# Patient Record
Sex: MALE | Race: WHITE | NOT HISPANIC OR LATINO | URBAN - METROPOLITAN AREA
[De-identification: names, ages, dates, MRNs, and addresses within clinical notes are randomized per-mention and may not be internally consistent; named-entity substitution may affect disease eponyms.]

---

## 2019-01-21 ENCOUNTER — OFFICE (OUTPATIENT)
Dept: URBAN - METROPOLITAN AREA CLINIC 75 | Facility: CLINIC | Age: 69
End: 2019-01-21

## 2019-01-21 VITALS
DIASTOLIC BLOOD PRESSURE: 76 MMHG | WEIGHT: 209 LBS | HEART RATE: 69 BPM | HEIGHT: 66 IN | SYSTOLIC BLOOD PRESSURE: 164 MMHG

## 2019-01-21 DIAGNOSIS — K75.81 NONALCOHOLIC STEATOHEPATITIS (NASH): ICD-10-CM

## 2019-01-21 DIAGNOSIS — Z86.010 PERSONAL HISTORY OF COLONIC POLYPS: ICD-10-CM

## 2019-01-21 PROCEDURE — 99214 OFFICE O/P EST MOD 30 MIN: CPT | Performed by: INTERNAL MEDICINE

## 2020-01-06 ENCOUNTER — OFFICE (OUTPATIENT)
Dept: URBAN - METROPOLITAN AREA CLINIC 75 | Facility: CLINIC | Age: 70
End: 2020-01-06

## 2020-01-06 VITALS
HEIGHT: 66 IN | SYSTOLIC BLOOD PRESSURE: 136 MMHG | WEIGHT: 197 LBS | HEART RATE: 99 BPM | DIASTOLIC BLOOD PRESSURE: 74 MMHG

## 2020-01-06 DIAGNOSIS — K75.81 NONALCOHOLIC STEATOHEPATITIS (NASH): ICD-10-CM

## 2020-01-06 DIAGNOSIS — E11.9 TYPE 2 DIABETES MELLITUS WITHOUT COMPLICATIONS: ICD-10-CM

## 2020-01-06 DIAGNOSIS — Z86.010 PERSONAL HISTORY OF COLONIC POLYPS: ICD-10-CM

## 2020-01-06 DIAGNOSIS — I10 ESSENTIAL (PRIMARY) HYPERTENSION: ICD-10-CM

## 2020-01-06 PROCEDURE — 99214 OFFICE O/P EST MOD 30 MIN: CPT | Performed by: INTERNAL MEDICINE

## 2021-01-13 ENCOUNTER — CONSULT (OUTPATIENT)
Dept: ONCOLOGY | Facility: CLINIC | Age: 71
End: 2021-01-13

## 2021-01-13 ENCOUNTER — LAB (OUTPATIENT)
Dept: LAB | Facility: HOSPITAL | Age: 71
End: 2021-01-13

## 2021-01-13 VITALS
BODY MASS INDEX: 31.67 KG/M2 | HEART RATE: 88 BPM | WEIGHT: 190.1 LBS | RESPIRATION RATE: 16 BRPM | TEMPERATURE: 98.9 F | HEIGHT: 65 IN | OXYGEN SATURATION: 97 %

## 2021-01-13 DIAGNOSIS — D64.9 ANEMIA, UNSPECIFIED TYPE: ICD-10-CM

## 2021-01-13 DIAGNOSIS — D69.6 THROMBOCYTOPENIA (HCC): Primary | ICD-10-CM

## 2021-01-13 DIAGNOSIS — D61.818 PANCYTOPENIA (HCC): ICD-10-CM

## 2021-01-13 DIAGNOSIS — K76.0 FATTY LIVER: ICD-10-CM

## 2021-01-13 LAB
ALBUMIN SERPL-MCNC: 3.7 G/DL (ref 3.5–5.2)
ALBUMIN/GLOB SERPL: 1.1 G/DL (ref 1.1–2.4)
ALP SERPL-CCNC: 132 U/L (ref 38–116)
ALT SERPL W P-5'-P-CCNC: 84 U/L (ref 0–41)
ANION GAP SERPL CALCULATED.3IONS-SCNC: 12.1 MMOL/L (ref 5–15)
AST SERPL-CCNC: 94 U/L (ref 0–40)
BASOPHILS # BLD AUTO: 0.02 10*3/MM3 (ref 0–0.2)
BASOPHILS NFR BLD AUTO: 0.6 % (ref 0–1.5)
BILIRUB SERPL-MCNC: 0.8 MG/DL (ref 0.2–1.2)
BUN SERPL-MCNC: 18 MG/DL (ref 6–20)
BUN/CREAT SERPL: 17 (ref 7.3–30)
CALCIUM SPEC-SCNC: 9.7 MG/DL (ref 8.5–10.2)
CHLORIDE SERPL-SCNC: 100 MMOL/L (ref 98–107)
CO2 SERPL-SCNC: 22.9 MMOL/L (ref 22–29)
CREAT SERPL-MCNC: 1.06 MG/DL (ref 0.7–1.3)
DEPRECATED RDW RBC AUTO: 45 FL (ref 37–54)
EOSINOPHIL # BLD AUTO: 0.22 10*3/MM3 (ref 0–0.4)
EOSINOPHIL NFR BLD AUTO: 6.8 % (ref 0.3–6.2)
ERYTHROCYTE [DISTWIDTH] IN BLOOD BY AUTOMATED COUNT: 13.8 % (ref 12.3–15.4)
FERRITIN SERPL-MCNC: 41.5 NG/ML (ref 30–400)
FOLATE SERPL-MCNC: >20 NG/ML (ref 4.78–24.2)
GFR SERPL CREATININE-BSD FRML MDRD: 69 ML/MIN/1.73
GLOBULIN UR ELPH-MCNC: 3.3 GM/DL (ref 1.8–3.5)
GLUCOSE SERPL-MCNC: 259 MG/DL (ref 74–124)
HAPTOGLOB SERPL-MCNC: 55 MG/DL (ref 30–200)
HCT VFR BLD AUTO: 31.1 % (ref 37.5–51)
HGB BLD-MCNC: 10.9 G/DL (ref 13–17.7)
IMM GRANULOCYTES # BLD AUTO: 0.01 10*3/MM3 (ref 0–0.05)
IMM GRANULOCYTES NFR BLD AUTO: 0.3 % (ref 0–0.5)
IRON 24H UR-MRATE: 48 MCG/DL (ref 59–158)
IRON SATN MFR SERPL: 10 % (ref 14–48)
LDH SERPL-CCNC: 197 U/L (ref 99–259)
LYMPHOCYTES # BLD AUTO: 0.84 10*3/MM3 (ref 0.7–3.1)
LYMPHOCYTES NFR BLD AUTO: 25.8 % (ref 19.6–45.3)
MCH RBC QN AUTO: 31.1 PG (ref 26.6–33)
MCHC RBC AUTO-ENTMCNC: 35 G/DL (ref 31.5–35.7)
MCV RBC AUTO: 88.9 FL (ref 79–97)
MONOCYTES # BLD AUTO: 0.33 10*3/MM3 (ref 0.1–0.9)
MONOCYTES NFR BLD AUTO: 10.2 % (ref 5–12)
NEUTROPHILS NFR BLD AUTO: 1.83 10*3/MM3 (ref 1.7–7)
NEUTROPHILS NFR BLD AUTO: 56.3 % (ref 42.7–76)
NRBC BLD AUTO-RTO: 0 /100 WBC (ref 0–0.2)
PLATELET # BLD AUTO: 48 10*3/MM3 (ref 140–450)
PLATELETS.RETICULATED NFR BLD AUTO: 7 % (ref 0.9–6.5)
PMV BLD AUTO: 12.6 FL (ref 6–12)
POTASSIUM SERPL-SCNC: 3.9 MMOL/L (ref 3.5–4.7)
PROT SERPL-MCNC: 7 G/DL (ref 6.3–8)
RBC # BLD AUTO: 3.5 10*6/MM3 (ref 4.14–5.8)
SODIUM SERPL-SCNC: 135 MMOL/L (ref 134–145)
TIBC SERPL-MCNC: 470 MCG/DL (ref 249–505)
TRANSFERRIN SERPL-MCNC: 336 MG/DL (ref 200–360)
VIT B12 BLD-MCNC: 1413 PG/ML (ref 211–946)
WBC # BLD AUTO: 3.25 10*3/MM3 (ref 3.4–10.8)

## 2021-01-13 PROCEDURE — 84466 ASSAY OF TRANSFERRIN: CPT | Performed by: INTERNAL MEDICINE

## 2021-01-13 PROCEDURE — 82746 ASSAY OF FOLIC ACID SERUM: CPT | Performed by: INTERNAL MEDICINE

## 2021-01-13 PROCEDURE — 85025 COMPLETE CBC W/AUTO DIFF WBC: CPT

## 2021-01-13 PROCEDURE — 83010 ASSAY OF HAPTOGLOBIN QUANT: CPT | Performed by: INTERNAL MEDICINE

## 2021-01-13 PROCEDURE — 82728 ASSAY OF FERRITIN: CPT | Performed by: INTERNAL MEDICINE

## 2021-01-13 PROCEDURE — 80053 COMPREHEN METABOLIC PANEL: CPT | Performed by: INTERNAL MEDICINE

## 2021-01-13 PROCEDURE — 99204 OFFICE O/P NEW MOD 45 MIN: CPT | Performed by: INTERNAL MEDICINE

## 2021-01-13 PROCEDURE — 36415 COLL VENOUS BLD VENIPUNCTURE: CPT

## 2021-01-13 PROCEDURE — 82607 VITAMIN B-12: CPT | Performed by: INTERNAL MEDICINE

## 2021-01-13 PROCEDURE — 83540 ASSAY OF IRON: CPT | Performed by: INTERNAL MEDICINE

## 2021-01-13 PROCEDURE — 83615 LACTATE (LD) (LDH) ENZYME: CPT | Performed by: INTERNAL MEDICINE

## 2021-01-13 PROCEDURE — 85055 RETICULATED PLATELET ASSAY: CPT | Performed by: INTERNAL MEDICINE

## 2021-01-13 RX ORDER — LISINOPRIL AND HYDROCHLOROTHIAZIDE 20; 12.5 MG/1; MG/1
1 TABLET ORAL 2 TIMES DAILY
COMMUNITY
Start: 2020-12-01 | End: 2022-10-01 | Stop reason: HOSPADM

## 2021-01-13 RX ORDER — ALLOPURINOL 100 MG/1
100 TABLET ORAL DAILY
COMMUNITY
Start: 2020-12-21

## 2021-01-13 RX ORDER — TESTOSTERONE 10 MG/.5G
GEL, METERED TOPICAL
COMMUNITY
Start: 2020-12-24 | End: 2022-06-10 | Stop reason: SDDI

## 2021-01-13 RX ORDER — SILDENAFIL 100 MG/1
TABLET, FILM COATED ORAL
COMMUNITY
Start: 2020-11-10 | End: 2022-06-10 | Stop reason: SDDI

## 2021-01-13 NOTE — PROGRESS NOTES
.     REASON FOR CONSULTATION:     Provide an opinion on any further workup or treatment of thrombocytopenia.                                REQUESTING PHYSICIAN: Александр Joseph MD      RECORDS OBTAINED:  Records of the patient's history including those obtained from the referring provider were reviewed and summarized in detail.    HISTORY OF PRESENT ILLNESS:  The patient is a 70 y.o. year old male with history of hypertension diabetes, RODRIGUEZ, and hypogonadism who presented today on 1/13/2021 for initial evaluation because of thrombocytopenia, referred by his primary care physician Dr. Joseph.      Patient reports he has no limitation of activity.  He does have chronic joint pains, but of note joint swelling.  His skin pruritus.  No skin rashes.  He does have easy bruising, however denies evidence of bleeding such as epistaxis, gum bleeding, rectal bleeding or hematuria.  Denies weight loss.  No low fever.  Appetite is reasonable.    I reviewed his outside laboratory studies.  Most recent also lab on 10/22/2020 reported hemoglobin 10.5, MCV 89.6, WBC 3200, including neutrophils 1900, lymphocytes 800 monocytes 300, and platelets 62,000.    Liver study on 9/2/2020 reported WBC 3200, including ANC 1900 lymphocytes 1000 monocytes 300, hemoglobin 10.5, MCV 80.0, and platelets 65,000.    His earliest CBC results available for review was from 5/8/2019 which reported WBC 4200, including ANC 2600, lymphocytes 1200 and monocytes 400, hemoglobin 13.4 and platelets 95,000.  At that time chemistry lab reported elevated liver panel including  , alk phosphatase 87 and a total bilirubin 1.1.  Total serum protein 7.0 albumin 4.1 and the globulin 2.9.  His creatinine was 1.31, with glucose of 219 been in normal electrolytes.  Hemoglobin A1c was 8.3%.      Past Medical History:   Diagnosis Date   • H/O Dental disorder    • History of thrombocytopenia    • Hypertension    • RODRIGUEZ (nonalcoholic steatohepatitis)     • Psoriasis    • Type 2 diabetes mellitus (CMS/HCC)      No past surgical history on file.    MEDICATIONS    Current Outpatient Medications:   •  allopurinol (ZYLOPRIM) 100 MG tablet, , Disp: , Rfl:   •  lisinopril-hydrochlorothiazide (PRINZIDE,ZESTORETIC) 20-12.5 MG per tablet, Take 1 tablet by mouth 2 (Two) Times a Day., Disp: , Rfl:   •  metFORMIN (GLUCOPHAGE) 500 MG tablet, , Disp: , Rfl:   •  sildenafil (VIAGRA) 100 MG tablet, , Disp: , Rfl:   •  Testosterone 10 MG/ACT (2%) gel, APPLY 2 APPLICATORFUL TOPICALLY ONCE DAILY, Disp: , Rfl:     ALLERGIES:   No Known Allergies    SOCIAL HISTORY:         Social History     Socioeconomic History   • Marital status:      Spouse name: Lisa   • Number of children: Not on file   • Years of education: Not on file   • Highest education level: Not on file   Occupational History     Employer: RETIRED   Tobacco Use   • Smoking status: Former Smoker   Substance and Sexual Activity   • Alcohol use: Never     Frequency: Never   As of 1/13/2021, patient reports he does drink alcoholic beverage 10 drinks per week.  Denies illegal drug use.      FAMILY HISTORY:  No cancer in family, mother HTN,  Family History   Problem Relation Age of Onset   • Cerebral aneurysm Other    • Cancer Other        REVIEW OF SYSTEMS:  Review of Systems   Constitutional: Negative for activity change, appetite change, diaphoresis, fatigue and unexpected weight change.   HENT: Negative for congestion, nosebleeds and sore throat.    Eyes: Negative for photophobia and visual disturbance.   Respiratory: Negative for cough and shortness of breath.    Cardiovascular: Negative for chest pain and leg swelling.   Gastrointestinal: Negative for abdominal distention, abdominal pain, blood in stool and nausea.   Endocrine: Negative for cold intolerance.   Genitourinary: Negative for enuresis and hematuria.   Musculoskeletal: Positive for arthralgias.        Leg cramping   Skin: Negative for color change and  "rash.        Pruritus   Allergic/Immunologic: Negative for immunocompromised state.   Neurological: Negative for dizziness, weakness and headaches.   Hematological: Negative for adenopathy. Bruises/bleeds easily.   Psychiatric/Behavioral: Negative for agitation and sleep disturbance.              Vitals:    21 1508   Pulse: 88   Resp: 16   Temp: 98.9 °F (37.2 °C)   SpO2: 97%   Weight: 86.2 kg (190 lb 1.6 oz)   Height: 164 cm (64.57\")   PainSc: 0-No pain     Current Status 2021   ECOG score 0      PHYSICAL EXAM:      CONSTITUTIONAL:  Vital signs reviewed.  Well-developed well-nourished male.  No distress, looks comfortable.   EYES:  Conjunctiva and lids unremarkable.  PERRLA  EARS,NOSE,MOUTH,THROAT: Patient wears mask due to the pandemic coronavirus infection.   RESPIRATORY:  Normal respiratory effort.  Lungs clear to auscultation bilaterally.  CARDIOVASCULAR: Regular rhythm and rate.  Normal S1, S2.  No murmurs.  No significant lower extremity edema.  GASTROINTESTINAL: Abdomen appears unremarkable.  Nontender.  No hepatomegaly.  No splenomegaly.  Bowel sounds normal.  LYMPHATIC:  No cervical, supraclavicular, axillary lymphadenopathy.  MUSCULOSKELETAL:  Unremarkable gait and station.  Unremarkable digits/nails.  No cyanosis or clubbing.  SKIN:  Warm.  No rashes.  PSYCHIATRIC:  Normal judgment and insight.  Normal mood and affect.      RECENT LABS:        WBC   Date Value Ref Range Status   2021 3.25 (L) 3.40 - 10.80 10*3/mm3 Final     Hemoglobin   Date Value Ref Range Status   2021 10.9 (L) 13.0 - 17.7 g/dL Final     Platelets   Date Value Ref Range Status   2021 48 (L) 140 - 450 10*3/mm3 Final     Lab Results   Component Value Date    NEUTROABS 1.83 2021           RADIOLOGY REPORT      FACILITY:  [facility]    UNIT/AGE/GENDER:   OP      AGE:61 Y          SEX:M    PATIENT NAME/:  WINSOME MOSES C    1950    UNIT NUMBER:  [id number]    ACCOUNT NUMBER: "  [id number]    ACCESSION NUMBER:  [id number]      EXAM: ULTRASOUND ABDOMEN, LIMITED EXAM .      DATE: July 20, 2012.      HISTORY: Nonalcoholic steatohepatitis.      FINDINGS: The liver is diffusely echogenic consistent with hepatic steatosis. There are no gallstones seen. There is no evidence for gallbladder wall thickening. The common bile duct measures 4   mm, which is within normal limits. The pancreas is suboptimally visualized. The right kidney is unremarkable.      IMPRESSION: Hepatic steatosis.  No acute abnormalities.      Dictated by: Carl Turcios M.D.      Assessment/Plan   *  Thrombocytopenia   Etiology of the thrombocytopenia is not clear.  However I do think it somehow related to his fatty liver which was documented from abdomen ultrasound on 7/20/2012.  Nevertheless we will obtain laboratory studies with IPF and Vitamin B12 for reevaluation.   (Addendum IPF 7.0%, no apparent compensation for platelets production).     *  Anemia.   Patient has mild anemia.  Discussed with him, will obtain laboratory studies for evaluation to see whether he has hemolysis, or underproduction of erythropoietin, or evidence for deficiency with folate B12 or iron.    * Pancytopenia with mild leukocytopenia.  Patient reports no recurrent infection.  His neutrophils marginally normal at 1830 out of WBC 3250.  Will obtain laboratory studies for vitamin B12, CMP.       PLAN:  1. Laboratory studies today:   - Immature Platelet Fraction    - Folate  - Vitamin B12  - Comprehensive Metabolic Panel  - Lactate Dehydrogenase  - Haptoglobin  - Erythropoietin  - Ferritin  - Iron Profile  2. We will bring patient back for reevaluation in 2 months, will repeat a CBC and IPF.       TERE ANDREWS M.D., Ph.D.        Addendum:  Lab Results   Component Value Date    IRON 48 (L) 01/13/2021    TIBC 470 01/13/2021    FERRITIN 41.50 01/13/2021     Lab Results   Component Value Date    [id number] 1,413 (H) 01/13/2021     Lab Results    Component Value Date    FOLATE >20.00 01/13/2021     Glucose   Date Value Ref Range Status   01/13/2021 259 (H) 74 - 124 mg/dL Final     BUN   Date Value Ref Range Status   01/13/2021 18 6 - 20 mg/dL Final     Creatinine   Date Value Ref Range Status   01/13/2021 1.06 0.70 - 1.30 mg/dL Final     Sodium   Date Value Ref Range Status   01/13/2021 135 134 - 145 mmol/L Final     Potassium   Date Value Ref Range Status   01/13/2021 3.9 3.5 - 4.7 mmol/L Final     Chloride   Date Value Ref Range Status   01/13/2021 100 98 - 107 mmol/L Final     CO2   Date Value Ref Range Status   01/13/2021 22.9 22.0 - 29.0 mmol/L Final     Calcium   Date Value Ref Range Status   01/13/2021 9.7 8.5 - 10.2 mg/dL Final     Total Protein   Date Value Ref Range Status   01/13/2021 7.0 6.3 - 8.0 g/dL Final     Albumin   Date Value Ref Range Status   01/13/2021 3.70 3.50 - 5.20 g/dL Final     ALT (SGPT)   Date Value Ref Range Status   01/13/2021 84 (H) 0 - 41 U/L Final     AST (SGOT)   Date Value Ref Range Status   01/13/2021 94 (C) 0 - 40 U/L Final     Alkaline Phosphatase   Date Value Ref Range Status   01/13/2021 132 (H) 38 - 116 U/L Final     Total Bilirubin   Date Value Ref Range Status   01/13/2021 0.8 0.2 - 1.2 mg/dL Final     eGFR Non  Amer   Date Value Ref Range Status   01/13/2021 69 >60 mL/min/1.73 Final     BUN/Creatinine Ratio   Date Value Ref Range Status   01/13/2021 17.0 7.3 - 30.0 Final     Anion Gap   Date Value Ref Range Status   01/13/2021 12.1 5.0 - 15.0 mmol/L Final     Component      Latest Ref Rng & Units 1/13/2021   IPF      0.90 - 6.50 % 7.00 (H)   LDH      99 - 259 U/L 197   Haptoglobin      30 - 200 mg/dL 55   Erythropoietin      2.6 - 18.5 mIU/mL 16.1       Laboratory study reported no evidence of hemolysis, had a supratherapeutic B12 level and folate.  He does have evidence of iron deficiency with low iron sats 10% and the ferritin only 41.5 ng/mL in the setting of anemia with Hb 10.9.  His erythropoietin  is not elevated accordingly, which indicates lack of response from his kidney to produce erythropoietin.     Currently is not a candidate for CONRAD/Procrit treatment.  However he is adamant he is a candidate for oral iron treatment.    We will start him on ferrous sulfate 325 mg 3 times a day.  Will send prescription to his pharmacy.       TERE ANDREWS M.D., Ph.D.

## 2021-01-14 LAB — EPO SERPL-ACNC: 16.1 MIU/ML (ref 2.6–18.5)

## 2021-01-25 PROBLEM — K76.0 FATTY LIVER: Status: ACTIVE | Noted: 2021-01-25

## 2021-01-25 RX ORDER — FERROUS SULFATE 325(65) MG
325 TABLET ORAL
Qty: 90 TABLET | Refills: 2 | Status: SHIPPED | OUTPATIENT
Start: 2021-01-25

## 2021-01-26 ENCOUNTER — TELEPHONE (OUTPATIENT)
Dept: ONCOLOGY | Facility: CLINIC | Age: 71
End: 2021-01-26

## 2021-01-26 NOTE — TELEPHONE ENCOUNTER
Attempted to contact patient to let him know about his prescription of ferrous sulfate x3 daily sent to his [facility] pharmacy per dr hebert. Patients phone went to  with no option to leave one at this time. Will attempt for second time later today.

## 2021-01-26 NOTE — TELEPHONE ENCOUNTER
Got farrukh of patients wife, she stated that they knew about prescription sent in. I verified frequency with her and told her to have him eat with it because it can cause gi upset and to let us know if they need anything. No further questions.

## 2021-02-18 ENCOUNTER — IMMUNIZATION (OUTPATIENT)
Dept: VACCINE CLINIC | Facility: HOSPITAL | Age: 71
End: 2021-02-18

## 2021-02-18 PROCEDURE — 91300 HC SARSCOV02 VAC 30MCG/0.3ML IM: CPT | Performed by: INTERNAL MEDICINE

## 2021-02-18 PROCEDURE — 0001A: CPT | Performed by: INTERNAL MEDICINE

## 2021-03-11 ENCOUNTER — IMMUNIZATION (OUTPATIENT)
Dept: VACCINE CLINIC | Facility: HOSPITAL | Age: 71
End: 2021-03-11

## 2021-03-11 PROCEDURE — 0002A: CPT | Performed by: INTERNAL MEDICINE

## 2021-03-11 PROCEDURE — 91300 HC SARSCOV02 VAC 30MCG/0.3ML IM: CPT | Performed by: INTERNAL MEDICINE

## 2021-03-19 ENCOUNTER — LAB (OUTPATIENT)
Dept: LAB | Facility: HOSPITAL | Age: 71
End: 2021-03-19

## 2021-03-19 ENCOUNTER — OFFICE VISIT (OUTPATIENT)
Dept: ONCOLOGY | Facility: CLINIC | Age: 71
End: 2021-03-19

## 2021-03-19 VITALS
HEIGHT: 65 IN | TEMPERATURE: 98.2 F | BODY MASS INDEX: 31.31 KG/M2 | OXYGEN SATURATION: 95 % | DIASTOLIC BLOOD PRESSURE: 80 MMHG | HEART RATE: 81 BPM | SYSTOLIC BLOOD PRESSURE: 122 MMHG | WEIGHT: 187.9 LBS | RESPIRATION RATE: 16 BRPM

## 2021-03-19 DIAGNOSIS — D50.9 IRON DEFICIENCY ANEMIA, UNSPECIFIED IRON DEFICIENCY ANEMIA TYPE: ICD-10-CM

## 2021-03-19 DIAGNOSIS — D72.819 LEUKOPENIA, UNSPECIFIED TYPE: ICD-10-CM

## 2021-03-19 DIAGNOSIS — D61.818 PANCYTOPENIA (HCC): ICD-10-CM

## 2021-03-19 DIAGNOSIS — D69.6 THROMBOCYTOPENIA (HCC): ICD-10-CM

## 2021-03-19 DIAGNOSIS — K76.0 FATTY LIVER: ICD-10-CM

## 2021-03-19 DIAGNOSIS — D69.6 THROMBOCYTOPENIA (HCC): Primary | ICD-10-CM

## 2021-03-19 DIAGNOSIS — D64.9 ANEMIA, UNSPECIFIED TYPE: ICD-10-CM

## 2021-03-19 LAB
BASOPHILS # BLD AUTO: 0.03 10*3/MM3 (ref 0–0.2)
BASOPHILS NFR BLD AUTO: 0.8 % (ref 0–1.5)
DEPRECATED RDW RBC AUTO: 43.7 FL (ref 37–54)
EOSINOPHIL # BLD AUTO: 0.21 10*3/MM3 (ref 0–0.4)
EOSINOPHIL NFR BLD AUTO: 5.6 % (ref 0.3–6.2)
ERYTHROCYTE [DISTWIDTH] IN BLOOD BY AUTOMATED COUNT: 13.1 % (ref 12.3–15.4)
HCT VFR BLD AUTO: 32.7 % (ref 37.5–51)
HGB BLD-MCNC: 11.9 G/DL (ref 13–17.7)
IMM GRANULOCYTES # BLD AUTO: 0.03 10*3/MM3 (ref 0–0.05)
IMM GRANULOCYTES NFR BLD AUTO: 0.8 % (ref 0–0.5)
LYMPHOCYTES # BLD AUTO: 0.99 10*3/MM3 (ref 0.7–3.1)
LYMPHOCYTES NFR BLD AUTO: 26.6 % (ref 19.6–45.3)
MCH RBC QN AUTO: 33.4 PG (ref 26.6–33)
MCHC RBC AUTO-ENTMCNC: 36.4 G/DL (ref 31.5–35.7)
MCV RBC AUTO: 91.9 FL (ref 79–97)
MONOCYTES # BLD AUTO: 0.36 10*3/MM3 (ref 0.1–0.9)
MONOCYTES NFR BLD AUTO: 9.7 % (ref 5–12)
NEUTROPHILS NFR BLD AUTO: 2.1 10*3/MM3 (ref 1.7–7)
NEUTROPHILS NFR BLD AUTO: 56.5 % (ref 42.7–76)
NRBC BLD AUTO-RTO: 0 /100 WBC (ref 0–0.2)
PLATELET # BLD AUTO: 50 10*3/MM3 (ref 140–450)
PLATELETS.RETICULATED NFR BLD AUTO: 8.3 % (ref 0.9–6.5)
PMV BLD AUTO: 12.5 FL (ref 6–12)
RBC # BLD AUTO: 3.56 10*6/MM3 (ref 4.14–5.8)
WBC # BLD AUTO: 3.72 10*3/MM3 (ref 3.4–10.8)

## 2021-03-19 PROCEDURE — 99213 OFFICE O/P EST LOW 20 MIN: CPT | Performed by: INTERNAL MEDICINE

## 2021-03-19 PROCEDURE — 36415 COLL VENOUS BLD VENIPUNCTURE: CPT

## 2021-03-19 PROCEDURE — 85025 COMPLETE CBC W/AUTO DIFF WBC: CPT

## 2021-03-19 PROCEDURE — 85055 RETICULATED PLATELET ASSAY: CPT

## 2021-03-19 NOTE — PROGRESS NOTES
.     REASON FOR FOLLOWUP:     * Thrombocytopenia.   *  Iron deficiency anemia discovered on 1/13/2021.  Patient was started on oral iron 3 times a day.  *  Mild leukocytopenia and low normal neutrophils      HISTORY OF PRESENT ILLNESS:  The patient is a 70 y.o. year old male with history of hypertension diabetes, RODRIGUEZ, and hypogonadism who presented today for 2 months follow-up evaluation to assess response to oral iron treatment.    Patient reports that he had 2 doses of covid 19 vaccine, has very limited side effects with some soreness at the site of injection.    Patient otherwise is at baseline condition.  Patient reports he has no limitation of activity.  He does have chronic joint pains, but no joint swelling.  Has skin pruritus.  No skin rashes.  He does have easy bruising, however denies evidence of bleeding such as epistaxis, gum bleeding, rectal bleeding or hematuria.  Denies weight loss.  No low fever.  Appetite is reasonable.    Patient has been tolerating oral iron 3 times a day with no nausea vomiting no significant constipation.    Laboratory study today showed a slightly improved hemoglobin 11.9, with stable thrombocytopenia platelets 50,000 IPF 8.3%, and WBC 3720 including ANC 2100.      Past Medical History:   Diagnosis Date   • H/O Dental disorder    • History of thrombocytopenia    • Hypertension    • RODRIGUEZ (nonalcoholic steatohepatitis)    • Psoriasis    • Type 2 diabetes mellitus (CMS/HCC)      Past Surgical History:   Procedure Laterality Date   • COLONOSCOPY  11/2015   • ROTATOR CUFF REPAIR Left 2005     HEMATOLOGY HISTORY:  The patient is a 70 y.o. year old male with history of hypertension diabetes, RODRIGUEZ, and hypogonadism who presented today on 1/13/2021 for initial evaluation because of thrombocytopenia, referred by his primary care physician Dr. Joseph.      Patient reports he has no limitation of activity.  He does have chronic joint pains, but of note joint swelling.  His skin  pruritus.  No skin rashes.  He does have easy bruising, however denies evidence of bleeding such as epistaxis, gum bleeding, rectal bleeding or hematuria.  Denies weight loss.  No low fever.  Appetite is reasonable.    I reviewed his outside laboratory studies.  Most recent also lab on 10/22/2020 reported hemoglobin 10.5, MCV 89.6, WBC 3200, including neutrophils 1900, lymphocytes 800 monocytes 300, and platelets 62,000.    Liver study on 9/2/2020 reported WBC 3200, including ANC 1900 lymphocytes 1000 monocytes 300, hemoglobin 10.5, MCV 80.0, and platelets 65,000.    His earliest CBC results available for review was from 5/8/2019 which reported WBC 4200, including ANC 2600, lymphocytes 1200 and monocytes 400, hemoglobin 13.4 and platelets 95,000.  At that time chemistry lab reported elevated liver panel including  , alk phosphatase 87 and a total bilirubin 1.1.  Total serum protein 7.0 albumin 4.1 and the globulin 2.9.  His creatinine was 1.31, with glucose of 219 been in normal electrolytes.  Hemoglobin A1c was 8.3%.      Laboratory studies on 1/13/2021 reported mild anemia with Hb 10.9, thrombocytopenia platelets 48,000, and IPF 7.0%, leukocytopenia WBC 3250 including ANC 1830.  Other labs reported no evidence of hemolysis, had supratherapeutic B12 level 1413 pg/mL and folate > 20 ng/mL.  He does have evidence of iron deficiency with low iron sats 10% and ferritin only 41.5 ng/mL in the setting of anemia with Hb 10.9.  His erythropoietin is not elevated accordingly, which indicates lack of response from his kidney to produce erythropoietin.     Currently is not a candidate for CONRAD/Procrit treatment.  However he is adamant he is a candidate for oral iron treatment.    We will start him on ferrous sulfate 325 mg 3 times a day.  Will send prescription to his pharmacy.     MEDICATIONS    Current Outpatient Medications:   •  allopurinol (ZYLOPRIM) 100 MG tablet, , Disp: , Rfl:   •  ferrous sulfate 325 (65  FE) MG tablet, Take 1 tablet by mouth 3 (Three) Times a Day With Meals., Disp: 90 tablet, Rfl: 2  •  lisinopril-hydrochlorothiazide (PRINZIDE,ZESTORETIC) 20-12.5 MG per tablet, Take 1 tablet by mouth 2 (Two) Times a Day., Disp: , Rfl:   •  metFORMIN (GLUCOPHAGE) 500 MG tablet, , Disp: , Rfl:   •  sildenafil (VIAGRA) 100 MG tablet, , Disp: , Rfl:   •  Testosterone 10 MG/ACT (2%) gel, APPLY 2 APPLICATORFUL TOPICALLY ONCE DAILY, Disp: , Rfl:     ALLERGIES:   No Known Allergies    SOCIAL HISTORY:         Social History     Socioeconomic History   • Marital status:      Spouse name: Lisa   • Number of children: Not on file   • Years of education: Not on file   • Highest education level: Not on file   Tobacco Use   • Smoking status: Former Smoker     Quit date:      Years since quittin.2   Substance and Sexual Activity   • Alcohol use: Never   As of 2021, patient reports he does drink alcoholic beverage 10 drinks per week.  Denies illegal drug use.      FAMILY HISTORY:  No cancer in family, mother HTN,  Family History   Problem Relation Age of Onset   • Cerebral aneurysm Other    • Cancer Other        REVIEW OF SYSTEMS:  Review of Systems   Constitutional: Negative for activity change, appetite change, diaphoresis, fatigue, fever and unexpected weight change.   HENT: Negative for nosebleeds and sore throat.    Eyes: Negative for photophobia and visual disturbance.   Respiratory: Negative for cough and shortness of breath.    Cardiovascular: Negative for chest pain and leg swelling.   Gastrointestinal: Negative for abdominal pain, blood in stool and nausea.   Endocrine: Negative for cold intolerance and heat intolerance.   Genitourinary: Negative for dysuria and hematuria.   Musculoskeletal: Positive for arthralgias. Negative for joint swelling.        Leg cramping   Skin: Negative for color change, pallor and rash.        Pruritus   Allergic/Immunologic: Negative for immunocompromised state.  "  Neurological: Negative for dizziness and headaches.   Hematological: Negative for adenopathy. Bruises/bleeds easily.   Psychiatric/Behavioral: Negative for agitation and sleep disturbance.          Vitals:    03/19/21 0953   BP: 122/80   Pulse: 81   Resp: 16   Temp: 98.2 °F (36.8 °C)   SpO2: 95%   Weight: 85.2 kg (187 lb 14.4 oz)   Height: 164 cm (64.57\")   PainSc: 0-No pain     Current Status 3/19/2021   ECOG score 0      PHYSICAL EXAM:    GENERAL:  Well-developed, well-nourished in no acute distress.   SKIN:  Warm, dry without rashes, purpura or petechiae.  HEAD:  Normocephalic.  EYES:  Pupils equal, round.  Conjunctivae normal.  NOSE:  Patient wears mask due to the pandemic coronavirus infection.   NECK:  Supple; no thyromegaly or masses.  LYMPHATICS:  No cervical, supraclavicular adenopathy.  CHEST: Normal respiratory effort.  Lungs clear to auscultation. Good airflow.  CARDIAC:  Regular rate and rhythm.  Patient has systolic murmur grade 3/6.  Normal S1,S2.  ABDOMEN:  Soft, nontender with no organomegaly or masses.  Bowel sounds normal.  EXTREMITIES:  No clubbing, cyanosis or edema.  NEUROLOGICAL:  Cranial Nerves II-XII grossly intact.    PSYCHIATRIC:  Normal affect and mood.       RECENT LABS:        WBC   Date Value Ref Range Status   03/19/2021 3.72 3.40 - 10.80 10*3/mm3 Final   01/13/2021 3.25 (L) 3.40 - 10.80 10*3/mm3 Final     Hemoglobin   Date Value Ref Range Status   03/19/2021 11.9 (L) 13.0 - 17.7 g/dL Final   01/13/2021 10.9 (L) 13.0 - 17.7 g/dL Final     Platelets   Date Value Ref Range Status   03/19/2021 50 (L) 140 - 450 10*3/mm3 Final   01/13/2021 48 (L) 140 - 450 10*3/mm3 Final     Lab Results   Component Value Date    NEUTROABS 2.10 03/19/2021     Lab Results   Component Value Date    IRON 48 (L) 01/13/2021    TIBC 470 01/13/2021    FERRITIN 41.50 01/13/2021   Iron saturation 10% on 1/13/2021.    Lab Results   Component Value Date    FOLATE >20.00 01/13/2021     Lab Results   Component Value " Date    [id number] 1,413 (H) 01/13/2021         Assessment/Plan   *  Thrombocytopenia   · Etiology of the thrombocytopenia is not clear.  However I do think it is likely related to his fatty liver which was documented from abdomen ultrasound on 7/20/2012.    · Laboratory studies on 1/13/2021 reported excellent vitamin B12 level.  He had marginally elevated IPF 7.0%.  no apparent compensation for platelets production.     *  Anemia.   · Laboratory study on 1/13/2021 reported no evidence of hemolysis, had supratherapeutic B12 level and folate.    · He does have evidence of iron deficiency with low iron sats 10% and ferritin only 41.5 ng/mL in the setting of anemia with Hb 10.9.    · His erythropoietin is not elevated accordingly, which indicates lack of response from his kidney to produce erythropoietin. Currently is not a candidate for CONRAD/Procrit treatment.    · We started patient on oral ferrous sulfate 325 mg 3 times a day in January 2021.   · Today 3/19/2021, patient has improved hemoglobin 11.9.  Continue to monitor.    * Pancytopenia with mild leukocytopenia.  This is also likely related to his RODRIGUEZ.  · Patient reports no recurrent infection.  His neutrophils marginally normal at 1830 out of WBC 3250.  · Patient had supratherapeutic B12 level 1/13/2021.  · Marginally better WBC 3720 including ANC 2100 3/19/2021.  Continue to monitor.        PLAN:  1. Continue oral iron 3 times a day.    2. We will bring patient back for reevaluation in 3 months, will repeat CBC, IPF and ferritin with iron profile.    TERE ANDREWS M.D., Ph.D.    3/19/2021    CC:  Александр Joseph MD

## 2021-03-21 PROBLEM — D72.819 LEUKOCYTOPENIA: Status: ACTIVE | Noted: 2021-03-21

## 2021-06-11 ENCOUNTER — OFFICE VISIT (OUTPATIENT)
Dept: ONCOLOGY | Facility: CLINIC | Age: 71
End: 2021-06-11

## 2021-06-11 ENCOUNTER — LAB (OUTPATIENT)
Dept: LAB | Facility: HOSPITAL | Age: 71
End: 2021-06-11

## 2021-06-11 VITALS
BODY MASS INDEX: 30.82 KG/M2 | SYSTOLIC BLOOD PRESSURE: 130 MMHG | RESPIRATION RATE: 14 BRPM | WEIGHT: 185 LBS | HEART RATE: 93 BPM | TEMPERATURE: 98.4 F | HEIGHT: 65 IN | DIASTOLIC BLOOD PRESSURE: 74 MMHG | OXYGEN SATURATION: 97 %

## 2021-06-11 DIAGNOSIS — D72.819 LEUKOPENIA, UNSPECIFIED TYPE: ICD-10-CM

## 2021-06-11 DIAGNOSIS — D50.9 IRON DEFICIENCY ANEMIA, UNSPECIFIED IRON DEFICIENCY ANEMIA TYPE: Primary | ICD-10-CM

## 2021-06-11 DIAGNOSIS — D61.818 PANCYTOPENIA (HCC): ICD-10-CM

## 2021-06-11 DIAGNOSIS — D69.6 THROMBOCYTOPENIA (HCC): ICD-10-CM

## 2021-06-11 DIAGNOSIS — D50.9 IRON DEFICIENCY ANEMIA, UNSPECIFIED IRON DEFICIENCY ANEMIA TYPE: ICD-10-CM

## 2021-06-11 LAB
ALBUMIN SERPL-MCNC: 3.7 G/DL (ref 3.5–5.2)
ALBUMIN/GLOB SERPL: 1.2 G/DL (ref 1.1–2.4)
ALP SERPL-CCNC: 141 U/L (ref 38–116)
ALT SERPL W P-5'-P-CCNC: 104 U/L (ref 0–41)
ANION GAP SERPL CALCULATED.3IONS-SCNC: 11.5 MMOL/L (ref 5–15)
AST SERPL-CCNC: 126 U/L (ref 0–40)
BASOPHILS # BLD AUTO: 0.03 10*3/MM3 (ref 0–0.2)
BASOPHILS NFR BLD AUTO: 0.8 % (ref 0–1.5)
BILIRUB SERPL-MCNC: 0.8 MG/DL (ref 0.2–1.2)
BUN SERPL-MCNC: 18 MG/DL (ref 6–20)
BUN/CREAT SERPL: 17.5 (ref 7.3–30)
CALCIUM SPEC-SCNC: 9.3 MG/DL (ref 8.5–10.2)
CHLORIDE SERPL-SCNC: 102 MMOL/L (ref 98–107)
CO2 SERPL-SCNC: 21.5 MMOL/L (ref 22–29)
CREAT SERPL-MCNC: 1.03 MG/DL (ref 0.7–1.3)
DEPRECATED RDW RBC AUTO: 46.4 FL (ref 37–54)
EOSINOPHIL # BLD AUTO: 0.23 10*3/MM3 (ref 0–0.4)
EOSINOPHIL NFR BLD AUTO: 6.5 % (ref 0.3–6.2)
ERYTHROCYTE [DISTWIDTH] IN BLOOD BY AUTOMATED COUNT: 13.2 % (ref 12.3–15.4)
FERRITIN SERPL-MCNC: 115.3 NG/ML (ref 30–400)
GFR SERPL CREATININE-BSD FRML MDRD: 71 ML/MIN/1.73
GLOBULIN UR ELPH-MCNC: 3.2 GM/DL (ref 1.8–3.5)
GLUCOSE SERPL-MCNC: 156 MG/DL (ref 74–124)
HCT VFR BLD AUTO: 32.3 % (ref 37.5–51)
HGB BLD-MCNC: 11 G/DL (ref 13–17.7)
IMM GRANULOCYTES # BLD AUTO: 0.01 10*3/MM3 (ref 0–0.05)
IMM GRANULOCYTES NFR BLD AUTO: 0.3 % (ref 0–0.5)
IRON 24H UR-MRATE: 55 MCG/DL (ref 59–158)
IRON SATN MFR SERPL: 14 % (ref 14–48)
LYMPHOCYTES # BLD AUTO: 0.68 10*3/MM3 (ref 0.7–3.1)
LYMPHOCYTES NFR BLD AUTO: 19.3 % (ref 19.6–45.3)
MCH RBC QN AUTO: 32.5 PG (ref 26.6–33)
MCHC RBC AUTO-ENTMCNC: 34.1 G/DL (ref 31.5–35.7)
MCV RBC AUTO: 95.6 FL (ref 79–97)
MONOCYTES # BLD AUTO: 0.4 10*3/MM3 (ref 0.1–0.9)
MONOCYTES NFR BLD AUTO: 11.3 % (ref 5–12)
NEUTROPHILS NFR BLD AUTO: 2.18 10*3/MM3 (ref 1.7–7)
NEUTROPHILS NFR BLD AUTO: 61.8 % (ref 42.7–76)
NRBC BLD AUTO-RTO: 0 /100 WBC (ref 0–0.2)
PLATELET # BLD AUTO: 63 10*3/MM3 (ref 140–450)
PLATELETS.RETICULATED NFR BLD AUTO: 5.3 % (ref 0.9–6.5)
PMV BLD AUTO: 10.7 FL (ref 6–12)
POTASSIUM SERPL-SCNC: 4.3 MMOL/L (ref 3.5–4.7)
PROT SERPL-MCNC: 6.9 G/DL (ref 6.3–8)
RBC # BLD AUTO: 3.38 10*6/MM3 (ref 4.14–5.8)
SODIUM SERPL-SCNC: 135 MMOL/L (ref 134–145)
TIBC SERPL-MCNC: 384 MCG/DL (ref 249–505)
TRANSFERRIN SERPL-MCNC: 274 MG/DL (ref 200–360)
WBC # BLD AUTO: 3.53 10*3/MM3 (ref 3.4–10.8)

## 2021-06-11 PROCEDURE — 80053 COMPREHEN METABOLIC PANEL: CPT

## 2021-06-11 PROCEDURE — 84466 ASSAY OF TRANSFERRIN: CPT

## 2021-06-11 PROCEDURE — 99213 OFFICE O/P EST LOW 20 MIN: CPT | Performed by: INTERNAL MEDICINE

## 2021-06-11 PROCEDURE — 85025 COMPLETE CBC W/AUTO DIFF WBC: CPT

## 2021-06-11 PROCEDURE — 85055 RETICULATED PLATELET ASSAY: CPT

## 2021-06-11 PROCEDURE — 36415 COLL VENOUS BLD VENIPUNCTURE: CPT

## 2021-06-11 PROCEDURE — 82728 ASSAY OF FERRITIN: CPT

## 2021-06-11 PROCEDURE — 83540 ASSAY OF IRON: CPT

## 2021-06-11 NOTE — PROGRESS NOTES
.     REASON FOR FOLLOWUP:     * Thrombocytopenia.   *  Iron deficiency anemia discovered on 1/13/2021.  Patient was started on oral iron 3 times a day.  *  Mild leukocytopenia and low normal neutrophils      HISTORY OF PRESENT ILLNESS:  The patient is a 70 y.o. year old male with history of hypertension diabetes, RODRIGUEZ, and hypogonadism who presented today for 2 months follow-up evaluation to assess response to oral iron treatment.    Patient reports that he had 2 doses of covid 19 vaccine, has very limited side effects with some soreness at the site of injection.    Patient otherwise is at baseline condition.  Patient reports he has no limitation of activity.  He does have chronic joint pains, but no joint swelling.  Has skin pruritus.  No skin rashes.  He does have easy bruising, however denies evidence of bleeding such as epistaxis, gum bleeding, rectal bleeding or hematuria.  Denies weight loss.  No low fever.  Appetite is reasonable.    Patient has been tolerating oral iron 3 times a day with no nausea vomiting no significant constipation.    Laboratory study today showed a slightly improved hemoglobin 11.9, with stable thrombocytopenia platelets 50,000 IPF 8.3%, and WBC 3720 including ANC 2100.      Past Medical History:   Diagnosis Date   • H/O Dental disorder    • History of thrombocytopenia    • Hypertension    • RODRIGUEZ (nonalcoholic steatohepatitis)    • Psoriasis    • Type 2 diabetes mellitus (CMS/HCC)      Past Surgical History:   Procedure Laterality Date   • COLONOSCOPY  11/2015   • ROTATOR CUFF REPAIR Left 2005     HEMATOLOGY HISTORY:  The patient is a 70 y.o. year old male with history of hypertension diabetes, RODRIGUEZ, and hypogonadism who presented today on 1/13/2021 for initial evaluation because of thrombocytopenia, referred by his primary care physician Dr. Joseph.      Patient reports he has no limitation of activity.  He does have chronic joint pains, but of note joint swelling.  His skin  pruritus.  No skin rashes.  He does have easy bruising, however denies evidence of bleeding such as epistaxis, gum bleeding, rectal bleeding or hematuria.  Denies weight loss.  No low fever.  Appetite is reasonable.    I reviewed his outside laboratory studies.  Most recent also lab on 10/22/2020 reported hemoglobin 10.5, MCV 89.6, WBC 3200, including neutrophils 1900, lymphocytes 800 monocytes 300, and platelets 62,000.    Liver study on 9/2/2020 reported WBC 3200, including ANC 1900 lymphocytes 1000 monocytes 300, hemoglobin 10.5, MCV 80.0, and platelets 65,000.    His earliest CBC results available for review was from 5/8/2019 which reported WBC 4200, including ANC 2600, lymphocytes 1200 and monocytes 400, hemoglobin 13.4 and platelets 95,000.  At that time chemistry lab reported elevated liver panel including  , alk phosphatase 87 and a total bilirubin 1.1.  Total serum protein 7.0 albumin 4.1 and the globulin 2.9.  His creatinine was 1.31, with glucose of 219 been in normal electrolytes.  Hemoglobin A1c was 8.3%.      Laboratory studies on 1/13/2021 reported mild anemia with Hb 10.9, thrombocytopenia platelets 48,000, and IPF 7.0%, leukocytopenia WBC 3250 including ANC 1830.  Other labs reported no evidence of hemolysis, had supratherapeutic B12 level 1413 pg/mL and folate > 20 ng/mL.  He does have evidence of iron deficiency with low iron sats 10% and ferritin only 41.5 ng/mL in the setting of anemia with Hb 10.9.  His erythropoietin is not elevated accordingly, which indicates lack of response from his kidney to produce erythropoietin.     Currently is not a candidate for CONRAD/Procrit treatment.  However he is adamant he is a candidate for oral iron treatment.    We will start him on ferrous sulfate 325 mg 3 times a day.  Will send prescription to his pharmacy.     MEDICATIONS    Current Outpatient Medications:   •  allopurinol (ZYLOPRIM) 100 MG tablet, , Disp: , Rfl:   •  ferrous sulfate 325 (65  FE) MG tablet, Take 1 tablet by mouth 3 (Three) Times a Day With Meals., Disp: 90 tablet, Rfl: 2  •  lisinopril-hydrochlorothiazide (PRINZIDE,ZESTORETIC) 20-12.5 MG per tablet, Take 1 tablet by mouth 2 (Two) Times a Day., Disp: , Rfl:   •  metFORMIN (GLUCOPHAGE) 500 MG tablet, , Disp: , Rfl:   •  sildenafil (VIAGRA) 100 MG tablet, , Disp: , Rfl:   •  Testosterone 10 MG/ACT (2%) gel, APPLY 2 APPLICATORFUL TOPICALLY ONCE DAILY, Disp: , Rfl:     ALLERGIES:     Allergies   Allergen Reactions   • Doxycycline Itching       SOCIAL HISTORY:         Social History     Socioeconomic History   • Marital status:      Spouse name: Lisa   • Number of children: Not on file   • Years of education: Not on file   • Highest education level: Not on file   Tobacco Use   • Smoking status: Former Smoker     Quit date:      Years since quittin.4   Substance and Sexual Activity   • Alcohol use: Never   As of 2021, patient reports he does drink alcoholic beverage 10 drinks per week.  Denies illegal drug use.      FAMILY HISTORY:  No cancer in family, mother HTN,  Family History   Problem Relation Age of Onset   • Cerebral aneurysm Other    • Cancer Other        REVIEW OF SYSTEMS:  Review of Systems   Constitutional: Negative for activity change, appetite change, diaphoresis, fatigue, fever and unexpected weight change.   HENT: Negative for nosebleeds and sore throat.    Eyes: Negative for photophobia and visual disturbance.   Respiratory: Negative for cough and shortness of breath.    Cardiovascular: Negative for chest pain and leg swelling.   Gastrointestinal: Negative for abdominal pain, blood in stool and nausea.   Endocrine: Negative for cold intolerance and heat intolerance.   Genitourinary: Negative for dysuria and hematuria.   Musculoskeletal: Positive for arthralgias. Negative for joint swelling.        Leg cramping   Skin: Negative for color change, pallor and rash.        Pruritus   Allergic/Immunologic:  "Negative for immunocompromised state.   Neurological: Negative for dizziness and headaches.   Hematological: Negative for adenopathy. Bruises/bleeds easily.   Psychiatric/Behavioral: Negative for agitation and sleep disturbance.          Vitals:    06/11/21 0937   BP: 130/74   Pulse: 93   Resp: 14   Temp: 98.4 °F (36.9 °C)   TempSrc: Infrared   SpO2: 97%   Weight: 83.9 kg (185 lb)   Height: 164 cm (64.57\")   PainSc: 0-No pain     Current Status 6/11/2021   ECOG score 0      PHYSICAL EXAM:    GENERAL:  Well-developed, well-nourished male in no acute distress.  Orientated to time place and the people.  SKIN:  Warm, dry without rashes, purpura or petechiae.  HEENT:  Normocephalic.  Wearing mask.   LYMPHATICS:  No cervical, supraclavicular adenopathy.  CHEST: Normal respiratory effort.  Lungs clear to auscultation. Good airflow.  CARDIAC:  Regular rate and rhythm.  Patient has systolic murmur 3/6 grade.  Normal S1,S2.  ABDOMEN:  Soft, nontender with no organomegaly or masses.  Bowel sounds normal.  EXTREMITIES:  No clubbing, cyanosis or edema.  NEUROLOGICAL:  Grossly intact.    PSYCHIATRIC:  Normal affect and mood.      RECENT LABS:  Lab Results   Component Value Date    WBC 3.53 06/11/2021    HGB 11.0 (L) 06/11/2021    HCT 32.3 (L) 06/11/2021    MCV 95.6 06/11/2021    PLT 63 (L) 06/11/2021     Lab Results   Component Value Date    NEUTROABS 2.18 06/11/2021     Lab Results   Component Value Date    IRON 55 (L) 06/11/2021    TIBC 384 06/11/2021    FERRITIN 115.30 06/11/2021   Iron saturation 14% on 6/11/2021.    Lab Results   Component Value Date    FOLATE >20.00 01/13/2021     Lab Results   Component Value Date    PHPQDVSI29 1,413 (H) 01/13/2021         Assessment/Plan   *  Thrombocytopenia   · Etiology of the thrombocytopenia is not clear.  However I do think it is likely related to his fatty liver which was documented from abdomen ultrasound on 7/20/2012.    · Laboratory studies on 1/13/2021 reported excellent vitamin " B12 level.  He had marginally elevated IPF 7.0%.  no apparent compensation for platelets production.   · On 6/11/2021, platelets improved at 63,000 with normal IPF 5.3%.  Patient is asymptomatic.  Continue to monitor.    *Iron deficiency anemia.   · Laboratory study on 1/13/2021 reported no evidence of hemolysis, had supratherapeutic B12 level and folate.    · He does have evidence of iron deficiency with low iron sats 10% and ferritin only 41.5 ng/mL with Hb 10.9.    · His erythropoietin 16.1, is not elevated accordingly, which indicates lack of response from his kidney to produce erythropoietin. Currently is not a candidate for CONRAD/Procrit treatment.    · We started patient on oral ferrous sulfate 325 mg 3 times a day in January 2021.   · Today 3/19/2021, patient has improved hemoglobin 11.9.  Continue to monitor.  · 6/11/2021, stable anemia Hb 11.0, however much improved with ferritin 115.3 ng/mL, and iron saturation 14%.  Will advised to continue oral iron treatment.    * Pancytopenia with mild leukocytopenia.  This is also likely related to his RODRIGUEZ.  · Patient reports no recurrent infection.  His neutrophils marginally normal at 1830 out of WBC 3250.  · Patient had supratherapeutic B12 level 1/13/2021.  · Marginally better WBC 3720 including ANC 2100 3/19/2021.    · 6/11/2021 WBC 3530, with ANC 2180.  No recurrent infection.  Continue to monitor.        PLAN:  1. Continue oral iron 3 times a day.    2. We will bring patient back for reevaluation in 6 months, will repeat CBC, IPF, CMP and ferritin plus iron profile.      TERE ANDREWS M.D., Ph.D.    6/11/2021    CC:  MD Everardo Cuello M.D.

## 2021-10-08 ENCOUNTER — OFFICE (OUTPATIENT)
Dept: URBAN - METROPOLITAN AREA CLINIC 75 | Facility: CLINIC | Age: 71
End: 2021-10-08

## 2021-10-08 VITALS
HEART RATE: 90 BPM | DIASTOLIC BLOOD PRESSURE: 80 MMHG | OXYGEN SATURATION: 100 % | WEIGHT: 197 LBS | HEIGHT: 66 IN | SYSTOLIC BLOOD PRESSURE: 182 MMHG

## 2021-10-08 DIAGNOSIS — K75.81 NONALCOHOLIC STEATOHEPATITIS (NASH): ICD-10-CM

## 2021-10-08 DIAGNOSIS — D50.9 IRON DEFICIENCY ANEMIA, UNSPECIFIED: ICD-10-CM

## 2021-10-08 DIAGNOSIS — R19.7 DIARRHEA, UNSPECIFIED: ICD-10-CM

## 2021-10-08 PROCEDURE — 99214 OFFICE O/P EST MOD 30 MIN: CPT | Performed by: INTERNAL MEDICINE

## 2021-12-17 ENCOUNTER — OFFICE VISIT (OUTPATIENT)
Dept: ONCOLOGY | Facility: CLINIC | Age: 71
End: 2021-12-17

## 2021-12-17 ENCOUNTER — LAB (OUTPATIENT)
Dept: LAB | Facility: HOSPITAL | Age: 71
End: 2021-12-17

## 2021-12-17 VITALS
OXYGEN SATURATION: 98 % | BODY MASS INDEX: 32.47 KG/M2 | DIASTOLIC BLOOD PRESSURE: 74 MMHG | HEIGHT: 65 IN | TEMPERATURE: 99.5 F | HEART RATE: 91 BPM | SYSTOLIC BLOOD PRESSURE: 168 MMHG | RESPIRATION RATE: 18 BRPM | WEIGHT: 194.9 LBS

## 2021-12-17 DIAGNOSIS — D69.6 THROMBOCYTOPENIA (HCC): ICD-10-CM

## 2021-12-17 DIAGNOSIS — D50.9 IRON DEFICIENCY ANEMIA, UNSPECIFIED IRON DEFICIENCY ANEMIA TYPE: ICD-10-CM

## 2021-12-17 DIAGNOSIS — D61.818 PANCYTOPENIA (HCC): ICD-10-CM

## 2021-12-17 DIAGNOSIS — D50.9 IRON DEFICIENCY ANEMIA, UNSPECIFIED IRON DEFICIENCY ANEMIA TYPE: Primary | ICD-10-CM

## 2021-12-17 DIAGNOSIS — D64.9 ANEMIA, UNSPECIFIED TYPE: ICD-10-CM

## 2021-12-17 LAB
ALBUMIN SERPL-MCNC: 3.6 G/DL (ref 3.5–5.2)
ALBUMIN/GLOB SERPL: 1.2 G/DL (ref 1.1–2.4)
ALP SERPL-CCNC: 144 U/L (ref 38–116)
ALT SERPL W P-5'-P-CCNC: 54 U/L (ref 0–41)
ANION GAP SERPL CALCULATED.3IONS-SCNC: 10.1 MMOL/L (ref 5–15)
AST SERPL-CCNC: 66 U/L (ref 0–40)
BASOPHILS # BLD AUTO: 0.02 10*3/MM3 (ref 0–0.2)
BASOPHILS NFR BLD AUTO: 0.5 % (ref 0–1.5)
BILIRUB SERPL-MCNC: 1.2 MG/DL (ref 0.2–1.2)
BUN SERPL-MCNC: 15 MG/DL (ref 6–20)
BUN/CREAT SERPL: 14.7 (ref 7.3–30)
CALCIUM SPEC-SCNC: 9.7 MG/DL (ref 8.5–10.2)
CHLORIDE SERPL-SCNC: 105 MMOL/L (ref 98–107)
CO2 SERPL-SCNC: 23.9 MMOL/L (ref 22–29)
CREAT SERPL-MCNC: 1.02 MG/DL (ref 0.7–1.3)
DEPRECATED RDW RBC AUTO: 44 FL (ref 37–54)
EOSINOPHIL # BLD AUTO: 0.26 10*3/MM3 (ref 0–0.4)
EOSINOPHIL NFR BLD AUTO: 6.9 % (ref 0.3–6.2)
ERYTHROCYTE [DISTWIDTH] IN BLOOD BY AUTOMATED COUNT: 12.8 % (ref 12.3–15.4)
FERRITIN SERPL-MCNC: 86.1 NG/ML (ref 30–400)
GFR SERPL CREATININE-BSD FRML MDRD: 72 ML/MIN/1.73
GLOBULIN UR ELPH-MCNC: 3 GM/DL (ref 1.8–3.5)
GLUCOSE SERPL-MCNC: 147 MG/DL (ref 74–124)
HCT VFR BLD AUTO: 31.8 % (ref 37.5–51)
HGB BLD-MCNC: 10.8 G/DL (ref 13–17.7)
IMM GRANULOCYTES # BLD AUTO: 0.01 10*3/MM3 (ref 0–0.05)
IMM GRANULOCYTES NFR BLD AUTO: 0.3 % (ref 0–0.5)
IRON 24H UR-MRATE: 126 MCG/DL (ref 59–158)
IRON SATN MFR SERPL: 34 % (ref 14–48)
LYMPHOCYTES # BLD AUTO: 1.04 10*3/MM3 (ref 0.7–3.1)
LYMPHOCYTES NFR BLD AUTO: 27.5 % (ref 19.6–45.3)
MCH RBC QN AUTO: 32 PG (ref 26.6–33)
MCHC RBC AUTO-ENTMCNC: 34 G/DL (ref 31.5–35.7)
MCV RBC AUTO: 94.1 FL (ref 79–97)
MONOCYTES # BLD AUTO: 0.39 10*3/MM3 (ref 0.1–0.9)
MONOCYTES NFR BLD AUTO: 10.3 % (ref 5–12)
NEUTROPHILS NFR BLD AUTO: 2.06 10*3/MM3 (ref 1.7–7)
NEUTROPHILS NFR BLD AUTO: 54.5 % (ref 42.7–76)
NRBC BLD AUTO-RTO: 0 /100 WBC (ref 0–0.2)
PLATELET # BLD AUTO: 60 10*3/MM3 (ref 140–450)
PLATELETS.RETICULATED NFR BLD AUTO: 4.1 % (ref 0.9–6.5)
PMV BLD AUTO: 9.8 FL (ref 6–12)
POTASSIUM SERPL-SCNC: 4.3 MMOL/L (ref 3.5–4.7)
PROT SERPL-MCNC: 6.6 G/DL (ref 6.3–8)
RBC # BLD AUTO: 3.38 10*6/MM3 (ref 4.14–5.8)
SODIUM SERPL-SCNC: 139 MMOL/L (ref 134–145)
TIBC SERPL-MCNC: 368 MCG/DL (ref 249–505)
TRANSFERRIN SERPL-MCNC: 263 MG/DL (ref 200–360)
WBC NRBC COR # BLD: 3.78 10*3/MM3 (ref 3.4–10.8)

## 2021-12-17 PROCEDURE — 99214 OFFICE O/P EST MOD 30 MIN: CPT | Performed by: INTERNAL MEDICINE

## 2021-12-17 PROCEDURE — 85055 RETICULATED PLATELET ASSAY: CPT

## 2021-12-17 PROCEDURE — 82728 ASSAY OF FERRITIN: CPT

## 2021-12-17 PROCEDURE — 83540 ASSAY OF IRON: CPT

## 2021-12-17 PROCEDURE — 85025 COMPLETE CBC W/AUTO DIFF WBC: CPT

## 2021-12-17 PROCEDURE — 80053 COMPREHEN METABOLIC PANEL: CPT

## 2021-12-17 PROCEDURE — 36415 COLL VENOUS BLD VENIPUNCTURE: CPT

## 2021-12-17 PROCEDURE — 84466 ASSAY OF TRANSFERRIN: CPT

## 2021-12-17 NOTE — PROGRESS NOTES
.     REASON FOR FOLLOWUP:     * Thrombocytopenia.   *  Iron deficiency anemia discovered on 1/13/2021.  Patient was started on oral iron 3 times a day.  *  Mild leukocytopenia and low normal neutrophils      HISTORY OF PRESENT ILLNESS:  The patient is a 71 y.o. year old male with history of hypertension diabetes, RODRIGUEZ, and hypogonadism who presented today for 6-month follow-up evaluation to assess response to oral iron treatment.    Patient reports he received a booster dose of COVID-19 vaccine.  He also already received a flu vaccine this season.    Patient complains of easy bruising on his arms from scratching, he has skin pruritus, however no skin rashes.  He denies spontaneous bleeding such as epistaxis, gum bleeding, or hematochezia.    Patient reports he been taking oral iron 3 times a day with good tolerance.    Laboratory studies today on 12/17/2021 reported stable pancytopenia with anemia Hb 10.8, with platelets 60,000 and IPF 4.1%, and WBC 3780 including neutrophils 2060 lymphocytes 1040.  Iron study reported slightly trending down ferritin 86.1 ng/mL in the much improved iron saturation 34% with free iron 126 and TIBC 368.      Past Medical History:   Diagnosis Date   • H/O Dental disorder    • History of thrombocytopenia    • Hypertension    • RODRIGUEZ (nonalcoholic steatohepatitis)    • Psoriasis    • Type 2 diabetes mellitus (HCC)      Past Surgical History:   Procedure Laterality Date   • COLONOSCOPY  11/2015   • ROTATOR CUFF REPAIR Left 2005     HEMATOLOGY HISTORY:  The patient is a 70 y.o. year old male with history of hypertension diabetes, RODRIGUEZ, and hypogonadism who presented today on 1/13/2021 for initial evaluation because of thrombocytopenia, referred by his primary care physician Dr. Joseph.      Patient reports he has no limitation of activity.  He does have chronic joint pains, but of note joint swelling.  His skin pruritus.  No skin rashes.  He does have easy bruising, however denies  evidence of bleeding such as epistaxis, gum bleeding, rectal bleeding or hematuria.  Denies weight loss.  No low fever.  Appetite is reasonable.    I reviewed his outside laboratory studies.  Most recent also lab on 10/22/2020 reported hemoglobin 10.5, MCV 89.6, WBC 3200, including neutrophils 1900, lymphocytes 800 monocytes 300, and platelets 62,000.    Liver study on 9/2/2020 reported WBC 3200, including ANC 1900 lymphocytes 1000 monocytes 300, hemoglobin 10.5, MCV 80.0, and platelets 65,000.    His earliest CBC results available for review was from 5/8/2019 which reported WBC 4200, including ANC 2600, lymphocytes 1200 and monocytes 400, hemoglobin 13.4 and platelets 95,000.  At that time chemistry lab reported elevated liver panel including  , alk phosphatase 87 and a total bilirubin 1.1.  Total serum protein 7.0 albumin 4.1 and the globulin 2.9.  His creatinine was 1.31, with glucose of 219 been in normal electrolytes.  Hemoglobin A1c was 8.3%.      Laboratory studies on 1/13/2021 reported mild anemia with Hb 10.9, thrombocytopenia platelets 48,000, and IPF 7.0%, leukocytopenia WBC 3250 including ANC 1830.  Other labs reported no evidence of hemolysis, had supratherapeutic B12 level 1413 pg/mL and folate > 20 ng/mL.  He does have evidence of iron deficiency with low iron sats 10% and ferritin only 41.5 ng/mL in the setting of anemia with Hb 10.9.  His erythropoietin is not elevated accordingly, which indicates lack of response from his kidney to produce erythropoietin.     Currently is not a candidate for CONRAD/Procrit treatment.  However he is adamant he is a candidate for oral iron treatment.    We will start him on ferrous sulfate 325 mg 3 times a day.  Will send prescription to his pharmacy.     Laboratory study 6/11/2021 showed a slightly improved hemoglobin 11.9, with stable thrombocytopenia platelets 50,000 IPF 8.3%, and WBC 3720 including ANC 2100.  Iron study reported ferritin 115.3 ng/mL  "and iron saturation 14% with free iron 55 TIBC 384.    MEDICATIONS    Current Outpatient Medications:   •  allopurinol (ZYLOPRIM) 100 MG tablet, , Disp: , Rfl:   •  ferrous sulfate 325 (65 FE) MG tablet, Take 1 tablet by mouth 3 (Three) Times a Day With Meals., Disp: 90 tablet, Rfl: 2  •  lisinopril-hydrochlorothiazide (PRINZIDE,ZESTORETIC) 20-12.5 MG per tablet, Take 1 tablet by mouth 2 (Two) Times a Day., Disp: , Rfl:   •  metFORMIN (GLUCOPHAGE) 500 MG tablet, 3 (Three) Times a Day., Disp: , Rfl:   •  sildenafil (VIAGRA) 100 MG tablet, , Disp: , Rfl:   •  Testosterone 10 MG/ACT (2%) gel, APPLY 2 APPLICATORFUL TOPICALLY ONCE DAILY, Disp: , Rfl:     ALLERGIES:     Allergies   Allergen Reactions   • Doxycycline Itching       SOCIAL HISTORY:         Social History     Socioeconomic History   • Marital status:      Spouse name: Lisa   Tobacco Use   • Smoking status: Former Smoker     Quit date:      Years since quittin.9   Substance and Sexual Activity   • Alcohol use: Never   · As of 2021, patient reports he does drink alcoholic beverage 10 drinks per week.  Denies illegal drug use.      FAMILY HISTORY:  No cancer in family, mother HTN,         Vitals:    21 0941   BP: 168/74   Pulse: 91   Resp: 18   Temp: 99.5 °F (37.5 °C)   TempSrc: Temporal   SpO2: 98%   Weight: 88.4 kg (194 lb 14.4 oz)   Height: 164 cm (64.57\")   PainSc: 0-No pain     Current Status 2021   ECOG score 0      PHYSICAL EXAM:    GENERAL:  Well-developed, well-nourished in no acute distress.    SKIN:  Warm, dry without rashes, purpura or petechiae.  HEENT:  Normocephalic.  Wearing mask.   LYMPHATICS:  No cervical, supraclavicular adenopathy.  CHEST: Normal respiratory effort.  Lungs clear to auscultation. Good airflow.  CARDIAC:  Regular rate and rhythm. Positive for systolic murmur. Normal S1,S2.  ABDOMEN:  Soft, nontender with no organomegaly or masses.  Bowel sounds normal.  EXTREMITIES:  No clubbing, cyanosis or " edema.  NEUROLOGICAL:  Grossly intact.    PSYCHIATRIC:  Normal affect and mood.        RECENT LABS:  Lab Results   Component Value Date    WBC 3.78 12/17/2021    HGB 10.8 (L) 12/17/2021    HCT 31.8 (L) 12/17/2021    MCV 94.1 12/17/2021    PLT 60 (L) 12/17/2021     Lab Results   Component Value Date    NEUTROABS 2.06 12/17/2021     Lab Results   Component Value Date    IRON 126 12/17/2021    TIBC 368 12/17/2021    FERRITIN 86.10 12/17/2021   Iron saturation 34% on 12/17/2020     Lab Results   Component Value Date    FOLATE >20.00 01/13/2021     Lab Results   Component Value Date    [id number] 1,413 (H) 01/13/2021         Assessment/Plan   *  Thrombocytopenia   · Etiology of the thrombocytopenia is not clear.  However I do think it is likely related to his fatty liver which was documented from abdomen ultrasound on 7/20/2012.    · Laboratory studies on 1/13/2021 reported excellent vitamin B12 level.  He had marginally elevated IPF 7.0%.  no apparent compensation for platelets production.   · On 6/11/2021, platelets improved at 63,000 with normal IPF 5.3%.  Patient is asymptomatic.    · On 12/17/2021, stable platelets 60,000 and normal IPF 4.1%. Patient is asymptomatic. Continue to monitor.    *Iron deficiency anemia.   · Laboratory study on 1/13/2021 reported no evidence of hemolysis, had supratherapeutic B12 level and folate.    · He does have evidence of iron deficiency with low iron sats 10% and ferritin only 41.5 ng/mL with Hb 10.9.    · His erythropoietin 16.1, is not elevated accordingly, which indicates lack of response from his kidney to produce erythropoietin. Currently is not a candidate for CONRAD/Procrit treatment.    · We started patient on oral ferrous sulfate 325 mg 3 times a day in January 2021.   · On 3/19/2021, patient has improved hemoglobin 11.9.  Continue to monitor.  · On 6/11/2021, stable anemia Hb 11.0, however much improved with ferritin 115.3 ng/mL, and iron saturation 14%.  Will advised to  continue oral iron treatment.  · On 12/17/2021 stable Hb 10.8, much improved iron saturation 34% and free iron 126, however with trending down ferritin level 86.1 ng/mL. Continue oral iron treatment.    * Pancytopenia with mild leukocytopenia.  This is also likely related to his fatty liver. RODRIGUEZ? Patient drinks alcohol beverage is reported in January 2021.  · Patient reports no recurrent infection.  His neutrophils marginally normal at 1830 out of WBC 3250.  · Patient had supratherapeutic B12 level 1/13/2021.  · Marginally better WBC 3720 including ANC 2100 3/19/2021.    · 6/11/2021 WBC 3530, with ANC 2180.  No recurrent infection.    · On 12/17/2021 stable leukocytopenia WBC 3780 with low normal neutrophils 2016.  Patient is asymptomatic.  Continue to monitor.    *COVID-19 vaccination.    · 6/11/2021, patient reports that he had 2 doses of COVID-19 vaccine, had very limited side effects with some soreness at the site of injection.   · On 12/17/2021, patient reports he received a booster dose of COVID-19 vaccine.    PLAN:  1. Continue oral iron 3 times a day.    2. Patient returns for follow-up with me in 6 months, will repeat CBC, CMP, ferritin plus iron profile.      TERE ANDREWS M.D., Ph.D.    12/17/2021      CC:  MD Everardo Cuello M.D.

## 2022-06-10 ENCOUNTER — OFFICE VISIT (OUTPATIENT)
Dept: ONCOLOGY | Facility: CLINIC | Age: 72
End: 2022-06-10

## 2022-06-10 ENCOUNTER — LAB (OUTPATIENT)
Dept: LAB | Facility: HOSPITAL | Age: 72
End: 2022-06-10

## 2022-06-10 VITALS
SYSTOLIC BLOOD PRESSURE: 142 MMHG | TEMPERATURE: 98.3 F | RESPIRATION RATE: 16 BRPM | BODY MASS INDEX: 36.57 KG/M2 | HEART RATE: 97 BPM | WEIGHT: 219.5 LBS | HEIGHT: 65 IN | DIASTOLIC BLOOD PRESSURE: 70 MMHG | OXYGEN SATURATION: 95 %

## 2022-06-10 DIAGNOSIS — D61.818 PANCYTOPENIA: ICD-10-CM

## 2022-06-10 DIAGNOSIS — D64.9 ANEMIA, UNSPECIFIED TYPE: ICD-10-CM

## 2022-06-10 DIAGNOSIS — D69.6 THROMBOCYTOPENIA: ICD-10-CM

## 2022-06-10 DIAGNOSIS — D50.9 IRON DEFICIENCY ANEMIA, UNSPECIFIED IRON DEFICIENCY ANEMIA TYPE: ICD-10-CM

## 2022-06-10 DIAGNOSIS — R18.8 CIRRHOSIS OF LIVER WITH ASCITES, UNSPECIFIED HEPATIC CIRRHOSIS TYPE: Primary | ICD-10-CM

## 2022-06-10 DIAGNOSIS — R18.8 OTHER ASCITES: ICD-10-CM

## 2022-06-10 DIAGNOSIS — K74.60 CIRRHOSIS OF LIVER WITH ASCITES, UNSPECIFIED HEPATIC CIRRHOSIS TYPE: Primary | ICD-10-CM

## 2022-06-10 PROBLEM — T45.4X5A ADVERSE EFFECT OF IRON AND ITS COMPOUNDS, INITIAL ENCOUNTER: Status: ACTIVE | Noted: 2022-06-10

## 2022-06-10 PROBLEM — K90.89 POOR IRON ABSORPTION: Status: ACTIVE | Noted: 2022-06-10

## 2022-06-10 LAB
BASOPHILS # BLD AUTO: 0.02 10*3/MM3 (ref 0–0.2)
BASOPHILS NFR BLD AUTO: 0.5 % (ref 0–1.5)
DEPRECATED RDW RBC AUTO: 50 FL (ref 37–54)
EOSINOPHIL # BLD AUTO: 0.17 10*3/MM3 (ref 0–0.4)
EOSINOPHIL NFR BLD AUTO: 4 % (ref 0.3–6.2)
ERYTHROCYTE [DISTWIDTH] IN BLOOD BY AUTOMATED COUNT: 14.1 % (ref 12.3–15.4)
FERRITIN SERPL-MCNC: 86.8 NG/ML (ref 30–400)
HCT VFR BLD AUTO: 29.5 % (ref 37.5–51)
HGB BLD-MCNC: 9.6 G/DL (ref 13–17.7)
IMM GRANULOCYTES # BLD AUTO: 0.01 10*3/MM3 (ref 0–0.05)
IMM GRANULOCYTES NFR BLD AUTO: 0.2 % (ref 0–0.5)
IRON 24H UR-MRATE: 61 MCG/DL (ref 59–158)
IRON SATN MFR SERPL: 17 % (ref 14–48)
LYMPHOCYTES # BLD AUTO: 0.74 10*3/MM3 (ref 0.7–3.1)
LYMPHOCYTES NFR BLD AUTO: 17.2 % (ref 19.6–45.3)
MCH RBC QN AUTO: 31.4 PG (ref 26.6–33)
MCHC RBC AUTO-ENTMCNC: 32.5 G/DL (ref 31.5–35.7)
MCV RBC AUTO: 96.4 FL (ref 79–97)
MONOCYTES # BLD AUTO: 0.37 10*3/MM3 (ref 0.1–0.9)
MONOCYTES NFR BLD AUTO: 8.6 % (ref 5–12)
NEUTROPHILS NFR BLD AUTO: 2.98 10*3/MM3 (ref 1.7–7)
NEUTROPHILS NFR BLD AUTO: 69.5 % (ref 42.7–76)
NRBC BLD AUTO-RTO: 0 /100 WBC (ref 0–0.2)
PLATELET # BLD AUTO: 69 10*3/MM3 (ref 140–450)
PMV BLD AUTO: 10 FL (ref 6–12)
RBC # BLD AUTO: 3.06 10*6/MM3 (ref 4.14–5.8)
TIBC SERPL-MCNC: 357 MCG/DL (ref 249–505)
TRANSFERRIN SERPL-MCNC: 255 MG/DL (ref 200–360)
WBC NRBC COR # BLD: 4.29 10*3/MM3 (ref 3.4–10.8)

## 2022-06-10 PROCEDURE — 84466 ASSAY OF TRANSFERRIN: CPT

## 2022-06-10 PROCEDURE — 83540 ASSAY OF IRON: CPT

## 2022-06-10 PROCEDURE — 82728 ASSAY OF FERRITIN: CPT

## 2022-06-10 PROCEDURE — 99215 OFFICE O/P EST HI 40 MIN: CPT | Performed by: INTERNAL MEDICINE

## 2022-06-10 PROCEDURE — 36415 COLL VENOUS BLD VENIPUNCTURE: CPT

## 2022-06-10 PROCEDURE — 85025 COMPLETE CBC W/AUTO DIFF WBC: CPT

## 2022-06-10 RX ORDER — DIPHENOXYLATE HYDROCHLORIDE AND ATROPINE SULFATE 2.5; .025 MG/1; MG/1
TABLET ORAL DAILY
COMMUNITY

## 2022-06-10 NOTE — H&P (VIEW-ONLY)
.     REASON FOR FOLLOWUP:     * Thrombocytopenia.   *  Iron deficiency anemia discovered on 1/13/2021.  Patient was started on oral iron 3 times a day.  *  Mild leukocytopenia and low normal neutrophils      HISTORY OF PRESENT ILLNESS:  The patient is a 71 y.o. year old male with history of hypertension diabetes, RODRIGUEZ, and hypogonadism who presented today for 6-month follow-up evaluation.  Patient is accompanied by his wife who helped with history.    Raul reports patient has abdomen distention for about 3 months.  He also developed bilateral leg edema in December 2021.  Patient also reports worsening dyspnea and weight gain for the past several months..  Per our records, he gained 25 pounds since December 2021.    Patient reports having taken oral iron 3 times a day.  He has mild nausea but not significant and no vomiting.  He has brown-colored stool.  He also reports having loose stool some of days.  No diarrhea.    Patient received a boost dose of COVID-19 vaccine on 11/6/2021.    abd distention about 3 months, leg edema 6 months, after the boost dose of COVID (December) boost on 11/6/21    Patient complains of easy bruising on his arms from scratching, he has skin pruritus, however no skin rashes.  He denies spontaneous bleeding such as epistaxis, gum bleeding, or hematochezia.    Lab study today 6/10/2022 reported deteriorating anemia with Hb 9.6, persistent thrombocytopenia with stable platelets 69,000, and low normal WBC 4290 including neutrophils 2980.  Iron studies showed a deteriorating saturation 17%, free iron 61 TIBC 357 with a ferritin 86.8%.          Past Medical History:   Diagnosis Date   • H/O Dental disorder    • History of thrombocytopenia    • Hypertension    • RODRIGUEZ (nonalcoholic steatohepatitis)    • Psoriasis    • Type 2 diabetes mellitus (HCC)      Past Surgical History:   Procedure Laterality Date   • COLONOSCOPY  11/2015   • ROTATOR CUFF REPAIR Left 2005     HEMATOLOGY HISTORY:  The  patient is a 70 y.o. year old male with history of hypertension diabetes, RODRIGUEZ, and hypogonadism who presented today on 1/13/2021 for initial evaluation because of thrombocytopenia, referred by his primary care physician Dr. Joseph.      Patient reports he has no limitation of activity.  He does have chronic joint pains, but of note joint swelling.  His skin pruritus.  No skin rashes.  He does have easy bruising, however denies evidence of bleeding such as epistaxis, gum bleeding, rectal bleeding or hematuria.  Denies weight loss.  No low fever.  Appetite is reasonable.    I reviewed his outside laboratory studies.  Most recent also lab on 10/22/2020 reported hemoglobin 10.5, MCV 89.6, WBC 3200, including neutrophils 1900, lymphocytes 800 monocytes 300, and platelets 62,000.    Liver study on 9/2/2020 reported WBC 3200, including ANC 1900 lymphocytes 1000 monocytes 300, hemoglobin 10.5, MCV 80.0, and platelets 65,000.    His earliest CBC results available for review was from 5/8/2019 which reported WBC 4200, including ANC 2600, lymphocytes 1200 and monocytes 400, hemoglobin 13.4 and platelets 95,000.  At that time chemistry lab reported elevated liver panel including  , alk phosphatase 87 and a total bilirubin 1.1.  Total serum protein 7.0 albumin 4.1 and the globulin 2.9.  His creatinine was 1.31, with glucose of 219 been in normal electrolytes.  Hemoglobin A1c was 8.3%.      Laboratory studies on 1/13/2021 reported mild anemia with Hb 10.9, thrombocytopenia platelets 48,000, and IPF 7.0%, leukocytopenia WBC 3250 including ANC 1830.  Other labs reported no evidence of hemolysis, had supratherapeutic B12 level 1413 pg/mL and folate > 20 ng/mL.  He does have evidence of iron deficiency with low iron sats 10% and ferritin only 41.5 ng/mL in the setting of anemia with Hb 10.9.  His erythropoietin is not elevated accordingly, which indicates lack of response from his kidney to produce erythropoietin.      Currently is not a candidate for CONRAD/Procrit treatment.  However he is adamant he is a candidate for oral iron treatment.    We will start him on ferrous sulfate 325 mg 3 times a day.  Will send prescription to his pharmacy.     Laboratory study 2021 showed a slightly improved hemoglobin 11.9, with stable thrombocytopenia platelets 50,000 IPF 8.3%, and WBC 3720 including ANC 2100.  Iron study reported ferritin 115.3 ng/mL and iron saturation 14% with free iron 55 TIBC 384.    Laboratory studies on 2021 reported stable pancytopenia with anemia Hb 10.8, with platelets 60,000 and IPF 4.1%, and WBC 3780 including neutrophils 2060 lymphocytes 1040.  Iron study reported slightly trending down ferritin 86.1 ng/mL in the much improved iron saturation 34% with free iron 126 and TIBC 368.        MEDICATIONS    Current Outpatient Medications:   •  allopurinol (ZYLOPRIM) 100 MG tablet, , Disp: , Rfl:   •  ferrous sulfate 325 (65 FE) MG tablet, Take 1 tablet by mouth 3 (Three) Times a Day With Meals., Disp: 90 tablet, Rfl: 2  •  FIBER PO, Take  by mouth., Disp: , Rfl:   •  lisinopril-hydrochlorothiazide (PRINZIDE,ZESTORETIC) 20-12.5 MG per tablet, Take 1 tablet by mouth 2 (Two) Times a Day., Disp: , Rfl:   •  metFORMIN (GLUCOPHAGE) 500 MG tablet, 3 (Three) Times a Day., Disp: , Rfl:   •  multivitamin (MULTIVITAMIN PO), Take  by mouth Daily., Disp: , Rfl:     ALLERGIES:     Allergies   Allergen Reactions   • Doxycycline Itching       SOCIAL HISTORY:         Social History     Socioeconomic History   • Marital status:      Spouse name: Lisa   Tobacco Use   • Smoking status: Former Smoker     Quit date:      Years since quittin.4   Substance and Sexual Activity   • Alcohol use: Never   · As of 2021, patient reports he does drink alcoholic beverage 10 drinks per week.  Denies illegal drug use.      FAMILY HISTORY:  No cancer in family, mother HTN,         Vitals:    06/10/22 0934   BP: 142/70  "  Pulse: 97   Resp: 16   Temp: 98.3 °F (36.8 °C)   TempSrc: Temporal   SpO2: 95%   Weight: 99.6 kg (219 lb 8 oz)   Height: 164 cm (64.57\")   PainSc: 0-No pain     Current Status 6/10/2022   ECOG score 1      PHYSICAL EXAM:    GENERAL:  Well-developed, male sitting in wheelchair in no acute distress.  Orientated to time place and the people.  SKIN:  Warm, dry without rashes, purpura or petechiae.  HEENT:  Normocephalic.  Wearing mask.   LYMPHATICS:  No cervical, supraclavicular adenopathy.  CHEST: Normal respiratory effort.  Lungs clear to auscultation. Good airflow.  CARDIAC:  Regular rate and rhythm. Normal S1,S2.  ABDOMEN: Distended abdomen.  Bowel sounds normal.  EXTREMITIES: bilateral leg 2+ edema.  NEUROLOGICAL:  Grossly intact.   PSYCHIATRIC:  Normal affect and mood.        RECENT LABS:  Lab Results   Component Value Date    WBC 4.29 06/10/2022    HGB 9.6 (L) 06/10/2022    HCT 29.5 (L) 06/10/2022    MCV 96.4 06/10/2022    PLT 69 (L) 06/10/2022     Lab Results   Component Value Date    NEUTROABS 2.98 06/10/2022     Lab Results   Component Value Date    IRON 61 06/10/2022    TIBC 357 06/10/2022    FERRITIN 86.80 06/10/2022   Iron saturation 17% on 6/10/2022. was 34% on 2021     Lab Results   Component Value Date    FOLATE >20.00 2021     Lab Results   Component Value Date    KXULJCBL16 1,413 (H) 2021       IMAGING:   ABD CT on 10/15/2021   Name: WINSOME MOSES   MRN: [id number]   FIN: [id number]   Accession No: [id number]   Sex: Male   : 1950     Age: 71 years   Study Date/Time: 10/15/2021 9:00 AM   Study Description: CT Abdomen Pelvis W   Attending Physician: ALLAN WHITESIDE (REF) STEPHEN   Ordering Physician: ALLAN WHITESIDE (REF) STEPHEN   Referring Physician: ALLAN WHITESIDE (REF) STEPHEN   Primary Care Physician: ALLAN WHITESIDE (REFKiara MITCHELL         INDICATION:   Nonalcoholic steatohepatitis..     TECHNIQUE:   CT of the abdomen and pelvis with contrast. Coronal and sagittal reconstructions were "   obtained.  Radiation dose reduction techniques included automated exposure control or   exposure modulation based on body size.     COMPARISON:   CT abdomen 6/15/2015. MR abdomen 3/23/2016.     FINDINGS:   ABDOMEN:   The liver is cirrhotic. Diffusely diminished attenuation throughout the hepatic   parenchyma is indicative of hepatic steatosis. There is a benign cyst in superior   right hepatic lobe. No intrahepatic or extrahepatic biliary dilatation. The spleen is   enlarged measuring up to 17 cm. There is benign cyst in the right kidney. The adrenal   glands and the pancreas are within normal limits.. The gallbladder is not distended.   The bowel is not dilated.  The appendix is normal.   No enlarged retroperitoneal or   mesenteric lymph nodes. The abdominal aorta is normal in caliber. There are slightly   increased number of mesenteric and retroperitoneal lymph nodes, however they're   subcentimeter in size. Small volume of ascites. There is recanalization of the   umbilical vein and small gastroesophageal varices.     PELVIS:   No pelvic mass or free pelvic fluid.  No enlarged pelvic or inguinal lymph nodes.   There is a small umbilical hernia.     No acute osseous abnormalities.     IMPRESSION:     1. Cirrhotic morphology of the liver.   2. Splenomegaly and small volume of ascites..          Assessment & Plan   *  Thrombocytopenia   · Etiology of the thrombocytopenia is not clear.  However I do think it is likely related to his fatty liver which was documented from abdomen ultrasound on 7/20/2012.    · Laboratory studies on 1/13/2021 reported excellent vitamin B12 level.  He had marginally elevated IPF 7.0%.  no apparent compensation for platelets production.   · On 6/11/2021, platelets improved at 63,000 with normal IPF 5.3%.  Patient is asymptomatic.    · On 12/17/2021, stable platelets 60,000 and normal IPF 4.1%. Patient is asymptomatic.   · On 6/10/2022 platelets 69,000.  Patient reports no spontaneous  bleeding.  Continue to monitor.    *Iron deficiency anemia.   · Laboratory study on 1/13/2021 reported no evidence of hemolysis, had supratherapeutic B12 level and folate.    · He does have evidence of iron deficiency with low iron sats 10% and ferritin only 41.5 ng/mL with Hb 10.9.    · His erythropoietin 16.1, is not elevated accordingly, which indicates lack of response from his kidney to produce erythropoietin. Currently is not a candidate for CONRAD/Procrit treatment.    · We started patient on oral ferrous sulfate 325 mg 3 times a day in January 2021.   · On 3/19/2021, patient has improved hemoglobin 11.9.  Continue to monitor.  · On 6/11/2021, stable anemia Hb 11.0, however much improved with ferritin 115.3 ng/mL, and iron saturation 14%.  Will advised to continue oral iron treatment.  · On 12/17/2021 stable Hb 10.8, much improved iron saturation 34% and free iron 126, however with trending down ferritin level 86.1 ng/mL. Continue oral iron treatment.  · 6/10/2022, patient reports taking oral iron 3 times a day.  No apparent side effects except mild nausea.  Laboratory studies showed a deteriorating hemoglobin 9.6 and also worsening iron saturation 17% with free iron 61 and the ferritin 86.8 ng/mL.  Discussed with the patient, recommended intravenous iron therapy, this patient has poor iron absorption.      * Pancytopenia with mild leukocytopenia.  This is also likely related to his fatty liver and liver cirrhosis.  Patient drinks alcohol beverage is reported in January 2021.  · Patient reports no recurrent infection.  His neutrophils marginally normal at 1830 out of WBC 3250.  · Patient had supratherapeutic B12 level 1/13/2021.  · Marginally better WBC 3720 including ANC 2100 3/19/2021.    · 6/11/2021 WBC 3530, with ANC 2180.  No recurrent infection.    · On 12/17/2021 stable leukocytopenia WBC 3780 with low normal neutrophils 2016.  Patient is asymptomatic.  Continue to monitor.  · On 6/10/2022 patient has  slightly better WBC 4290 and neutrophils 2980.    *COVID-19 vaccination.    · 6/11/2021, patient reports that he had 2 doses of COVID-19 vaccine, had very limited side effects with some soreness at the site of injection.   · On 12/17/2021, patient reports he received a booster dose of COVID-19 vaccine on 11/6/2021.      *Ascites secondary to liver cirrhosis.  · CT scan examination on 10/15/2021 confirmed liver cirrhosis.  · Today on 6/10/2022 patient reports abdomen distention for the past 3 months, weight gains, 25 pounds per our records, and also has worsening dyspnea.  Physical examination shows very distended abdomen.  I think this patient has large ascites secondary to his liver cirrhosis.  I recommended to have ultrasound-guided therapeutic paracentesis with samples for routine chemistry labs cell counts and also for cytology study.  · Patient also has bilateral leg edema which is also secondary to liver cirrhosis.  Patient was seen her primary care physician next week.  I think most likely he will need to be started on diuretics.  He also needs to follow-up with his GI specialist Dr. Everardo Foote to discuss possibility of  shunt        PLAN:  1. Arrange ultrasound-guided therapeutic paracentesis, also send sample for chemistry study, cytology study and the cell counts.    2. Arrange patient to have intravenous iron therapy with Venofer 300 mg weekly for 5 doses versus Injectafer weekly for 2 doses.  Pending insurance approval.    3. Continue oral iron 3 times a day.    4. Will arrange patient come back for follow-up with me in 3 months.  Repeat laboratory studies for CBC CMP ferritin iron profile, will also add a B12 and folate level.        Discussed with the patient and his wife about laboratory results, physical examination, and management plan.  Ascites is a new problem today.    More than 45 min were used for patient care, over half of that time were for counseling and coordinating his  care.      TERE ANDREWS M.D., Ph.D.    6/10/2022.      CC:  MD Everardo Cuello M.D.

## 2022-06-10 NOTE — PROGRESS NOTES
.     REASON FOR FOLLOWUP:     * Thrombocytopenia.   *  Iron deficiency anemia discovered on 1/13/2021.  Patient was started on oral iron 3 times a day.  *  Mild leukocytopenia and low normal neutrophils      HISTORY OF PRESENT ILLNESS:  The patient is a 71 y.o. year old male with history of hypertension diabetes, RODRIGUEZ, and hypogonadism who presented today for 6-month follow-up evaluation.  Patient is accompanied by his wife who helped with history.    Raul reports patient has abdomen distention for about 3 months.  He also developed bilateral leg edema in December 2021.  Patient also reports worsening dyspnea and weight gain for the past several months..  Per our records, he gained 25 pounds since December 2021.    Patient reports having taken oral iron 3 times a day.  He has mild nausea but not significant and no vomiting.  He has brown-colored stool.  He also reports having loose stool some of days.  No diarrhea.    Patient received a boost dose of COVID-19 vaccine on 11/6/2021.    abd distention about 3 months, leg edema 6 months, after the boost dose of COVID (December) boost on 11/6/21    Patient complains of easy bruising on his arms from scratching, he has skin pruritus, however no skin rashes.  He denies spontaneous bleeding such as epistaxis, gum bleeding, or hematochezia.    Lab study today 6/10/2022 reported deteriorating anemia with Hb 9.6, persistent thrombocytopenia with stable platelets 69,000, and low normal WBC 4290 including neutrophils 2980.  Iron studies showed a deteriorating saturation 17%, free iron 61 TIBC 357 with a ferritin 86.8%.          Past Medical History:   Diagnosis Date   • H/O Dental disorder    • History of thrombocytopenia    • Hypertension    • RODRIGUEZ (nonalcoholic steatohepatitis)    • Psoriasis    • Type 2 diabetes mellitus (HCC)      Past Surgical History:   Procedure Laterality Date   • COLONOSCOPY  11/2015   • ROTATOR CUFF REPAIR Left 2005     HEMATOLOGY HISTORY:  The  patient is a 70 y.o. year old male with history of hypertension diabetes, RODRIGUEZ, and hypogonadism who presented today on 1/13/2021 for initial evaluation because of thrombocytopenia, referred by his primary care physician Dr. Joseph.      Patient reports he has no limitation of activity.  He does have chronic joint pains, but of note joint swelling.  His skin pruritus.  No skin rashes.  He does have easy bruising, however denies evidence of bleeding such as epistaxis, gum bleeding, rectal bleeding or hematuria.  Denies weight loss.  No low fever.  Appetite is reasonable.    I reviewed his outside laboratory studies.  Most recent also lab on 10/22/2020 reported hemoglobin 10.5, MCV 89.6, WBC 3200, including neutrophils 1900, lymphocytes 800 monocytes 300, and platelets 62,000.    Liver study on 9/2/2020 reported WBC 3200, including ANC 1900 lymphocytes 1000 monocytes 300, hemoglobin 10.5, MCV 80.0, and platelets 65,000.    His earliest CBC results available for review was from 5/8/2019 which reported WBC 4200, including ANC 2600, lymphocytes 1200 and monocytes 400, hemoglobin 13.4 and platelets 95,000.  At that time chemistry lab reported elevated liver panel including  , alk phosphatase 87 and a total bilirubin 1.1.  Total serum protein 7.0 albumin 4.1 and the globulin 2.9.  His creatinine was 1.31, with glucose of 219 been in normal electrolytes.  Hemoglobin A1c was 8.3%.      Laboratory studies on 1/13/2021 reported mild anemia with Hb 10.9, thrombocytopenia platelets 48,000, and IPF 7.0%, leukocytopenia WBC 3250 including ANC 1830.  Other labs reported no evidence of hemolysis, had supratherapeutic B12 level 1413 pg/mL and folate > 20 ng/mL.  He does have evidence of iron deficiency with low iron sats 10% and ferritin only 41.5 ng/mL in the setting of anemia with Hb 10.9.  His erythropoietin is not elevated accordingly, which indicates lack of response from his kidney to produce erythropoietin.      Currently is not a candidate for CONRAD/Procrit treatment.  However he is adamant he is a candidate for oral iron treatment.    We will start him on ferrous sulfate 325 mg 3 times a day.  Will send prescription to his pharmacy.     Laboratory study 2021 showed a slightly improved hemoglobin 11.9, with stable thrombocytopenia platelets 50,000 IPF 8.3%, and WBC 3720 including ANC 2100.  Iron study reported ferritin 115.3 ng/mL and iron saturation 14% with free iron 55 TIBC 384.    Laboratory studies on 2021 reported stable pancytopenia with anemia Hb 10.8, with platelets 60,000 and IPF 4.1%, and WBC 3780 including neutrophils 2060 lymphocytes 1040.  Iron study reported slightly trending down ferritin 86.1 ng/mL in the much improved iron saturation 34% with free iron 126 and TIBC 368.        MEDICATIONS    Current Outpatient Medications:   •  allopurinol (ZYLOPRIM) 100 MG tablet, , Disp: , Rfl:   •  ferrous sulfate 325 (65 FE) MG tablet, Take 1 tablet by mouth 3 (Three) Times a Day With Meals., Disp: 90 tablet, Rfl: 2  •  FIBER PO, Take  by mouth., Disp: , Rfl:   •  lisinopril-hydrochlorothiazide (PRINZIDE,ZESTORETIC) 20-12.5 MG per tablet, Take 1 tablet by mouth 2 (Two) Times a Day., Disp: , Rfl:   •  metFORMIN (GLUCOPHAGE) 500 MG tablet, 3 (Three) Times a Day., Disp: , Rfl:   •  multivitamin (MULTIVITAMIN PO), Take  by mouth Daily., Disp: , Rfl:     ALLERGIES:     Allergies   Allergen Reactions   • Doxycycline Itching       SOCIAL HISTORY:         Social History     Socioeconomic History   • Marital status:      Spouse name: Lisa   Tobacco Use   • Smoking status: Former Smoker     Quit date:      Years since quittin.4   Substance and Sexual Activity   • Alcohol use: Never   · As of 2021, patient reports he does drink alcoholic beverage 10 drinks per week.  Denies illegal drug use.      FAMILY HISTORY:  No cancer in family, mother HTN,         Vitals:    06/10/22 0934   BP: 142/70  "  Pulse: 97   Resp: 16   Temp: 98.3 °F (36.8 °C)   TempSrc: Temporal   SpO2: 95%   Weight: 99.6 kg (219 lb 8 oz)   Height: 164 cm (64.57\")   PainSc: 0-No pain     Current Status 6/10/2022   ECOG score 1      PHYSICAL EXAM:    GENERAL:  Well-developed, male sitting in wheelchair in no acute distress.  Orientated to time place and the people.  SKIN:  Warm, dry without rashes, purpura or petechiae.  HEENT:  Normocephalic.  Wearing mask.   LYMPHATICS:  No cervical, supraclavicular adenopathy.  CHEST: Normal respiratory effort.  Lungs clear to auscultation. Good airflow.  CARDIAC:  Regular rate and rhythm. Normal S1,S2.  ABDOMEN: Distended abdomen.  Bowel sounds normal.  EXTREMITIES: bilateral leg 2+ edema.  NEUROLOGICAL:  Grossly intact.   PSYCHIATRIC:  Normal affect and mood.        RECENT LABS:  Lab Results   Component Value Date    WBC 4.29 06/10/2022    HGB 9.6 (L) 06/10/2022    HCT 29.5 (L) 06/10/2022    MCV 96.4 06/10/2022    PLT 69 (L) 06/10/2022     Lab Results   Component Value Date    NEUTROABS 2.98 06/10/2022     Lab Results   Component Value Date    IRON 61 06/10/2022    TIBC 357 06/10/2022    FERRITIN 86.80 06/10/2022   Iron saturation 17% on 6/10/2022. was 34% on 2021     Lab Results   Component Value Date    FOLATE >20.00 2021     Lab Results   Component Value Date    JHRLIYDU28 1,413 (H) 2021       IMAGING:   ABD CT on 10/15/2021   Name: WINSOME MOSES   MRN: [id number]   FIN: [id number]   Accession No: [id number]   Sex: Male   : 1950     Age: 71 years   Study Date/Time: 10/15/2021 9:00 AM   Study Description: CT Abdomen Pelvis W   Attending Physician: ALLAN WHITESIDE (REF) STEPHEN   Ordering Physician: ALLAN WHITESIDE (REF) STEPHEN   Referring Physician: ALLAN WHITESIDE (REF) STEPHEN   Primary Care Physician: ALLAN WHITESIDE (REFKiara MITCHELL         INDICATION:   Nonalcoholic steatohepatitis..     TECHNIQUE:   CT of the abdomen and pelvis with contrast. Coronal and sagittal reconstructions were "   obtained.  Radiation dose reduction techniques included automated exposure control or   exposure modulation based on body size.     COMPARISON:   CT abdomen 6/15/2015. MR abdomen 3/23/2016.     FINDINGS:   ABDOMEN:   The liver is cirrhotic. Diffusely diminished attenuation throughout the hepatic   parenchyma is indicative of hepatic steatosis. There is a benign cyst in superior   right hepatic lobe. No intrahepatic or extrahepatic biliary dilatation. The spleen is   enlarged measuring up to 17 cm. There is benign cyst in the right kidney. The adrenal   glands and the pancreas are within normal limits.. The gallbladder is not distended.   The bowel is not dilated.  The appendix is normal.   No enlarged retroperitoneal or   mesenteric lymph nodes. The abdominal aorta is normal in caliber. There are slightly   increased number of mesenteric and retroperitoneal lymph nodes, however they're   subcentimeter in size. Small volume of ascites. There is recanalization of the   umbilical vein and small gastroesophageal varices.     PELVIS:   No pelvic mass or free pelvic fluid.  No enlarged pelvic or inguinal lymph nodes.   There is a small umbilical hernia.     No acute osseous abnormalities.     IMPRESSION:     1. Cirrhotic morphology of the liver.   2. Splenomegaly and small volume of ascites..          Assessment & Plan   *  Thrombocytopenia   · Etiology of the thrombocytopenia is not clear.  However I do think it is likely related to his fatty liver which was documented from abdomen ultrasound on 7/20/2012.    · Laboratory studies on 1/13/2021 reported excellent vitamin B12 level.  He had marginally elevated IPF 7.0%.  no apparent compensation for platelets production.   · On 6/11/2021, platelets improved at 63,000 with normal IPF 5.3%.  Patient is asymptomatic.    · On 12/17/2021, stable platelets 60,000 and normal IPF 4.1%. Patient is asymptomatic.   · On 6/10/2022 platelets 69,000.  Patient reports no spontaneous  bleeding.  Continue to monitor.    *Iron deficiency anemia.   · Laboratory study on 1/13/2021 reported no evidence of hemolysis, had supratherapeutic B12 level and folate.    · He does have evidence of iron deficiency with low iron sats 10% and ferritin only 41.5 ng/mL with Hb 10.9.    · His erythropoietin 16.1, is not elevated accordingly, which indicates lack of response from his kidney to produce erythropoietin. Currently is not a candidate for CONRAD/Procrit treatment.    · We started patient on oral ferrous sulfate 325 mg 3 times a day in January 2021.   · On 3/19/2021, patient has improved hemoglobin 11.9.  Continue to monitor.  · On 6/11/2021, stable anemia Hb 11.0, however much improved with ferritin 115.3 ng/mL, and iron saturation 14%.  Will advised to continue oral iron treatment.  · On 12/17/2021 stable Hb 10.8, much improved iron saturation 34% and free iron 126, however with trending down ferritin level 86.1 ng/mL. Continue oral iron treatment.  · 6/10/2022, patient reports taking oral iron 3 times a day.  No apparent side effects except mild nausea.  Laboratory studies showed a deteriorating hemoglobin 9.6 and also worsening iron saturation 17% with free iron 61 and the ferritin 86.8 ng/mL.  Discussed with the patient, recommended intravenous iron therapy, this patient has poor iron absorption.      * Pancytopenia with mild leukocytopenia.  This is also likely related to his fatty liver and liver cirrhosis.  Patient drinks alcohol beverage is reported in January 2021.  · Patient reports no recurrent infection.  His neutrophils marginally normal at 1830 out of WBC 3250.  · Patient had supratherapeutic B12 level 1/13/2021.  · Marginally better WBC 3720 including ANC 2100 3/19/2021.    · 6/11/2021 WBC 3530, with ANC 2180.  No recurrent infection.    · On 12/17/2021 stable leukocytopenia WBC 3780 with low normal neutrophils 2016.  Patient is asymptomatic.  Continue to monitor.  · On 6/10/2022 patient has  slightly better WBC 4290 and neutrophils 2980.    *COVID-19 vaccination.    · 6/11/2021, patient reports that he had 2 doses of COVID-19 vaccine, had very limited side effects with some soreness at the site of injection.   · On 12/17/2021, patient reports he received a booster dose of COVID-19 vaccine on 11/6/2021.      *Ascites secondary to liver cirrhosis.  · CT scan examination on 10/15/2021 confirmed liver cirrhosis.  · Today on 6/10/2022 patient reports abdomen distention for the past 3 months, weight gains, 25 pounds per our records, and also has worsening dyspnea.  Physical examination shows very distended abdomen.  I think this patient has large ascites secondary to his liver cirrhosis.  I recommended to have ultrasound-guided therapeutic paracentesis with samples for routine chemistry labs cell counts and also for cytology study.  · Patient also has bilateral leg edema which is also secondary to liver cirrhosis.  Patient was seen her primary care physician next week.  I think most likely he will need to be started on diuretics.  He also needs to follow-up with his GI specialist Dr. Everardo Foote to discuss possibility of  shunt        PLAN:  1. Arrange ultrasound-guided therapeutic paracentesis, also send sample for chemistry study, cytology study and the cell counts.    2. Arrange patient to have intravenous iron therapy with Venofer 300 mg weekly for 5 doses versus Injectafer weekly for 2 doses.  Pending insurance approval.    3. Continue oral iron 3 times a day.    4. Will arrange patient come back for follow-up with me in 3 months.  Repeat laboratory studies for CBC CMP ferritin iron profile, will also add a B12 and folate level.        Discussed with the patient and his wife about laboratory results, physical examination, and management plan.  Ascites is a new problem today.    More than 45 min were used for patient care, over half of that time were for counseling and coordinating his  care.      TERE ANDREWS M.D., Ph.D.    6/10/2022.      CC:  MD Everardo Cuello M.D.

## 2022-06-16 ENCOUNTER — INFUSION (OUTPATIENT)
Dept: ONCOLOGY | Facility: HOSPITAL | Age: 72
End: 2022-06-16

## 2022-06-16 VITALS
TEMPERATURE: 98.4 F | DIASTOLIC BLOOD PRESSURE: 69 MMHG | WEIGHT: 221.4 LBS | RESPIRATION RATE: 18 BRPM | OXYGEN SATURATION: 97 % | BODY MASS INDEX: 37.34 KG/M2 | HEART RATE: 85 BPM | SYSTOLIC BLOOD PRESSURE: 121 MMHG

## 2022-06-16 DIAGNOSIS — T45.4X5A ADVERSE EFFECT OF IRON AND ITS COMPOUNDS, INITIAL ENCOUNTER: ICD-10-CM

## 2022-06-16 DIAGNOSIS — D50.9 IRON DEFICIENCY ANEMIA, UNSPECIFIED IRON DEFICIENCY ANEMIA TYPE: Primary | ICD-10-CM

## 2022-06-16 PROCEDURE — 63710000001 PROCHLORPERAZINE MALEATE PER 10 MG: Performed by: INTERNAL MEDICINE

## 2022-06-16 PROCEDURE — 25010000002 FERRIC CARBOXYMALTOSE 750 MG/15ML SOLUTION 15 ML VIAL: Performed by: INTERNAL MEDICINE

## 2022-06-16 PROCEDURE — 96365 THER/PROPH/DIAG IV INF INIT: CPT

## 2022-06-16 RX ORDER — PROCHLORPERAZINE MALEATE 10 MG
10 TABLET ORAL ONCE
Status: COMPLETED | OUTPATIENT
Start: 2022-06-16 | End: 2022-06-16

## 2022-06-16 RX ORDER — SODIUM CHLORIDE 9 MG/ML
250 INJECTION, SOLUTION INTRAVENOUS ONCE
Status: COMPLETED | OUTPATIENT
Start: 2022-06-16 | End: 2022-06-16

## 2022-06-16 RX ADMIN — FERRIC CARBOXYMALTOSE INJECTION 750 MG: 50 INJECTION, SOLUTION INTRAVENOUS at 11:55

## 2022-06-16 RX ADMIN — PROCHLORPERAZINE MALEATE 10 MG: 10 TABLET ORAL at 11:45

## 2022-06-16 RX ADMIN — SODIUM CHLORIDE 250 ML: 9 INJECTION, SOLUTION INTRAVENOUS at 11:54

## 2022-06-17 ENCOUNTER — HOSPITAL ENCOUNTER (OUTPATIENT)
Dept: ULTRASOUND IMAGING | Facility: HOSPITAL | Age: 72
Discharge: HOME | End: 2022-06-17
Admitting: INTERNAL MEDICINE

## 2022-06-17 VITALS
SYSTOLIC BLOOD PRESSURE: 150 MMHG | HEIGHT: 66 IN | DIASTOLIC BLOOD PRESSURE: 67 MMHG | OXYGEN SATURATION: 98 % | RESPIRATION RATE: 16 BRPM | HEART RATE: 81 BPM | WEIGHT: 221 LBS | BODY MASS INDEX: 35.52 KG/M2 | TEMPERATURE: 98.2 F

## 2022-06-17 DIAGNOSIS — R18.8 OTHER ASCITES: ICD-10-CM

## 2022-06-17 DIAGNOSIS — K74.60 CIRRHOSIS OF LIVER WITH ASCITES, UNSPECIFIED HEPATIC CIRRHOSIS TYPE: ICD-10-CM

## 2022-06-17 DIAGNOSIS — R18.8 CIRRHOSIS OF LIVER WITH ASCITES, UNSPECIFIED HEPATIC CIRRHOSIS TYPE: ICD-10-CM

## 2022-06-17 LAB
APPEARANCE FLD: ABNORMAL
COLOR FLD: YELLOW
INR PPP: 1.1 (ref 0.8–1.2)
LYMPHOCYTES NFR FLD MANUAL: 44 %
METHOD: ABNORMAL
MONOCYTES NFR FLD: 15 %
MONOS+MACROS NFR FLD: 39 %
NEUTROPHILS NFR FLD MANUAL: 2 %
NUC CELL # FLD: 105 /MM3
PROTHROMBIN TIME: 13.7 SECONDS (ref 12.8–15.2)
RBC # FLD AUTO: 103 /MM3

## 2022-06-17 PROCEDURE — 85610 PROTHROMBIN TIME: CPT

## 2022-06-17 PROCEDURE — 89051 BODY FLUID CELL COUNT: CPT | Performed by: INTERNAL MEDICINE

## 2022-06-17 PROCEDURE — 88112 CYTOPATH CELL ENHANCE TECH: CPT | Performed by: INTERNAL MEDICINE

## 2022-06-17 PROCEDURE — 88305 TISSUE EXAM BY PATHOLOGIST: CPT | Performed by: INTERNAL MEDICINE

## 2022-06-17 PROCEDURE — 76942 ECHO GUIDE FOR BIOPSY: CPT

## 2022-06-17 PROCEDURE — 0 LIDOCAINE 1 % SOLUTION: Performed by: RADIOLOGY

## 2022-06-17 RX ORDER — SODIUM CHLORIDE 0.9 % (FLUSH) 0.9 %
10 SYRINGE (ML) INJECTION AS NEEDED
Status: DISCONTINUED | OUTPATIENT
Start: 2022-06-17 | End: 2022-06-18 | Stop reason: HOSPADM

## 2022-06-17 RX ORDER — SODIUM CHLORIDE 0.9 % (FLUSH) 0.9 %
3 SYRINGE (ML) INJECTION EVERY 12 HOURS SCHEDULED
Status: DISCONTINUED | OUTPATIENT
Start: 2022-06-17 | End: 2022-06-18 | Stop reason: HOSPADM

## 2022-06-17 RX ORDER — LIDOCAINE HYDROCHLORIDE 10 MG/ML
10 INJECTION, SOLUTION INFILTRATION; PERINEURAL ONCE
Status: COMPLETED | OUTPATIENT
Start: 2022-06-17 | End: 2022-06-17

## 2022-06-17 RX ADMIN — LIDOCAINE HYDROCHLORIDE 6 ML: 10 INJECTION, SOLUTION INFILTRATION; PERINEURAL at 07:44

## 2022-06-17 NOTE — NURSING NOTE
Patient arrived to radiology triage for paracentesis.    Protective goggles and mask in place with all patient interactions today

## 2022-06-17 NOTE — DISCHARGE INSTRUCTIONS
EDUCATION /DISCHARGE INSTRUCTIONS  Paracentesis:  A needle is inserted into the space between your abdominal organs and the membrane that surrounds them (peritoneal space).  It is done for the diagnosis and treatment of fluid that is resistant to other therapies.  It helps determine the cause of the fluid and at the relieves pressure created by the fluid.  A sample is obtained and sent to the laboratory for study.  During the procedure:  You will lie on a bed on your back with your legs drawn up.  Your abdomen will be exposed from the chest to the pelvis.  You will otherwise be covered to maintain comfort.  A physician will clean your abdomen with antiseptic soap, place a sterile towel around the site and administer a local anesthetic to numb the area.  The physician will insert a needle into your abdominal wall.  There may be a popping sound which signifies the needle has pierced the abdominal wall. Next, the physician will attach tubing to transfer a sample into a collection bottle.  After the fluid is obtained the needle will be removed.  A pressure dressing is applied to the site.  Risks of the procedure include but are not limited to:   *  Bleeding    *  Wound infection   *  Low blood pressure   *  Decreased urination   *  Low sodium if a large amount of fluid is removed   *  Puncture of abdominal organs by the needle    Benefits of the procedure:  Benefits include the removal of fluid from the abdomen, relief of abdominal pressure and facilitation of a diagnosis.  Alternatives to the procedure:  Possible alternatives are diuretic drug therapy or surgery to place a shunt to drain fluid.  Risks of diuretic drug therapy include possible dehydration and renal failure.  The benefit of drug therapy is that it can be done at home under physician supervision.  Risks of shunt placement include exposure to anesthesia, infection, excessive bleeding and injury to abdominal organs.  The benefit of a shunt is that it can be  used to drain fluid over a longer period of time.  THIS EDUCATION INFORMATION WAS REVIEWED PRIOR TO THE PROCEDURE AND CONSENT. Patient initials__________________Time_________________    Post procedure: (Follow all the instructions below carefully)   *  Weigh yourself daily.   *  Follow your doctors dietary instructions.   *  Rest today (no pushing pulling, straining or heavy lifting).   *  Slowly increase activity tomorow.   *  If you received sedation do not drive for 24 hours.              * Skin affix  applied to puncture site. Do not try to remove, scratch or apply lotion   * Skin affix will fall off on it's own   *  You may shower tomorrow  Call your doctor if experiencing:   *  Signs of infection such as redness, swelling, excessive pain and / or foul       smelling drainage from the puncture site.   *  Chills or fever over 101 degrees (by mouth).   *  Fainting or any noted Rapid weight gain/loss   *  Unrelieved pain or any new or severe symptoms  Following the procedure:      Follow-up with the ordering physician as directed.   Continue to take other medications as directed by your physician unless    otherwise instructed.   If applicable, resume taking your blood thinners or Aspirin in 24 hours.              If you have any concerns please call the Radiology Nurses Desk at [phone]

## 2022-06-17 NOTE — NURSING NOTE
Pt discharge instructions reviewed. Pt verbalized understanding. NAD noted. Pt taken to main entrance in W/C by CNA.

## 2022-06-20 LAB
CYTO UR: NORMAL
LAB AP CASE REPORT: NORMAL
PATH REPORT.FINAL DX SPEC: NORMAL
PATH REPORT.GROSS SPEC: NORMAL

## 2022-06-23 ENCOUNTER — APPOINTMENT (OUTPATIENT)
Dept: ONCOLOGY | Facility: HOSPITAL | Age: 72
End: 2022-06-23

## 2022-06-24 ENCOUNTER — INFUSION (OUTPATIENT)
Dept: ONCOLOGY | Facility: HOSPITAL | Age: 72
End: 2022-06-24

## 2022-06-24 VITALS
RESPIRATION RATE: 18 BRPM | WEIGHT: 207.8 LBS | TEMPERATURE: 98.2 F | SYSTOLIC BLOOD PRESSURE: 138 MMHG | HEART RATE: 80 BPM | BODY MASS INDEX: 33.54 KG/M2 | OXYGEN SATURATION: 95 % | DIASTOLIC BLOOD PRESSURE: 71 MMHG

## 2022-06-24 DIAGNOSIS — T45.4X5A ADVERSE EFFECT OF IRON AND ITS COMPOUNDS, INITIAL ENCOUNTER: Primary | ICD-10-CM

## 2022-06-24 DIAGNOSIS — D50.9 IRON DEFICIENCY ANEMIA, UNSPECIFIED IRON DEFICIENCY ANEMIA TYPE: ICD-10-CM

## 2022-06-24 PROCEDURE — 63710000001 PROCHLORPERAZINE MALEATE PER 10 MG: Performed by: INTERNAL MEDICINE

## 2022-06-24 PROCEDURE — 25010000002 FERRIC CARBOXYMALTOSE 750 MG/15ML SOLUTION 15 ML VIAL: Performed by: INTERNAL MEDICINE

## 2022-06-24 PROCEDURE — 96374 THER/PROPH/DIAG INJ IV PUSH: CPT

## 2022-06-24 RX ORDER — PROCHLORPERAZINE MALEATE 10 MG
10 TABLET ORAL ONCE
Status: COMPLETED | OUTPATIENT
Start: 2022-06-24 | End: 2022-06-24

## 2022-06-24 RX ORDER — SODIUM CHLORIDE 9 MG/ML
250 INJECTION, SOLUTION INTRAVENOUS ONCE
Status: COMPLETED | OUTPATIENT
Start: 2022-06-24 | End: 2022-06-24

## 2022-06-24 RX ADMIN — SODIUM CHLORIDE 250 ML: 9 INJECTION, SOLUTION INTRAVENOUS at 11:50

## 2022-06-24 RX ADMIN — PROCHLORPERAZINE MALEATE 10 MG: 10 TABLET ORAL at 11:50

## 2022-06-24 RX ADMIN — FERRIC CARBOXYMALTOSE INJECTION 750 MG: 50 INJECTION, SOLUTION INTRAVENOUS at 11:56

## 2022-07-05 ENCOUNTER — TELEPHONE (OUTPATIENT)
Dept: ONCOLOGY | Facility: CLINIC | Age: 72
End: 2022-07-05

## 2022-07-05 DIAGNOSIS — R18.8 CIRRHOSIS OF LIVER WITH ASCITES, UNSPECIFIED HEPATIC CIRRHOSIS TYPE: ICD-10-CM

## 2022-07-05 DIAGNOSIS — D69.6 THROMBOCYTOPENIA: ICD-10-CM

## 2022-07-05 DIAGNOSIS — K74.60 CIRRHOSIS OF LIVER WITH ASCITES, UNSPECIFIED HEPATIC CIRRHOSIS TYPE: ICD-10-CM

## 2022-07-05 DIAGNOSIS — D61.818 PANCYTOPENIA: Primary | ICD-10-CM

## 2022-07-05 NOTE — TELEPHONE ENCOUNTER
Caller: Winsome Lieberman    Relationship: Self        What was the call regarding:     HAD A PARACENTESIS 06/17, AND FLUID HAS BUILT BACK UP ,    NOTICED WITHIN THE LAST WEEK CANNOT LAY DOWN AT ALL , IS NOT ABLE TO BREATHE AT ALL WHEN LAYING DOWN, FEELS LIKE AIR IS PUSHED COMPLETELY OUT OF LUNGS    WANTED TO GET IN THIS WEEK OR NEXT WEEK TO SEE DR ANDREWS HAS F/U SCHEDULED 09/09      I CALLED CLINICAL LINE REGARDING WINSOME'S SYMPTOMS      WARM TRANSFERRED TO MARK ON THE CLINICAL LINE TO FURTHER ASSIST.

## 2022-07-06 ENCOUNTER — OFFICE VISIT (OUTPATIENT)
Dept: ONCOLOGY | Facility: CLINIC | Age: 72
End: 2022-07-06

## 2022-07-06 ENCOUNTER — LAB (OUTPATIENT)
Dept: LAB | Facility: HOSPITAL | Age: 72
End: 2022-07-06

## 2022-07-06 VITALS
SYSTOLIC BLOOD PRESSURE: 145 MMHG | WEIGHT: 206.1 LBS | RESPIRATION RATE: 18 BRPM | TEMPERATURE: 97.3 F | OXYGEN SATURATION: 96 % | HEART RATE: 90 BPM | BODY MASS INDEX: 33.12 KG/M2 | DIASTOLIC BLOOD PRESSURE: 74 MMHG | HEIGHT: 66 IN

## 2022-07-06 DIAGNOSIS — D61.818 PANCYTOPENIA: ICD-10-CM

## 2022-07-06 DIAGNOSIS — R18.8 CIRRHOSIS OF LIVER WITH ASCITES, UNSPECIFIED HEPATIC CIRRHOSIS TYPE: ICD-10-CM

## 2022-07-06 DIAGNOSIS — D69.6 THROMBOCYTOPENIA: ICD-10-CM

## 2022-07-06 DIAGNOSIS — R18.8 CIRRHOSIS OF LIVER WITH ASCITES, UNSPECIFIED HEPATIC CIRRHOSIS TYPE: Primary | ICD-10-CM

## 2022-07-06 DIAGNOSIS — K74.60 CIRRHOSIS OF LIVER WITH ASCITES, UNSPECIFIED HEPATIC CIRRHOSIS TYPE: ICD-10-CM

## 2022-07-06 DIAGNOSIS — D50.9 IRON DEFICIENCY ANEMIA, UNSPECIFIED IRON DEFICIENCY ANEMIA TYPE: ICD-10-CM

## 2022-07-06 DIAGNOSIS — N28.9 RENAL INSUFFICIENCY: ICD-10-CM

## 2022-07-06 DIAGNOSIS — J90 PLEURAL EFFUSION, RIGHT: ICD-10-CM

## 2022-07-06 DIAGNOSIS — K74.60 CIRRHOSIS OF LIVER WITH ASCITES, UNSPECIFIED HEPATIC CIRRHOSIS TYPE: Primary | ICD-10-CM

## 2022-07-06 DIAGNOSIS — R18.8 OTHER ASCITES: ICD-10-CM

## 2022-07-06 DIAGNOSIS — R06.02 SHORTNESS OF BREATH: ICD-10-CM

## 2022-07-06 LAB
ALBUMIN SERPL-MCNC: 3 G/DL (ref 3.5–5.2)
ALBUMIN/GLOB SERPL: 1 G/DL (ref 1.1–2.4)
ALP SERPL-CCNC: 158 U/L (ref 38–116)
ALT SERPL W P-5'-P-CCNC: 45 U/L (ref 0–41)
ANION GAP SERPL CALCULATED.3IONS-SCNC: 11.8 MMOL/L (ref 5–15)
AST SERPL-CCNC: 56 U/L (ref 0–40)
BASOPHILS # BLD AUTO: 0.03 10*3/MM3 (ref 0–0.2)
BASOPHILS NFR BLD AUTO: 0.7 % (ref 0–1.5)
BILIRUB SERPL-MCNC: 0.5 MG/DL (ref 0.2–1.2)
BUN SERPL-MCNC: 27 MG/DL (ref 6–20)
BUN/CREAT SERPL: 19.9 (ref 7.3–30)
CALCIUM SPEC-SCNC: 8.5 MG/DL (ref 8.5–10.2)
CHLORIDE SERPL-SCNC: 109 MMOL/L (ref 98–107)
CO2 SERPL-SCNC: 21.2 MMOL/L (ref 22–29)
CREAT SERPL-MCNC: 1.36 MG/DL (ref 0.7–1.3)
DEPRECATED RDW RBC AUTO: 51.6 FL (ref 37–54)
EGFRCR SERPLBLD CKD-EPI 2021: 55.6 ML/MIN/1.73
EOSINOPHIL # BLD AUTO: 0.29 10*3/MM3 (ref 0–0.4)
EOSINOPHIL NFR BLD AUTO: 6.8 % (ref 0.3–6.2)
ERYTHROCYTE [DISTWIDTH] IN BLOOD BY AUTOMATED COUNT: 14.5 % (ref 12.3–15.4)
FERRITIN SERPL-MCNC: 644.2 NG/ML (ref 30–400)
GLOBULIN UR ELPH-MCNC: 3.1 GM/DL (ref 1.8–3.5)
GLUCOSE SERPL-MCNC: 170 MG/DL (ref 74–124)
HCT VFR BLD AUTO: 30.2 % (ref 37.5–51)
HGB BLD-MCNC: 9.7 G/DL (ref 13–17.7)
IMM GRANULOCYTES # BLD AUTO: 0.01 10*3/MM3 (ref 0–0.05)
IMM GRANULOCYTES NFR BLD AUTO: 0.2 % (ref 0–0.5)
IRON 24H UR-MRATE: 48 MCG/DL (ref 59–158)
IRON SATN MFR SERPL: 21 % (ref 14–48)
LYMPHOCYTES # BLD AUTO: 0.84 10*3/MM3 (ref 0.7–3.1)
LYMPHOCYTES NFR BLD AUTO: 19.7 % (ref 19.6–45.3)
MCH RBC QN AUTO: 31.4 PG (ref 26.6–33)
MCHC RBC AUTO-ENTMCNC: 32.1 G/DL (ref 31.5–35.7)
MCV RBC AUTO: 97.7 FL (ref 79–97)
MONOCYTES # BLD AUTO: 0.47 10*3/MM3 (ref 0.1–0.9)
MONOCYTES NFR BLD AUTO: 11 % (ref 5–12)
NEUTROPHILS NFR BLD AUTO: 2.62 10*3/MM3 (ref 1.7–7)
NEUTROPHILS NFR BLD AUTO: 61.6 % (ref 42.7–76)
NRBC BLD AUTO-RTO: 0 /100 WBC (ref 0–0.2)
PLATELET # BLD AUTO: 65 10*3/MM3 (ref 140–450)
PMV BLD AUTO: 10.9 FL (ref 6–12)
POTASSIUM SERPL-SCNC: 4.3 MMOL/L (ref 3.5–4.7)
PROT SERPL-MCNC: 6.1 G/DL (ref 6.3–8)
RBC # BLD AUTO: 3.09 10*6/MM3 (ref 4.14–5.8)
SODIUM SERPL-SCNC: 142 MMOL/L (ref 134–145)
TIBC SERPL-MCNC: 227 MCG/DL (ref 249–505)
TRANSFERRIN SERPL-MCNC: 162 MG/DL (ref 200–360)
WBC NRBC COR # BLD: 4.26 10*3/MM3 (ref 3.4–10.8)

## 2022-07-06 PROCEDURE — 36415 COLL VENOUS BLD VENIPUNCTURE: CPT

## 2022-07-06 PROCEDURE — 84466 ASSAY OF TRANSFERRIN: CPT | Performed by: INTERNAL MEDICINE

## 2022-07-06 PROCEDURE — 99215 OFFICE O/P EST HI 40 MIN: CPT | Performed by: INTERNAL MEDICINE

## 2022-07-06 PROCEDURE — 80053 COMPREHEN METABOLIC PANEL: CPT

## 2022-07-06 PROCEDURE — 83540 ASSAY OF IRON: CPT | Performed by: INTERNAL MEDICINE

## 2022-07-06 PROCEDURE — 85025 COMPLETE CBC W/AUTO DIFF WBC: CPT

## 2022-07-06 PROCEDURE — 82728 ASSAY OF FERRITIN: CPT | Performed by: INTERNAL MEDICINE

## 2022-07-06 RX ORDER — POTASSIUM CHLORIDE 1500 MG/1
20 TABLET, FILM COATED, EXTENDED RELEASE ORAL DAILY
Qty: 30 TABLET | Refills: 1 | Status: ON HOLD | OUTPATIENT
Start: 2022-07-06 | End: 2022-10-01 | Stop reason: SDUPTHER

## 2022-07-06 RX ORDER — FUROSEMIDE 20 MG/1
20 TABLET ORAL 2 TIMES DAILY
Qty: 30 TABLET | Refills: 1 | Status: SHIPPED | OUTPATIENT
Start: 2022-07-06 | End: 2022-10-01 | Stop reason: HOSPADM

## 2022-07-06 NOTE — PROGRESS NOTES
.     REASON FOR FOLLOWUP:     * Thrombocytopenia.   *  Iron deficiency anemia discovered on 1/13/2021.  Patient was started on oral iron 3 times a day.  *  Mild leukocytopenia and low normal neutrophils      HISTORY OF PRESENT ILLNESS:  The patient is a 71 y.o. year old male with hypertension diabetes, liver cirrhosis secondary to RODRIGUEZ, and hypogonadism who presented today for reevaluation because he is reporting worsening abdomen distention and dyspnea.  I saw him recently on 6/10/2022 and is scheduled patient for paracentesis and arranging for 3-month follow-up.   However he called in reporting not feeling well so we brought him in for evaluation.  Patient is accompanied by his wife.    Patient indeed had paracentesis on 6/17/2022 with 11.2 L ascites fluid removed.  Cytology study was negative for malignancy.    Patient reports today that he has worsening abdominal distention, also has dyspnea however no cough hemoptysis.  No chest pain.  He denies fever sweating chills.  Patient also reports worsening bilateral leg edema.    He also recently finished her 2 dose of Injectafer in late June 2022 because of iron deficiency anemia, not responding to oral iron treatment 3 times a day.    Patient continues to have easy bruising however no spontaneous bleeding such as epistaxis or gum bleeding.    Laboratory study today on 7/6/2022 reported stable anemia Hb 9.7 and stable thrombocytopenia platelets 65,000, low normal WBC 4260.  Patient has worsening renal function with creatinine 1.36, and supratherapeutic ferritin 644 ng/mL.        Past Medical History:   Diagnosis Date   • H/O Dental disorder    • History of thrombocytopenia    • Hypertension    • RODRIGUEZ (nonalcoholic steatohepatitis)    • Psoriasis    • Type 2 diabetes mellitus (HCC)      Past Surgical History:   Procedure Laterality Date   • CATARACT EXTRACTION, BILATERAL     • COLONOSCOPY  11/2015   • ROTATOR CUFF REPAIR Left 2005     HEMATOLOGY HISTORY:  The  patient is a 70 y.o. year old male with history of hypertension diabetes, RODRIGUEZ, and hypogonadism who presented today on 1/13/2021 for initial evaluation because of thrombocytopenia, referred by his primary care physician Dr. Joseph.      Patient reports he has no limitation of activity.  He does have chronic joint pains, but of note joint swelling.  His skin pruritus.  No skin rashes.  He does have easy bruising, however denies evidence of bleeding such as epistaxis, gum bleeding, rectal bleeding or hematuria.  Denies weight loss.  No low fever.  Appetite is reasonable.    I reviewed his outside laboratory studies.  Most recent also lab on 10/22/2020 reported hemoglobin 10.5, MCV 89.6, WBC 3200, including neutrophils 1900, lymphocytes 800 monocytes 300, and platelets 62,000.    Liver study on 9/2/2020 reported WBC 3200, including ANC 1900 lymphocytes 1000 monocytes 300, hemoglobin 10.5, MCV 80.0, and platelets 65,000.    His earliest CBC results available for review was from 5/8/2019 which reported WBC 4200, including ANC 2600, lymphocytes 1200 and monocytes 400, hemoglobin 13.4 and platelets 95,000.  At that time chemistry lab reported elevated liver panel including  , alk phosphatase 87 and a total bilirubin 1.1.  Total serum protein 7.0 albumin 4.1 and the globulin 2.9.  His creatinine was 1.31, with glucose of 219 been in normal electrolytes.  Hemoglobin A1c was 8.3%.      Laboratory studies on 1/13/2021 reported mild anemia with Hb 10.9, thrombocytopenia platelets 48,000, and IPF 7.0%, leukocytopenia WBC 3250 including ANC 1830.  Other labs reported no evidence of hemolysis, had supratherapeutic B12 level 1413 pg/mL and folate > 20 ng/mL.  He does have evidence of iron deficiency with low iron sats 10% and ferritin only 41.5 ng/mL in the setting of anemia with Hb 10.9.  His erythropoietin is not elevated accordingly, which indicates lack of response from his kidney to produce erythropoietin.      Currently is not a candidate for CONRAD/Procrit treatment.  However he is adamant he is a candidate for oral iron treatment.    We will start him on ferrous sulfate 325 mg 3 times a day.  Will send prescription to his pharmacy.     Laboratory study 6/11/2021 showed a slightly improved hemoglobin 11.9, with stable thrombocytopenia platelets 50,000 IPF 8.3%, and WBC 3720 including ANC 2100.  Iron study reported ferritin 115.3 ng/mL and iron saturation 14% with free iron 55 TIBC 384.    Laboratory studies on 12/17/2021 reported stable pancytopenia with anemia Hb 10.8, with platelets 60,000 and IPF 4.1%, and WBC 3780 including neutrophils 2060 lymphocytes 1040.  Iron study reported slightly trending down ferritin 86.1 ng/mL in the much improved iron saturation 34% with free iron 126 and TIBC 368.      On 6/10/2022 reports patient has abdomen distention for about 3 months.  He also developed bilateral leg edema in December 2021.  Patient also reports worsening dyspnea and weight gain for the past several months..  Per our records, he gained 25 pounds since December 2021.    Patient reports having taken oral iron 3 times a day.  He has mild nausea but not significant and no vomiting.  He has brown-colored stool.  He also reports having loose stool some of days.  No diarrhea.    Patient received a boost dose of COVID-19 vaccine on 11/6/2021.  Patient reports abdomen distention about 3 months, leg edema 6 months, after the boost dose of COVID (December) boost on 11/6/21    Patient complains of easy bruising on his arms from scratching, he has skin pruritus, however no skin rashes.  He denies spontaneous bleeding such as epistaxis, gum bleeding, or hematochezia.    Lab study on 6/10/2022 reported deteriorating anemia with Hb 9.6, persistent thrombocytopenia with stable platelets 69,000, and low normal WBC 4290 including neutrophils 2980.  Iron studies showed a deteriorating saturation 17%, free iron 61 TIBC 357 with a  "ferritin 86.8%.      MEDICATIONS    Current Outpatient Medications:   •  allopurinol (ZYLOPRIM) 100 MG tablet, , Disp: , Rfl:   •  ferrous sulfate 325 (65 FE) MG tablet, Take 1 tablet by mouth 3 (Three) Times a Day With Meals., Disp: 90 tablet, Rfl: 2  •  FIBER PO, Take  by mouth., Disp: , Rfl:   •  lisinopril-hydrochlorothiazide (PRINZIDE,ZESTORETIC) 20-12.5 MG per tablet, Take 1 tablet by mouth 2 (Two) Times a Day., Disp: , Rfl:   •  metFORMIN (GLUCOPHAGE) 500 MG tablet, 3 (Three) Times a Day., Disp: , Rfl:   •  multivitamin (THERAGRAN) tablet tablet, Take  by mouth Daily., Disp: , Rfl:     ALLERGIES:     Allergies   Allergen Reactions   • Doxycycline Itching       SOCIAL HISTORY:         Social History     Socioeconomic History   • Marital status:      Spouse name: Lisa   Tobacco Use   • Smoking status: Former Smoker     Quit date:      Years since quittin.5   Substance and Sexual Activity   • Alcohol use: Never   · As of 2021, patient reports he does drink alcoholic beverage 10 drinks per week.  Denies illegal drug use.      FAMILY HISTORY:  No cancer in family, mother HTN,           Vitals:    22 1539   BP: 145/74   Pulse: 90   Resp: 18   Temp: 97.3 °F (36.3 °C)   TempSrc: Temporal   SpO2: 96%   Weight: 93.5 kg (206 lb 1.6 oz)   Height: 167.6 cm (66\")  Comment: pt stated HT   PainSc: 0-No pain  Comment: pt stated just short of breath       Current Status 6/10/2022   ECOG score 1      PHYSICAL EXAM:    GENERAL:  Well-developed, well-nourished male in no acute distress.   SKIN:  Warm, dry without rashes, purpura or petechiae.  HEENT:  Normocephalic.  Wearing mask.   LYMPHATICS:  No cervical, supraclavicular adenopathy.  CHEST: Decreased breathing sounds on the right side, no wheezing or rails.  Left breathing sounds normal.    CARDIAC:  Regular rate and rhythm. Normal S1,S2.  ABDOMEN: Abdomen distended.  Nontender.  Bowel sounds normal.  EXTREMITIES: 2+ edema on both lower " extremities.  NEUROLOGICAL:  Grossly intact.  No focal neurological deficits.  PSYCHIATRIC:  Normal affect and mood.          RECENT LABS:  Lab Results   Component Value Date    WBC 4.26 07/06/2022    HGB 9.7 (L) 07/06/2022    HCT 30.2 (L) 07/06/2022    MCV 97.7 (H) 07/06/2022    PLT 65 (L) 07/06/2022     Lab Results   Component Value Date    NEUTROABS 2.62 07/06/2022     Lab Results   Component Value Date    IRON 61 06/10/2022    TIBC 357 06/10/2022    FERRITIN 86.80 06/10/2022   Iron saturation 17% on 6/10/2022. was 34% on 12/17/2021     Lab Results   Component Value Date    IRON 48 (L) 07/06/2022    TIBC 227 (L) 07/06/2022    FERRITIN 644.20 (H) 07/06/2022   Iron saturation 21% on 7/6/2022.      Lab Results   Component Value Date    FOLATE >20.00 01/13/2021     Lab Results   Component Value Date    RXQKVZYI01 1,413 (H) 01/13/2021       IMAGING:   US Paracentesis (06/17/2022 08:25)      Assessment & Plan   *  Thrombocytopenia   · Etiology of the thrombocytopenia is not clear.  However I do think it is likely related to his fatty liver which was documented from abdomen ultrasound on 7/20/2012.    · Laboratory studies on 1/13/2021 reported excellent vitamin B12 level.  He had marginally elevated IPF 7.0%.  no apparent compensation for platelets production.   · On 6/11/2021, platelets improved at 63,000 with normal IPF 5.3%.  Patient is asymptomatic.    · On 12/17/2021, stable platelets 60,000 and normal IPF 4.1%. Patient is asymptomatic.   · On 6/10/2022 platelets 69,000.  Patient reports no spontaneous bleeding.    · Today 7/6/2022 platelets 65,000.  No spontaneous bleeding.  Continue to monitor.    *Iron deficiency anemia.   · Laboratory study on 1/13/2021 reported no evidence of hemolysis, had supratherapeutic B12 level and folate.    · He does have evidence of iron deficiency with low iron sats 10% and ferritin only 41.5 ng/mL with Hb 10.9.    · His erythropoietin 16.1, is not elevated accordingly, which  indicates lack of response from his kidney to produce erythropoietin. Currently is not a candidate for CONRAD/Procrit treatment.    · We started patient on oral ferrous sulfate 325 mg 3 times a day in January 2021.   · On 3/19/2021, patient has improved hemoglobin 11.9.  Continue to monitor.  · On 6/11/2021, stable anemia Hb 11.0, however much improved with ferritin 115.3 ng/mL, and iron saturation 14%.  Will advised to continue oral iron treatment.  · On 12/17/2021 stable Hb 10.8, much improved iron saturation 34% and free iron 126, however with trending down ferritin level 86.1 ng/mL. Continue oral iron treatment.  · 6/10/2022, patient reports taking oral iron 3 times a day.  No apparent side effects except mild nausea.  Laboratory studies showed deteriorating hemoglobin 9.6 and also worsening iron saturation 17% with free iron 61 and the ferritin 86.8 ng/mL.  Discussed with the patient, recommended intravenous iron therapy, this patient has poor iron absorption.    · Patient received 2 dose of Injectafer 750 mg x 2 in June 2022.  Lab study today on 7/6/2022 reported improved normalized iron saturation 21% with a supratherapeutic ferritin 644 ng/mL.    * Pancytopenia with mild leukocytopenia.  This is also likely related to his fatty liver and liver cirrhosis.  Patient drinks alcohol beverage is reported in January 2021.  · Patient reports no recurrent infection.  His neutrophils marginally normal at 1830 out of WBC 3250.  · Patient had supratherapeutic B12 level 1/13/2021.  · Marginally better WBC 3720 including ANC 2100 3/19/2021.    · 6/11/2021 WBC 3530, with ANC 2180.  No recurrent infection.    · On 12/17/2021 stable leukocytopenia WBC 3780 with low normal neutrophils 2016.  Patient is asymptomatic.  Continue to monitor.  · On 6/10/2022 patient has slightly better WBC 4290 and neutrophils 2980.  · On 7/6/2022 patient had WBC 4260 ANC 2620, lymphocytes 840 and monocytes 470.    *COVID-19 vaccination.     · 6/11/2021, patient reports that he had 2 doses of COVID-19 vaccine, had very limited side effects with some soreness at the site of injection.   · On 12/17/2021, patient reports he received a booster dose of COVID-19 vaccine on 11/6/2021.      *Ascites secondary to liver cirrhosis.  · CT scan examination on 10/15/2021 confirmed liver cirrhosis.  · On 6/10/2022 patient reports abdomen distention for the past 3 months, weight gains, 25 pounds per our records, and also has worsening dyspnea.  Physical examination shows very distended abdomen.  I think this patient has large ascites secondary to his liver cirrhosis.  I recommended to have ultrasound-guided therapeutic paracentesis with samples for routine chemistry labs cell counts and also for cytology study.  · Patient also has bilateral leg edema which is also secondary to liver cirrhosis.  Patient was seen her primary care physician next week.  I think most likely he will need to be started on diuretics.  He also needs to follow-up with his GI specialist Dr. Everardo Foote to discuss possibility of  shunt.   · On 6/17/2022 patient had ultrasound-guided paracentesis, with 11.2 L clear yellow ascites removed.  Cytology study reported negative for malignant cells.  There was a reactive mesothelial cells histiocytes and mixed inflammatory cells.  · Today on 7/6/2022 follow-up, patient reports worsening abdomen distention again.  I will arrange patient to have therapeutic paracentesis again as soon as possible, and also will arrange another paracentesis in 3 weeks.  · At the meantime I also recommended to start the patient on low-dose Lasix 20 mg daily on 7/6/2022.   · Patient also complains of dyspnea, I will obtain chest x-ray examination PA lateral as soon as possible..    *Worsening renal function.  · Laboratory study today on 7/6/2022 reported creatinine 1.37.  He had creatinine 1.02 on 12/17/2021.  Discussed with patient, I recommended referring patient to  nephrology service for evaluation of possible hepatorenal syndrome due to liver cirrhosis.  Patient is agreeable.    PLAN:  1. Arrange ultrasound-guided therapeutic paracentesis, as soon as possible, and also arrange another paracentesis in 3 weeks.   2. Start oral Lasix 20 mg daily.  I E scribed to his pharmacy.  3. Obtain chest x-ray PA lateral as soon as possible for evaluation of dyspnea.  4. Refer to nephrology Dr. Calvo for evaluation worsening renal function.  Suspect patient having hepatorenal syndrome due to his liver cirrhosis.  5. I encourage patient to talk to his GI physician Dr. Everardo Foote for evaluation and possible TIPS.   6. Continue oral iron 3 times a day.    7. Will arrange patient come back for follow-up with me in 2 months.  Repeat laboratory studies for CBC CMP ferritin iron profile.        Discussed with the patient and his wife about the results and the paracentesis, and referral to nephrology service as well as starting diuretics.  Patient and his wife voiced understanding and agreement.    More than 40 min were used for patient care, over half of that time were for counseling and coordinating his care.      TERE ANDREWS M.D., Ph.D.    7/6/2022.     Addendum:  XR Chest PA & Lateral  Narrative: XR CHEST PA AND LATERAL-     HISTORY: Male who is 71 years-old,  short of breath     TECHNIQUE: Frontal and lateral views of the chest     COMPARISON: None available     FINDINGS:     The heart size is difficult to characterize owing to obscuration of the  right cardiac margin. Aorta is calcified. Pulmonary vasculature is  unremarkable.     Opacification of the right mid to lower hemithorax suggests moderate to  large pleural effusion and likely atelectasis, possibility of underlying  pneumonia or neoplasm is not excluded, follow-up recommended, CT can be  considered for further evaluation as indicated. No pneumothorax. No  acute osseous process.     Impression: As described.     This report was  finalized on 7/14/2022 1:20 PM by Dr. Lizandro Horowitz M.D.     US Paracentesis  Narrative: ULTRASOUND-GUIDED PARACENTESIS     HISTORY: Ascites     After signed informed consent was obtained, the abdomen was prepped and  draped in the usual sterile fashion with 2% chlorhexidine. Lidocaine was  used for local anesthesia.     A 8 Romansh catheter was inserted into the right lower quad using  ultrasound guidance. 6.4 L of straw-colored ascites was removed.     Confirmatory images were obtained.     Patient tolerated the procedure well with no complications.     Impression: Ultrasound-guided paracentesis as described.     This report was finalized on 7/14/2022 12:42 PM by Dr. Jonathan Ram M.D.         Chest x-ray examination on 7/14/2022 reported a right-sided moderate to large pleural effusion.  We will arrange patient to have ultrasound-guided right thoracentesis.  Suspect the patient has pleural effusion due to his liver cirrhosis.  He will have x-ray examination after the thoracentesis per protocol.  We will reassess whether he needs a CT scan for the chest.    This patient also had paracentesis again on 7/14/2022 with 6.2 L ascites fluid removed. (He had an 11.2 L ascites removed on 6/17/2022).     TERE ANDREWS M.D., Ph.D.     6/17/2022.        CC:  MD Everardo Cuello M.D.     Adolfo Calvo MD

## 2022-07-14 ENCOUNTER — HOSPITAL ENCOUNTER (OUTPATIENT)
Dept: GENERAL RADIOLOGY | Facility: HOSPITAL | Age: 72
Discharge: HOME | End: 2022-07-14

## 2022-07-14 ENCOUNTER — HOSPITAL ENCOUNTER (OUTPATIENT)
Dept: ULTRASOUND IMAGING | Facility: HOSPITAL | Age: 72
Discharge: HOME | End: 2022-07-14

## 2022-07-14 VITALS
HEART RATE: 95 BPM | DIASTOLIC BLOOD PRESSURE: 69 MMHG | RESPIRATION RATE: 22 BRPM | TEMPERATURE: 97.3 F | OXYGEN SATURATION: 98 % | SYSTOLIC BLOOD PRESSURE: 141 MMHG

## 2022-07-14 DIAGNOSIS — K74.60 CIRRHOSIS OF LIVER WITH ASCITES, UNSPECIFIED HEPATIC CIRRHOSIS TYPE: ICD-10-CM

## 2022-07-14 DIAGNOSIS — N28.9 RENAL INSUFFICIENCY: ICD-10-CM

## 2022-07-14 DIAGNOSIS — R06.02 SHORTNESS OF BREATH: ICD-10-CM

## 2022-07-14 DIAGNOSIS — R18.8 CIRRHOSIS OF LIVER WITH ASCITES, UNSPECIFIED HEPATIC CIRRHOSIS TYPE: ICD-10-CM

## 2022-07-14 DIAGNOSIS — R18.8 OTHER ASCITES: ICD-10-CM

## 2022-07-14 LAB
INR PPP: 1.1 (ref 0.8–1.2)
PROTHROMBIN TIME: 13.4 SECONDS (ref 12.8–15.2)

## 2022-07-14 PROCEDURE — 0 LIDOCAINE 1 % SOLUTION: Performed by: RADIOLOGY

## 2022-07-14 PROCEDURE — 76942 ECHO GUIDE FOR BIOPSY: CPT

## 2022-07-14 PROCEDURE — 71046 X-RAY EXAM CHEST 2 VIEWS: CPT

## 2022-07-14 PROCEDURE — 85610 PROTHROMBIN TIME: CPT

## 2022-07-14 RX ORDER — SODIUM CHLORIDE 0.9 % (FLUSH) 0.9 %
3 SYRINGE (ML) INJECTION EVERY 12 HOURS SCHEDULED
Status: CANCELLED | OUTPATIENT
Start: 2022-07-26

## 2022-07-14 RX ORDER — LIDOCAINE HYDROCHLORIDE 10 MG/ML
10 INJECTION, SOLUTION INFILTRATION; PERINEURAL ONCE
Status: COMPLETED | OUTPATIENT
Start: 2022-07-14 | End: 2022-07-14

## 2022-07-14 RX ORDER — SODIUM CHLORIDE 0.9 % (FLUSH) 0.9 %
10 SYRINGE (ML) INJECTION AS NEEDED
Status: CANCELLED | OUTPATIENT
Start: 2022-07-26

## 2022-07-14 RX ADMIN — LIDOCAINE HYDROCHLORIDE 3 ML: 10 INJECTION, SOLUTION INFILTRATION; PERINEURAL at 11:42

## 2022-07-14 NOTE — DISCHARGE INSTRUCTIONS
..EDUCATION /DISCHARGE INSTRUCTIONS  Paracentesis:  A needle is inserted into the space between your abdominal organs and the membrane that surrounds them (peritoneal space).  It is done for the diagnosis and treatment of fluid that is resistant to other therapies.  It helps determine the cause of the fluid and at the relieves pressure created by the fluid.  A sample is obtained and sent to the laboratory for study.  During the procedure:  You will lie on a bed on your back with your legs drawn up.  Your abdomen will be exposed from the chest to the pelvis.  You will otherwise be covered to maintain comfort.  A physician will clean your abdomen with antiseptic soap, place a sterile towel around the site and administer a local anesthetic to numb the area.  The physician will insert a needle into your abdominal wall.  There may be a popping sound which signifies the needle has pierced the abdominal wall. Next, the physician will attach tubing to transfer a sample into a collection bottle.  After the fluid is obtained the needle will be removed.  A pressure dressing is applied to the site.  Risks of the procedure include but are not limited to:   *  Bleeding    *  Wound infection   *  Low blood pressure   *  Decreased urination   *  Low sodium if a large amount of fluid is removed   *  Puncture of abdominal organs by the needle    Benefits of the procedure:  Benefits include the removal of fluid from the abdomen, relief of abdominal pressure and facilitation of a diagnosis.  Alternatives to the procedure:  Possible alternatives are diuretic drug therapy or surgery to place a shunt to drain fluid.  Risks of diuretic drug therapy include possible dehydration and renal failure.  The benefit of drug therapy is that it can be done at home under physician supervision.  Risks of shunt placement include exposure to anesthesia, infection, excessive bleeding and injury to abdominal organs.  The benefit of a shunt is that it can  be used to drain fluid over a longer period of time.  THIS EDUCATION INFORMATION WAS REVIEWED PRIOR TO THE PROCEDURE AND CONSENT. Patient initials__________________Time_________________    Post procedure: (Follow all the instructions below carefully)   *  Weigh yourself daily.   *  Follow your doctors dietary instructions.   *  Rest today (no pushing pulling, straining or heavy lifting).   *  Slowly increase activity tomorow.   *  If you received sedation do not drive for 24 hours.              * Skin affix  applied to puncture site. Do not try to remove, scratch or apply lotion   * Skin affix will fall off on it's own   *  You may shower tomorrow  Call your doctor if experiencing:   *  Signs of infection such as redness, swelling, excessive pain and / or foul       smelling drainage from the puncture site.   *  Chills or fever over 101 degrees (by mouth).   *  Fainting or any noted Rapid weight gain/loss   *  Unrelieved pain or any new or severe symptoms  Following the procedure:      Follow-up with the ordering physician as directed.   Continue to take other medications as directed by your physician unless    otherwise instructed.   If applicable, resume taking your blood thinners or Aspirin in 24 hours.              If you have any concerns please call the Radiology Nurses Desk at [phone]

## 2022-07-14 NOTE — H&P
Name: Dennys Lieberman ADMIT: 2022   : 1950  PCP: Александр Joseph MD    MRN: [id number] LOS: 0 days   AGE/SEX: 71 y.o. male  ROOM: Room/bed info not found       Chief complaint   Patient is a 71 y.o. male presents with ascites.     Past Surgical History:  Past Surgical History:   Procedure Laterality Date   • CATARACT EXTRACTION, BILATERAL     • COLONOSCOPY  2015   • ROTATOR CUFF REPAIR Left        Past Medical History:  Past Medical History:   Diagnosis Date   • H/O Dental disorder    • History of thrombocytopenia    • Hypertension    • RODRIGUEZ (nonalcoholic steatohepatitis)    • Psoriasis    • Type 2 diabetes mellitus (HCC)        Home Medications:  (Not in a hospital admission)      Allergies:  Doxycycline    Family History:  Family History   Problem Relation Age of Onset   • Cerebral aneurysm Other    • Cancer Other        Social History:  Social History     Tobacco Use   • Smoking status: Former Smoker     Quit date:      Years since quittin.5   Substance Use Topics   • Alcohol use: Never        Objective     Physical Exam:   distended    Vital Signs  Temp:  [97.3 °F (36.3 °C)] 97.3 °F (36.3 °C)  Heart Rate:  [95] 95  Resp:  [22] 22  BP: (158)/(75) 158/75    Anticipated Surgical Procedure:  R/r/r, ctab    The risks, benefits and alternatives of this procedure have been discussed with the patient or responsible party: Yes        Jonathan Ram MD  22  10:56 EDT

## 2022-07-14 NOTE — H&P (VIEW-ONLY)
Name: Dennys Lieberman ADMIT: 2022   : 1950  PCP: Александр Joseph MD    MRN: [id number] LOS: 0 days   AGE/SEX: 71 y.o. male  ROOM: Room/bed info not found       Chief complaint   Patient is a 71 y.o. male presents with ascites.     Past Surgical History:  Past Surgical History:   Procedure Laterality Date   • CATARACT EXTRACTION, BILATERAL     • COLONOSCOPY  2015   • ROTATOR CUFF REPAIR Left        Past Medical History:  Past Medical History:   Diagnosis Date   • H/O Dental disorder    • History of thrombocytopenia    • Hypertension    • RODRIGUEZ (nonalcoholic steatohepatitis)    • Psoriasis    • Type 2 diabetes mellitus (HCC)        Home Medications:  (Not in a hospital admission)      Allergies:  Doxycycline    Family History:  Family History   Problem Relation Age of Onset   • Cerebral aneurysm Other    • Cancer Other        Social History:  Social History     Tobacco Use   • Smoking status: Former Smoker     Quit date:      Years since quittin.5   Substance Use Topics   • Alcohol use: Never        Objective     Physical Exam:   distended    Vital Signs  Temp:  [97.3 °F (36.3 °C)] 97.3 °F (36.3 °C)  Heart Rate:  [95] 95  Resp:  [22] 22  BP: (158)/(75) 158/75    Anticipated Surgical Procedure:  R/r/r, ctab    The risks, benefits and alternatives of this procedure have been discussed with the patient or responsible party: Yes        Jonathan Ram MD  22  10:56 EDT

## 2022-07-18 ENCOUNTER — OFFICE (OUTPATIENT)
Dept: URBAN - METROPOLITAN AREA CLINIC 75 | Facility: CLINIC | Age: 72
End: 2022-07-18

## 2022-07-18 ENCOUNTER — TELEPHONE (OUTPATIENT)
Dept: ONCOLOGY | Facility: CLINIC | Age: 72
End: 2022-07-18

## 2022-07-18 VITALS
DIASTOLIC BLOOD PRESSURE: 54 MMHG | OXYGEN SATURATION: 97 % | HEIGHT: 66 IN | SYSTOLIC BLOOD PRESSURE: 120 MMHG | WEIGHT: 181 LBS | HEART RATE: 62 BPM

## 2022-07-18 DIAGNOSIS — R06.02 SHORTNESS OF BREATH: Primary | ICD-10-CM

## 2022-07-18 DIAGNOSIS — J90 PLEURAL EFFUSION, RIGHT: ICD-10-CM

## 2022-07-18 DIAGNOSIS — R06.02 SHORTNESS OF BREATH: ICD-10-CM

## 2022-07-18 DIAGNOSIS — D69.6 THROMBOCYTOPENIA: ICD-10-CM

## 2022-07-18 DIAGNOSIS — K75.81 NONALCOHOLIC STEATOHEPATITIS (NASH): ICD-10-CM

## 2022-07-18 DIAGNOSIS — R18.8 OTHER ASCITES: ICD-10-CM

## 2022-07-18 DIAGNOSIS — Z86.010 PERSONAL HISTORY OF COLONIC POLYPS: ICD-10-CM

## 2022-07-18 DIAGNOSIS — D69.6 THROMBOCYTOPENIA: Primary | ICD-10-CM

## 2022-07-18 DIAGNOSIS — K74.60 UNSPECIFIED CIRRHOSIS OF LIVER: ICD-10-CM

## 2022-07-18 PROCEDURE — 99214 OFFICE O/P EST MOD 30 MIN: CPT | Performed by: INTERNAL MEDICINE

## 2022-07-18 RX ORDER — FUROSEMIDE 20 MG/1
TABLET ORAL
Qty: 60 | Refills: 3 | Status: COMPLETED
Start: 2022-07-18 | End: 2022-09-26

## 2022-07-18 RX ORDER — SPIRONOLACTONE 100 MG/1
TABLET, FILM COATED ORAL
Qty: 30 | Refills: 3 | Status: ACTIVE
Start: 2022-07-18

## 2022-07-18 NOTE — TELEPHONE ENCOUNTER
----- Message from Almaz Conrad RN sent at 7/18/2022  8:21 AM EDT -----  Regarding: Thoracentesis Right  Patient needs a appointment for a Thoracentesis for a right pleural effusion. Thanks

## 2022-07-18 NOTE — TELEPHONE ENCOUNTER
Patient called and informed Dr Salinas has ordered a right Thoracentesis due to a right sided Pleural Effusion. Message sent to appointment desk to schedule.  Patient v/u

## 2022-07-18 NOTE — PROGRESS NOTES
07/27/22 0001   Pre-Procedure Phone Call   Procedure Time Verified Yes   Arrival Time 0700   Procedure Location Verified Yes   Medical History Reviewed No   NPO Status Reinforced Yes   Ride and Caregiver Arranged Yes   Patient Knows to Bring Current Medications No   Bring Outside Films Requested No

## 2022-07-18 NOTE — TELEPHONE ENCOUNTER
----- Message from Milagro Salinas MD PhD sent at 7/16/2022  9:57 PM EDT -----  Regarding: Right thoracentesis  Almaz,    Chest x-ray examination on 7/14/2022 reported a right-sided moderate to large pleural effusion.  We will arrange patient to have ultrasound-guided right thoracentesis.  Send the pleural effusion for cytology study.   He will have x-ray examination after the thoracentesis per protocol.  We will reassess whether he needs a CT scan for the chest.    Thank you very much!    Brad

## 2022-07-19 RX ORDER — ALBUMIN (HUMAN) 12.5 G/50ML
12.5 SOLUTION INTRAVENOUS ONCE
Status: CANCELLED | OUTPATIENT
Start: 2022-07-26

## 2022-07-26 ENCOUNTER — LAB (OUTPATIENT)
Dept: LAB | Facility: HOSPITAL | Age: 72
End: 2022-07-26

## 2022-07-26 DIAGNOSIS — J90 PLEURAL EFFUSION, RIGHT: ICD-10-CM

## 2022-07-26 DIAGNOSIS — D69.6 THROMBOCYTOPENIA: ICD-10-CM

## 2022-07-26 DIAGNOSIS — R06.02 SHORTNESS OF BREATH: ICD-10-CM

## 2022-07-26 LAB — SARS-COV-2 ORF1AB RESP QL NAA+PROBE: NOT DETECTED

## 2022-07-26 PROCEDURE — U0005 INFEC AGEN DETEC AMPLI PROBE: HCPCS

## 2022-07-26 PROCEDURE — U0004 COV-19 TEST NON-CDC HGH THRU: HCPCS

## 2022-07-26 PROCEDURE — C9803 HOPD COVID-19 SPEC COLLECT: HCPCS

## 2022-07-27 ENCOUNTER — HOSPITAL ENCOUNTER (OUTPATIENT)
Dept: ULTRASOUND IMAGING | Facility: HOSPITAL | Age: 72
Discharge: HOME | End: 2022-07-27

## 2022-07-27 ENCOUNTER — HOSPITAL ENCOUNTER (OUTPATIENT)
Dept: GENERAL RADIOLOGY | Facility: HOSPITAL | Age: 72
Discharge: HOME | End: 2022-07-27

## 2022-07-27 VITALS
HEIGHT: 66 IN | BODY MASS INDEX: 28.61 KG/M2 | TEMPERATURE: 98 F | DIASTOLIC BLOOD PRESSURE: 67 MMHG | OXYGEN SATURATION: 98 % | RESPIRATION RATE: 20 BRPM | SYSTOLIC BLOOD PRESSURE: 122 MMHG | HEART RATE: 87 BPM | WEIGHT: 178 LBS

## 2022-07-27 DIAGNOSIS — J90 PLEURAL EFFUSION, RIGHT: ICD-10-CM

## 2022-07-27 DIAGNOSIS — R06.02 SHORTNESS OF BREATH: ICD-10-CM

## 2022-07-27 DIAGNOSIS — D61.818 PANCYTOPENIA: ICD-10-CM

## 2022-07-27 DIAGNOSIS — D69.6 THROMBOCYTOPENIA: ICD-10-CM

## 2022-07-27 LAB
APPEARANCE FLD: CLEAR
COLOR FLD: NORMAL
INR PPP: 1.2 (ref 0.8–1.2)
LYMPHOCYTES NFR FLD MANUAL: 40 %
METHOD: NORMAL
MONOS+MACROS NFR FLD: 60 %
NUC CELL # FLD: 497 /MM3
PROTHROMBIN TIME: 14.5 SECONDS (ref 12.8–15.2)
RBC # FLD AUTO: 362 /MM3

## 2022-07-27 PROCEDURE — 71046 X-RAY EXAM CHEST 2 VIEWS: CPT

## 2022-07-27 PROCEDURE — 0 LIDOCAINE 1 % SOLUTION: Performed by: RADIOLOGY

## 2022-07-27 PROCEDURE — 88112 CYTOPATH CELL ENHANCE TECH: CPT | Performed by: INTERNAL MEDICINE

## 2022-07-27 PROCEDURE — 88305 TISSUE EXAM BY PATHOLOGIST: CPT | Performed by: INTERNAL MEDICINE

## 2022-07-27 PROCEDURE — 85610 PROTHROMBIN TIME: CPT

## 2022-07-27 PROCEDURE — 71045 X-RAY EXAM CHEST 1 VIEW: CPT

## 2022-07-27 PROCEDURE — 76942 ECHO GUIDE FOR BIOPSY: CPT

## 2022-07-27 PROCEDURE — 89051 BODY FLUID CELL COUNT: CPT | Performed by: INTERNAL MEDICINE

## 2022-07-27 RX ORDER — SODIUM CHLORIDE 0.9 % (FLUSH) 0.9 %
10 SYRINGE (ML) INJECTION AS NEEDED
Status: DISCONTINUED | OUTPATIENT
Start: 2022-07-27 | End: 2022-07-28 | Stop reason: HOSPADM

## 2022-07-27 RX ORDER — SODIUM CHLORIDE 0.9 % (FLUSH) 0.9 %
3 SYRINGE (ML) INJECTION EVERY 12 HOURS SCHEDULED
Status: DISCONTINUED | OUTPATIENT
Start: 2022-07-27 | End: 2022-07-28 | Stop reason: HOSPADM

## 2022-07-27 RX ORDER — SPIRONOLACTONE 100 MG/1
100 TABLET, FILM COATED ORAL EVERY MORNING
COMMUNITY
Start: 2022-07-18 | End: 2022-10-01 | Stop reason: HOSPADM

## 2022-07-27 RX ORDER — LIDOCAINE HYDROCHLORIDE 10 MG/ML
10 INJECTION, SOLUTION INFILTRATION; PERINEURAL ONCE
Status: COMPLETED | OUTPATIENT
Start: 2022-07-27 | End: 2022-07-27

## 2022-07-27 RX ADMIN — LIDOCAINE HYDROCHLORIDE 3 ML: 10 INJECTION, SOLUTION INFILTRATION; PERINEURAL at 07:48

## 2022-07-27 NOTE — DISCHARGE INSTRUCTIONS
EDUCATION /DISCHARGE INSTRUCTIONS Thoracentesis:  A thoracentesis is a procedure to remove fluid or air from the space between the lung and it's lining.  It is done to relieve lung compression and respiratory distress.  A sample can also be obtained to aid diagnosis.   Medications can be administered into the space.      During the procedure:  You will be seated comfortable leaning forward onto a pillow supported by a table.  The area will be cleaned with antiseptic soap and draped with a sterile towel.  The physician will administer a local antiseptic to numb the area.  Next, the physician will insert a needle with tubing attached into the space between the lung and lining.  Fluid is removed and a sample sent to the laboratory.   When completed a pressure dressing is applied to the site.    Risks of the procedure include but are not limited to:   *  Bleeding    *  Low blood pressure *  Infection     *  Lung collapse possibly requiring insertion of a tube to re-inflate the lung.    Benefits of the procedure:  Relief of respiratory distress and facilitation of a diagnosis.  Alternatives to the procedure:  Drug therapy with diuretics to remove fluid.  Risks of diuretic drug therapy include possible dehydration and renal failure.  The benefit of drug therapy is that it can be done at home under physician supervision.  THIS EDUCATION INFORMATION WAS REVIEWED PRIOR TO THE PROCEDURE AND CONSENT. Patient initials___________________Time__________________    Post Procedure:    *  Rest today (no pushing pulling, straining or heavy lifting).   *  Slowly increase activity tomorow.    *  If you received sedation do not drive for 24 hours.   *  Keep dressing clean and dry.   *  Leave dressing on puncture site for 24 hours.    *  You may shower when dressing removed.   *  Expect some coughing as the lung re-expands.    Call your doctor if experiencing:   *  If experiencing sudden / severe shortness of breath, chest pain or if  coughing       up blood go to the nearest emergency room.   *  Signs of infection such as redness, swelling, excessive pain and / or foul       smelling drainage from the puncture site.   *  Chills or fever over 101 degrees (by mouth).   *  Change in sputum color (yellow, green, red).   *  Unrelieved pain.   *  Any new or severe symptoms.  Following the procedure:     Follow-up with the ordering physician as directed.    Continue to take other medications as directed by your physician unless    otherwise instructed.      If you have any concerns please call the Radiology Nurses Desk at [phone].  You are the most important factor in your recovery.  Follow the above instructions carefully.

## 2022-07-27 NOTE — NURSING NOTE
Pt arrived in radiology triage for Right Thoracentesis.    Protective goggles and mask in place with all patient interactions today

## 2022-07-27 NOTE — NURSING NOTE
Patient taken by wheelchair to entrance A to meet his wife.    Protective goggles and mask in place with all patient interactions today

## 2022-07-28 ENCOUNTER — TELEPHONE (OUTPATIENT)
Dept: INTERVENTIONAL RADIOLOGY/VASCULAR | Facility: HOSPITAL | Age: 72
End: 2022-07-28

## 2022-08-02 ENCOUNTER — TELEPHONE (OUTPATIENT)
Dept: ONCOLOGY | Facility: CLINIC | Age: 72
End: 2022-08-02

## 2022-08-02 DIAGNOSIS — J90 PLEURAL EFFUSION, RIGHT: ICD-10-CM

## 2022-08-02 DIAGNOSIS — N28.9 RENAL INSUFFICIENCY: ICD-10-CM

## 2022-08-02 DIAGNOSIS — D61.818 PANCYTOPENIA: Primary | ICD-10-CM

## 2022-08-02 NOTE — TELEPHONE ENCOUNTER
Caller: nir shoemaker    Relationship: Emergency Contact    Best call back number: [phone]    Who are you requesting to speak with (clinical staff, provider,  specific staff member): CLINICAL      What was the call regarding: WINSOME I SHAVING A PROCEDURE ON THURSDAY. DOES HE NEED A COVID TEST PRIOR?  HE WILL NEED AN ORDER FOR Wednesday     Do you require a callback: YES

## 2022-08-02 NOTE — TELEPHONE ENCOUNTER
Patient needs a COVID test before his procedures on Thursday 8/4/2022. COVID test orders placed.

## 2022-08-02 NOTE — PROGRESS NOTES
08/04/22 0003   Pre-Procedure Phone Call   Procedure Time Verified Yes   Arrival Time 1300   Procedure Location Verified Yes   Medical History Reviewed No   NPO Status Reinforced Yes   Ride and Caregiver Arranged Yes   Patient Knows to Bring Current Medications No   Bring Outside Films Requested No

## 2022-08-03 ENCOUNTER — LAB (OUTPATIENT)
Dept: LAB | Facility: HOSPITAL | Age: 72
End: 2022-08-03

## 2022-08-03 ENCOUNTER — TRANSCRIBE ORDERS (OUTPATIENT)
Dept: ADMINISTRATIVE | Facility: HOSPITAL | Age: 72
End: 2022-08-03

## 2022-08-03 DIAGNOSIS — Z01.818 PREOP EXAMINATION: Primary | ICD-10-CM

## 2022-08-03 DIAGNOSIS — Z01.818 PREOP EXAMINATION: ICD-10-CM

## 2022-08-03 LAB — SARS-COV-2 ORF1AB RESP QL NAA+PROBE: NOT DETECTED

## 2022-08-03 PROCEDURE — U0005 INFEC AGEN DETEC AMPLI PROBE: HCPCS

## 2022-08-03 PROCEDURE — U0004 COV-19 TEST NON-CDC HGH THRU: HCPCS

## 2022-08-04 ENCOUNTER — HOSPITAL ENCOUNTER (OUTPATIENT)
Dept: ULTRASOUND IMAGING | Facility: HOSPITAL | Age: 72
Discharge: HOME | End: 2022-08-04

## 2022-08-04 ENCOUNTER — HOSPITAL ENCOUNTER (OUTPATIENT)
Dept: GENERAL RADIOLOGY | Facility: HOSPITAL | Age: 72
Discharge: HOME | End: 2022-08-04

## 2022-08-04 VITALS
TEMPERATURE: 98.4 F | OXYGEN SATURATION: 97 % | HEIGHT: 66 IN | HEART RATE: 102 BPM | BODY MASS INDEX: 27.53 KG/M2 | DIASTOLIC BLOOD PRESSURE: 79 MMHG | RESPIRATION RATE: 16 BRPM | SYSTOLIC BLOOD PRESSURE: 104 MMHG | WEIGHT: 171.3 LBS

## 2022-08-04 DIAGNOSIS — R18.8 CIRRHOSIS OF LIVER WITH ASCITES, UNSPECIFIED HEPATIC CIRRHOSIS TYPE: ICD-10-CM

## 2022-08-04 DIAGNOSIS — D69.6 THROMBOCYTOPENIA: ICD-10-CM

## 2022-08-04 DIAGNOSIS — N28.9 RENAL INSUFFICIENCY: ICD-10-CM

## 2022-08-04 DIAGNOSIS — K74.60 CIRRHOSIS OF LIVER WITH ASCITES, UNSPECIFIED HEPATIC CIRRHOSIS TYPE: ICD-10-CM

## 2022-08-04 DIAGNOSIS — R06.02 SHORTNESS OF BREATH: ICD-10-CM

## 2022-08-04 DIAGNOSIS — J90 PLEURAL EFFUSION, RIGHT: ICD-10-CM

## 2022-08-04 DIAGNOSIS — R18.8 OTHER ASCITES: ICD-10-CM

## 2022-08-04 LAB
INR PPP: 1.1 (ref 0.8–1.2)
PROTHROMBIN TIME: 13.7 SECONDS (ref 12.8–15.2)

## 2022-08-04 PROCEDURE — 88305 TISSUE EXAM BY PATHOLOGIST: CPT | Performed by: INTERNAL MEDICINE

## 2022-08-04 PROCEDURE — 96365 THER/PROPH/DIAG IV INF INIT: CPT

## 2022-08-04 PROCEDURE — 0 LIDOCAINE 1 % SOLUTION: Performed by: RADIOLOGY

## 2022-08-04 PROCEDURE — 76942 ECHO GUIDE FOR BIOPSY: CPT

## 2022-08-04 PROCEDURE — 85610 PROTHROMBIN TIME: CPT

## 2022-08-04 PROCEDURE — 25010000002 ALBUMIN HUMAN 25% PER 50 ML: Performed by: INTERNAL MEDICINE

## 2022-08-04 PROCEDURE — 88112 CYTOPATH CELL ENHANCE TECH: CPT | Performed by: INTERNAL MEDICINE

## 2022-08-04 PROCEDURE — P9047 ALBUMIN (HUMAN), 25%, 50ML: HCPCS | Performed by: INTERNAL MEDICINE

## 2022-08-04 PROCEDURE — 71045 X-RAY EXAM CHEST 1 VIEW: CPT

## 2022-08-04 RX ORDER — SODIUM CHLORIDE 0.9 % (FLUSH) 0.9 %
3 SYRINGE (ML) INJECTION EVERY 12 HOURS SCHEDULED
Status: DISCONTINUED | OUTPATIENT
Start: 2022-08-04 | End: 2022-08-05 | Stop reason: HOSPADM

## 2022-08-04 RX ORDER — SODIUM CHLORIDE 0.9 % (FLUSH) 0.9 %
10 SYRINGE (ML) INJECTION AS NEEDED
Status: DISCONTINUED | OUTPATIENT
Start: 2022-08-04 | End: 2022-08-05 | Stop reason: HOSPADM

## 2022-08-04 RX ORDER — LIDOCAINE HYDROCHLORIDE 10 MG/ML
10 INJECTION, SOLUTION INFILTRATION; PERINEURAL ONCE
Status: COMPLETED | OUTPATIENT
Start: 2022-08-04 | End: 2022-08-04

## 2022-08-04 RX ORDER — ALBUMIN (HUMAN) 12.5 G/50ML
12.5 SOLUTION INTRAVENOUS ONCE
Status: COMPLETED | OUTPATIENT
Start: 2022-08-04 | End: 2022-08-04

## 2022-08-04 RX ADMIN — ALBUMIN HUMAN 12.5 G: 0.25 SOLUTION INTRAVENOUS at 14:27

## 2022-08-04 RX ADMIN — LIDOCAINE HYDROCHLORIDE 5 ML: 10 INJECTION, SOLUTION INFILTRATION; PERINEURAL at 12:45

## 2022-08-04 RX ADMIN — Medication 3 ML: at 14:27

## 2022-08-04 RX ADMIN — Medication 3 ML: at 14:23

## 2022-08-04 RX ADMIN — LIDOCAINE HYDROCHLORIDE 4 ML: 10 INJECTION, SOLUTION INFILTRATION; PERINEURAL at 13:09

## 2022-08-04 NOTE — DISCHARGE INSTRUCTIONS
EDUCATION /DISCHARGE INSTRUCTIONS Thoracentesis:  A thoracentesis is a procedure to remove fluid or air from the space between the lung and it's lining.  It is done to relieve lung compression and respiratory distress.  A sample can also be obtained to aid diagnosis.   Medications can be administered into the space.      During the procedure:  You will be seated comfortable leaning forward onto a pillow supported by a table.  The area will be cleaned with antiseptic soap and draped with a sterile towel.  The physician will administer a local antiseptic to numb the area.  Next, the physician will insert a needle with tubing attached into the space between the lung and lining.  Fluid is removed and a sample sent to the laboratory.   When completed a pressure dressing is applied to the site.    Risks of the procedure include but are not limited to:   *  Bleeding    *  Low blood pressure *  Infection     *  Lung collapse possibly requiring insertion of a tube to re-inflate the lung.    Benefits of the procedure:  Relief of respiratory distress and facilitation of a diagnosis.  Alternatives to the procedure:  Drug therapy with diuretics to remove fluid.  Risks of diuretic drug therapy include possible dehydration and renal failure.  The benefit of drug therapy is that it can be done at home under physician supervision.  THIS EDUCATION INFORMATION WAS REVIEWED PRIOR TO THE PROCEDURE AND CONSENT. Patient initials___________________Time__________________    Post Procedure:    *  Rest today (no pushing pulling, straining or heavy lifting).   *  Slowly increase activity tomorow.    *  If you received sedation do not drive for 24 hours.   *  Keep dressing clean and dry.   *  Leave dressing on puncture site for 24 hours.    *  You may shower when dressing removed.   *  Expect some coughing as the lung re-expands.    Call your doctor if experiencing:   *  If experiencing sudden / severe shortness of breath, chest pain or if  coughing       up blood go to the nearest emergency room.   *  Signs of infection such as redness, swelling, excessive pain and / or foul       smelling drainage from the puncture site.   *  Chills or fever over 101 degrees (by mouth).   *  Change in sputum color (yellow, green, red).   *  Unrelieved pain.   *  Any new or severe symptoms.  Following the procedure:     Follow-up with the ordering physician as directed.    Continue to take other medications as directed by your physician unless    otherwise instructed.      If you have any concerns please call the Radiology Nurses Desk at [phone].  You are the most important factor in your recovery.  Follow the above instructions carefully.         EDUCATION /DISCHARGE INSTRUCTIONS  Paracentesis:  A needle is inserted into the space between your abdominal organs and the membrane that surrounds them (peritoneal space).  It is done for the diagnosis and treatment of fluid that is resistant to other therapies.  It helps determine the cause of the fluid and at the relieves pressure created by the fluid.  A sample is obtained and sent to the laboratory for study.  During the procedure:  You will lie on a bed on your back with your legs drawn up.  Your abdomen will be exposed from the chest to the pelvis.  You will otherwise be covered to maintain comfort.  A physician will clean your abdomen with antiseptic soap, place a sterile towel around the site and administer a local anesthetic to numb the area.  The physician will insert a needle into your abdominal wall.  There may be a popping sound which signifies the needle has pierced the abdominal wall. Next, the physician will attach tubing to transfer a sample into a collection bottle.  After the fluid is obtained the needle will be removed.  A pressure dressing is applied to the site.  Risks of the procedure include but are not limited to:   *  Bleeding    *  Wound infection   *  Low blood pressure   *  Decreased  urination   *  Low sodium if a large amount of fluid is removed   *  Puncture of abdominal organs by the needle    Benefits of the procedure:  Benefits include the removal of fluid from the abdomen, relief of abdominal pressure and facilitation of a diagnosis.  Alternatives to the procedure:  Possible alternatives are diuretic drug therapy or surgery to place a shunt to drain fluid.  Risks of diuretic drug therapy include possible dehydration and renal failure.  The benefit of drug therapy is that it can be done at home under physician supervision.  Risks of shunt placement include exposure to anesthesia, infection, excessive bleeding and injury to abdominal organs.  The benefit of a shunt is that it can be used to drain fluid over a longer period of time.  THIS EDUCATION INFORMATION WAS REVIEWED PRIOR TO THE PROCEDURE AND CONSENT. Patient initials__________________Time_________________    Post procedure: (Follow all the instructions below carefully)   *  Weigh yourself daily.   *  Follow your doctors dietary instructions.   *  Rest today (no pushing pulling, straining or heavy lifting).   *  Slowly increase activity tomorow.   *  If you received sedation do not drive for 24 hours.              * Skin affix  applied to puncture site. Do not try to remove, scratch or apply lotion   * Skin affix will fall off on it's own   *  You may shower tomorrow  Call your doctor if experiencing:   *  Signs of infection such as redness, swelling, excessive pain and / or foul       smelling drainage from the puncture site.   *  Chills or fever over 101 degrees (by mouth).   *  Fainting or any noted Rapid weight gain/loss   *  Unrelieved pain or any new or severe symptoms  Following the procedure:      Follow-up with the ordering physician as directed.   Continue to take other medications as directed by your physician unless    otherwise instructed.              If you have any concerns please call the Radiology Nurses Desk at  [phone]

## 2022-08-04 NOTE — NURSING NOTE
Patient arrived in Radiology Triage [place] [facility] for US paracentesis and US thoracentesis.    Protective goggles and mask in place with all patient interactions today.

## 2022-08-04 NOTE — NURSING NOTE
Patient wheeled to patient discharge to meet wife with car.      Protective goggles and mask in place with all patient interactions today.

## 2022-08-05 ENCOUNTER — TELEPHONE (OUTPATIENT)
Dept: INTERVENTIONAL RADIOLOGY/VASCULAR | Facility: HOSPITAL | Age: 72
End: 2022-08-05

## 2022-08-15 ENCOUNTER — OFFICE (OUTPATIENT)
Dept: URBAN - METROPOLITAN AREA CLINIC 75 | Facility: CLINIC | Age: 72
End: 2022-08-15

## 2022-08-15 VITALS
DIASTOLIC BLOOD PRESSURE: 52 MMHG | SYSTOLIC BLOOD PRESSURE: 108 MMHG | WEIGHT: 153 LBS | OXYGEN SATURATION: 98 % | HEART RATE: 98 BPM | HEIGHT: 66 IN

## 2022-08-15 DIAGNOSIS — K74.60 UNSPECIFIED CIRRHOSIS OF LIVER: ICD-10-CM

## 2022-08-15 DIAGNOSIS — R18.8 OTHER ASCITES: ICD-10-CM

## 2022-08-15 DIAGNOSIS — Z86.010 PERSONAL HISTORY OF COLONIC POLYPS: ICD-10-CM

## 2022-08-15 PROCEDURE — 99214 OFFICE O/P EST MOD 30 MIN: CPT | Performed by: INTERNAL MEDICINE

## 2022-08-31 ENCOUNTER — TELEPHONE (OUTPATIENT)
Dept: ONCOLOGY | Facility: CLINIC | Age: 72
End: 2022-08-31

## 2022-08-31 NOTE — TELEPHONE ENCOUNTER
Caller: Dennys Lieberman    Relationship to patient: Self    Best call back number: [phone]    Chief complaint: PATIENT NEEDS TO RESCHEDULE, HUB UNABLE TO RESCHEDULE FOR ACTIVE TREATMENT      Type of visit: LAB AND FOLLOW UP    Requested date: ANY DAY AFTER 9-19-22 EXCEPT MONDAYS      If rescheduling, when is the original appointment: 9-19-22     Additional notes:PLEASE CALL PATIENT TO ADVISE

## 2022-09-09 ENCOUNTER — APPOINTMENT (OUTPATIENT)
Dept: LAB | Facility: HOSPITAL | Age: 72
End: 2022-09-09

## 2022-09-26 ENCOUNTER — OFFICE (OUTPATIENT)
Dept: URBAN - METROPOLITAN AREA CLINIC 75 | Facility: CLINIC | Age: 72
End: 2022-09-26

## 2022-09-26 VITALS
WEIGHT: 160 LBS | HEIGHT: 66 IN | SYSTOLIC BLOOD PRESSURE: 122 MMHG | DIASTOLIC BLOOD PRESSURE: 56 MMHG | HEART RATE: 67 BPM | OXYGEN SATURATION: 98 %

## 2022-09-26 DIAGNOSIS — K74.60 UNSPECIFIED CIRRHOSIS OF LIVER: ICD-10-CM

## 2022-09-26 DIAGNOSIS — Z86.010 PERSONAL HISTORY OF COLONIC POLYPS: ICD-10-CM

## 2022-09-26 DIAGNOSIS — K75.81 NONALCOHOLIC STEATOHEPATITIS (NASH): ICD-10-CM

## 2022-09-26 DIAGNOSIS — R18.8 OTHER ASCITES: ICD-10-CM

## 2022-09-26 PROCEDURE — 99214 OFFICE O/P EST MOD 30 MIN: CPT | Performed by: INTERNAL MEDICINE

## 2022-09-28 ENCOUNTER — LAB (OUTPATIENT)
Dept: LAB | Facility: HOSPITAL | Age: 72
End: 2022-09-28

## 2022-09-28 ENCOUNTER — TELEPHONE (OUTPATIENT)
Dept: ONCOLOGY | Facility: CLINIC | Age: 72
End: 2022-09-28

## 2022-09-28 ENCOUNTER — OFFICE VISIT (OUTPATIENT)
Dept: ONCOLOGY | Facility: CLINIC | Age: 72
End: 2022-09-28

## 2022-09-28 ENCOUNTER — APPOINTMENT (OUTPATIENT)
Dept: GENERAL RADIOLOGY | Facility: HOSPITAL | Age: 72
End: 2022-09-28

## 2022-09-28 ENCOUNTER — HOSPITAL ENCOUNTER (INPATIENT)
Facility: HOSPITAL | Age: 72
LOS: 3 days | Discharge: HOME | End: 2022-10-01
Attending: EMERGENCY MEDICINE | Admitting: INTERNAL MEDICINE

## 2022-09-28 VITALS
TEMPERATURE: 97.8 F | HEIGHT: 66 IN | HEART RATE: 63 BPM | OXYGEN SATURATION: 98 % | SYSTOLIC BLOOD PRESSURE: 112 MMHG | WEIGHT: 168 LBS | BODY MASS INDEX: 27 KG/M2 | DIASTOLIC BLOOD PRESSURE: 63 MMHG

## 2022-09-28 DIAGNOSIS — R18.8 CIRRHOSIS OF LIVER WITH ASCITES, UNSPECIFIED HEPATIC CIRRHOSIS TYPE: ICD-10-CM

## 2022-09-28 DIAGNOSIS — D63.1 ANEMIA DUE TO CHRONIC RENAL FAILURE TREATED WITH ERYTHROPOIETIN, UNSPECIFIED STAGE: ICD-10-CM

## 2022-09-28 DIAGNOSIS — D72.819 LEUKOPENIA, UNSPECIFIED TYPE: ICD-10-CM

## 2022-09-28 DIAGNOSIS — N28.9 RENAL INSUFFICIENCY: Primary | ICD-10-CM

## 2022-09-28 DIAGNOSIS — D50.9 IRON DEFICIENCY ANEMIA, UNSPECIFIED IRON DEFICIENCY ANEMIA TYPE: ICD-10-CM

## 2022-09-28 DIAGNOSIS — D61.818 PANCYTOPENIA: ICD-10-CM

## 2022-09-28 DIAGNOSIS — T45.4X5A ADVERSE EFFECT OF IRON AND ITS COMPOUNDS, INITIAL ENCOUNTER: ICD-10-CM

## 2022-09-28 DIAGNOSIS — E87.5 HYPERKALEMIA: ICD-10-CM

## 2022-09-28 DIAGNOSIS — D69.6 THROMBOCYTOPENIA: ICD-10-CM

## 2022-09-28 DIAGNOSIS — N17.9 ACUTE KIDNEY INJURY: Primary | ICD-10-CM

## 2022-09-28 DIAGNOSIS — K74.60 CIRRHOSIS OF LIVER WITH ASCITES, UNSPECIFIED HEPATIC CIRRHOSIS TYPE: ICD-10-CM

## 2022-09-28 DIAGNOSIS — N18.9 ANEMIA DUE TO CHRONIC RENAL FAILURE TREATED WITH ERYTHROPOIETIN, UNSPECIFIED STAGE: ICD-10-CM

## 2022-09-28 DIAGNOSIS — D64.9 ANEMIA, UNSPECIFIED TYPE: ICD-10-CM

## 2022-09-28 PROBLEM — B35.6 TINEA CRURIS: Status: ACTIVE | Noted: 2022-09-28

## 2022-09-28 PROBLEM — D64.89 OTHER SPECIFIED ANEMIAS: Status: ACTIVE | Noted: 2022-09-28

## 2022-09-28 LAB
ALBUMIN SERPL-MCNC: 3.3 G/DL (ref 3.5–5.2)
ALBUMIN/GLOB SERPL: 1 G/DL (ref 1.1–2.4)
ALP SERPL-CCNC: 98 U/L (ref 38–116)
ALT SERPL W P-5'-P-CCNC: 40 U/L (ref 0–41)
ANION GAP SERPL CALCULATED.3IONS-SCNC: 10.8 MMOL/L (ref 5–15)
APTT PPP: 27.1 SECONDS (ref 22.7–35.4)
AST SERPL-CCNC: 41 U/L (ref 0–40)
BASOPHILS # BLD AUTO: 0.03 10*3/MM3 (ref 0–0.2)
BASOPHILS NFR BLD AUTO: 0.9 % (ref 0–1.5)
BILIRUB SERPL-MCNC: 0.4 MG/DL (ref 0.2–1.2)
BILIRUB UR QL STRIP: NEGATIVE
BUN SERPL-MCNC: 50 MG/DL (ref 6–20)
BUN/CREAT SERPL: 21.2 (ref 7.3–30)
CALCIUM SPEC-SCNC: 9.4 MG/DL (ref 8.5–10.2)
CHLORIDE SERPL-SCNC: 111 MMOL/L (ref 98–107)
CLARITY UR: CLEAR
CO2 SERPL-SCNC: 18.2 MMOL/L (ref 22–29)
COLOR UR: YELLOW
CREAT SERPL-MCNC: 2.36 MG/DL (ref 0.7–1.3)
CREAT UR-MCNC: 91.8 MG/DL
DEPRECATED RDW RBC AUTO: 50.8 FL (ref 37–54)
EGFRCR SERPLBLD CKD-EPI 2021: 28.5 ML/MIN/1.73
EOSINOPHIL # BLD AUTO: 0.25 10*3/MM3 (ref 0–0.4)
EOSINOPHIL NFR BLD AUTO: 7.4 % (ref 0.3–6.2)
EOSINOPHIL SPEC QL MICRO: 0 % EOS/100 CELLS (ref 0–0)
ERYTHROCYTE [DISTWIDTH] IN BLOOD BY AUTOMATED COUNT: 14.1 % (ref 12.3–15.4)
FERRITIN SERPL-MCNC: 256 NG/ML (ref 30–400)
GLOBULIN UR ELPH-MCNC: 3.2 GM/DL (ref 1.8–3.5)
GLUCOSE BLDC GLUCOMTR-MCNC: 159 MG/DL (ref 70–130)
GLUCOSE BLDC GLUCOMTR-MCNC: 261 MG/DL (ref 70–130)
GLUCOSE SERPL-MCNC: 211 MG/DL (ref 74–124)
GLUCOSE UR STRIP-MCNC: NEGATIVE MG/DL
HCT VFR BLD AUTO: 25.9 % (ref 37.5–51)
HGB BLD-MCNC: 8.5 G/DL (ref 13–17.7)
HGB UR QL STRIP.AUTO: NEGATIVE
HOLD SPECIMEN: NORMAL
HOLD SPECIMEN: NORMAL
IMM GRANULOCYTES # BLD AUTO: 0.02 10*3/MM3 (ref 0–0.05)
IMM GRANULOCYTES NFR BLD AUTO: 0.6 % (ref 0–0.5)
IRON 24H UR-MRATE: 66 MCG/DL (ref 59–158)
IRON SATN MFR SERPL: 22 % (ref 14–48)
KETONES UR QL STRIP: NEGATIVE
LEUKOCYTE ESTERASE UR QL STRIP.AUTO: NEGATIVE
LYMPHOCYTES # BLD AUTO: 1.04 10*3/MM3 (ref 0.7–3.1)
LYMPHOCYTES NFR BLD AUTO: 30.8 % (ref 19.6–45.3)
MCH RBC QN AUTO: 32.7 PG (ref 26.6–33)
MCHC RBC AUTO-ENTMCNC: 32.8 G/DL (ref 31.5–35.7)
MCV RBC AUTO: 99.6 FL (ref 79–97)
MONOCYTES # BLD AUTO: 0.26 10*3/MM3 (ref 0.1–0.9)
MONOCYTES NFR BLD AUTO: 7.7 % (ref 5–12)
NEUTROPHILS NFR BLD AUTO: 1.78 10*3/MM3 (ref 1.7–7)
NEUTROPHILS NFR BLD AUTO: 52.6 % (ref 42.7–76)
NITRITE UR QL STRIP: NEGATIVE
NRBC BLD AUTO-RTO: 0 /100 WBC (ref 0–0.2)
PH UR STRIP.AUTO: 5.5 [PH] (ref 5–8)
PLATELET # BLD AUTO: 61 10*3/MM3 (ref 140–450)
PMV BLD AUTO: 11.6 FL (ref 6–12)
POTASSIUM SERPL-SCNC: 5.8 MMOL/L (ref 3.5–5.2)
POTASSIUM SERPL-SCNC: 6.4 MMOL/L (ref 3.5–4.7)
PROT SERPL-MCNC: 6.5 G/DL (ref 6.3–8)
PROT UR QL STRIP: NEGATIVE
RBC # BLD AUTO: 2.6 10*6/MM3 (ref 4.14–5.8)
SODIUM SERPL-SCNC: 140 MMOL/L (ref 134–145)
SODIUM UR-SCNC: 84 MMOL/L
SP GR UR STRIP: 1.02 (ref 1–1.03)
TIBC SERPL-MCNC: 295 MCG/DL (ref 249–505)
TRANSFERRIN SERPL-MCNC: 211 MG/DL (ref 200–360)
UROBILINOGEN UR QL STRIP: NORMAL
UUN 24H UR-MCNC: 674 MG/DL
VIT B12 BLD-MCNC: 1155 PG/ML (ref 211–946)
WBC NRBC COR # BLD: 3.38 10*3/MM3 (ref 3.4–10.8)
WHOLE BLOOD HOLD COAG: NORMAL
WHOLE BLOOD HOLD SPECIMEN: NORMAL

## 2022-09-28 PROCEDURE — 84466 ASSAY OF TRANSFERRIN: CPT

## 2022-09-28 PROCEDURE — 93010 ELECTROCARDIOGRAM REPORT: CPT | Performed by: INTERNAL MEDICINE

## 2022-09-28 PROCEDURE — 83540 ASSAY OF IRON: CPT

## 2022-09-28 PROCEDURE — 81003 URINALYSIS AUTO W/O SCOPE: CPT | Performed by: INTERNAL MEDICINE

## 2022-09-28 PROCEDURE — 94664 DEMO&/EVAL PT USE INHALER: CPT

## 2022-09-28 PROCEDURE — 87205 SMEAR GRAM STAIN: CPT | Performed by: INTERNAL MEDICINE

## 2022-09-28 PROCEDURE — P9047 ALBUMIN (HUMAN), 25%, 50ML: HCPCS | Performed by: INTERNAL MEDICINE

## 2022-09-28 PROCEDURE — 25010000002 ALBUMIN HUMAN 25% PER 50 ML: Performed by: INTERNAL MEDICINE

## 2022-09-28 PROCEDURE — 94799 UNLISTED PULMONARY SVC/PX: CPT

## 2022-09-28 PROCEDURE — 82962 GLUCOSE BLOOD TEST: CPT

## 2022-09-28 PROCEDURE — 82728 ASSAY OF FERRITIN: CPT

## 2022-09-28 PROCEDURE — 82570 ASSAY OF URINE CREATININE: CPT | Performed by: INTERNAL MEDICINE

## 2022-09-28 PROCEDURE — 80053 COMPREHEN METABOLIC PANEL: CPT

## 2022-09-28 PROCEDURE — 63710000001 INSULIN REGULAR HUMAN PER 5 UNITS: Performed by: PHYSICIAN ASSISTANT

## 2022-09-28 PROCEDURE — 84540 ASSAY OF URINE/UREA-N: CPT | Performed by: INTERNAL MEDICINE

## 2022-09-28 PROCEDURE — 85025 COMPLETE CBC W/AUTO DIFF WBC: CPT

## 2022-09-28 PROCEDURE — 82607 VITAMIN B-12: CPT | Performed by: INTERNAL MEDICINE

## 2022-09-28 PROCEDURE — 99222 1ST HOSP IP/OBS MODERATE 55: CPT | Performed by: INTERNAL MEDICINE

## 2022-09-28 PROCEDURE — 36415 COLL VENOUS BLD VENIPUNCTURE: CPT

## 2022-09-28 PROCEDURE — 93005 ELECTROCARDIOGRAM TRACING: CPT | Performed by: PHYSICIAN ASSISTANT

## 2022-09-28 PROCEDURE — 85730 THROMBOPLASTIN TIME PARTIAL: CPT | Performed by: PHYSICIAN ASSISTANT

## 2022-09-28 PROCEDURE — 94640 AIRWAY INHALATION TREATMENT: CPT

## 2022-09-28 PROCEDURE — 84132 ASSAY OF SERUM POTASSIUM: CPT | Performed by: PHYSICIAN ASSISTANT

## 2022-09-28 PROCEDURE — 99284 EMERGENCY DEPT VISIT MOD MDM: CPT

## 2022-09-28 PROCEDURE — 84300 ASSAY OF URINE SODIUM: CPT | Performed by: INTERNAL MEDICINE

## 2022-09-28 PROCEDURE — 94762 N-INVAS EAR/PLS OXIMTRY CONT: CPT

## 2022-09-28 PROCEDURE — 99215 OFFICE O/P EST HI 40 MIN: CPT | Performed by: INTERNAL MEDICINE

## 2022-09-28 PROCEDURE — 71045 X-RAY EXAM CHEST 1 VIEW: CPT

## 2022-09-28 RX ORDER — NICOTINE POLACRILEX 4 MG
15 LOZENGE BUCCAL
Status: DISCONTINUED | OUTPATIENT
Start: 2022-09-28 | End: 2022-10-01 | Stop reason: HOSPADM

## 2022-09-28 RX ORDER — ACETAMINOPHEN 325 MG/1
650 TABLET ORAL EVERY 4 HOURS PRN
Status: DISCONTINUED | OUTPATIENT
Start: 2022-09-28 | End: 2022-10-01 | Stop reason: HOSPADM

## 2022-09-28 RX ORDER — ONDANSETRON 4 MG/1
4 TABLET, FILM COATED ORAL EVERY 6 HOURS PRN
Status: DISCONTINUED | OUTPATIENT
Start: 2022-09-28 | End: 2022-10-01 | Stop reason: HOSPADM

## 2022-09-28 RX ORDER — ACETAMINOPHEN 160 MG/5ML
650 SOLUTION ORAL EVERY 4 HOURS PRN
Status: DISCONTINUED | OUTPATIENT
Start: 2022-09-28 | End: 2022-10-01 | Stop reason: HOSPADM

## 2022-09-28 RX ORDER — NITROGLYCERIN 0.4 MG/1
0.4 TABLET SUBLINGUAL
Status: DISCONTINUED | OUTPATIENT
Start: 2022-09-28 | End: 2022-10-01 | Stop reason: HOSPADM

## 2022-09-28 RX ORDER — INSULIN LISPRO 100 [IU]/ML
0-7 INJECTION, SOLUTION INTRAVENOUS; SUBCUTANEOUS
Status: DISCONTINUED | OUTPATIENT
Start: 2022-09-29 | End: 2022-10-01 | Stop reason: HOSPADM

## 2022-09-28 RX ORDER — ONDANSETRON 2 MG/ML
4 INJECTION INTRAMUSCULAR; INTRAVENOUS EVERY 6 HOURS PRN
Status: DISCONTINUED | OUTPATIENT
Start: 2022-09-28 | End: 2022-10-01 | Stop reason: HOSPADM

## 2022-09-28 RX ORDER — POLYETHYLENE GLYCOL 3350 17 G/17G
17 POWDER, FOR SOLUTION ORAL DAILY PRN
Status: DISCONTINUED | OUTPATIENT
Start: 2022-09-28 | End: 2022-10-01 | Stop reason: HOSPADM

## 2022-09-28 RX ORDER — SODIUM CHLORIDE 0.9 % (FLUSH) 0.9 %
10 SYRINGE (ML) INJECTION EVERY 12 HOURS SCHEDULED
Status: DISCONTINUED | OUTPATIENT
Start: 2022-09-28 | End: 2022-10-01 | Stop reason: HOSPADM

## 2022-09-28 RX ORDER — ACETAMINOPHEN 650 MG/1
650 SUPPOSITORY RECTAL EVERY 4 HOURS PRN
Status: DISCONTINUED | OUTPATIENT
Start: 2022-09-28 | End: 2022-10-01 | Stop reason: HOSPADM

## 2022-09-28 RX ORDER — CALCIUM POLYCARBOPHIL 625 MG
625 TABLET ORAL DAILY
Status: DISCONTINUED | OUTPATIENT
Start: 2022-09-28 | End: 2022-10-01 | Stop reason: HOSPADM

## 2022-09-28 RX ORDER — ALLOPURINOL 100 MG/1
100 TABLET ORAL DAILY
Status: DISCONTINUED | OUTPATIENT
Start: 2022-09-28 | End: 2022-10-01 | Stop reason: HOSPADM

## 2022-09-28 RX ORDER — DEXTROSE MONOHYDRATE 25 G/50ML
50 INJECTION, SOLUTION INTRAVENOUS ONCE
Status: COMPLETED | OUTPATIENT
Start: 2022-09-28 | End: 2022-09-28

## 2022-09-28 RX ORDER — AMOXICILLIN 250 MG
2 CAPSULE ORAL 2 TIMES DAILY
Status: DISCONTINUED | OUTPATIENT
Start: 2022-09-28 | End: 2022-10-01 | Stop reason: HOSPADM

## 2022-09-28 RX ORDER — ALBUMIN (HUMAN) 12.5 G/50ML
75 SOLUTION INTRAVENOUS ONCE
Status: COMPLETED | OUTPATIENT
Start: 2022-09-28 | End: 2022-09-28

## 2022-09-28 RX ORDER — MORPHINE SULFATE 2 MG/ML
2 INJECTION, SOLUTION INTRAMUSCULAR; INTRAVENOUS EVERY 4 HOURS PRN
Status: DISCONTINUED | OUTPATIENT
Start: 2022-09-28 | End: 2022-10-01 | Stop reason: HOSPADM

## 2022-09-28 RX ORDER — UREA 10 %
1 LOTION (ML) TOPICAL NIGHTLY PRN
Status: DISCONTINUED | OUTPATIENT
Start: 2022-09-28 | End: 2022-10-01 | Stop reason: HOSPADM

## 2022-09-28 RX ORDER — BISACODYL 5 MG/1
5 TABLET, DELAYED RELEASE ORAL DAILY PRN
Status: DISCONTINUED | OUTPATIENT
Start: 2022-09-28 | End: 2022-10-01 | Stop reason: HOSPADM

## 2022-09-28 RX ORDER — NALOXONE HCL 0.4 MG/ML
0.4 VIAL (ML) INJECTION
Status: DISCONTINUED | OUTPATIENT
Start: 2022-09-28 | End: 2022-10-01 | Stop reason: HOSPADM

## 2022-09-28 RX ORDER — BISACODYL 10 MG
10 SUPPOSITORY, RECTAL RECTAL DAILY PRN
Status: DISCONTINUED | OUTPATIENT
Start: 2022-09-28 | End: 2022-10-01 | Stop reason: HOSPADM

## 2022-09-28 RX ORDER — DEXTROSE MONOHYDRATE 25 G/50ML
25 INJECTION, SOLUTION INTRAVENOUS
Status: DISCONTINUED | OUTPATIENT
Start: 2022-09-28 | End: 2022-10-01 | Stop reason: HOSPADM

## 2022-09-28 RX ORDER — FERROUS SULFATE 325(65) MG
325 TABLET ORAL
Status: DISCONTINUED | OUTPATIENT
Start: 2022-09-29 | End: 2022-10-01 | Stop reason: HOSPADM

## 2022-09-28 RX ORDER — CALCIUM CARBONATE 200(500)MG
2 TABLET,CHEWABLE ORAL 2 TIMES DAILY PRN
Status: DISCONTINUED | OUTPATIENT
Start: 2022-09-28 | End: 2022-10-01 | Stop reason: HOSPADM

## 2022-09-28 RX ADMIN — CALCIUM POLYCARBOPHIL 625 MG: 625 TABLET, FILM COATED ORAL at 21:41

## 2022-09-28 RX ADMIN — INSULIN HUMAN 10 UNITS: 100 INJECTION, SOLUTION PARENTERAL at 14:13

## 2022-09-28 RX ADMIN — ALLOPURINOL 100 MG: 100 TABLET ORAL at 21:41

## 2022-09-28 RX ADMIN — SODIUM CHLORIDE 500 ML: 9 INJECTION, SOLUTION INTRAVENOUS at 13:35

## 2022-09-28 RX ADMIN — ALBUMIN HUMAN 75 G: 0.25 SOLUTION INTRAVENOUS at 20:24

## 2022-09-28 RX ADMIN — DEXTROSE MONOHYDRATE 50 ML: 25 INJECTION, SOLUTION INTRAVENOUS at 14:13

## 2022-09-28 RX ADMIN — ALBUTEROL SULFATE 10 MG: 2.5 SOLUTION RESPIRATORY (INHALATION) at 14:16

## 2022-09-28 RX ADMIN — Medication 10 ML: at 20:25

## 2022-09-28 RX ADMIN — SODIUM ZIRCONIUM CYCLOSILICATE 10 G: 10 POWDER, FOR SUSPENSION ORAL at 21:42

## 2022-09-28 NOTE — TELEPHONE ENCOUNTER
Per Dr Salinas patient called and informed he needs to go to [facility] ER due to patient's potassium of 6.4, and a creatinine 2.36.   Patient stated he would go there immediately

## 2022-09-28 NOTE — TELEPHONE ENCOUNTER
----- Message from Milagro Salinas MD PhD sent at 9/28/2022 11:46 AM EDT -----  Regarding: Elevated potassium and worsening renal function  Anuja Muse in the lab just called me that patient has a potassium of 6.4, and a creatinine 2.36.    Would you please call patient and ask him to go to ER at [facility] to be evaluated.    Z

## 2022-09-28 NOTE — PROGRESS NOTES
.     REASON FOR FOLLOWUP:     * Thrombocytopenia.   *  Iron deficiency anemia discovered on 1/13/2021.  Patient was started on oral iron 3 times a day.  *  Mild leukocytopenia and low normal neutrophils      HISTORY OF PRESENT ILLNESS:  The patient is a 72 y.o. year old male with hypertension diabetes, pancytopenia secondary to liver cirrhosis caused by RODRIGUEZ, and hypogonadism who presented today for 3-month follow-up evaluation.  Patient is accompanied by his wife who helped with history.        Patient reports that he recently had elevated potassium, and that his GI specialist Dr. Foote asked him to hold potassium and spironolactone.  Patient reports he does have distended abdomen and is awaiting for paracentesis, his leg edema has largely resolved.    Patient also reports that to Dr. Foote schedule patient for TIPS procedure in January 2023.    Patient reports no fever no sweating no chills.  He reports not able to tolerate oral iron because of upset stomach, and intermittent constipation and diarrhea.  He reports no melena or hematochezia.  No fresh blood per rectum.  He denies he does have some easy bruising on his extremities, with small bruises.      This patient had right thoracentesis with 2 L pleural effusion removed.  Cytology studies negative for malignant cells.  He also had multiple paracentesis, in June, July and early August, cytology study was also negative for malignant cells.    Laboratory study today on 9/28/2022 reported worsening anemia Hb 8.5, platelets 61,000, WBC 3380 including ANC 1780.  Chemistry labs pending.       Past Medical History:   Diagnosis Date   • H/O Dental disorder    • History of thrombocytopenia    • Hypertension    • RODRIGUEZ (nonalcoholic steatohepatitis)    • Psoriasis    • Type 2 diabetes mellitus (HCC)      Past Surgical History:   Procedure Laterality Date   • CATARACT EXTRACTION, BILATERAL     • COLONOSCOPY  11/2015   • ROTATOR CUFF REPAIR Left 2005     HEMATOLOGY  HISTORY:  The patient is a 70 y.o. year old male with history of hypertension diabetes, RODRIGUEZ, and hypogonadism who presented today on 1/13/2021 for initial evaluation because of thrombocytopenia, referred by his primary care physician Dr. Joseph.      Patient reports he has no limitation of activity.  He does have chronic joint pains, but of note joint swelling.  His skin pruritus.  No skin rashes.  He does have easy bruising, however denies evidence of bleeding such as epistaxis, gum bleeding, rectal bleeding or hematuria.  Denies weight loss.  No low fever.  Appetite is reasonable.    I reviewed his outside laboratory studies.  Most recent also lab on 10/22/2020 reported hemoglobin 10.5, MCV 89.6, WBC 3200, including neutrophils 1900, lymphocytes 800 monocytes 300, and platelets 62,000.    Liver study on 9/2/2020 reported WBC 3200, including ANC 1900 lymphocytes 1000 monocytes 300, hemoglobin 10.5, MCV 80.0, and platelets 65,000.    His earliest CBC results available for review was from 5/8/2019 which reported WBC 4200, including ANC 2600, lymphocytes 1200 and monocytes 400, hemoglobin 13.4 and platelets 95,000.  At that time chemistry lab reported elevated liver panel including  , alk phosphatase 87 and a total bilirubin 1.1.  Total serum protein 7.0 albumin 4.1 and the globulin 2.9.  His creatinine was 1.31, with glucose of 219 been in normal electrolytes.  Hemoglobin A1c was 8.3%.    Laboratory studies on 1/13/2021 reported mild anemia with Hb 10.9, thrombocytopenia platelets 48,000, and IPF 7.0%, leukocytopenia WBC 3250 including ANC 1830.  Other labs reported no evidence of hemolysis, had supratherapeutic B12 level 1413 pg/mL and folate > 20 ng/mL.  He does have evidence of iron deficiency with low iron sats 10% and ferritin only 41.5 ng/mL in the setting of anemia with Hb 10.9.  His erythropoietin is not elevated accordingly, which indicates lack of response from his kidney to produce  erythropoietin.     Currently is not a candidate for CONRAD/Procrit treatment.  However he is adamant he is a candidate for oral iron treatment.    We will start him on ferrous sulfate 325 mg 3 times a day.  Will send prescription to his pharmacy.     Laboratory study 6/11/2021 showed a slightly improved hemoglobin 11.9, with stable thrombocytopenia platelets 50,000 IPF 8.3%, and WBC 3720 including ANC 2100.  Iron study reported ferritin 115.3 ng/mL and iron saturation 14% with free iron 55 TIBC 384.    Laboratory studies on 12/17/2021 reported stable pancytopenia with anemia Hb 10.8, with platelets 60,000 and IPF 4.1%, and WBC 3780 including neutrophils 2060 lymphocytes 1040.  Iron study reported slightly trending down ferritin 86.1 ng/mL in the much improved iron saturation 34% with free iron 126 and TIBC 368.      On 6/10/2022 reports patient has abdomen distention for about 3 months.  He also developed bilateral leg edema in December 2021.  Patient also reports worsening dyspnea and weight gain for the past several months..  Per our records, he gained 25 pounds since December 2021.    Patient reports having taken oral iron 3 times a day.  He has mild nausea but not significant and no vomiting.  He has brown-colored stool.  He also reports having loose stool some of days.  No diarrhea.    Patient received a boost dose of COVID-19 vaccine on 11/6/2021.  Patient reports abdomen distention about 3 months, leg edema 6 months, after the boost dose of COVID (December) boost on 11/6/21    Patient complains of easy bruising on his arms from scratching, he has skin pruritus, however no skin rashes.  He denies spontaneous bleeding such as epistaxis, gum bleeding, or hematochezia.    Lab study on 6/10/2022 reported deteriorating anemia with Hb 9.6, persistent thrombocytopenia with stable platelets 69,000, and low normal WBC 4290 including neutrophils 2980.  Iron studies showed a deteriorating saturation 17%, free iron 61 TIBC  357 with a ferritin 86.8%.    Patient indeed had paracentesis on 6/17/2022 with 11.2 L ascites fluid removed.  Cytology study was negative for malignancy.    Patient reports today that he has worsening abdominal distention, also has dyspnea however no cough hemoptysis.  No chest pain.  He denies fever sweating chills.  Patient also reports worsening bilateral leg edema.    He also recently finished her 2 dose of Injectafer in late June 2022 because of iron deficiency anemia, not responding to oral iron treatment 3 times a day.    Patient continues to have easy bruising however no spontaneous bleeding such as epistaxis or gum bleeding.    Laboratory study on 7/6/2022 reported stable anemia Hb 9.7 and stable thrombocytopenia platelets 65,000, low normal WBC 4260.  Patient has worsening renal function with creatinine 1.36, and supratherapeutic ferritin 644 ng/mL.    Chest x-ray examination on 7/14/2022 reported a right-sided moderate to large pleural effusion.  We will arrange patient to have ultrasound-guided right thoracentesis.  Suspect the patient has pleural effusion due to his liver cirrhosis.  He will have x-ray examination after the thoracentesis per protocol.  We will reassess whether he needs a CT scan for the chest.    This patient also had paracentesis again on 7/14/2022 with 6.2 L ascites fluid removed.            MEDICATIONS    Current Outpatient Medications:   •  allopurinol (ZYLOPRIM) 100 MG tablet, , Disp: , Rfl:   •  ferrous sulfate 325 (65 FE) MG tablet, Take 1 tablet by mouth 3 (Three) Times a Day With Meals., Disp: 90 tablet, Rfl: 2  •  FIBER PO, Take  by mouth., Disp: , Rfl:   •  lisinopril-hydrochlorothiazide (PRINZIDE,ZESTORETIC) 20-12.5 MG per tablet, Take 1 tablet by mouth 2 (Two) Times a Day., Disp: , Rfl:   •  metFORMIN (GLUCOPHAGE) 500 MG tablet, 3 (Three) Times a Day., Disp: , Rfl:   •  multivitamin (THERAGRAN) tablet tablet, Take  by mouth Daily., Disp: , Rfl:   •  potassium chloride  "(K-TAB) 20 MEQ tablet controlled-release ER tablet, Take 1 tablet by mouth Daily. (Patient taking differently: Take 20 mEq by mouth Daily. Dr Foote Discontinued), Disp: 30 tablet, Rfl: 1  •  furosemide (LASIX) 20 MG tablet, Take 1 tablet by mouth 2 (Two) Times a Day. (Patient taking differently: Take 20 mg by mouth Daily.), Disp: 30 tablet, Rfl: 1  •  spironolactone (ALDACTONE) 100 MG tablet, Take 100 mg by mouth Every Morning., Disp: , Rfl:     ALLERGIES:     Allergies   Allergen Reactions   • Doxycycline Itching       SOCIAL HISTORY:         Social History     Socioeconomic History   • Marital status:      Spouse name: Lisa   Tobacco Use   • Smoking status: Former Smoker     Quit date: 1975     Years since quittin.7   Substance and Sexual Activity   • Alcohol use: Never   · As of 2021, patient reports he does drink alcoholic beverage 10 drinks per week.  Denies illegal drug use.      FAMILY HISTORY:  No cancer in family, mother HTN,           Vitals:    22 1032   BP: 112/63   Pulse: 63   Temp: 97.8 °F (36.6 °C)   SpO2: 98%   Weight: 76.2 kg (168 lb)   Height: 167.6 cm (65.98\")   PainSc: 0-No pain       Current Status 2022   ECOG score 1      PHYSICAL EXAM:    GENERAL:  Well-developed, well-nourished in no acute distress.  Orientated to time place and the people.  SKIN:  Warm, dry without rashes, purpura or petechiae.  HEENT:  Normocephalic.  Wearing mask.   LYMPHATICS:  No cervical, supraclavicular adenopathy.  CHEST: Normal respiratory effort.  Lungs clear to auscultation. Good airflow.  CARDIAC:  Regular rate and rhythm.  Systolic murmur grade 3/6. Normal S1,S2.  ABDOMEN: Abdomen distended. Bowel sounds normal.  EXTREMITIES: 1+ edema in legs.  Does have small bruises on arms and legs.    NEUROLOGICAL:  Grossly intact.  No focal neurological deficits.  PSYCHIATRIC:  Normal affect and mood.          RECENT LABS:  Lab Results   Component Value Date    WBC 3.38 (L) 2022    HGB 8.5 (L) " 09/28/2022    HCT 25.9 (L) 09/28/2022    MCV 99.6 (H) 09/28/2022    PLT 61 (L) 09/28/2022     Lab Results   Component Value Date    NEUTROABS 1.78 09/28/2022     Lab Results   Component Value Date    IRON 48 (L) 07/06/2022    TIBC 227 (L) 07/06/2022    FERRITIN 644.20 (H) 07/06/2022   Iron saturation 21% on 7/6/2022.      Lab Results   Component Value Date    FOLATE >20.00 01/13/2021     Lab Results   Component Value Date    RHUBGOVV47 1,413 (H) 01/13/2021       IMAGING:       Assessment & Plan   *  Thrombocytopenia   · Etiology of the thrombocytopenia is not clear.  However I do think it is likely related to his fatty liver which was documented from abdomen ultrasound on 7/20/2012.    · Laboratory studies on 1/13/2021 reported excellent vitamin B12 level.  He had marginally elevated IPF 7.0%.  no apparent compensation for platelets production.   · On 6/11/2021, platelets improved at 63,000 with normal IPF 5.3%.  Patient is asymptomatic.    · On 12/17/2021, stable platelets 60,000 and normal IPF 4.1%. Patient is asymptomatic.   · On 6/10/2022 platelets 69,000.  Patient reports no spontaneous bleeding.    · On 7/6/2022 platelets 65,000.  No spontaneous bleeding.    · On 9/28/2022 platelets 61,000.  Patient has easy bruising on extremities, however no spontaneous bleeding such as melena hematochezia or gum bleeding.  Continue to monitor.    *Iron deficiency anemia.   · Laboratory study on 1/13/2021 reported no evidence of hemolysis, had supratherapeutic B12 level and folate.    · He does have evidence of iron deficiency with low iron sats 10% and ferritin only 41.5 ng/mL with Hb 10.9.    · His erythropoietin 16.1, is not elevated accordingly, which indicates lack of response from his kidney to produce erythropoietin. Currently is not a candidate for CONRAD/Procrit treatment.    · We started patient on oral ferrous sulfate 325 mg 3 times a day in January 2021.   · On 3/19/2021, patient has improved hemoglobin 11.9.   Continue to monitor.  · On 6/11/2021, stable anemia Hb 11.0, however much improved with ferritin 115.3 ng/mL, and iron saturation 14%.  Will advised to continue oral iron treatment.  · On 12/17/2021 stable Hb 10.8, much improved iron saturation 34% and free iron 126, however with trending down ferritin level 86.1 ng/mL. Continue oral iron treatment.  · 6/10/2022, patient reports taking oral iron 3 times a day.  No apparent side effects except mild nausea.  Laboratory studies showed deteriorating hemoglobin 9.6 and also worsening iron saturation 17% with free iron 61 and the ferritin 86.8 ng/mL.  Discussed with the patient, recommended intravenous iron therapy, this patient has poor iron absorption.    · Patient received 2 dose of Injectafer 750 mg x 2 in June 2022.  Lab study today on 7/6/2022 reported improved normalized iron saturation 21% with a supratherapeutic ferritin 644 ng/mL.  · Pending results today on 9/28/2022.     * Pancytopenia with mild leukocytopenia.  This is also likely related to his fatty liver and liver cirrhosis.  Patient drinks alcohol beverage is reported in January 2021.  · Patient reports no recurrent infection.  His neutrophils marginally normal at 1830 out of WBC 3250.  · Patient had supratherapeutic B12 level 1/13/2021.  · Marginally better WBC 3720 including ANC 2100 3/19/2021.    · 6/11/2021 WBC 3530, with ANC 2180.  No recurrent infection.    · On 12/17/2021 stable leukocytopenia WBC 3780 with low normal neutrophils 2016.  Patient is asymptomatic.  Continue to monitor.  · On 6/10/2022 patient has slightly better WBC 4290 and neutrophils 2980.  · On 7/6/2022 patient had WBC 4260 ANC 2620, lymphocytes 840 and monocytes 470.  · Today on 9/28/2022 WBC 3380, including ANC 1780, hemoglobin 8.5, and platelets 61,000.    *COVID-19 vaccination.    · 6/11/2021, patient reports that he had 2 doses of COVID-19 vaccine, had very limited side effects with some soreness at the site of injection.    · On 12/17/2021, patient reports he received a booster dose of COVID-19 vaccine on 11/6/2021.      *Ascites secondary to liver cirrhosis.  · CT scan examination on 10/15/2021 confirmed liver cirrhosis.  · On 6/10/2022 patient reports abdomen distention for the past 3 months, weight gains, 25 pounds per our records, and also has worsening dyspnea.  Physical examination shows very distended abdomen.  I think this patient has large ascites secondary to his liver cirrhosis.  I recommended to have ultrasound-guided therapeutic paracentesis with samples for routine chemistry labs cell counts and also for cytology study.  · Patient also has bilateral leg edema which is also secondary to liver cirrhosis.  Patient was seen her primary care physician next week.  I think most likely he will need to be started on diuretics.  He also needs to follow-up with his GI specialist Dr. Everardo Foote to discuss possibility of  shunt.   · On 6/17/2022 patient had ultrasound-guided paracentesis, with 11.2 L clear yellow ascites removed.  Cytology study reported negative for malignant cells.  There was a reactive mesothelial cells histiocytes and mixed inflammatory cells.  · Today on 7/6/2022 follow-up, patient reports worsening abdomen distention again.  I will arrange patient to have therapeutic paracentesis again as soon as possible, and also will arrange another paracentesis in 3 weeks.  · At the meantime I also recommended to start the patient on low-dose Lasix 20 mg daily on 7/6/2022.   · Paracentesis, with 6.4 L ascites removed 7/14/2022.  · Paracentesis 2.4 L ascites removed 8/4/2022.     *Right pleural effusion.    · Patient also complains of dyspnea, x-ray examination reported right pleural effusion 7/14/2022..  · Patient had ultrasound-guided right thoracentesis with 2 L pleural effusion removed 7/27/2022.  Cytology studies negative for malignancy.  · On 8/4/2022 patient had another 2 L pleural effusion removed on the right  side.      *Worsening renal function.  · Laboratory study on 7/6/2022 reported creatinine 1.37.  He had creatinine 1.02 on 12/17/2021.  Discussed with patient, I recommended referring patient to nephrology service for evaluation of possible hepatorenal syndrome due to liver cirrhosis.  Patient is agreeable.  · On 9/28/2022 patient reports that he still has no appointment for nephrology service.    PLAN:  1. Pending lab results for CMP and iron studies.    2. Obtain insurance approval for CONRAD/Procrit or biological equivalent, added 20,000 units, repeating every 2 weeks.    3. Discontinue oral iron.  4. Patient is on Lasix, however of spironolactone and potassium due per Dr. Foote to elevated potassium level from recent outside lab study.    5. Refer to nephrology [facility] for evaluation worsening renal function.  Suspect patient having hepatorenal syndrome due to his liver cirrhosis.  6. Follow-up with GI physician Dr. Everardo Foote for evaluation and TIPS in January 2020..   7. Will arrange patient come back for follow-up with me in 3 months. Repeat laboratory studies for CBC CMP ferritin iron profile.        Discussed with the patient and his wife about the results and referral to nephrology service.  Patient and his wife voiced understanding and agreement.      Addendum:    Lab Results   Component Value Date    GLUCOSE 211 (H) 09/28/2022    BUN 50 (H) 09/28/2022    CREATININE 2.36 (C) 09/28/2022    EGFRIFNONA 72 12/17/2021    BCR 21.2 09/28/2022    K 6.4 (C) 09/28/2022    CO2 18.2 (L) 09/28/2022    CALCIUM 9.4 09/28/2022    ALBUMIN 3.30 (L) 09/28/2022    AST 41 (H) 09/28/2022    ALT 40 09/28/2022     Lab Results   Component Value Date    IRON 66 09/28/2022    TIBC 295 09/28/2022    FERRITIN 256.00 09/28/2022     Iron saturation 22%.      Laboratory study showed significant hyperkalemia potassium 6.4, and worsening renal function creatinine 2.36    We advised the patient to be evaluated at the  emergency room at [facility] and he needs hospital admission with nephrology consult from Dr. Adolfo Calvo,  He voiced understanding.         TERE ANDREWS M.D., Ph.D.    9/28/2022            CC:  MD Everardo Cuello M.D.     Adolfo COPE MD

## 2022-09-29 ENCOUNTER — APPOINTMENT (OUTPATIENT)
Dept: ULTRASOUND IMAGING | Facility: HOSPITAL | Age: 72
End: 2022-09-29

## 2022-09-29 ENCOUNTER — APPOINTMENT (OUTPATIENT)
Dept: CARDIOLOGY | Facility: HOSPITAL | Age: 72
End: 2022-09-29

## 2022-09-29 PROBLEM — N18.30 CKD (CHRONIC KIDNEY DISEASE) STAGE 3, GFR 30-59 ML/MIN: Status: ACTIVE | Noted: 2022-09-29

## 2022-09-29 LAB
ALBUMIN SERPL-MCNC: 3.6 G/DL (ref 3.5–5.2)
ALBUMIN/GLOB SERPL: 1.4 G/DL
ALP SERPL-CCNC: 80 U/L (ref 39–117)
ALT SERPL W P-5'-P-CCNC: 33 U/L (ref 1–41)
ANION GAP SERPL CALCULATED.3IONS-SCNC: 9.2 MMOL/L (ref 5–15)
AST SERPL-CCNC: 31 U/L (ref 1–40)
BASOPHILS # BLD AUTO: 0.01 10*3/MM3 (ref 0–0.2)
BASOPHILS NFR BLD AUTO: 0.4 % (ref 0–1.5)
BH CV VAS HEPATIC PORTAL SPLEEN LENGTH: 16.89 CM
BH CV VAS HEPATIC PORTAL SPLEEN WIDTH: 8.46 CM
BH CV VAS HEPATIC PORTAL VEIN DIAMETER: 2.07 CM
BH CV VAS HEPATOPORTAL AORTA AP DIAM: 1.76 CM
BH CV VAS HEPATOPORTAL HEPATIC V LT DIRECTION: NORMAL
BH CV VAS HEPATOPORTAL HEPATIC V MID DIRECTION: NORMAL
BH CV VAS HEPATOPORTAL HEPATIC V RT DIRECTION: NORMAL
BH CV VAS HEPATOPORTAL IVC CONFLUENCE FLOW: NORMAL
BH CV VAS HEPATOPORTAL IVC CONFLUENCE SPONT: NORMAL
BH CV VAS HEPATOPORTAL IVC FLOW: NORMAL
BH CV VAS HEPATOPORTAL IVC SPONT: NORMAL
BH CV VAS HEPATOPORTAL PORTAL V EXTRAHEPATIC DIRECTION: NORMAL
BH CV VAS HEPATOPORTAL PORTAL V LT INTRA DIRECTION: NORMAL
BH CV VAS HEPATOPORTAL PORTAL V MAIN INTRA DIRECTION: NORMAL
BH CV VAS HEPATOPORTAL PORTAL V PSV: 18.8 CM/S
BH CV VAS HEPATOPORTAL PORTAL V RT INTRA DIRECTION: NORMAL
BH CV VAS HEPATOPORTAL SMV DIRECTION: NORMAL
BH CV VAS HEPATOPORTAL SPLENIC DIRECTION: NORMAL
BH CV VAS SMA AORTA PSV: 117 CM/S
BH CV VAS SMA HEPATIC EDV: 6.07 CM/S
BH CV VAS SMA HEPATIC PSV: 86.4 CM/S
BH CV VAS SMA SPLENIC EDV: 37.6 CM/S
BH CV VAS SMA SPLENIC PSV: 246 CM/S
BILIRUB SERPL-MCNC: 0.7 MG/DL (ref 0–1.2)
BUN SERPL-MCNC: 44 MG/DL (ref 8–23)
BUN/CREAT SERPL: 22.2 (ref 7–25)
CALCIUM SPEC-SCNC: 9.3 MG/DL (ref 8.6–10.5)
CHLORIDE SERPL-SCNC: 112 MMOL/L (ref 98–107)
CO2 SERPL-SCNC: 18.8 MMOL/L (ref 22–29)
CREAT SERPL-MCNC: 1.98 MG/DL (ref 0.76–1.27)
CREAT UR-MCNC: 62.5 MG/DL
DEPRECATED RDW RBC AUTO: 43.4 FL (ref 37–54)
EGFRCR SERPLBLD CKD-EPI 2021: 35.2 ML/MIN/1.73
EOSINOPHIL # BLD AUTO: 0.19 10*3/MM3 (ref 0–0.4)
EOSINOPHIL NFR BLD AUTO: 6.7 % (ref 0.3–6.2)
ERYTHROCYTE [DISTWIDTH] IN BLOOD BY AUTOMATED COUNT: 12.7 % (ref 12.3–15.4)
FOLATE BLD-MCNC: 535 NG/ML
FOLATE RBC-MCNC: 2175 NG/ML
GLOBULIN UR ELPH-MCNC: 2.5 GM/DL
GLUCOSE BLDC GLUCOMTR-MCNC: 108 MG/DL (ref 70–130)
GLUCOSE BLDC GLUCOMTR-MCNC: 109 MG/DL (ref 70–130)
GLUCOSE BLDC GLUCOMTR-MCNC: 126 MG/DL (ref 70–130)
GLUCOSE BLDC GLUCOMTR-MCNC: 169 MG/DL (ref 70–130)
GLUCOSE SERPL-MCNC: 104 MG/DL (ref 65–99)
HBA1C MFR BLD: 6.1 % (ref 4.8–5.6)
HCT VFR BLD AUTO: 22.4 % (ref 37.5–51)
HCT VFR BLD AUTO: 24.6 % (ref 37.5–51)
HGB BLD-MCNC: 7.4 G/DL (ref 13–17.7)
INR PPP: 1.39 (ref 0.9–1.1)
LYMPHOCYTES # BLD AUTO: 1.1 10*3/MM3 (ref 0.7–3.1)
LYMPHOCYTES NFR BLD AUTO: 38.9 % (ref 19.6–45.3)
MAGNESIUM SERPL-MCNC: 2.2 MG/DL (ref 1.6–2.4)
MAXIMAL PREDICTED HEART RATE: 148 BPM
MCH RBC QN AUTO: 31.5 PG (ref 26.6–33)
MCHC RBC AUTO-ENTMCNC: 33 G/DL (ref 31.5–35.7)
MCV RBC AUTO: 95.3 FL (ref 79–97)
MONOCYTES # BLD AUTO: 0.22 10*3/MM3 (ref 0.1–0.9)
MONOCYTES NFR BLD AUTO: 7.8 % (ref 5–12)
NEUTROPHILS NFR BLD AUTO: 1.3 10*3/MM3 (ref 1.7–7)
NEUTROPHILS NFR BLD AUTO: 45.8 % (ref 42.7–76)
PHOSPHATE SERPL-MCNC: 4.2 MG/DL (ref 2.5–4.5)
PLATELET # BLD AUTO: 47 10*3/MM3 (ref 140–450)
PMV BLD AUTO: 10.2 FL (ref 6–12)
POTASSIUM SERPL-SCNC: 5.3 MMOL/L (ref 3.5–5.2)
POTASSIUM SERPL-SCNC: 5.4 MMOL/L (ref 3.5–5.2)
PROT ?TM UR-MCNC: 5.4 MG/DL
PROT SERPL-MCNC: 6.1 G/DL (ref 6–8.5)
PROT/CREAT UR: 86.4 MG/G CREA (ref 0–200)
PROTHROMBIN TIME: 16.8 SECONDS (ref 11.7–14.2)
QT INTERVAL: 427 MS
RBC # BLD AUTO: 2.35 10*6/MM3 (ref 4.14–5.8)
SODIUM SERPL-SCNC: 140 MMOL/L (ref 136–145)
SODIUM UR-SCNC: 113 MMOL/L
STRESS TARGET HR: 126 BPM
TSH SERPL DL<=0.05 MIU/L-ACNC: 1.99 UIU/ML (ref 0.27–4.2)
WBC NRBC COR # BLD: 2.83 10*3/MM3 (ref 3.4–10.8)

## 2022-09-29 PROCEDURE — 84156 ASSAY OF PROTEIN URINE: CPT | Performed by: INTERNAL MEDICINE

## 2022-09-29 PROCEDURE — 84100 ASSAY OF PHOSPHORUS: CPT | Performed by: INTERNAL MEDICINE

## 2022-09-29 PROCEDURE — 83735 ASSAY OF MAGNESIUM: CPT | Performed by: INTERNAL MEDICINE

## 2022-09-29 PROCEDURE — 63710000001 INSULIN LISPRO (HUMAN) PER 5 UNITS: Performed by: INTERNAL MEDICINE

## 2022-09-29 PROCEDURE — 76775 US EXAM ABDO BACK WALL LIM: CPT

## 2022-09-29 PROCEDURE — 84300 ASSAY OF URINE SODIUM: CPT | Performed by: INTERNAL MEDICINE

## 2022-09-29 PROCEDURE — P9047 ALBUMIN (HUMAN), 25%, 50ML: HCPCS | Performed by: INTERNAL MEDICINE

## 2022-09-29 PROCEDURE — 85610 PROTHROMBIN TIME: CPT | Performed by: INTERNAL MEDICINE

## 2022-09-29 PROCEDURE — 76705 ECHO EXAM OF ABDOMEN: CPT

## 2022-09-29 PROCEDURE — 25010000002 ALBUMIN HUMAN 25% PER 50 ML: Performed by: INTERNAL MEDICINE

## 2022-09-29 PROCEDURE — 93975 VASCULAR STUDY: CPT

## 2022-09-29 PROCEDURE — 82962 GLUCOSE BLOOD TEST: CPT

## 2022-09-29 PROCEDURE — 84132 ASSAY OF SERUM POTASSIUM: CPT | Performed by: INTERNAL MEDICINE

## 2022-09-29 PROCEDURE — 82570 ASSAY OF URINE CREATININE: CPT | Performed by: INTERNAL MEDICINE

## 2022-09-29 PROCEDURE — BW40ZZZ ULTRASONOGRAPHY OF ABDOMEN: ICD-10-PCS | Performed by: RADIOLOGY

## 2022-09-29 PROCEDURE — 99232 SBSQ HOSP IP/OBS MODERATE 35: CPT | Performed by: INTERNAL MEDICINE

## 2022-09-29 PROCEDURE — 80053 COMPREHEN METABOLIC PANEL: CPT | Performed by: INTERNAL MEDICINE

## 2022-09-29 PROCEDURE — 84443 ASSAY THYROID STIM HORMONE: CPT | Performed by: INTERNAL MEDICINE

## 2022-09-29 PROCEDURE — 83036 HEMOGLOBIN GLYCOSYLATED A1C: CPT | Performed by: INTERNAL MEDICINE

## 2022-09-29 PROCEDURE — 76942 ECHO GUIDE FOR BIOPSY: CPT

## 2022-09-29 PROCEDURE — 85025 COMPLETE CBC W/AUTO DIFF WBC: CPT | Performed by: INTERNAL MEDICINE

## 2022-09-29 RX ORDER — ALBUMIN (HUMAN) 12.5 G/50ML
25 SOLUTION INTRAVENOUS EVERY 8 HOURS
Status: COMPLETED | OUTPATIENT
Start: 2022-09-29 | End: 2022-09-30

## 2022-09-29 RX ADMIN — MICONAZOLE NITRATE 1 APPLICATION: 20 CREAM TOPICAL at 20:36

## 2022-09-29 RX ADMIN — MICONAZOLE NITRATE 1 APPLICATION: 20 CREAM TOPICAL at 08:29

## 2022-09-29 RX ADMIN — FERROUS SULFATE TAB 325 MG (65 MG ELEMENTAL FE) 325 MG: 325 (65 FE) TAB at 08:28

## 2022-09-29 RX ADMIN — Medication 10 ML: at 20:35

## 2022-09-29 RX ADMIN — SODIUM ZIRCONIUM CYCLOSILICATE 10 G: 10 POWDER, FOR SUSPENSION ORAL at 20:35

## 2022-09-29 RX ADMIN — INSULIN LISPRO 2 UNITS: 100 INJECTION, SOLUTION INTRAVENOUS; SUBCUTANEOUS at 17:04

## 2022-09-29 RX ADMIN — SODIUM ZIRCONIUM CYCLOSILICATE 10 G: 10 POWDER, FOR SUSPENSION ORAL at 17:49

## 2022-09-29 RX ADMIN — DOCUSATE SODIUM 50MG AND SENNOSIDES 8.6MG 2 TABLET: 8.6; 5 TABLET, FILM COATED ORAL at 08:28

## 2022-09-29 RX ADMIN — Medication 10 ML: at 08:28

## 2022-09-29 RX ADMIN — DOCUSATE SODIUM 50MG AND SENNOSIDES 8.6MG 2 TABLET: 8.6; 5 TABLET, FILM COATED ORAL at 20:35

## 2022-09-29 RX ADMIN — ALBUMIN (HUMAN) 25 G: 0.25 INJECTION, SOLUTION INTRAVENOUS at 17:49

## 2022-09-30 LAB
ALBUMIN SERPL-MCNC: 3.7 G/DL (ref 3.5–5.2)
ALBUMIN/GLOB SERPL: 1.8 G/DL
ALP SERPL-CCNC: 80 U/L (ref 39–117)
ALT SERPL W P-5'-P-CCNC: 27 U/L (ref 1–41)
ANION GAP SERPL CALCULATED.3IONS-SCNC: 11 MMOL/L (ref 5–15)
AST SERPL-CCNC: 33 U/L (ref 1–40)
BASOPHILS # BLD AUTO: 0.02 10*3/MM3 (ref 0–0.2)
BASOPHILS NFR BLD AUTO: 0.7 % (ref 0–1.5)
BILIRUB SERPL-MCNC: 1 MG/DL (ref 0–1.2)
BUN SERPL-MCNC: 37 MG/DL (ref 8–23)
BUN/CREAT SERPL: 21.6 (ref 7–25)
CALCIUM SPEC-SCNC: 9.5 MG/DL (ref 8.6–10.5)
CHLORIDE SERPL-SCNC: 109 MMOL/L (ref 98–107)
CO2 SERPL-SCNC: 16 MMOL/L (ref 22–29)
CREAT SERPL-MCNC: 1.71 MG/DL (ref 0.76–1.27)
DEPRECATED RDW RBC AUTO: 42.6 FL (ref 37–54)
EGFRCR SERPLBLD CKD-EPI 2021: 42 ML/MIN/1.73
EOSINOPHIL # BLD AUTO: 0.18 10*3/MM3 (ref 0–0.4)
EOSINOPHIL NFR BLD AUTO: 6.3 % (ref 0.3–6.2)
ERYTHROCYTE [DISTWIDTH] IN BLOOD BY AUTOMATED COUNT: 12.5 % (ref 12.3–15.4)
GLOBULIN UR ELPH-MCNC: 2.1 GM/DL
GLUCOSE BLDC GLUCOMTR-MCNC: 104 MG/DL (ref 70–130)
GLUCOSE BLDC GLUCOMTR-MCNC: 115 MG/DL (ref 70–130)
GLUCOSE BLDC GLUCOMTR-MCNC: 146 MG/DL (ref 70–130)
GLUCOSE BLDC GLUCOMTR-MCNC: 168 MG/DL (ref 70–130)
GLUCOSE BLDC GLUCOMTR-MCNC: 182 MG/DL (ref 70–130)
GLUCOSE SERPL-MCNC: 100 MG/DL (ref 65–99)
HCT VFR BLD AUTO: 21.4 % (ref 37.5–51)
HCT VFR BLD AUTO: 21.9 % (ref 37.5–51)
HGB BLD-MCNC: 7.2 G/DL (ref 13–17.7)
HGB BLD-MCNC: 7.5 G/DL (ref 13–17.7)
LYMPHOCYTES # BLD AUTO: 0.8 10*3/MM3 (ref 0.7–3.1)
LYMPHOCYTES NFR BLD AUTO: 28 % (ref 19.6–45.3)
MCH RBC QN AUTO: 32 PG (ref 26.6–33)
MCHC RBC AUTO-ENTMCNC: 33.6 G/DL (ref 31.5–35.7)
MCV RBC AUTO: 95.1 FL (ref 79–97)
MONOCYTES # BLD AUTO: 0.22 10*3/MM3 (ref 0.1–0.9)
MONOCYTES NFR BLD AUTO: 7.7 % (ref 5–12)
NEUTROPHILS NFR BLD AUTO: 1.64 10*3/MM3 (ref 1.7–7)
NEUTROPHILS NFR BLD AUTO: 57.3 % (ref 42.7–76)
PLATELET # BLD AUTO: 39 10*3/MM3 (ref 140–450)
PMV BLD AUTO: 11.9 FL (ref 6–12)
POTASSIUM SERPL-SCNC: 4.4 MMOL/L (ref 3.5–5.2)
PROT SERPL-MCNC: 5.8 G/DL (ref 6–8.5)
RBC # BLD AUTO: 2.25 10*6/MM3 (ref 4.14–5.8)
SODIUM SERPL-SCNC: 136 MMOL/L (ref 136–145)
WBC NRBC COR # BLD: 2.86 10*3/MM3 (ref 3.4–10.8)

## 2022-09-30 PROCEDURE — 85025 COMPLETE CBC W/AUTO DIFF WBC: CPT | Performed by: INTERNAL MEDICINE

## 2022-09-30 PROCEDURE — 63710000001 INSULIN LISPRO (HUMAN) PER 5 UNITS: Performed by: INTERNAL MEDICINE

## 2022-09-30 PROCEDURE — 25010000002 EPOETIN ALFA-EPBX 20000 UNIT/ML SOLUTION: Performed by: INTERNAL MEDICINE

## 2022-09-30 PROCEDURE — 99232 SBSQ HOSP IP/OBS MODERATE 35: CPT | Performed by: PHYSICIAN ASSISTANT

## 2022-09-30 PROCEDURE — 97162 PT EVAL MOD COMPLEX 30 MIN: CPT

## 2022-09-30 PROCEDURE — 97116 GAIT TRAINING THERAPY: CPT

## 2022-09-30 PROCEDURE — 80053 COMPREHEN METABOLIC PANEL: CPT | Performed by: INTERNAL MEDICINE

## 2022-09-30 PROCEDURE — 82962 GLUCOSE BLOOD TEST: CPT

## 2022-09-30 PROCEDURE — 97530 THERAPEUTIC ACTIVITIES: CPT

## 2022-09-30 PROCEDURE — 25010000002 ALBUMIN HUMAN 25% PER 50 ML: Performed by: INTERNAL MEDICINE

## 2022-09-30 PROCEDURE — P9047 ALBUMIN (HUMAN), 25%, 50ML: HCPCS | Performed by: INTERNAL MEDICINE

## 2022-09-30 PROCEDURE — 85014 HEMATOCRIT: CPT | Performed by: STUDENT IN AN ORGANIZED HEALTH CARE EDUCATION/TRAINING PROGRAM

## 2022-09-30 PROCEDURE — 99222 1ST HOSP IP/OBS MODERATE 55: CPT | Performed by: INTERNAL MEDICINE

## 2022-09-30 PROCEDURE — 85018 HEMOGLOBIN: CPT | Performed by: STUDENT IN AN ORGANIZED HEALTH CARE EDUCATION/TRAINING PROGRAM

## 2022-09-30 RX ADMIN — FERROUS SULFATE TAB 325 MG (65 MG ELEMENTAL FE) 325 MG: 325 (65 FE) TAB at 06:32

## 2022-09-30 RX ADMIN — DOCUSATE SODIUM 50MG AND SENNOSIDES 8.6MG 2 TABLET: 8.6; 5 TABLET, FILM COATED ORAL at 08:32

## 2022-09-30 RX ADMIN — EPOETIN ALFA-EPBX 20000 UNITS: 20000 INJECTION, SOLUTION INTRAVENOUS; SUBCUTANEOUS at 22:23

## 2022-09-30 RX ADMIN — ALBUMIN (HUMAN) 25 G: 0.25 INJECTION, SOLUTION INTRAVENOUS at 00:54

## 2022-09-30 RX ADMIN — SODIUM ZIRCONIUM CYCLOSILICATE 10 G: 10 POWDER, FOR SUSPENSION ORAL at 08:32

## 2022-09-30 RX ADMIN — MICONAZOLE NITRATE 1 APPLICATION: 20 CREAM TOPICAL at 08:33

## 2022-09-30 RX ADMIN — Medication 10 ML: at 08:33

## 2022-09-30 RX ADMIN — MICONAZOLE NITRATE 1 APPLICATION: 20 CREAM TOPICAL at 22:34

## 2022-09-30 RX ADMIN — Medication 10 ML: at 21:07

## 2022-09-30 RX ADMIN — INSULIN LISPRO 2 UNITS: 100 INJECTION, SOLUTION INTRAVENOUS; SUBCUTANEOUS at 13:06

## 2022-09-30 RX ADMIN — ALBUMIN (HUMAN) 25 G: 0.25 INJECTION, SOLUTION INTRAVENOUS at 08:33

## 2022-09-30 RX ADMIN — ALLOPURINOL 100 MG: 100 TABLET ORAL at 08:32

## 2022-09-30 RX ADMIN — CALCIUM POLYCARBOPHIL 625 MG: 625 TABLET, FILM COATED ORAL at 08:32

## 2022-10-01 ENCOUNTER — READMISSION MANAGEMENT (OUTPATIENT)
Dept: CALL CENTER | Facility: HOSPITAL | Age: 72
End: 2022-10-01

## 2022-10-01 VITALS
DIASTOLIC BLOOD PRESSURE: 64 MMHG | OXYGEN SATURATION: 95 % | HEART RATE: 72 BPM | WEIGHT: 160 LBS | RESPIRATION RATE: 18 BRPM | SYSTOLIC BLOOD PRESSURE: 156 MMHG | TEMPERATURE: 98.6 F | BODY MASS INDEX: 25.84 KG/M2

## 2022-10-01 LAB
ALBUMIN SERPL-MCNC: 4.1 G/DL (ref 3.5–5.2)
ALBUMIN/GLOB SERPL: 1.8 G/DL
ALP SERPL-CCNC: 89 U/L (ref 39–117)
ALT SERPL W P-5'-P-CCNC: 27 U/L (ref 1–41)
ANION GAP SERPL CALCULATED.3IONS-SCNC: 12.6 MMOL/L (ref 5–15)
AST SERPL-CCNC: 31 U/L (ref 1–40)
BASOPHILS # BLD AUTO: 0.02 10*3/MM3 (ref 0–0.2)
BASOPHILS NFR BLD AUTO: 0.5 % (ref 0–1.5)
BILIRUB SERPL-MCNC: 1.1 MG/DL (ref 0–1.2)
BUN SERPL-MCNC: 40 MG/DL (ref 8–23)
BUN/CREAT SERPL: 22 (ref 7–25)
CALCIUM SPEC-SCNC: 9.6 MG/DL (ref 8.6–10.5)
CHLORIDE SERPL-SCNC: 108 MMOL/L (ref 98–107)
CO2 SERPL-SCNC: 16.4 MMOL/L (ref 22–29)
CREAT SERPL-MCNC: 1.82 MG/DL (ref 0.76–1.27)
DEPRECATED RDW RBC AUTO: 41.4 FL (ref 37–54)
EGFRCR SERPLBLD CKD-EPI 2021: 39 ML/MIN/1.73
EOSINOPHIL # BLD AUTO: 0.21 10*3/MM3 (ref 0–0.4)
EOSINOPHIL NFR BLD AUTO: 5 % (ref 0.3–6.2)
ERYTHROCYTE [DISTWIDTH] IN BLOOD BY AUTOMATED COUNT: 12.4 % (ref 12.3–15.4)
GLOBULIN UR ELPH-MCNC: 2.3 GM/DL
GLUCOSE BLDC GLUCOMTR-MCNC: 117 MG/DL (ref 70–130)
GLUCOSE SERPL-MCNC: 129 MG/DL (ref 65–99)
HCT VFR BLD AUTO: 23.1 % (ref 37.5–51)
HGB BLD-MCNC: 8.2 G/DL (ref 13–17.7)
IMM GRANULOCYTES # BLD AUTO: 0.01 10*3/MM3 (ref 0–0.05)
IMM GRANULOCYTES NFR BLD AUTO: 0.2 % (ref 0–0.5)
LYMPHOCYTES # BLD AUTO: 0.87 10*3/MM3 (ref 0.7–3.1)
LYMPHOCYTES NFR BLD AUTO: 20.7 % (ref 19.6–45.3)
MCH RBC QN AUTO: 32 PG (ref 26.6–33)
MCHC RBC AUTO-ENTMCNC: 35.5 G/DL (ref 31.5–35.7)
MCV RBC AUTO: 90.2 FL (ref 79–97)
MONOCYTES # BLD AUTO: 0.4 10*3/MM3 (ref 0.1–0.9)
MONOCYTES NFR BLD AUTO: 9.5 % (ref 5–12)
NEUTROPHILS NFR BLD AUTO: 2.69 10*3/MM3 (ref 1.7–7)
NEUTROPHILS NFR BLD AUTO: 64.1 % (ref 42.7–76)
NRBC BLD AUTO-RTO: 0 /100 WBC (ref 0–0.2)
PLATELET # BLD AUTO: 53 10*3/MM3 (ref 140–450)
PMV BLD AUTO: 12.5 FL (ref 6–12)
POTASSIUM SERPL-SCNC: 4.5 MMOL/L (ref 3.5–5.2)
PROT SERPL-MCNC: 6.4 G/DL (ref 6–8.5)
RBC # BLD AUTO: 2.56 10*6/MM3 (ref 4.14–5.8)
SODIUM SERPL-SCNC: 137 MMOL/L (ref 136–145)
WBC NRBC COR # BLD: 4.2 10*3/MM3 (ref 3.4–10.8)

## 2022-10-01 PROCEDURE — 85025 COMPLETE CBC W/AUTO DIFF WBC: CPT | Performed by: INTERNAL MEDICINE

## 2022-10-01 PROCEDURE — 99231 SBSQ HOSP IP/OBS SF/LOW 25: CPT | Performed by: INTERNAL MEDICINE

## 2022-10-01 PROCEDURE — 80053 COMPREHEN METABOLIC PANEL: CPT | Performed by: INTERNAL MEDICINE

## 2022-10-01 PROCEDURE — 82962 GLUCOSE BLOOD TEST: CPT

## 2022-10-01 RX ORDER — SPIRONOLACTONE 25 MG/1
25 TABLET ORAL DAILY
Qty: 30 TABLET | Refills: 0 | Status: SHIPPED | OUTPATIENT
Start: 2022-10-01 | End: 2022-12-06

## 2022-10-01 RX ORDER — POTASSIUM CHLORIDE 1500 MG/1
20 TABLET, FILM COATED, EXTENDED RELEASE ORAL DAILY
Qty: 30 TABLET | Refills: 0 | Status: SHIPPED | OUTPATIENT
Start: 2022-10-01 | End: 2022-10-31

## 2022-10-01 RX ORDER — FUROSEMIDE 40 MG/1
40 TABLET ORAL DAILY
Status: DISCONTINUED | OUTPATIENT
Start: 2022-10-01 | End: 2022-10-01 | Stop reason: HOSPADM

## 2022-10-01 RX ORDER — SPIRONOLACTONE 25 MG/1
25 TABLET ORAL DAILY
Status: DISCONTINUED | OUTPATIENT
Start: 2022-10-01 | End: 2022-10-01 | Stop reason: HOSPADM

## 2022-10-01 RX ORDER — FUROSEMIDE 40 MG/1
40 TABLET ORAL DAILY
Qty: 30 TABLET | Refills: 0 | Status: SHIPPED | OUTPATIENT
Start: 2022-10-01 | End: 2023-04-03 | Stop reason: SDUPTHER

## 2022-10-01 RX ADMIN — CALCIUM POLYCARBOPHIL 625 MG: 625 TABLET, FILM COATED ORAL at 08:39

## 2022-10-01 RX ADMIN — FUROSEMIDE 40 MG: 40 TABLET ORAL at 12:21

## 2022-10-01 RX ADMIN — MICONAZOLE NITRATE 1 APPLICATION: 20 CREAM TOPICAL at 08:40

## 2022-10-01 RX ADMIN — DOCUSATE SODIUM 50MG AND SENNOSIDES 8.6MG 2 TABLET: 8.6; 5 TABLET, FILM COATED ORAL at 08:39

## 2022-10-01 RX ADMIN — ALLOPURINOL 100 MG: 100 TABLET ORAL at 08:39

## 2022-10-01 RX ADMIN — SPIRONOLACTONE 25 MG: 25 TABLET, FILM COATED ORAL at 12:21

## 2022-10-01 RX ADMIN — Medication 10 ML: at 08:41

## 2022-10-01 RX ADMIN — FERROUS SULFATE TAB 325 MG (65 MG ELEMENTAL FE) 325 MG: 325 (65 FE) TAB at 06:20

## 2022-10-02 NOTE — OUTREACH NOTE
Prep Survey    Flowsheet Row Responses   [facility] facility patient discharged from? [place]   Is LACE score < 7 ? No   Emergency Room discharge w/ pulse ox? No   Eligibility Readm Mgmt   Discharge diagnosis Acute kidney injury   Does the patient have one of the following disease processes/diagnoses(primary or secondary)? Other   Does the patient have Home health ordered? No   Is there a DME ordered? No   Prep survey completed? Yes          DANGELO DIAZ - Registered Nurse

## 2022-10-04 ENCOUNTER — READMISSION MANAGEMENT (OUTPATIENT)
Dept: CALL CENTER | Facility: HOSPITAL | Age: 72
End: 2022-10-04

## 2022-10-04 NOTE — OUTREACH NOTE
Medical Week 1 Survey    Flowsheet Row Responses   [facility] patient discharged from? [place]   Does the patient have one of the following disease processes/diagnoses(primary or secondary)? Other   Week 1 attempt successful? Yes   Call start time 1049   Call end time 1106   Discharge diagnosis Acute kidney injury   Person spoke with today (if not patient) and relationship Patient   Meds reviewed with patient/caregiver? Yes   Is the patient having any side effects they believe may be caused by any medication additions or changes? No   Does the patient have all medications ordered at discharge? Yes   Prescription comments Lasix/Aldactone change   Is the patient taking all medications as directed (includes completed medication regime)? Yes   Does the patient have a primary care provider?  Yes   Does the patient have an appointment with their PCP within 7 days of discharge? No   Comments regarding PCP Advised pt to make PCP fu appt   Nursing Interventions Educated patient on importance of making appointment, Advised patient to make appointment   Has the patient kept scheduled appointments due by today? N/A   Comments Pt has a Nephrologist fu appt on 10/19/22,  GI fu on 10/7/22,  Onc/Hemat fu appt on 10/7/22   Psychosocial issues? No   Did the patient receive a copy of their discharge instructions? Yes   Nursing interventions Reviewed instructions with patient   What is the patient's perception of their health status since discharge? Improving   Is the patient/caregiver able to teach back signs and symptoms related to disease process for when to call PCP? Yes   Is the patient/caregiver able to teach back signs and symptoms related to disease process for when to call 911? Yes   Is the patient/caregiver able to teach back the hierarchy of who to call/visit for symptoms/problems? PCP, Specialist, Home health nurse, Urgent Care, ED, 911 Yes   If the patient is a current smoker, are they able to teach back resources  for cessation? Not a smoker   Week 1 call completed? Yes   Wrap up additional comments Pt states he is doing ok. Pt has a Nephrologist fu appt on 10/19/22,  GI fu on 10/7/22,  Onc/Hemat fu appt on 10/7/22,  and advised to make a PCP fu appt. Pt reports liver mass found on CT, but will need a fu CT w/contrast contingent on kidney function per Nephrology. Pt shares he has a appt w/ hepatologist transplant in January 2023.           CLARE LUNA - Registered Nurse

## 2022-10-07 ENCOUNTER — LAB (OUTPATIENT)
Dept: LAB | Facility: HOSPITAL | Age: 72
End: 2022-10-07

## 2022-10-07 ENCOUNTER — INFUSION (OUTPATIENT)
Dept: ONCOLOGY | Facility: HOSPITAL | Age: 72
End: 2022-10-07

## 2022-10-07 DIAGNOSIS — D61.818 PANCYTOPENIA: ICD-10-CM

## 2022-10-07 DIAGNOSIS — N18.9 ANEMIA DUE TO CHRONIC RENAL FAILURE TREATED WITH ERYTHROPOIETIN, UNSPECIFIED STAGE: Primary | ICD-10-CM

## 2022-10-07 DIAGNOSIS — D63.1 ANEMIA DUE TO CHRONIC RENAL FAILURE TREATED WITH ERYTHROPOIETIN, UNSPECIFIED STAGE: Primary | ICD-10-CM

## 2022-10-07 DIAGNOSIS — N28.9 RENAL INSUFFICIENCY: ICD-10-CM

## 2022-10-07 DIAGNOSIS — D69.6 THROMBOCYTOPENIA: ICD-10-CM

## 2022-10-07 DIAGNOSIS — D64.9 ANEMIA, UNSPECIFIED TYPE: ICD-10-CM

## 2022-10-07 LAB
BASOPHILS # BLD AUTO: 0.04 10*3/MM3 (ref 0–0.2)
BASOPHILS NFR BLD AUTO: 1.1 % (ref 0–1.5)
DEPRECATED RDW RBC AUTO: 49.5 FL (ref 37–54)
EOSINOPHIL # BLD AUTO: 0.29 10*3/MM3 (ref 0–0.4)
EOSINOPHIL NFR BLD AUTO: 7.8 % (ref 0.3–6.2)
ERYTHROCYTE [DISTWIDTH] IN BLOOD BY AUTOMATED COUNT: 14.6 % (ref 12.3–15.4)
HCT VFR BLD AUTO: 26.1 % (ref 37.5–51)
HGB BLD-MCNC: 8.7 G/DL (ref 13–17.7)
IMM GRANULOCYTES # BLD AUTO: 0.12 10*3/MM3 (ref 0–0.05)
IMM GRANULOCYTES NFR BLD AUTO: 3.2 % (ref 0–0.5)
LYMPHOCYTES # BLD AUTO: 0.93 10*3/MM3 (ref 0.7–3.1)
LYMPHOCYTES NFR BLD AUTO: 24.9 % (ref 19.6–45.3)
MCH RBC QN AUTO: 31.8 PG (ref 26.6–33)
MCHC RBC AUTO-ENTMCNC: 33.3 G/DL (ref 31.5–35.7)
MCV RBC AUTO: 95.3 FL (ref 79–97)
MONOCYTES # BLD AUTO: 0.34 10*3/MM3 (ref 0.1–0.9)
MONOCYTES NFR BLD AUTO: 9.1 % (ref 5–12)
NEUTROPHILS NFR BLD AUTO: 2.02 10*3/MM3 (ref 1.7–7)
NEUTROPHILS NFR BLD AUTO: 53.9 % (ref 42.7–76)
NRBC BLD AUTO-RTO: 0 /100 WBC (ref 0–0.2)
PLATELET # BLD AUTO: 69 10*3/MM3 (ref 140–450)
PMV BLD AUTO: 11.8 FL (ref 6–12)
RBC # BLD AUTO: 2.74 10*6/MM3 (ref 4.14–5.8)
WBC NRBC COR # BLD: 3.74 10*3/MM3 (ref 3.4–10.8)

## 2022-10-07 PROCEDURE — 25010000002 EPOETIN ALFA PER 1000 UNITS: Performed by: INTERNAL MEDICINE

## 2022-10-07 PROCEDURE — 96372 THER/PROPH/DIAG INJ SC/IM: CPT

## 2022-10-07 PROCEDURE — 36415 COLL VENOUS BLD VENIPUNCTURE: CPT

## 2022-10-07 PROCEDURE — 85025 COMPLETE CBC W/AUTO DIFF WBC: CPT

## 2022-10-07 RX ADMIN — ERYTHROPOIETIN 20000 UNITS: 20000 INJECTION, SOLUTION INTRAVENOUS; SUBCUTANEOUS at 12:14

## 2022-10-13 ENCOUNTER — READMISSION MANAGEMENT (OUTPATIENT)
Dept: CALL CENTER | Facility: HOSPITAL | Age: 72
End: 2022-10-13

## 2022-10-13 NOTE — OUTREACH NOTE
"Medical Week 2 Survey    Flowsheet Row Responses   [facility] patient discharged from? [place]   Does the patient have one of the following disease processes/diagnoses(primary or secondary)? Other   Week 2 attempt successful? Yes   Call start time 1542   Discharge diagnosis Acute kidney injury   Call end time 1544   Person spoke with today (if not patient) and relationship wife   Meds reviewed with patient/caregiver? Yes   Is the patient having any side effects they believe may be caused by any medication additions or changes? No   Does the patient have all medications ordered at discharge? Yes   Is the patient taking all medications as directed (includes completed medication regime)? Yes   Does the patient have a primary care provider?  Yes   Comments regarding PCP Patient has followed up with Hematology/oncology--wife reports pt has series upcoming appts in next week   Has the patient kept scheduled appointments due by today? Yes   Has home health visited the patient within 72 hours of discharge? N/A   Psychosocial issues? No   What is the patient's perception of their health status since discharge? Improving  [Wife reports pt is doing well--no swelling and that diuretic change was a \"God-send.\"  He weighs daily and weight is 157.4 today--aware to montior for symptomatic edema and notify MD. no paracentesis needed since d/c.]   Is the patient/caregiver able to teach back signs and symptoms related to disease process for when to call PCP? Yes   Is the patient/caregiver able to teach back signs and symptoms related to disease process for when to call 911? Yes   Week 2 Call Completed? Yes   Graduated Yes          ALBERTINA JONES - Registered Nurse  "

## 2022-10-21 ENCOUNTER — INFUSION (OUTPATIENT)
Dept: ONCOLOGY | Facility: HOSPITAL | Age: 72
End: 2022-10-21

## 2022-10-21 ENCOUNTER — LAB (OUTPATIENT)
Dept: LAB | Facility: HOSPITAL | Age: 72
End: 2022-10-21

## 2022-10-21 DIAGNOSIS — N18.9 ANEMIA DUE TO CHRONIC RENAL FAILURE TREATED WITH ERYTHROPOIETIN, UNSPECIFIED STAGE: Primary | ICD-10-CM

## 2022-10-21 DIAGNOSIS — D61.818 PANCYTOPENIA: ICD-10-CM

## 2022-10-21 DIAGNOSIS — N28.9 RENAL INSUFFICIENCY: ICD-10-CM

## 2022-10-21 DIAGNOSIS — D69.6 THROMBOCYTOPENIA: ICD-10-CM

## 2022-10-21 DIAGNOSIS — D64.9 ANEMIA, UNSPECIFIED TYPE: ICD-10-CM

## 2022-10-21 DIAGNOSIS — D63.1 ANEMIA DUE TO CHRONIC RENAL FAILURE TREATED WITH ERYTHROPOIETIN, UNSPECIFIED STAGE: Primary | ICD-10-CM

## 2022-10-21 LAB
ALBUMIN SERPL-MCNC: 3.8 G/DL (ref 3.5–5.2)
ANION GAP SERPL CALCULATED.3IONS-SCNC: 12.7 MMOL/L (ref 5–15)
BASOPHILS # BLD AUTO: 0.03 10*3/MM3 (ref 0–0.2)
BASOPHILS NFR BLD AUTO: 0.9 % (ref 0–1.5)
BUN SERPL-MCNC: 44 MG/DL (ref 8–23)
BUN/CREAT SERPL: 21.7 (ref 7–25)
CALCIUM SPEC-SCNC: 9.5 MG/DL (ref 8.6–10.5)
CHLORIDE SERPL-SCNC: 108 MMOL/L (ref 98–107)
CO2 SERPL-SCNC: 19.3 MMOL/L (ref 22–29)
CREAT SERPL-MCNC: 2.03 MG/DL (ref 0.76–1.27)
CREAT UR-MCNC: 35.2 MG/DL
DEPRECATED RDW RBC AUTO: 49.9 FL (ref 37–54)
EGFRCR SERPLBLD CKD-EPI 2021: 34.2 ML/MIN/1.73
EOSINOPHIL # BLD AUTO: 0.19 10*3/MM3 (ref 0–0.4)
EOSINOPHIL NFR BLD AUTO: 6 % (ref 0.3–6.2)
ERYTHROCYTE [DISTWIDTH] IN BLOOD BY AUTOMATED COUNT: 14 % (ref 12.3–15.4)
FERRITIN SERPL-MCNC: 198.4 NG/ML (ref 30–400)
GLUCOSE SERPL-MCNC: 140 MG/DL (ref 65–99)
HCT VFR BLD AUTO: 27.5 % (ref 37.5–51)
HGB BLD-MCNC: 9 G/DL (ref 13–17.7)
IMM GRANULOCYTES # BLD AUTO: 0.02 10*3/MM3 (ref 0–0.05)
IMM GRANULOCYTES NFR BLD AUTO: 0.6 % (ref 0–0.5)
IRON 24H UR-MRATE: 56 MCG/DL (ref 59–158)
IRON SATN MFR SERPL: 19 % (ref 14–48)
LYMPHOCYTES # BLD AUTO: 0.88 10*3/MM3 (ref 0.7–3.1)
LYMPHOCYTES NFR BLD AUTO: 27.8 % (ref 19.6–45.3)
MCH RBC QN AUTO: 31.7 PG (ref 26.6–33)
MCHC RBC AUTO-ENTMCNC: 32.7 G/DL (ref 31.5–35.7)
MCV RBC AUTO: 96.8 FL (ref 79–97)
MONOCYTES # BLD AUTO: 0.27 10*3/MM3 (ref 0.1–0.9)
MONOCYTES NFR BLD AUTO: 8.5 % (ref 5–12)
NEUTROPHILS NFR BLD AUTO: 1.78 10*3/MM3 (ref 1.7–7)
NEUTROPHILS NFR BLD AUTO: 56.2 % (ref 42.7–76)
NRBC BLD AUTO-RTO: 0 /100 WBC (ref 0–0.2)
PHOSPHATE SERPL-MCNC: 3.8 MG/DL (ref 2.5–4.5)
PLATELET # BLD AUTO: 49 10*3/MM3 (ref 140–450)
PMV BLD AUTO: 10.7 FL (ref 6–12)
POTASSIUM SERPL-SCNC: 5 MMOL/L (ref 3.5–5.2)
PTH-INTACT SERPL-MCNC: 27.8 PG/ML (ref 15–65)
RBC # BLD AUTO: 2.84 10*6/MM3 (ref 4.14–5.8)
SODIUM SERPL-SCNC: 140 MMOL/L (ref 136–145)
TIBC SERPL-MCNC: 294 MCG/DL (ref 249–505)
TRANSFERRIN SERPL-MCNC: 210 MG/DL (ref 200–360)
WBC NRBC COR # BLD: 3.17 10*3/MM3 (ref 3.4–10.8)

## 2022-10-21 PROCEDURE — 83540 ASSAY OF IRON: CPT

## 2022-10-21 PROCEDURE — 84466 ASSAY OF TRANSFERRIN: CPT

## 2022-10-21 PROCEDURE — 96372 THER/PROPH/DIAG INJ SC/IM: CPT

## 2022-10-21 PROCEDURE — 82728 ASSAY OF FERRITIN: CPT

## 2022-10-21 PROCEDURE — 82570 ASSAY OF URINE CREATININE: CPT | Performed by: INTERNAL MEDICINE

## 2022-10-21 PROCEDURE — 25010000002 EPOETIN ALFA PER 1000 UNITS: Performed by: INTERNAL MEDICINE

## 2022-10-21 PROCEDURE — 36415 COLL VENOUS BLD VENIPUNCTURE: CPT

## 2022-10-21 PROCEDURE — 83970 ASSAY OF PARATHORMONE: CPT | Performed by: INTERNAL MEDICINE

## 2022-10-21 PROCEDURE — 80069 RENAL FUNCTION PANEL: CPT

## 2022-10-21 PROCEDURE — 85025 COMPLETE CBC W/AUTO DIFF WBC: CPT

## 2022-10-21 RX ADMIN — ERYTHROPOIETIN 20000 UNITS: 20000 INJECTION, SOLUTION INTRAVENOUS; SUBCUTANEOUS at 11:14

## 2022-11-02 ENCOUNTER — APPOINTMENT (OUTPATIENT)
Dept: OTHER | Facility: HOSPITAL | Age: 72
End: 2022-11-02

## 2022-11-02 ENCOUNTER — LAB (OUTPATIENT)
Dept: LAB | Facility: HOSPITAL | Age: 72
End: 2022-11-02

## 2022-11-02 ENCOUNTER — OFFICE VISIT (OUTPATIENT)
Dept: ONCOLOGY | Facility: CLINIC | Age: 72
End: 2022-11-02

## 2022-11-02 ENCOUNTER — INFUSION (OUTPATIENT)
Dept: ONCOLOGY | Facility: HOSPITAL | Age: 72
End: 2022-11-02

## 2022-11-02 VITALS
DIASTOLIC BLOOD PRESSURE: 55 MMHG | WEIGHT: 168.3 LBS | SYSTOLIC BLOOD PRESSURE: 155 MMHG | HEIGHT: 66 IN | OXYGEN SATURATION: 98 % | BODY MASS INDEX: 27.05 KG/M2 | TEMPERATURE: 97.3 F | HEART RATE: 70 BPM

## 2022-11-02 DIAGNOSIS — D50.9 IRON DEFICIENCY ANEMIA, UNSPECIFIED IRON DEFICIENCY ANEMIA TYPE: ICD-10-CM

## 2022-11-02 DIAGNOSIS — D69.6 THROMBOCYTOPENIA: ICD-10-CM

## 2022-11-02 DIAGNOSIS — C22.0 HEPATOCELLULAR CARCINOMA: ICD-10-CM

## 2022-11-02 DIAGNOSIS — R18.8 CIRRHOSIS OF LIVER WITH ASCITES, UNSPECIFIED HEPATIC CIRRHOSIS TYPE: ICD-10-CM

## 2022-11-02 DIAGNOSIS — K74.60 CIRRHOSIS OF LIVER WITH ASCITES, UNSPECIFIED HEPATIC CIRRHOSIS TYPE: ICD-10-CM

## 2022-11-02 DIAGNOSIS — Z09 FOLLOW-UP EXAM: ICD-10-CM

## 2022-11-02 DIAGNOSIS — N18.9 ANEMIA DUE TO CHRONIC RENAL FAILURE TREATED WITH ERYTHROPOIETIN, UNSPECIFIED STAGE: Primary | ICD-10-CM

## 2022-11-02 DIAGNOSIS — K75.81 NONALCOHOLIC STEATOHEPATITIS: ICD-10-CM

## 2022-11-02 DIAGNOSIS — N18.9 ANEMIA DUE TO CHRONIC RENAL FAILURE TREATED WITH ERYTHROPOIETIN, UNSPECIFIED STAGE: ICD-10-CM

## 2022-11-02 DIAGNOSIS — K74.60 HEPATIC CIRRHOSIS, UNSPECIFIED HEPATIC CIRRHOSIS TYPE, UNSPECIFIED WHETHER ASCITES PRESENT: Primary | ICD-10-CM

## 2022-11-02 DIAGNOSIS — D61.818 PANCYTOPENIA: ICD-10-CM

## 2022-11-02 DIAGNOSIS — D63.1 ANEMIA DUE TO CHRONIC RENAL FAILURE TREATED WITH ERYTHROPOIETIN, UNSPECIFIED STAGE: Primary | ICD-10-CM

## 2022-11-02 DIAGNOSIS — D64.9 ANEMIA, UNSPECIFIED TYPE: ICD-10-CM

## 2022-11-02 DIAGNOSIS — D63.1 ANEMIA DUE TO CHRONIC RENAL FAILURE TREATED WITH ERYTHROPOIETIN, UNSPECIFIED STAGE: ICD-10-CM

## 2022-11-02 DIAGNOSIS — N28.9 RENAL INSUFFICIENCY: ICD-10-CM

## 2022-11-02 DIAGNOSIS — D61.818 PANCYTOPENIA: Primary | ICD-10-CM

## 2022-11-02 DIAGNOSIS — R16.0 LIVER MASS: ICD-10-CM

## 2022-11-02 LAB
ALBUMIN SERPL-MCNC: 3.9 G/DL (ref 3.5–5.2)
ALBUMIN/GLOB SERPL: 1.1 G/DL (ref 1.1–2.4)
ALP SERPL-CCNC: 114 U/L (ref 38–116)
ALPHA-FETOPROTEIN: 2.37 NG/ML (ref 0–8.3)
ALT SERPL W P-5'-P-CCNC: 27 U/L (ref 0–41)
ANION GAP SERPL CALCULATED.3IONS-SCNC: 10.3 MMOL/L (ref 5–15)
AST SERPL-CCNC: 36 U/L (ref 0–40)
BASOPHILS # BLD AUTO: 0.02 10*3/MM3 (ref 0–0.2)
BASOPHILS NFR BLD AUTO: 0.7 % (ref 0–1.5)
BILIRUB SERPL-MCNC: 0.5 MG/DL (ref 0.2–1.2)
BUN SERPL-MCNC: 47 MG/DL (ref 6–20)
BUN/CREAT SERPL: 20.1 (ref 7.3–30)
CALCIUM SPEC-SCNC: 10 MG/DL (ref 8.5–10.2)
CHLORIDE SERPL-SCNC: 105 MMOL/L (ref 98–107)
CO2 SERPL-SCNC: 20.7 MMOL/L (ref 22–29)
CREAT SERPL-MCNC: 2.34 MG/DL (ref 0.7–1.3)
DEPRECATED RDW RBC AUTO: 47.9 FL (ref 37–54)
EGFRCR SERPLBLD CKD-EPI 2021: 28.8 ML/MIN/1.73
EOSINOPHIL # BLD AUTO: 0.17 10*3/MM3 (ref 0–0.4)
EOSINOPHIL NFR BLD AUTO: 5.7 % (ref 0.3–6.2)
ERYTHROCYTE [DISTWIDTH] IN BLOOD BY AUTOMATED COUNT: 13.6 % (ref 12.3–15.4)
GLOBULIN UR ELPH-MCNC: 3.4 GM/DL (ref 1.8–3.5)
GLUCOSE SERPL-MCNC: 186 MG/DL (ref 74–124)
HCT VFR BLD AUTO: 28.9 % (ref 37.5–51)
HGB BLD-MCNC: 9.2 G/DL (ref 13–17.7)
IMM GRANULOCYTES # BLD AUTO: 0.01 10*3/MM3 (ref 0–0.05)
IMM GRANULOCYTES NFR BLD AUTO: 0.3 % (ref 0–0.5)
LYMPHOCYTES # BLD AUTO: 0.82 10*3/MM3 (ref 0.7–3.1)
LYMPHOCYTES NFR BLD AUTO: 27.6 % (ref 19.6–45.3)
MCH RBC QN AUTO: 30.5 PG (ref 26.6–33)
MCHC RBC AUTO-ENTMCNC: 31.8 G/DL (ref 31.5–35.7)
MCV RBC AUTO: 95.7 FL (ref 79–97)
MONOCYTES # BLD AUTO: 0.22 10*3/MM3 (ref 0.1–0.9)
MONOCYTES NFR BLD AUTO: 7.4 % (ref 5–12)
NEUTROPHILS NFR BLD AUTO: 1.73 10*3/MM3 (ref 1.7–7)
NEUTROPHILS NFR BLD AUTO: 58.3 % (ref 42.7–76)
NRBC BLD AUTO-RTO: 0 /100 WBC (ref 0–0.2)
PLATELET # BLD AUTO: 48 10*3/MM3 (ref 140–450)
PMV BLD AUTO: 11.4 FL (ref 6–12)
POTASSIUM SERPL-SCNC: 5.1 MMOL/L (ref 3.5–4.7)
PROT SERPL-MCNC: 7.3 G/DL (ref 6.3–8)
RBC # BLD AUTO: 3.02 10*6/MM3 (ref 4.14–5.8)
SODIUM SERPL-SCNC: 136 MMOL/L (ref 134–145)
WBC NRBC COR # BLD: 2.97 10*3/MM3 (ref 3.4–10.8)

## 2022-11-02 PROCEDURE — 85025 COMPLETE CBC W/AUTO DIFF WBC: CPT

## 2022-11-02 PROCEDURE — 96372 THER/PROPH/DIAG INJ SC/IM: CPT

## 2022-11-02 PROCEDURE — 36415 COLL VENOUS BLD VENIPUNCTURE: CPT

## 2022-11-02 PROCEDURE — 99214 OFFICE O/P EST MOD 30 MIN: CPT | Performed by: INTERNAL MEDICINE

## 2022-11-02 PROCEDURE — 25010000002 EPOETIN ALFA PER 1000 UNITS: Performed by: INTERNAL MEDICINE

## 2022-11-02 PROCEDURE — 82105 ALPHA-FETOPROTEIN SERUM: CPT | Performed by: INTERNAL MEDICINE

## 2022-11-02 PROCEDURE — 80053 COMPREHEN METABOLIC PANEL: CPT

## 2022-11-02 RX ORDER — CALCIUM POLYCARBOPHIL 625 MG
625 TABLET ORAL 3 TIMES DAILY
COMMUNITY

## 2022-11-02 RX ORDER — SODIUM CHLORIDE 9 MG/ML
250 INJECTION, SOLUTION INTRAVENOUS AS NEEDED
Status: CANCELLED | OUTPATIENT
Start: 2022-11-16

## 2022-11-02 RX ORDER — CLOTRIMAZOLE AND BETAMETHASONE DIPROPIONATE 10; .64 MG/G; MG/G
1 CREAM TOPICAL DAILY
COMMUNITY
Start: 2022-10-31

## 2022-11-02 RX ORDER — POTASSIUM CHLORIDE 20 MEQ/1
20 TABLET, EXTENDED RELEASE ORAL DAILY
COMMUNITY
Start: 2022-10-25

## 2022-11-02 RX ORDER — BLOOD-GLUCOSE METER
KIT MISCELLANEOUS
COMMUNITY
Start: 2022-10-14

## 2022-11-02 RX ADMIN — ERYTHROPOIETIN 20000 UNITS: 20000 INJECTION, SOLUTION INTRAVENOUS; SUBCUTANEOUS at 10:35

## 2022-11-02 NOTE — H&P (VIEW-ONLY)
.     REASON FOR FOLLOWUP:     *Pancytopenia secondary to liver cirrhosis   · thrombocytopenia secondary to liver cirrhosis.      · Mild leukocytopenia and low normal neutrophils  *  Iron deficiency anemia discovered on 1/13/2021.  Patient was started on oral iron 3 times a day.  *Newly discovered liver lesion.      HISTORY OF PRESENT ILLNESS:  The patient is a 72 y.o. year old male with hypertension diabetes, pancytopenia secondary to liver cirrhosis caused by RODRIGUEZ, and hypogonadism who presented today for 5-week follow-up evaluation.  Patient is accompanied by his wife who helped with history.      I saw patient recently on 9/20/2022 and he was found having worsening renal function, with hyperkalemia potassium 6.4.  Patient was sent to ER and eventually admitted for several days.  His hemoglobin was 7.5 on 9/30/2022.  Patient was started on Procrit 20,000 units during hospitalization.  After discharge, he has been given Procrit every 2 weeks.     During his hospitalization in end of September 2022, patient had ultrasound for the liver examination on 9/29/2022 which reported a hepatic lesion 27 x 24 x 23 mm, heterogeneous in the anterior liver.  There was evidence of liver cirrhosis.      Patient also had attempted ultrasound-guided paracentesis, however there was not enough ascites to be removed.     Patient also had a venous Doppler study and there was no evidence for portal vein thrombosis.    Patient reports he was seen by his GI specialist Dr. Everardo Foote old records that abdomen MRI examination with and without contrast which was done at the [facility] on 10/26/2022.  This study confirmed      Patient reports he noticed improved energy level since starting on Procrit injection.  Overall he still has chronic fatigue.  He denies evidence of bleeding.    Laboratory today study on 11/2/2022 reported improved hemoglobin 9.2.  Nevertheless he has persistent pancytopenia with platelets 40,000, WBC 2970  including neutrophils 1730.     Patient reports no fever sweating chills.  He is abdomen is slightly distended but no pains.  No nausea vomiting diarrhea.  He has no abdomen pain.  Performance status ECOG 1-2.    Patient also reports that to Dr. Foote schedule patient for TIPS procedure in January 2023.        Past Medical History:   Diagnosis Date   • H/O Dental disorder    • History of thrombocytopenia    • Hypertension    • RODRIGUEZ (nonalcoholic steatohepatitis)    • Psoriasis    • Type 2 diabetes mellitus (HCC)      Past Surgical History:   Procedure Laterality Date   • CATARACT EXTRACTION, BILATERAL     • COLONOSCOPY  11/2015   • ROTATOR CUFF REPAIR Left 2005     HEMATOLOGY HISTORY:  The patient is a 70 y.o. year old male with history of hypertension diabetes, RODRIGUEZ, and hypogonadism who presented today on 1/13/2021 for initial evaluation because of thrombocytopenia, referred by his primary care physician Dr. Joseph.      Patient reports he has no limitation of activity.  He does have chronic joint pains, but of note joint swelling.  His skin pruritus.  No skin rashes.  He does have easy bruising, however denies evidence of bleeding such as epistaxis, gum bleeding, rectal bleeding or hematuria.  Denies weight loss.  No low fever.  Appetite is reasonable.    I reviewed his outside laboratory studies.  Most recent also lab on 10/22/2020 reported hemoglobin 10.5, MCV 89.6, WBC 3200, including neutrophils 1900, lymphocytes 800 monocytes 300, and platelets 62,000.    Liver study on 9/2/2020 reported WBC 3200, including ANC 1900 lymphocytes 1000 monocytes 300, hemoglobin 10.5, MCV 80.0, and platelets 65,000.    His earliest CBC results available for review was from 5/8/2019 which reported WBC 4200, including ANC 2600, lymphocytes 1200 and monocytes 400, hemoglobin 13.4 and platelets 95,000.  At that time chemistry lab reported elevated liver panel including  , alk phosphatase 87 and a total bilirubin 1.1.   Total serum protein 7.0 albumin 4.1 and the globulin 2.9.  His creatinine was 1.31, with glucose of 219 been in normal electrolytes.  Hemoglobin A1c was 8.3%.    Laboratory studies on 1/13/2021 reported mild anemia with Hb 10.9, thrombocytopenia platelets 48,000, and IPF 7.0%, leukocytopenia WBC 3250 including ANC 1830.  Other labs reported no evidence of hemolysis, had supratherapeutic B12 level 1413 pg/mL and folate > 20 ng/mL.  He does have evidence of iron deficiency with low iron sats 10% and ferritin only 41.5 ng/mL in the setting of anemia with Hb 10.9.  His erythropoietin is not elevated accordingly, which indicates lack of response from his kidney to produce erythropoietin.     Currently is not a candidate for CONRAD/Procrit treatment.  However he is adamant he is a candidate for oral iron treatment.    We will start him on ferrous sulfate 325 mg 3 times a day.  Will send prescription to his pharmacy.     Laboratory study 6/11/2021 showed a slightly improved hemoglobin 11.9, with stable thrombocytopenia platelets 50,000 IPF 8.3%, and WBC 3720 including ANC 2100.  Iron study reported ferritin 115.3 ng/mL and iron saturation 14% with free iron 55 TIBC 384.    Laboratory studies on 12/17/2021 reported stable pancytopenia with anemia Hb 10.8, with platelets 60,000 and IPF 4.1%, and WBC 3780 including neutrophils 2060 lymphocytes 1040.  Iron study reported slightly trending down ferritin 86.1 ng/mL in the much improved iron saturation 34% with free iron 126 and TIBC 368.      On 6/10/2022 reports patient has abdomen distention for about 3 months.  He also developed bilateral leg edema in December 2021.  Patient also reports worsening dyspnea and weight gain for the past several months..  Per our records, he gained 25 pounds since December 2021.    Patient reports having taken oral iron 3 times a day.  He has mild nausea but not significant and no vomiting.  He has brown-colored stool.  He also reports having loose  stool some of days.  No diarrhea.    Patient received a boost dose of COVID-19 vaccine on 11/6/2021.  Patient reports abdomen distention about 3 months, leg edema 6 months, after the boost dose of COVID (December) boost on 11/6/21    Patient complains of easy bruising on his arms from scratching, he has skin pruritus, however no skin rashes.  He denies spontaneous bleeding such as epistaxis, gum bleeding, or hematochezia.    Lab study on 6/10/2022 reported deteriorating anemia with Hb 9.6, persistent thrombocytopenia with stable platelets 69,000, and low normal WBC 4290 including neutrophils 2980.  Iron studies showed a deteriorating saturation 17%, free iron 61 TIBC 357 with a ferritin 86.8%.    Patient indeed had paracentesis on 6/17/2022 with 11.2 L ascites fluid removed.  Cytology study was negative for malignancy.    Patient reports today that he has worsening abdominal distention, also has dyspnea however no cough hemoptysis.  No chest pain.  He denies fever sweating chills.  Patient also reports worsening bilateral leg edema.    He also recently finished her 2 dose of Injectafer in late June 2022 because of iron deficiency anemia, not responding to oral iron treatment 3 times a day.    Patient continues to have easy bruising however no spontaneous bleeding such as epistaxis or gum bleeding.    Laboratory study on 7/6/2022 reported stable anemia Hb 9.7 and stable thrombocytopenia platelets 65,000, low normal WBC 4260.  Patient has worsening renal function with creatinine 1.36, and supratherapeutic ferritin 644 ng/mL.    Chest x-ray examination on 7/14/2022 reported a right-sided moderate to large pleural effusion.  We will arrange patient to have ultrasound-guided right thoracentesis.  Suspect the patient has pleural effusion due to his liver cirrhosis.  He will have x-ray examination after the thoracentesis per protocol.  We will reassess whether he needs a CT scan for the chest.    This patient also had  paracentesis again on 7/14/2022 with 6.2 L ascites fluid removed.      This patient had right thoracentesis with 2 L pleural effusion removed.  Cytology studies negative for malignant cells.  He also had multiple paracentesis, in June, July and early August, cytology study was also negative for malignant cells.    Laboratory study on 9/28/2022 reported worsening anemia Hb 8.5, platelets 61,000, WBC 3380 including ANC 1780.  Laboratory study showed significant hyperkalemia potassium 6.4, and worsening renal function creatinine 2.36.        MEDICATIONS    Current Outpatient Medications:   •  allopurinol (ZYLOPRIM) 100 MG tablet, Take 100 mg by mouth Daily., Disp: , Rfl:   •  clotrimazole-betamethasone (LOTRISONE) 1-0.05 % cream, Apply 1 application topically to the appropriate area as directed Daily., Disp: , Rfl:   •  ferrous sulfate 325 (65 FE) MG tablet, Take 1 tablet by mouth 3 (Three) Times a Day With Meals. (Patient taking differently: Take 1 tablet by mouth Daily With Breakfast.), Disp: 90 tablet, Rfl: 2  •  FIBER PO, Take  by mouth., Disp: , Rfl:   •  FREESTYLE LITE test strip, , Disp: , Rfl:   •  multivitamin (THERAGRAN) tablet tablet, Take  by mouth Daily., Disp: , Rfl:   •  polycarbophil 625 MG tablet tablet, Take 1 tablet by mouth 3 (Three) Times a Day., Disp: , Rfl:   •  Polyethylene Glycol 3350 (MIRALAX PO), Take  by mouth., Disp: , Rfl:   •  potassium chloride (K-DUR,KLOR-CON) 20 MEQ CR tablet, Take 1 tablet by mouth Daily., Disp: , Rfl:   •  furosemide (LASIX) 40 MG tablet, Take 1 tablet by mouth Daily for 30 days., Disp: 30 tablet, Rfl: 0  •  spironolactone (ALDACTONE) 25 MG tablet, Take 1 tablet by mouth Daily for 30 days., Disp: 30 tablet, Rfl: 0  No current facility-administered medications for this visit.    Facility-Administered Medications Ordered in Other Visits:   •  epoetin sole (EPOGEN,PROCRIT) injection 20,000 Units, 20,000 Units, Subcutaneous, Once, Milagro Salinas MD PhD    ALLERGIES:    "  Allergies   Allergen Reactions   • Doxycycline Itching       SOCIAL HISTORY:         Social History     Socioeconomic History   • Marital status:      Spouse name: Lisa   Tobacco Use   • Smoking status: Former     Types: Cigarettes     Quit date: 1975     Years since quittin.8   Substance and Sexual Activity   • Alcohol use: Never   · As of 2021, patient reports he does drink alcoholic beverage 10 drinks per week.  Denies illegal drug use.      FAMILY HISTORY:  No cancer in family, mother HTN,           Vitals:    22 0953   BP: 155/55   Pulse: 70   Temp: 97.3 °F (36.3 °C)   TempSrc: Temporal   SpO2: 98%   Weight: 76.3 kg (168 lb 4.8 oz)   Height: 167.6 cm (65.98\")   PainSc: 0-No pain       Current Status 2022   ECOG score 1      PHYSICAL EXAM:    GENERAL:  Well-developed, well-nourished male in no acute distress.  Orientated to time place and the people.  SKIN:  Warm, dry without rashes, purpura or petechiae.  HEENT:  Normocephalic.  Wearing mask.   LYMPHATICS:  No cervical, supraclavicular adenopathy.  CHEST: Normal respiratory effort.  Lungs clear to auscultation. Good airflow.  CARDIAC:  Regular rate and rhythm without murmurs. Normal S1,S2.  ABDOMEN: Distended.  Nontender no guarding no rebound.  Bowel sounds present.    EXTREMITIES: 1+ edema in both lower legs.   NEUROLOGICAL:  Grossly intact.  No focal neurological deficits.  PSYCHIATRIC:  Normal affect and mood.          RECENT LABS:  Lab Results   Component Value Date    WBC 2.97 (L) 2022    HGB 9.2 (L) 2022    HCT 28.9 (L) 2022    MCV 95.7 2022    PLT 48 (L) 2022     Lab Results   Component Value Date    NEUTROABS 1.73 2022     Lab Results   Component Value Date    IRON 56 (L) 10/21/2022    TIBC 294 10/21/2022    FERRITIN 198.40 10/21/2022   Iron saturation 19% on 10/21/2022       Lab Results   Component Value Date    FOLATE >20.00 2021     Lab Results   Component Value Date    " [id number] 1,155 (H) 09/28/2022       IMAGING:   MRI of the abdomen with and without contrast on 10/26/2022 at [facility]  INDICATION:     Nonalcoholic steatohepatitis. Increasing abdominal pain and liver enzyme levels over the past 3 months.     TECHNIQUE:   MRI of the abdomen with and without contrast.     COMPARISON:     CT 08/23/2022     FINDINGS:   Cirrhotic morphology of the liver. No significant hepatic fat around a position. Spleen enlarged measuring 17.9 cm. Moderate-sized right pleural effusion similar to slightly smaller. 8 mm cyst towards the dome of the liver. 3.2 cm hepatocellular carcinoma in the lateral right hepatic lobe. This lesion is not perceptible on the comparison unenhanced study. No other liver lesion. Portal vein is patent. Recannulated paraumbilical vein.     Renal cysts. Adrenal glands and pancreas unremarkable. Gallbladder wall thickening probably related to underlying liver disease. Bowel loops are nondilated.     IMPRESSION:   Cirrhosis. 3.2 cm hepatocellular carcinoma in the lateral right hepatic lobe.     Sequela of portal hypertension detailed above.       Assessment & Plan   *  Thrombocytopenia secondary to liver cirrhosis and splenomegaly.  · Etiology of the thrombocytopenia is not clear.  However I do think it is likely related to his fatty liver which was documented from abdomen ultrasound on 7/20/2012.    · Laboratory studies on 1/13/2021 reported excellent vitamin B12 level.  He had marginally elevated IPF 7.0%.  no apparent compensation for platelets production.   · On 6/11/2021, platelets improved at 63,000 with normal IPF 5.3%.  Patient is asymptomatic.    · On 12/17/2021, stable platelets 60,000 and normal IPF 4.1%. Patient is asymptomatic.   · On 6/10/2022 platelets 69,000.  Patient reports no spontaneous bleeding.    · On 7/6/2022 platelets 65,000.  No spontaneous bleeding.    · On 9/28/2022 platelets 61,000.  Patient has easy bruising on extremities,  however no spontaneous bleeding such as melena hematochezia or gum bleeding.    · On 11/2/2022 platelets 48,000.  No change of clinical condition.  Continue to monitor.    *Iron deficiency anemia in the setting of chronic renal insufficiency stage III, and the liver cirrhosis.   · Laboratory study on 1/13/2021 reported no evidence of hemolysis, had supratherapeutic B12 level and folate.    · He does have evidence of iron deficiency with low iron sats 10% and ferritin only 41.5 ng/mL with Hb 10.9.    · His erythropoietin 16.1, is not elevated accordingly, which indicates lack of response from his kidney to produce erythropoietin. Currently is not a candidate for CONRAD/Procrit treatment.    · We started patient on oral ferrous sulfate 325 mg 3 times a day in January 2021.   · On 3/19/2021, patient has improved hemoglobin 11.9.  Continue to monitor.  · On 6/11/2021, stable anemia Hb 11.0, however much improved with ferritin 115.3 ng/mL, and iron saturation 14%.  Will advised to continue oral iron treatment.  · On 12/17/2021 stable Hb 10.8, much improved iron saturation 34% and free iron 126, however with trending down ferritin level 86.1 ng/mL. Continue oral iron treatment.  · 6/10/2022, patient reports taking oral iron 3 times a day.  No apparent side effects except mild nausea.  Laboratory studies showed deteriorating hemoglobin 9.6 and also worsening iron saturation 17% with free iron 61 and the ferritin 86.8 ng/mL.  Discussed with the patient, recommended intravenous iron therapy, this patient has poor iron absorption.    · Patient received 2 dose of Injectafer 750 mg x 2 in June 2022.  Lab study today on 7/6/2022 reported improved normalized iron saturation 21% with a supratherapeutic ferritin 644 ng/mL.  · On 9/28/2022 ferritin 256 ng/mL iron saturation 22% free iron 66 TIBC 295.  Supratherapeutic B12 at 155 and normal RBC folate 2175 ng/mL.  · Patient had a hemoglobin 7.2 on 9/30/2022.  Was started on Procrit  injection 20,000 units during hospitalization.  No PRBC transfusion.  · Procrit is subsequently continued as outpatient every 2 weeks.  · On 10/21/2022 ferritin 198 and free iron 56 TIBC 294 iron saturation 19%.   · Improved hemoglobin 9.2 on 11/2/2022.  We will continue Procrit 20,000 units every 2 weeks.    * Pancytopenia with mild leukocytopenia.  This is also likely related to his fatty liver and liver cirrhosis.  Patient drinks alcohol beverage as reported in January 2021.  · Patient reports no recurrent infection.  His neutrophils marginally normal at 1830 out of WBC 3250.  · Patient had supratherapeutic B12 level 1/13/2021.  · Marginally better WBC 3720 including ANC 2100 3/19/2021.    · 6/11/2021 WBC 3530, with ANC 2180.  No recurrent infection.    · On 12/17/2021 stable leukocytopenia WBC 3780 with low normal neutrophils 2016.  Patient is asymptomatic.  Continue to monitor.  · On 6/10/2022 patient has slightly better WBC 4290 and neutrophils 2980.  · On 7/6/2022 patient had WBC 4260 ANC 2620, lymphocytes 840 and monocytes 470.  · On 9/28/2022 WBC 3380, including ANC 1780, hemoglobin 8.5, and platelets 61,000.  · Today on 11/2/2022 total WBC 2970 including ANC 1730.  Continue to monitor.     *COVID-19 vaccination.    · 6/11/2021, patient reports that he had 2 doses of COVID-19 vaccine, had very limited side effects with some soreness at the site of injection.   · On 12/17/2021, patient reports he received a booster dose of COVID-19 vaccine on 11/6/2021.      *Ascites secondary to liver cirrhosis.  · CT scan examination on 10/15/2021 confirmed liver cirrhosis.  · On 6/10/2022 patient reports abdomen distention for the past 3 months, weight gains, 25 pounds per our records, and also has worsening dyspnea.  Physical examination shows very distended abdomen.  I think this patient has large ascites secondary to his liver cirrhosis.  I recommended to have ultrasound-guided therapeutic paracentesis with samples for  routine chemistry labs cell counts and also for cytology study.  · Patient also has bilateral leg edema which is also secondary to liver cirrhosis.  Patient was seen her primary care physician next week.  I think most likely he will need to be started on diuretics.  He also needs to follow-up with his GI specialist Dr. Everardo Foote to discuss possibility of  shunt.   · On 6/17/2022 patient had ultrasound-guided paracentesis, with 11.2 L clear yellow ascites removed.  Cytology study reported negative for malignant cells.  There was a reactive mesothelial cells histiocytes and mixed inflammatory cells.  · Today on 7/6/2022 follow-up, patient reports worsening abdomen distention again.  I will arrange patient to have therapeutic paracentesis again as soon as possible, and also will arrange another paracentesis in 3 weeks.  · At the meantime I also recommended to start the patient on low-dose Lasix 20 mg daily on 7/6/2022.   · Paracentesis, with 6.4 L ascites removed 7/14/2022.  · Paracentesis 2.4 L ascites removed 8/4/2022.   · Failed attempt on 9/29/2022 during hospitalization, no ascites per ultrasound exam.    *Right pleural effusion.    · Patient also complains of dyspnea, x-ray examination reported right pleural effusion 7/14/2022..  · Patient had ultrasound-guided right thoracentesis with 2 L pleural effusion removed 7/27/2022.  Cytology studies negative for malignancy.  · On 8/4/2022 patient had another 2 L pleural effusion removed on the right side.    *Worsening renal function.  · Laboratory study on 7/6/2022 reported creatinine 1.37.  He had creatinine 1.02 on 12/17/2021.  Discussed with patient, I recommended referring patient to nephrology service for evaluation of possible hepatorenal syndrome due to liver cirrhosis.  Patient is agreeable.  · On 9/28/2022 patient reports that he still has no appointment for nephrology service.      *Newly discovered hepatic lesion by ultrasound examination on  9/29/2022.  · Patient is subsequently seen by Dr. Everardo Foote, had an MRI of the abdomen with and without contrast at the [facility] on 10/26/2022 which reported 3.2 cm liver lesion, likely hepatocellular carcinoma.  · I recommended to have AFP liver study, also discussed with patient and his wife today on 11/2/2022 we will obtain the MRI images from [facility], and arrange CT-guided core needle biopsy.  Because of his thrombocytopenia, I will arrange 1 unit platelets transfusion right before the biopsy.  I did discuss with him about the risk for bleeding post the biopsy.  This is highly suspicious for primary hepatocellular carcinoma.     PLAN:  1. Proceed ahead with Procrit 20,000 units today and repeat every 2 weeks.    2. Pending lab results for CMP and AFP.    3. Will obtain MRI images from [facility].  4. Arrange CT-guided core needle biopsy of the liver lesion.   5. We will transfuse 1 unit platelets right before CT-guided liver biopsy.  6. Patient is on Lasix, and spironolactone and potassium due per Dr. Foote for management of ascites  7. Follow-up with GI physician Dr. Everardo Foote for evaluation and TIPS in January 2020.   8. Patient will continue follow-up with his nephrologist.  9. I will see patient in 1 week after his CT-guided biopsy, discussed results and the management plan.    I reviewed recent records from his hospitalization, is also records from [facility] specially the MRI examination.    Discussed with the patient and his wife about the results and further evaluation and also ongoing treatment with Procrit.  Patient and his wife voiced understanding and agreement.        TERE ANDREWS M.D., Ph.D.    11/2/2022.            CC:  MD Everardo Cuello M.D.     Adolfo COPE MD

## 2022-11-02 NOTE — PROGRESS NOTES
.     REASON FOR FOLLOWUP:     *Pancytopenia secondary to liver cirrhosis   · thrombocytopenia secondary to liver cirrhosis.      · Mild leukocytopenia and low normal neutrophils  *  Iron deficiency anemia discovered on 1/13/2021.  Patient was started on oral iron 3 times a day.  *Newly discovered liver lesion.      HISTORY OF PRESENT ILLNESS:  The patient is a 72 y.o. year old male with hypertension diabetes, pancytopenia secondary to liver cirrhosis caused by RODRIGUEZ, and hypogonadism who presented today for 5-week follow-up evaluation.  Patient is accompanied by his wife who helped with history.      I saw patient recently on 9/20/2022 and he was found having worsening renal function, with hyperkalemia potassium 6.4.  Patient was sent to ER and eventually admitted for several days.  His hemoglobin was 7.5 on 9/30/2022.  Patient was started on Procrit 20,000 units during hospitalization.  After discharge, he has been given Procrit every 2 weeks.     During his hospitalization in end of September 2022, patient had ultrasound for the liver examination on 9/29/2022 which reported a hepatic lesion 27 x 24 x 23 mm, heterogeneous in the anterior liver.  There was evidence of liver cirrhosis.      Patient also had attempted ultrasound-guided paracentesis, however there was not enough ascites to be removed.     Patient also had a venous Doppler study and there was no evidence for portal vein thrombosis.    Patient reports he was seen by his GI specialist Dr. Everardo Foote old records that abdomen MRI examination with and without contrast which was done at the [facility] on 10/26/2022.  This study confirmed      Patient reports he noticed improved energy level since starting on Procrit injection.  Overall he still has chronic fatigue.  He denies evidence of bleeding.    Laboratory today study on 11/2/2022 reported improved hemoglobin 9.2.  Nevertheless he has persistent pancytopenia with platelets 40,000, WBC 2970  including neutrophils 1730.     Patient reports no fever sweating chills.  He is abdomen is slightly distended but no pains.  No nausea vomiting diarrhea.  He has no abdomen pain.  Performance status ECOG 1-2.    Patient also reports that to Dr. Foote schedule patient for TIPS procedure in January 2023.        Past Medical History:   Diagnosis Date   • H/O Dental disorder    • History of thrombocytopenia    • Hypertension    • RODRIGUEZ (nonalcoholic steatohepatitis)    • Psoriasis    • Type 2 diabetes mellitus (HCC)      Past Surgical History:   Procedure Laterality Date   • CATARACT EXTRACTION, BILATERAL     • COLONOSCOPY  11/2015   • ROTATOR CUFF REPAIR Left 2005     HEMATOLOGY HISTORY:  The patient is a 70 y.o. year old male with history of hypertension diabetes, RODRIGUEZ, and hypogonadism who presented today on 1/13/2021 for initial evaluation because of thrombocytopenia, referred by his primary care physician Dr. Joseph.      Patient reports he has no limitation of activity.  He does have chronic joint pains, but of note joint swelling.  His skin pruritus.  No skin rashes.  He does have easy bruising, however denies evidence of bleeding such as epistaxis, gum bleeding, rectal bleeding or hematuria.  Denies weight loss.  No low fever.  Appetite is reasonable.    I reviewed his outside laboratory studies.  Most recent also lab on 10/22/2020 reported hemoglobin 10.5, MCV 89.6, WBC 3200, including neutrophils 1900, lymphocytes 800 monocytes 300, and platelets 62,000.    Liver study on 9/2/2020 reported WBC 3200, including ANC 1900 lymphocytes 1000 monocytes 300, hemoglobin 10.5, MCV 80.0, and platelets 65,000.    His earliest CBC results available for review was from 5/8/2019 which reported WBC 4200, including ANC 2600, lymphocytes 1200 and monocytes 400, hemoglobin 13.4 and platelets 95,000.  At that time chemistry lab reported elevated liver panel including  , alk phosphatase 87 and a total bilirubin 1.1.   Total serum protein 7.0 albumin 4.1 and the globulin 2.9.  His creatinine was 1.31, with glucose of 219 been in normal electrolytes.  Hemoglobin A1c was 8.3%.    Laboratory studies on 1/13/2021 reported mild anemia with Hb 10.9, thrombocytopenia platelets 48,000, and IPF 7.0%, leukocytopenia WBC 3250 including ANC 1830.  Other labs reported no evidence of hemolysis, had supratherapeutic B12 level 1413 pg/mL and folate > 20 ng/mL.  He does have evidence of iron deficiency with low iron sats 10% and ferritin only 41.5 ng/mL in the setting of anemia with Hb 10.9.  His erythropoietin is not elevated accordingly, which indicates lack of response from his kidney to produce erythropoietin.     Currently is not a candidate for CONRAD/Procrit treatment.  However he is adamant he is a candidate for oral iron treatment.    We will start him on ferrous sulfate 325 mg 3 times a day.  Will send prescription to his pharmacy.     Laboratory study 6/11/2021 showed a slightly improved hemoglobin 11.9, with stable thrombocytopenia platelets 50,000 IPF 8.3%, and WBC 3720 including ANC 2100.  Iron study reported ferritin 115.3 ng/mL and iron saturation 14% with free iron 55 TIBC 384.    Laboratory studies on 12/17/2021 reported stable pancytopenia with anemia Hb 10.8, with platelets 60,000 and IPF 4.1%, and WBC 3780 including neutrophils 2060 lymphocytes 1040.  Iron study reported slightly trending down ferritin 86.1 ng/mL in the much improved iron saturation 34% with free iron 126 and TIBC 368.      On 6/10/2022 reports patient has abdomen distention for about 3 months.  He also developed bilateral leg edema in December 2021.  Patient also reports worsening dyspnea and weight gain for the past several months..  Per our records, he gained 25 pounds since December 2021.    Patient reports having taken oral iron 3 times a day.  He has mild nausea but not significant and no vomiting.  He has brown-colored stool.  He also reports having loose  stool some of days.  No diarrhea.    Patient received a boost dose of COVID-19 vaccine on 11/6/2021.  Patient reports abdomen distention about 3 months, leg edema 6 months, after the boost dose of COVID (December) boost on 11/6/21    Patient complains of easy bruising on his arms from scratching, he has skin pruritus, however no skin rashes.  He denies spontaneous bleeding such as epistaxis, gum bleeding, or hematochezia.    Lab study on 6/10/2022 reported deteriorating anemia with Hb 9.6, persistent thrombocytopenia with stable platelets 69,000, and low normal WBC 4290 including neutrophils 2980.  Iron studies showed a deteriorating saturation 17%, free iron 61 TIBC 357 with a ferritin 86.8%.    Patient indeed had paracentesis on 6/17/2022 with 11.2 L ascites fluid removed.  Cytology study was negative for malignancy.    Patient reports today that he has worsening abdominal distention, also has dyspnea however no cough hemoptysis.  No chest pain.  He denies fever sweating chills.  Patient also reports worsening bilateral leg edema.    He also recently finished her 2 dose of Injectafer in late June 2022 because of iron deficiency anemia, not responding to oral iron treatment 3 times a day.    Patient continues to have easy bruising however no spontaneous bleeding such as epistaxis or gum bleeding.    Laboratory study on 7/6/2022 reported stable anemia Hb 9.7 and stable thrombocytopenia platelets 65,000, low normal WBC 4260.  Patient has worsening renal function with creatinine 1.36, and supratherapeutic ferritin 644 ng/mL.    Chest x-ray examination on 7/14/2022 reported a right-sided moderate to large pleural effusion.  We will arrange patient to have ultrasound-guided right thoracentesis.  Suspect the patient has pleural effusion due to his liver cirrhosis.  He will have x-ray examination after the thoracentesis per protocol.  We will reassess whether he needs a CT scan for the chest.    This patient also had  paracentesis again on 7/14/2022 with 6.2 L ascites fluid removed.      This patient had right thoracentesis with 2 L pleural effusion removed.  Cytology studies negative for malignant cells.  He also had multiple paracentesis, in June, July and early August, cytology study was also negative for malignant cells.    Laboratory study on 9/28/2022 reported worsening anemia Hb 8.5, platelets 61,000, WBC 3380 including ANC 1780.  Laboratory study showed significant hyperkalemia potassium 6.4, and worsening renal function creatinine 2.36.        MEDICATIONS    Current Outpatient Medications:   •  allopurinol (ZYLOPRIM) 100 MG tablet, Take 100 mg by mouth Daily., Disp: , Rfl:   •  clotrimazole-betamethasone (LOTRISONE) 1-0.05 % cream, Apply 1 application topically to the appropriate area as directed Daily., Disp: , Rfl:   •  ferrous sulfate 325 (65 FE) MG tablet, Take 1 tablet by mouth 3 (Three) Times a Day With Meals. (Patient taking differently: Take 1 tablet by mouth Daily With Breakfast.), Disp: 90 tablet, Rfl: 2  •  FIBER PO, Take  by mouth., Disp: , Rfl:   •  FREESTYLE LITE test strip, , Disp: , Rfl:   •  multivitamin (THERAGRAN) tablet tablet, Take  by mouth Daily., Disp: , Rfl:   •  polycarbophil 625 MG tablet tablet, Take 1 tablet by mouth 3 (Three) Times a Day., Disp: , Rfl:   •  Polyethylene Glycol 3350 (MIRALAX PO), Take  by mouth., Disp: , Rfl:   •  potassium chloride (K-DUR,KLOR-CON) 20 MEQ CR tablet, Take 1 tablet by mouth Daily., Disp: , Rfl:   •  furosemide (LASIX) 40 MG tablet, Take 1 tablet by mouth Daily for 30 days., Disp: 30 tablet, Rfl: 0  •  spironolactone (ALDACTONE) 25 MG tablet, Take 1 tablet by mouth Daily for 30 days., Disp: 30 tablet, Rfl: 0  No current facility-administered medications for this visit.    Facility-Administered Medications Ordered in Other Visits:   •  epoetin sole (EPOGEN,PROCRIT) injection 20,000 Units, 20,000 Units, Subcutaneous, Once, Milagro Salinas MD PhD    ALLERGIES:    "  Allergies   Allergen Reactions   • Doxycycline Itching       SOCIAL HISTORY:         Social History     Socioeconomic History   • Marital status:      Spouse name: Lisa   Tobacco Use   • Smoking status: Former     Types: Cigarettes     Quit date: 1975     Years since quittin.8   Substance and Sexual Activity   • Alcohol use: Never   · As of 2021, patient reports he does drink alcoholic beverage 10 drinks per week.  Denies illegal drug use.      FAMILY HISTORY:  No cancer in family, mother HTN,           Vitals:    22 0953   BP: 155/55   Pulse: 70   Temp: 97.3 °F (36.3 °C)   TempSrc: Temporal   SpO2: 98%   Weight: 76.3 kg (168 lb 4.8 oz)   Height: 167.6 cm (65.98\")   PainSc: 0-No pain       Current Status 2022   ECOG score 1      PHYSICAL EXAM:    GENERAL:  Well-developed, well-nourished male in no acute distress.  Orientated to time place and the people.  SKIN:  Warm, dry without rashes, purpura or petechiae.  HEENT:  Normocephalic.  Wearing mask.   LYMPHATICS:  No cervical, supraclavicular adenopathy.  CHEST: Normal respiratory effort.  Lungs clear to auscultation. Good airflow.  CARDIAC:  Regular rate and rhythm without murmurs. Normal S1,S2.  ABDOMEN: Distended.  Nontender no guarding no rebound.  Bowel sounds present.    EXTREMITIES: 1+ edema in both lower legs.   NEUROLOGICAL:  Grossly intact.  No focal neurological deficits.  PSYCHIATRIC:  Normal affect and mood.          RECENT LABS:  Lab Results   Component Value Date    WBC 2.97 (L) 2022    HGB 9.2 (L) 2022    HCT 28.9 (L) 2022    MCV 95.7 2022    PLT 48 (L) 2022     Lab Results   Component Value Date    NEUTROABS 1.73 2022     Lab Results   Component Value Date    IRON 56 (L) 10/21/2022    TIBC 294 10/21/2022    FERRITIN 198.40 10/21/2022   Iron saturation 19% on 10/21/2022       Lab Results   Component Value Date    FOLATE >20.00 2021     Lab Results   Component Value Date    " [id number] 1,155 (H) 09/28/2022       IMAGING:   MRI of the abdomen with and without contrast on 10/26/2022 at [facility]  INDICATION:     Nonalcoholic steatohepatitis. Increasing abdominal pain and liver enzyme levels over the past 3 months.     TECHNIQUE:   MRI of the abdomen with and without contrast.     COMPARISON:     CT 08/23/2022     FINDINGS:   Cirrhotic morphology of the liver. No significant hepatic fat around a position. Spleen enlarged measuring 17.9 cm. Moderate-sized right pleural effusion similar to slightly smaller. 8 mm cyst towards the dome of the liver. 3.2 cm hepatocellular carcinoma in the lateral right hepatic lobe. This lesion is not perceptible on the comparison unenhanced study. No other liver lesion. Portal vein is patent. Recannulated paraumbilical vein.     Renal cysts. Adrenal glands and pancreas unremarkable. Gallbladder wall thickening probably related to underlying liver disease. Bowel loops are nondilated.     IMPRESSION:   Cirrhosis. 3.2 cm hepatocellular carcinoma in the lateral right hepatic lobe.     Sequela of portal hypertension detailed above.       Assessment & Plan   *  Thrombocytopenia secondary to liver cirrhosis and splenomegaly.  · Etiology of the thrombocytopenia is not clear.  However I do think it is likely related to his fatty liver which was documented from abdomen ultrasound on 7/20/2012.    · Laboratory studies on 1/13/2021 reported excellent vitamin B12 level.  He had marginally elevated IPF 7.0%.  no apparent compensation for platelets production.   · On 6/11/2021, platelets improved at 63,000 with normal IPF 5.3%.  Patient is asymptomatic.    · On 12/17/2021, stable platelets 60,000 and normal IPF 4.1%. Patient is asymptomatic.   · On 6/10/2022 platelets 69,000.  Patient reports no spontaneous bleeding.    · On 7/6/2022 platelets 65,000.  No spontaneous bleeding.    · On 9/28/2022 platelets 61,000.  Patient has easy bruising on extremities,  however no spontaneous bleeding such as melena hematochezia or gum bleeding.    · On 11/2/2022 platelets 48,000.  No change of clinical condition.  Continue to monitor.    *Iron deficiency anemia in the setting of chronic renal insufficiency stage III, and the liver cirrhosis.   · Laboratory study on 1/13/2021 reported no evidence of hemolysis, had supratherapeutic B12 level and folate.    · He does have evidence of iron deficiency with low iron sats 10% and ferritin only 41.5 ng/mL with Hb 10.9.    · His erythropoietin 16.1, is not elevated accordingly, which indicates lack of response from his kidney to produce erythropoietin. Currently is not a candidate for CONRAD/Procrit treatment.    · We started patient on oral ferrous sulfate 325 mg 3 times a day in January 2021.   · On 3/19/2021, patient has improved hemoglobin 11.9.  Continue to monitor.  · On 6/11/2021, stable anemia Hb 11.0, however much improved with ferritin 115.3 ng/mL, and iron saturation 14%.  Will advised to continue oral iron treatment.  · On 12/17/2021 stable Hb 10.8, much improved iron saturation 34% and free iron 126, however with trending down ferritin level 86.1 ng/mL. Continue oral iron treatment.  · 6/10/2022, patient reports taking oral iron 3 times a day.  No apparent side effects except mild nausea.  Laboratory studies showed deteriorating hemoglobin 9.6 and also worsening iron saturation 17% with free iron 61 and the ferritin 86.8 ng/mL.  Discussed with the patient, recommended intravenous iron therapy, this patient has poor iron absorption.    · Patient received 2 dose of Injectafer 750 mg x 2 in June 2022.  Lab study today on 7/6/2022 reported improved normalized iron saturation 21% with a supratherapeutic ferritin 644 ng/mL.  · On 9/28/2022 ferritin 256 ng/mL iron saturation 22% free iron 66 TIBC 295.  Supratherapeutic B12 at 155 and normal RBC folate 2175 ng/mL.  · Patient had a hemoglobin 7.2 on 9/30/2022.  Was started on Procrit  injection 20,000 units during hospitalization.  No PRBC transfusion.  · Procrit is subsequently continued as outpatient every 2 weeks.  · On 10/21/2022 ferritin 198 and free iron 56 TIBC 294 iron saturation 19%.   · Improved hemoglobin 9.2 on 11/2/2022.  We will continue Procrit 20,000 units every 2 weeks.    * Pancytopenia with mild leukocytopenia.  This is also likely related to his fatty liver and liver cirrhosis.  Patient drinks alcohol beverage as reported in January 2021.  · Patient reports no recurrent infection.  His neutrophils marginally normal at 1830 out of WBC 3250.  · Patient had supratherapeutic B12 level 1/13/2021.  · Marginally better WBC 3720 including ANC 2100 3/19/2021.    · 6/11/2021 WBC 3530, with ANC 2180.  No recurrent infection.    · On 12/17/2021 stable leukocytopenia WBC 3780 with low normal neutrophils 2016.  Patient is asymptomatic.  Continue to monitor.  · On 6/10/2022 patient has slightly better WBC 4290 and neutrophils 2980.  · On 7/6/2022 patient had WBC 4260 ANC 2620, lymphocytes 840 and monocytes 470.  · On 9/28/2022 WBC 3380, including ANC 1780, hemoglobin 8.5, and platelets 61,000.  · Today on 11/2/2022 total WBC 2970 including ANC 1730.  Continue to monitor.     *COVID-19 vaccination.    · 6/11/2021, patient reports that he had 2 doses of COVID-19 vaccine, had very limited side effects with some soreness at the site of injection.   · On 12/17/2021, patient reports he received a booster dose of COVID-19 vaccine on 11/6/2021.      *Ascites secondary to liver cirrhosis.  · CT scan examination on 10/15/2021 confirmed liver cirrhosis.  · On 6/10/2022 patient reports abdomen distention for the past 3 months, weight gains, 25 pounds per our records, and also has worsening dyspnea.  Physical examination shows very distended abdomen.  I think this patient has large ascites secondary to his liver cirrhosis.  I recommended to have ultrasound-guided therapeutic paracentesis with samples for  routine chemistry labs cell counts and also for cytology study.  · Patient also has bilateral leg edema which is also secondary to liver cirrhosis.  Patient was seen her primary care physician next week.  I think most likely he will need to be started on diuretics.  He also needs to follow-up with his GI specialist Dr. Everardo Foote to discuss possibility of  shunt.   · On 6/17/2022 patient had ultrasound-guided paracentesis, with 11.2 L clear yellow ascites removed.  Cytology study reported negative for malignant cells.  There was a reactive mesothelial cells histiocytes and mixed inflammatory cells.  · Today on 7/6/2022 follow-up, patient reports worsening abdomen distention again.  I will arrange patient to have therapeutic paracentesis again as soon as possible, and also will arrange another paracentesis in 3 weeks.  · At the meantime I also recommended to start the patient on low-dose Lasix 20 mg daily on 7/6/2022.   · Paracentesis, with 6.4 L ascites removed 7/14/2022.  · Paracentesis 2.4 L ascites removed 8/4/2022.   · Failed attempt on 9/29/2022 during hospitalization, no ascites per ultrasound exam.    *Right pleural effusion.    · Patient also complains of dyspnea, x-ray examination reported right pleural effusion 7/14/2022..  · Patient had ultrasound-guided right thoracentesis with 2 L pleural effusion removed 7/27/2022.  Cytology studies negative for malignancy.  · On 8/4/2022 patient had another 2 L pleural effusion removed on the right side.    *Worsening renal function.  · Laboratory study on 7/6/2022 reported creatinine 1.37.  He had creatinine 1.02 on 12/17/2021.  Discussed with patient, I recommended referring patient to nephrology service for evaluation of possible hepatorenal syndrome due to liver cirrhosis.  Patient is agreeable.  · On 9/28/2022 patient reports that he still has no appointment for nephrology service.      *Newly discovered hepatic lesion by ultrasound examination on  9/29/2022.  · Patient is subsequently seen by Dr. Everardo Foote, had an MRI of the abdomen with and without contrast at the [facility] on 10/26/2022 which reported 3.2 cm liver lesion, likely hepatocellular carcinoma.  · I recommended to have AFP liver study, also discussed with patient and his wife today on 11/2/2022 we will obtain the MRI images from [facility], and arrange CT-guided core needle biopsy.  Because of his thrombocytopenia, I will arrange 1 unit platelets transfusion right before the biopsy.  I did discuss with him about the risk for bleeding post the biopsy.  This is highly suspicious for primary hepatocellular carcinoma.     PLAN:  1. Proceed ahead with Procrit 20,000 units today and repeat every 2 weeks.    2. Pending lab results for CMP and AFP.    3. Will obtain MRI images from [facility].  4. Arrange CT-guided core needle biopsy of the liver lesion.   5. We will transfuse 1 unit platelets right before CT-guided liver biopsy.  6. Patient is on Lasix, and spironolactone and potassium due per Dr. Foote for management of ascites  7. Follow-up with GI physician Dr. Everardo Foote for evaluation and TIPS in January 2020.   8. Patient will continue follow-up with his nephrologist.  9. I will see patient in 1 week after his CT-guided biopsy, discussed results and the management plan.    I reviewed recent records from his hospitalization, is also records from [facility] specially the MRI examination.    Discussed with the patient and his wife about the results and further evaluation and also ongoing treatment with Procrit.  Patient and his wife voiced understanding and agreement.        TERE ANDREWS M.D., Ph.D.    11/2/2022.            CC:  MD Everardo Cuello M.D.     Adolfo COPE MD

## 2022-11-03 NOTE — PROGRESS NOTES
11/16/22 0001   Pre-Procedure Phone Call   Procedure Time Verified Yes   Arrival Time 1100   Procedure Location Verified Yes   Medical History Reviewed No   NPO Status Reinforced Yes   Ride and Caregiver Arranged Yes   Patient Knows to Bring Current Medications   (No changes in medications since last reviewed.)   Bring Outside Films Requested   (CT A&P 8/23/22 outside films in Pacs, MRI Abdomen 10/26/22 PEF to film library 11/2/22)

## 2022-11-04 ENCOUNTER — APPOINTMENT (OUTPATIENT)
Dept: LAB | Facility: HOSPITAL | Age: 72
End: 2022-11-04

## 2022-11-04 ENCOUNTER — APPOINTMENT (OUTPATIENT)
Dept: ONCOLOGY | Facility: HOSPITAL | Age: 72
End: 2022-11-04

## 2022-11-10 ENCOUNTER — TELEPHONE (OUTPATIENT)
Dept: ONCOLOGY | Facility: CLINIC | Age: 72
End: 2022-11-10

## 2022-11-10 NOTE — TELEPHONE ENCOUNTER
Caller: Dennys Lieberman    Relationship to patient: Self    Best call back number: [phone]    Chief complaint: PATIENT WANTED TO SEE IF HE COULD RESCHEDULE HIS PROCRIT INJECTION TO THE SAME DAY AS HIS FOLLOW UP WITH DR ANDREWS     Type of visit: INJECTION     Requested date: 11-21-22     If rescheduling, when is the original appointment: 11-18-22     Additional notes:PLEASE CALL PATIENT TO ADVISE

## 2022-11-16 ENCOUNTER — HOSPITAL ENCOUNTER (OUTPATIENT)
Dept: CT IMAGING | Facility: HOSPITAL | Age: 72
Discharge: HOME | End: 2022-11-16

## 2022-11-16 ENCOUNTER — TRANSCRIBE ORDERS (OUTPATIENT)
Dept: GENERAL RADIOLOGY | Facility: HOSPITAL | Age: 72
End: 2022-11-16

## 2022-11-16 ENCOUNTER — HOSPITAL ENCOUNTER (OUTPATIENT)
Dept: INFUSION THERAPY | Facility: HOSPITAL | Age: 72
Discharge: HOME | End: 2022-11-16

## 2022-11-16 VITALS
HEART RATE: 62 BPM | OXYGEN SATURATION: 100 % | SYSTOLIC BLOOD PRESSURE: 142 MMHG | DIASTOLIC BLOOD PRESSURE: 52 MMHG | RESPIRATION RATE: 18 BRPM | TEMPERATURE: 97.3 F

## 2022-11-16 VITALS
SYSTOLIC BLOOD PRESSURE: 155 MMHG | DIASTOLIC BLOOD PRESSURE: 63 MMHG | HEART RATE: 70 BPM | RESPIRATION RATE: 16 BRPM | HEIGHT: 66 IN | WEIGHT: 169 LBS | BODY MASS INDEX: 27.16 KG/M2 | OXYGEN SATURATION: 100 % | TEMPERATURE: 97.8 F

## 2022-11-16 DIAGNOSIS — C22.0 HEPATOCELLULAR CARCINOMA: ICD-10-CM

## 2022-11-16 DIAGNOSIS — D61.818 PANCYTOPENIA: ICD-10-CM

## 2022-11-16 DIAGNOSIS — R16.0 LIVER MASS: ICD-10-CM

## 2022-11-16 DIAGNOSIS — D69.6 THROMBOCYTOPENIA: ICD-10-CM

## 2022-11-16 LAB
ABO GROUP BLD: NORMAL
BASOPHILS # BLD AUTO: 0.02 10*3/MM3 (ref 0–0.2)
BASOPHILS NFR BLD AUTO: 0.6 % (ref 0–1.5)
BLD GP AB SCN SERPL QL: NEGATIVE
DEPRECATED RDW RBC AUTO: 43.2 FL (ref 37–54)
EOSINOPHIL # BLD AUTO: 0.23 10*3/MM3 (ref 0–0.4)
EOSINOPHIL NFR BLD AUTO: 7.2 % (ref 0.3–6.2)
ERYTHROCYTE [DISTWIDTH] IN BLOOD BY AUTOMATED COUNT: 12.8 % (ref 12.3–15.4)
HCT VFR BLD AUTO: 29.1 % (ref 37.5–51)
HGB BLD-MCNC: 9.5 G/DL (ref 13–17.7)
IMM GRANULOCYTES # BLD AUTO: 0.01 10*3/MM3 (ref 0–0.05)
IMM GRANULOCYTES NFR BLD AUTO: 0.3 % (ref 0–0.5)
INR PPP: 1.2 (ref 0.8–1.2)
LYMPHOCYTES # BLD AUTO: 0.85 10*3/MM3 (ref 0.7–3.1)
LYMPHOCYTES NFR BLD AUTO: 26.6 % (ref 19.6–45.3)
MCH RBC QN AUTO: 30.3 PG (ref 26.6–33)
MCHC RBC AUTO-ENTMCNC: 32.6 G/DL (ref 31.5–35.7)
MCV RBC AUTO: 92.7 FL (ref 79–97)
MONOCYTES # BLD AUTO: 0.22 10*3/MM3 (ref 0.1–0.9)
MONOCYTES NFR BLD AUTO: 6.9 % (ref 5–12)
NEUTROPHILS NFR BLD AUTO: 1.87 10*3/MM3 (ref 1.7–7)
NEUTROPHILS NFR BLD AUTO: 58.4 % (ref 42.7–76)
NRBC BLD AUTO-RTO: 0 /100 WBC (ref 0–0.2)
PLATELET # BLD AUTO: 70 10*3/MM3 (ref 140–450)
PLATELET # BLD AUTO: 71 10*3/MM3 (ref 140–450)
PMV BLD AUTO: 12.3 FL (ref 6–12)
PROTHROMBIN TIME: 14.6 SECONDS (ref 12.8–15.2)
RBC # BLD AUTO: 3.14 10*6/MM3 (ref 4.14–5.8)
RH BLD: POSITIVE
T&S EXPIRATION DATE: NORMAL
WBC NRBC COR # BLD: 3.2 10*3/MM3 (ref 3.4–10.8)

## 2022-11-16 PROCEDURE — 86900 BLOOD TYPING SEROLOGIC ABO: CPT | Performed by: INTERNAL MEDICINE

## 2022-11-16 PROCEDURE — 0 LIDOCAINE 1 % SOLUTION: Performed by: RADIOLOGY

## 2022-11-16 PROCEDURE — 86901 BLOOD TYPING SEROLOGIC RH(D): CPT | Performed by: INTERNAL MEDICINE

## 2022-11-16 PROCEDURE — 88307 TISSUE EXAM BY PATHOLOGIST: CPT | Performed by: INTERNAL MEDICINE

## 2022-11-16 PROCEDURE — P9100 PATHOGEN TEST FOR PLATELETS: HCPCS

## 2022-11-16 PROCEDURE — 36415 COLL VENOUS BLD VENIPUNCTURE: CPT

## 2022-11-16 PROCEDURE — 88313 SPECIAL STAINS GROUP 2: CPT | Performed by: INTERNAL MEDICINE

## 2022-11-16 PROCEDURE — 36430 TRANSFUSION BLD/BLD COMPNT: CPT

## 2022-11-16 PROCEDURE — 88341 IMHCHEM/IMCYTCHM EA ADD ANTB: CPT | Performed by: INTERNAL MEDICINE

## 2022-11-16 PROCEDURE — 86850 RBC ANTIBODY SCREEN: CPT | Performed by: INTERNAL MEDICINE

## 2022-11-16 PROCEDURE — 88341 IMHCHEM/IMCYTCHM EA ADD ANTB: CPT

## 2022-11-16 PROCEDURE — 99152 MOD SED SAME PHYS/QHP 5/>YRS: CPT

## 2022-11-16 PROCEDURE — 85049 AUTOMATED PLATELET COUNT: CPT | Performed by: RADIOLOGY

## 2022-11-16 PROCEDURE — 88342 IMHCHEM/IMCYTCHM 1ST ANTB: CPT | Performed by: INTERNAL MEDICINE

## 2022-11-16 PROCEDURE — 25010000002 FENTANYL CITRATE (PF) 50 MCG/ML SOLUTION: Performed by: RADIOLOGY

## 2022-11-16 PROCEDURE — 85610 PROTHROMBIN TIME: CPT

## 2022-11-16 PROCEDURE — P9035 PLATELET PHERES LEUKOREDUCED: HCPCS

## 2022-11-16 PROCEDURE — 88360 TUMOR IMMUNOHISTOCHEM/MANUAL: CPT

## 2022-11-16 PROCEDURE — 77012 CT SCAN FOR NEEDLE BIOPSY: CPT

## 2022-11-16 PROCEDURE — 25010000002 MIDAZOLAM PER 1 MG: Performed by: RADIOLOGY

## 2022-11-16 PROCEDURE — 85025 COMPLETE CBC W/AUTO DIFF WBC: CPT | Performed by: RADIOLOGY

## 2022-11-16 RX ORDER — MIDAZOLAM HYDROCHLORIDE 1 MG/ML
INJECTION INTRAMUSCULAR; INTRAVENOUS AS NEEDED
Status: COMPLETED | OUTPATIENT
Start: 2022-11-16 | End: 2022-11-16

## 2022-11-16 RX ORDER — SODIUM CHLORIDE 0.9 % (FLUSH) 0.9 %
10 SYRINGE (ML) INJECTION AS NEEDED
Status: DISCONTINUED | OUTPATIENT
Start: 2022-11-16 | End: 2022-11-17 | Stop reason: HOSPADM

## 2022-11-16 RX ORDER — SODIUM CHLORIDE 9 MG/ML
25 INJECTION, SOLUTION INTRAVENOUS ONCE
Status: COMPLETED | OUTPATIENT
Start: 2022-11-16 | End: 2022-11-16

## 2022-11-16 RX ORDER — SODIUM CHLORIDE 9 MG/ML
250 INJECTION, SOLUTION INTRAVENOUS AS NEEDED
Status: DISCONTINUED | OUTPATIENT
Start: 2022-11-16 | End: 2022-11-18 | Stop reason: HOSPADM

## 2022-11-16 RX ORDER — LIDOCAINE HYDROCHLORIDE 10 MG/ML
20 INJECTION, SOLUTION INFILTRATION; PERINEURAL ONCE
Status: COMPLETED | OUTPATIENT
Start: 2022-11-16 | End: 2022-11-16

## 2022-11-16 RX ORDER — FENTANYL CITRATE 50 UG/ML
INJECTION, SOLUTION INTRAMUSCULAR; INTRAVENOUS AS NEEDED
Status: COMPLETED | OUTPATIENT
Start: 2022-11-16 | End: 2022-11-16

## 2022-11-16 RX ORDER — SODIUM CHLORIDE 0.9 % (FLUSH) 0.9 %
3 SYRINGE (ML) INJECTION EVERY 12 HOURS SCHEDULED
Status: DISCONTINUED | OUTPATIENT
Start: 2022-11-16 | End: 2022-11-17 | Stop reason: HOSPADM

## 2022-11-16 RX ADMIN — LIDOCAINE HYDROCHLORIDE 20 ML: 10 INJECTION, SOLUTION INFILTRATION; PERINEURAL at 13:20

## 2022-11-16 RX ADMIN — Medication 3 ML: at 13:01

## 2022-11-16 RX ADMIN — FENTANYL CITRATE 25 MCG: 50 INJECTION INTRAMUSCULAR; INTRAVENOUS at 13:19

## 2022-11-16 RX ADMIN — MIDAZOLAM 1 MG: 1 INJECTION INTRAMUSCULAR; INTRAVENOUS at 13:10

## 2022-11-16 RX ADMIN — SODIUM CHLORIDE 25 ML/HR: 9 INJECTION, SOLUTION INTRAVENOUS at 13:02

## 2022-11-16 RX ADMIN — FENTANYL CITRATE 25 MCG: 50 INJECTION INTRAMUSCULAR; INTRAVENOUS at 13:10

## 2022-11-16 NOTE — NURSING NOTE
Patient taken to patient discharge area via wheelchair. Wife  driving patient home.  Protective goggles and mask in place with all patient interactions today .

## 2022-11-16 NOTE — DISCHARGE INSTRUCTIONS

## 2022-11-16 NOTE — POST-PROCEDURE NOTE
POST PROCEDURE NOTE    Procedure: liver biopsy    Pre-Procedure Diagnosis: lesion    Post-procedure Diagnosis: same    Findings: successful bx, gelfoam used    Complications: none    Blood loss: min    Specimen Removed: 3 cores    Disposition:   Discharge home

## 2022-11-16 NOTE — NURSING NOTE
Patient arrived in Radiology triage for Liver biopsy bay 1.  Protective goggles and mask in place with all patient interactions today

## 2022-11-17 ENCOUNTER — TELEPHONE (OUTPATIENT)
Dept: INTERVENTIONAL RADIOLOGY/VASCULAR | Facility: HOSPITAL | Age: 72
End: 2022-11-17

## 2022-11-17 LAB
BH BB BLOOD EXPIRATION DATE: NORMAL
BH BB BLOOD TYPE BARCODE: 6200
BH BB DISPENSE STATUS: NORMAL
BH BB PRODUCT CODE: NORMAL
BH BB UNIT NUMBER: NORMAL
UNIT  ABO: NORMAL
UNIT  RH: NORMAL

## 2022-11-21 ENCOUNTER — INFUSION (OUTPATIENT)
Dept: ONCOLOGY | Facility: HOSPITAL | Age: 72
End: 2022-11-21

## 2022-11-21 ENCOUNTER — LAB (OUTPATIENT)
Dept: LAB | Facility: HOSPITAL | Age: 72
End: 2022-11-21

## 2022-11-21 ENCOUNTER — OFFICE VISIT (OUTPATIENT)
Dept: ONCOLOGY | Facility: CLINIC | Age: 72
End: 2022-11-21

## 2022-11-21 VITALS
HEIGHT: 66 IN | RESPIRATION RATE: 18 BRPM | DIASTOLIC BLOOD PRESSURE: 69 MMHG | SYSTOLIC BLOOD PRESSURE: 148 MMHG | WEIGHT: 172.8 LBS | HEART RATE: 88 BPM | BODY MASS INDEX: 27.77 KG/M2 | OXYGEN SATURATION: 98 % | TEMPERATURE: 98.9 F

## 2022-11-21 DIAGNOSIS — C22.0 HEPATOCELLULAR CARCINOMA: Primary | ICD-10-CM

## 2022-11-21 DIAGNOSIS — D61.818 PANCYTOPENIA: ICD-10-CM

## 2022-11-21 DIAGNOSIS — N18.9 ANEMIA DUE TO CHRONIC RENAL FAILURE TREATED WITH ERYTHROPOIETIN, UNSPECIFIED STAGE: Primary | ICD-10-CM

## 2022-11-21 DIAGNOSIS — R16.0 LIVER MASS: ICD-10-CM

## 2022-11-21 DIAGNOSIS — K74.60 CIRRHOSIS OF LIVER WITH ASCITES, UNSPECIFIED HEPATIC CIRRHOSIS TYPE: ICD-10-CM

## 2022-11-21 DIAGNOSIS — N18.2 TYPE 2 DIABETES MELLITUS WITH STAGE 2 CHRONIC KIDNEY DISEASE, WITHOUT LONG-TERM CURRENT USE OF INSULIN: Chronic | ICD-10-CM

## 2022-11-21 DIAGNOSIS — E11.22 TYPE 2 DIABETES MELLITUS WITH STAGE 2 CHRONIC KIDNEY DISEASE, WITHOUT LONG-TERM CURRENT USE OF INSULIN: Chronic | ICD-10-CM

## 2022-11-21 DIAGNOSIS — D69.6 THROMBOCYTOPENIA: ICD-10-CM

## 2022-11-21 DIAGNOSIS — C22.0 HEPATOCELLULAR CARCINOMA: ICD-10-CM

## 2022-11-21 DIAGNOSIS — N18.9 ANEMIA DUE TO CHRONIC RENAL FAILURE TREATED WITH ERYTHROPOIETIN, UNSPECIFIED STAGE: ICD-10-CM

## 2022-11-21 DIAGNOSIS — R18.8 CIRRHOSIS OF LIVER WITH ASCITES, UNSPECIFIED HEPATIC CIRRHOSIS TYPE: ICD-10-CM

## 2022-11-21 DIAGNOSIS — D63.1 ANEMIA DUE TO CHRONIC RENAL FAILURE TREATED WITH ERYTHROPOIETIN, UNSPECIFIED STAGE: Primary | ICD-10-CM

## 2022-11-21 DIAGNOSIS — D63.1 ANEMIA DUE TO CHRONIC RENAL FAILURE TREATED WITH ERYTHROPOIETIN, UNSPECIFIED STAGE: ICD-10-CM

## 2022-11-21 LAB
ALBUMIN SERPL-MCNC: 3.7 G/DL (ref 3.5–5.2)
ALBUMIN/GLOB SERPL: 1.2 G/DL (ref 1.1–2.4)
ALP SERPL-CCNC: 110 U/L (ref 38–116)
ALT SERPL W P-5'-P-CCNC: 27 U/L (ref 0–41)
ANION GAP SERPL CALCULATED.3IONS-SCNC: 12.5 MMOL/L (ref 5–15)
AST SERPL-CCNC: 34 U/L (ref 0–40)
BASOPHILS # BLD AUTO: 0.02 10*3/MM3 (ref 0–0.2)
BASOPHILS NFR BLD AUTO: 0.7 % (ref 0–1.5)
BILIRUB SERPL-MCNC: 0.6 MG/DL (ref 0.2–1.2)
BUN SERPL-MCNC: 41 MG/DL (ref 6–20)
BUN/CREAT SERPL: 17.6 (ref 7.3–30)
CALCIUM SPEC-SCNC: 9.4 MG/DL (ref 8.5–10.2)
CHLORIDE SERPL-SCNC: 102 MMOL/L (ref 98–107)
CO2 SERPL-SCNC: 20.5 MMOL/L (ref 22–29)
CREAT SERPL-MCNC: 2.33 MG/DL (ref 0.7–1.3)
DEPRECATED RDW RBC AUTO: 46.5 FL (ref 37–54)
EGFRCR SERPLBLD CKD-EPI 2021: 29 ML/MIN/1.73
EOSINOPHIL # BLD AUTO: 0.22 10*3/MM3 (ref 0–0.4)
EOSINOPHIL NFR BLD AUTO: 7.4 % (ref 0.3–6.2)
ERYTHROCYTE [DISTWIDTH] IN BLOOD BY AUTOMATED COUNT: 13.3 % (ref 12.3–15.4)
GLOBULIN UR ELPH-MCNC: 3.1 GM/DL (ref 1.8–3.5)
GLUCOSE SERPL-MCNC: 397 MG/DL (ref 74–124)
HCT VFR BLD AUTO: 27.9 % (ref 37.5–51)
HGB BLD-MCNC: 8.9 G/DL (ref 13–17.7)
IMM GRANULOCYTES # BLD AUTO: 0 10*3/MM3 (ref 0–0.05)
IMM GRANULOCYTES NFR BLD AUTO: 0 % (ref 0–0.5)
LYMPHOCYTES # BLD AUTO: 0.82 10*3/MM3 (ref 0.7–3.1)
LYMPHOCYTES NFR BLD AUTO: 27.5 % (ref 19.6–45.3)
MCH RBC QN AUTO: 30.4 PG (ref 26.6–33)
MCHC RBC AUTO-ENTMCNC: 31.9 G/DL (ref 31.5–35.7)
MCV RBC AUTO: 95.2 FL (ref 79–97)
MONOCYTES # BLD AUTO: 0.25 10*3/MM3 (ref 0.1–0.9)
MONOCYTES NFR BLD AUTO: 8.4 % (ref 5–12)
NEUTROPHILS NFR BLD AUTO: 1.67 10*3/MM3 (ref 1.7–7)
NEUTROPHILS NFR BLD AUTO: 56 % (ref 42.7–76)
NRBC BLD AUTO-RTO: 0 /100 WBC (ref 0–0.2)
PLATELET # BLD AUTO: 44 10*3/MM3 (ref 140–450)
PMV BLD AUTO: 10.6 FL (ref 6–12)
POTASSIUM SERPL-SCNC: 4.8 MMOL/L (ref 3.5–4.7)
PROT SERPL-MCNC: 6.8 G/DL (ref 6.3–8)
RBC # BLD AUTO: 2.93 10*6/MM3 (ref 4.14–5.8)
SODIUM SERPL-SCNC: 135 MMOL/L (ref 134–145)
WBC NRBC COR # BLD: 2.98 10*3/MM3 (ref 3.4–10.8)

## 2022-11-21 PROCEDURE — 80053 COMPREHEN METABOLIC PANEL: CPT

## 2022-11-21 PROCEDURE — 63710000001 INSULIN REGULAR HUMAN PER 5 UNITS: Performed by: INTERNAL MEDICINE

## 2022-11-21 PROCEDURE — A9270 NON-COVERED ITEM OR SERVICE: HCPCS | Performed by: INTERNAL MEDICINE

## 2022-11-21 PROCEDURE — 25010000002 EPOETIN ALFA PER 1000 UNITS: Performed by: INTERNAL MEDICINE

## 2022-11-21 PROCEDURE — 85025 COMPLETE CBC W/AUTO DIFF WBC: CPT

## 2022-11-21 PROCEDURE — 36415 COLL VENOUS BLD VENIPUNCTURE: CPT

## 2022-11-21 PROCEDURE — 96372 THER/PROPH/DIAG INJ SC/IM: CPT

## 2022-11-21 PROCEDURE — 99214 OFFICE O/P EST MOD 30 MIN: CPT | Performed by: INTERNAL MEDICINE

## 2022-11-21 RX ORDER — SODIUM CHLORIDE 9 MG/ML
250 INJECTION, SOLUTION INTRAVENOUS AS NEEDED
Status: CANCELLED | OUTPATIENT
Start: 2022-12-05

## 2022-11-21 RX ORDER — GLIPIZIDE 5 MG/1
5 TABLET ORAL
Qty: 60 TABLET | Refills: 2 | Status: SHIPPED | OUTPATIENT
Start: 2022-11-21

## 2022-11-21 RX ADMIN — HUMAN INSULIN 10 UNITS: 100 INJECTION, SOLUTION SUBCUTANEOUS at 15:22

## 2022-11-21 RX ADMIN — ERYTHROPOIETIN 25000 UNITS: 20000 INJECTION, SOLUTION INTRAVENOUS; SUBCUTANEOUS at 15:02

## 2022-11-21 NOTE — PROGRESS NOTES
Reviewed glucose of 397 with Dr Salinas, and ordered regular insulin 10 units now and will start patient on glipizide.

## 2022-11-21 NOTE — PROGRESS NOTES
"    .     REASON FOR FOLLOWUP:     *Pancytopenia secondary to liver cirrhosis   · thrombocytopenia secondary to liver cirrhosis.      · Mild leukocytopenia and low normal neutrophils  *  Iron deficiency anemia discovered on 1/13/2021.  Patient was started on oral iron 3 times a day.  *Newly discovered liver lesion.      HISTORY OF PRESENT ILLNESS:  The patient is a 72 y.o. year old male with hypertension diabetes, pancytopenia secondary to liver cirrhosis caused by RODRIGUEZ, and hypogonadism, who presented today for evaluation to discuss treatment of newly diagnosed primary hepatocellular carcinoma, confirmed by CT guided needle core biopsy on 11/16/2022..  Patient is accompanied by his wife who helped with history.      This patient had CT-guided core needle biopsy of the liver lesion on 11/16/2022 after 1 unit platelets transfusion.  He did well with the procedure.  Patient reports he feels \"fine.\"  He denies any pain in the area. Denied any kind of bleeding. Denies any fevers after procedure as well.     Patient reports no fever sweating chills.  He is abdomen is slightly distended but no pains.  No nausea vomiting diarrhea.  He has no abdomen pain.  Performance status ECOG 1-2.     Patient reports he noticed improved energy level since starting on Procrit injection.  Overall he still has chronic fatigue.  He denies evidence of bleeding.    Laboratory study today on 11/21/2022 reported baseline platelets 44,000, hemoglobin 8.9, and WBC 2980 including ANC 1670.     Past Medical History:   Diagnosis Date   • H/O Dental disorder    • History of thrombocytopenia    • Hypertension    • RODRIGUEZ (nonalcoholic steatohepatitis)    • Psoriasis    • Type 2 diabetes mellitus (HCC)      Past Surgical History:   Procedure Laterality Date   • CATARACT EXTRACTION, BILATERAL     • COLONOSCOPY  11/2015   • ROTATOR CUFF REPAIR Left 2005     HEMATOLOGY HISTORY:  The patient is a 70 y.o. year old male with history of hypertension diabetes, " RODRIGUEZ, and hypogonadism who presented today on 1/13/2021 for initial evaluation because of thrombocytopenia, referred by his primary care physician Dr. Joseph.      Patient reports he has no limitation of activity.  He does have chronic joint pains, but of note joint swelling.  His skin pruritus.  No skin rashes.  He does have easy bruising, however denies evidence of bleeding such as epistaxis, gum bleeding, rectal bleeding or hematuria.  Denies weight loss.  No low fever.  Appetite is reasonable.    I reviewed his outside laboratory studies.  Most recent also lab on 10/22/2020 reported hemoglobin 10.5, MCV 89.6, WBC 3200, including neutrophils 1900, lymphocytes 800 monocytes 300, and platelets 62,000.    Liver study on 9/2/2020 reported WBC 3200, including ANC 1900 lymphocytes 1000 monocytes 300, hemoglobin 10.5, MCV 80.0, and platelets 65,000.    His earliest CBC results available for review was from 5/8/2019 which reported WBC 4200, including ANC 2600, lymphocytes 1200 and monocytes 400, hemoglobin 13.4 and platelets 95,000.  At that time chemistry lab reported elevated liver panel including  , alk phosphatase 87 and a total bilirubin 1.1.  Total serum protein 7.0 albumin 4.1 and the globulin 2.9.  His creatinine was 1.31, with glucose of 219 been in normal electrolytes.  Hemoglobin A1c was 8.3%.    Laboratory studies on 1/13/2021 reported mild anemia with Hb 10.9, thrombocytopenia platelets 48,000, and IPF 7.0%, leukocytopenia WBC 3250 including ANC 1830.  Other labs reported no evidence of hemolysis, had supratherapeutic B12 level 1413 pg/mL and folate > 20 ng/mL.  He does have evidence of iron deficiency with low iron sats 10% and ferritin only 41.5 ng/mL in the setting of anemia with Hb 10.9.  His erythropoietin is not elevated accordingly, which indicates lack of response from his kidney to produce erythropoietin.     Currently is not a candidate for CONRAD/Procrit treatment.  However he is  nguyen he is a candidate for oral iron treatment.    We will start him on ferrous sulfate 325 mg 3 times a day.  Will send prescription to his pharmacy.     Laboratory study 6/11/2021 showed a slightly improved hemoglobin 11.9, with stable thrombocytopenia platelets 50,000 IPF 8.3%, and WBC 3720 including ANC 2100.  Iron study reported ferritin 115.3 ng/mL and iron saturation 14% with free iron 55 TIBC 384.    Laboratory studies on 12/17/2021 reported stable pancytopenia with anemia Hb 10.8, with platelets 60,000 and IPF 4.1%, and WBC 3780 including neutrophils 2060 lymphocytes 1040.  Iron study reported slightly trending down ferritin 86.1 ng/mL in the much improved iron saturation 34% with free iron 126 and TIBC 368.      On 6/10/2022 reports patient has abdomen distention for about 3 months.  He also developed bilateral leg edema in December 2021.  Patient also reports worsening dyspnea and weight gain for the past several months..  Per our records, he gained 25 pounds since December 2021.    Patient reports having taken oral iron 3 times a day.  He has mild nausea but not significant and no vomiting.  He has brown-colored stool.  He also reports having loose stool some of days.  No diarrhea.    Patient received a boost dose of COVID-19 vaccine on 11/6/2021.  Patient reports abdomen distention about 3 months, leg edema 6 months, after the boost dose of COVID (December) boost on 11/6/21    Patient complains of easy bruising on his arms from scratching, he has skin pruritus, however no skin rashes.  He denies spontaneous bleeding such as epistaxis, gum bleeding, or hematochezia.    Lab study on 6/10/2022 reported deteriorating anemia with Hb 9.6, persistent thrombocytopenia with stable platelets 69,000, and low normal WBC 4290 including neutrophils 2980.  Iron studies showed a deteriorating saturation 17%, free iron 61 TIBC 357 with a ferritin 86.8%.    Patient indeed had paracentesis on 6/17/2022 with 11.2 L  ascites fluid removed.  Cytology study was negative for malignancy.    Patient reports today that he has worsening abdominal distention, also has dyspnea however no cough hemoptysis.  No chest pain.  He denies fever sweating chills.  Patient also reports worsening bilateral leg edema.    He also recently finished her 2 dose of Injectafer in late June 2022 because of iron deficiency anemia, not responding to oral iron treatment 3 times a day.    Patient continues to have easy bruising however no spontaneous bleeding such as epistaxis or gum bleeding.    Laboratory study on 7/6/2022 reported stable anemia Hb 9.7 and stable thrombocytopenia platelets 65,000, low normal WBC 4260.  Patient has worsening renal function with creatinine 1.36, and supratherapeutic ferritin 644 ng/mL.    Chest x-ray examination on 7/14/2022 reported a right-sided moderate to large pleural effusion.  We will arrange patient to have ultrasound-guided right thoracentesis.  Suspect the patient has pleural effusion due to his liver cirrhosis.  He will have x-ray examination after the thoracentesis per protocol.  We will reassess whether he needs a CT scan for the chest.    This patient also had paracentesis again on 7/14/2022 with 6.2 L ascites fluid removed.      This patient had right thoracentesis with 2 L pleural effusion removed.  Cytology studies negative for malignant cells.  He also had multiple paracentesis, in June, July and early August, cytology study was also negative for malignant cells.    Laboratory study on 9/28/2022 reported worsening anemia Hb 8.5, platelets 61,000, WBC 3380 including ANC 1780.  Laboratory study showed significant hyperkalemia potassium 6.4, and worsening renal function creatinine 2.36.      I saw patient recently on 9/20/2022 and he was found having worsening renal function, with hyperkalemia potassium 6.4.  Patient was sent to ER and eventually admitted for several days.  His hemoglobin was 7.5 on 9/30/2022.   Patient was started on Procrit 20,000 units during hospitalization.  After discharge, he has been given Procrit every 2 weeks.     During his hospitalization in end of September 2022, patient had ultrasound for the liver examination on 9/29/2022 which reported a hepatic lesion 27 x 24 x 23 mm, heterogeneous in the anterior liver.  There was evidence of liver cirrhosis.      Patient also had attempted ultrasound-guided paracentesis, however there was not enough ascites to be removed.     Patient also had a venous Doppler study and there was no evidence for portal vein thrombosis.    Patient reports he was seen by his GI specialist Dr. Everardo Foote old records that abdomen MRI examination with and without contrast which was done at the [facility] on 10/26/2022.  This study confirmed liver cirrhosis however it also reported a solitary liver lesion.    MEDICATIONS    Current Outpatient Medications:   •  allopurinol (ZYLOPRIM) 100 MG tablet, Take 100 mg by mouth Daily., Disp: , Rfl:   •  clotrimazole-betamethasone (LOTRISONE) 1-0.05 % cream, Apply 1 application topically to the appropriate area as directed Daily., Disp: , Rfl:   •  ferrous sulfate 325 (65 FE) MG tablet, Take 1 tablet by mouth 3 (Three) Times a Day With Meals. (Patient taking differently: Take 325 mg by mouth Daily With Breakfast.), Disp: 90 tablet, Rfl: 2  •  FIBER PO, Take  by mouth., Disp: , Rfl:   •  FREESTYLE LITE test strip, , Disp: , Rfl:   •  multivitamin (THERAGRAN) tablet tablet, Take  by mouth Daily., Disp: , Rfl:   •  polycarbophil 625 MG tablet tablet, Take 1 tablet by mouth 3 (Three) Times a Day., Disp: , Rfl:   •  Polyethylene Glycol 3350 (MIRALAX PO), Take  by mouth., Disp: , Rfl:   •  potassium chloride (K-DUR,KLOR-CON) 20 MEQ CR tablet, Take 1 tablet by mouth Daily., Disp: , Rfl:   •  furosemide (LASIX) 40 MG tablet, Take 1 tablet by mouth Daily for 30 days., Disp: 30 tablet, Rfl: 0  •  glipizide (Glucotrol) 5 MG tablet, Take 1  "tablet by mouth 2 (Two) Times a Day Before Meals., Disp: 60 tablet, Rfl: 2  •  spironolactone (ALDACTONE) 25 MG tablet, Take 1 tablet by mouth Daily for 30 days., Disp: 30 tablet, Rfl: 0    ALLERGIES:     Allergies   Allergen Reactions   • Doxycycline Itching       SOCIAL HISTORY:         Social History     Socioeconomic History   • Marital status:      Spouse name: Lisa   Tobacco Use   • Smoking status: Former     Types: Cigarettes     Quit date: 1975     Years since quittin.9   Substance and Sexual Activity   • Alcohol use: Never   · As of 2021, patient reports he does drink alcoholic beverage 10 drinks per week.  Denies illegal drug use.      FAMILY HISTORY:  No cancer in family, mother HTN,           Vitals:    22 1412   BP: 148/69   Pulse: 88   Resp: 18   Temp: 98.9 °F (37.2 °C)   TempSrc: Temporal   SpO2: 98%   Weight: 78.4 kg (172 lb 12.8 oz)   Height: 167.6 cm (65.98\")   PainSc: 0-No pain     ECOG 1       PHYSICAL EXAM:    GENERAL:  Well-developed, however chronically ill male in no acute distress.  Orientated to time place and the people.  SKIN:  Warm, dry without rashes, purpura or petechiae.  HEENT:  Normocephalic.  Wearing mask.   LYMPHATICS:  No cervical, supraclavicular adenopathy.  CHEST: Normal respiratory effort.  Lungs clear to auscultation. Good airflow.  CARDIAC:  Regular rate and rhythm without murmurs. Normal S1,S2.  ABDOMEN: Slightly distended, soft, nontender.  Bowel sounds normal.  EXTREMITIES:  No edema in legs.  NEUROLOGICAL:  Grossly intact.  No focal neurological deficits.  PSYCHIATRIC:  Normal affect and mood.          RECENT LABS:  Lab Results   Component Value Date    WBC 2.98 (L) 2022    HGB 8.9 (L) 2022    HCT 27.9 (L) 2022    MCV 95.2 2022    PLT 44 (L) 2022     Lab Results   Component Value Date    NEUTROABS 1.67 (L) 2022     Lab Results   Component Value Date    GLUCOSE 397 (C) 2022    BUN 41 (H) 2022    " "CREATININE 2.33 (C) 11/21/2022    EGFRIFNONA 72 12/17/2021    BCR 17.6 11/21/2022    K 4.8 (H) 11/21/2022    CO2 20.5 (L) 11/21/2022    CALCIUM 9.4 11/21/2022    ALBUMIN 3.70 11/21/2022    AST 34 11/21/2022    ALT 27 11/21/2022       Lab Results   Component Value Date    IRON 56 (L) 10/21/2022    TIBC 294 10/21/2022    FERRITIN 198.40 10/21/2022   Iron saturation 19% on 10/21/2022       Lab Results   Component Value Date    FOLATE >20.00 01/13/2021     Lab Results   Component Value Date    GVTNOGVS04 1,155 (H) 09/28/2022       PATHOLOGY:  Case Report   Surgical Pathology Report                         Case: [id number]                                   Authorizing Provider:  Milagro Salinas MD PhD    Collected:           11/16/2022 01:26 PM           Ordering Location:     [facility]  Received:            11/16/2022 02:22 PM                                  CT                                                                            Pathologist:           Aysha Juarez MD                                                           Specimen:    Liver, Liver mass bx                                                                       Clinical Information    New liver mass   Final Diagnosis   1. Liver, CT-Guided Core Needle Biopsy:                A. HEPATOCELLULAR CARCINOMA, MODERATELY DIFFERENTIATED (SEE COMMENT).                 jab/jse    Electronically signed by Aysha Juarez MD on 11/18/2022 at 1303   Comment    The histomorphology and results of the special stains support the above diagnosis.   Gross Description    1. Liver.   Received in formalin labeled with the patient's name and designated \"liver mass biopsy\" are four tan to gray-white core fragments of soft tissue measuring from 0.5 cm up to 2.2 x 0.1 cm in tubal diameter. The tissue is entirely submitted in 1A.      Total blocks:  1     mb/uso/jab/sms       Special Stains    HepPar and Arginase immunostains performed on block 1A show " diffuse positivity.  Reticulin special stain performed on block 1A shows a disruption in the normal reticulin framework.  All controls reacted appropriately.              IMAGING:   MRI of the abdomen with and without contrast on 10/26/2022 at [facility]  INDICATION:     Nonalcoholic steatohepatitis. Increasing abdominal pain and liver enzyme levels over the past 3 months.     TECHNIQUE:   MRI of the abdomen with and without contrast.     COMPARISON:     CT 08/23/2022     FINDINGS:   Cirrhotic morphology of the liver. No significant hepatic fat around a position. Spleen enlarged measuring 17.9 cm. Moderate-sized right pleural effusion similar to slightly smaller. 8 mm cyst towards the dome of the liver. 3.2 cm hepatocellular carcinoma in the lateral right hepatic lobe. This lesion is not perceptible on the comparison unenhanced study. No other liver lesion. Portal vein is patent. Recannulated paraumbilical vein.     Renal cysts. Adrenal glands and pancreas unremarkable. Gallbladder wall thickening probably related to underlying liver disease. Bowel loops are nondilated.     IMPRESSION:   Cirrhosis. 3.2 cm hepatocellular carcinoma in the lateral right hepatic lobe.     Sequela of portal hypertension detailed above.       Assessment & Plan   *  Thrombocytopenia secondary to liver cirrhosis and splenomegaly.  · Etiology of the thrombocytopenia is not clear.  However I do think it is likely related to his fatty liver which was documented from abdomen ultrasound on 7/20/2012.    · Laboratory studies on 1/13/2021 reported excellent vitamin B12 level.  He had marginally elevated IPF 7.0%.  no apparent compensation for platelets production.   · On 6/11/2021, platelets improved at 63,000 with normal IPF 5.3%.  Patient is asymptomatic.    · On 12/17/2021, stable platelets 60,000 and normal IPF 4.1%. Patient is asymptomatic.   · On 6/10/2022 platelets 69,000.  Patient reports no spontaneous bleeding.     · On 7/6/2022 platelets 65,000.  No spontaneous bleeding.    · On 9/28/2022 platelets 61,000.  Patient has easy bruising on extremities, however no spontaneous bleeding such as melena hematochezia or gum bleeding.    · On 11/2/2022 platelets 48,000.  No change of clinical condition.  Continue to monitor.  · Patient was given 1 unit platelets transfusion on 11/15/2022, platelets improved at 70,000 11/16/2022 when he had successful CT-guided core needle biopsy of liver lesion.   · On 11/21/2022 platelets decreased at baseline level of 44,000.  No spontaneous bleeding.  We will arrange patient to receive platelets transfusion prior and post point interventional procedure with radiofrequency ablation of the primary hepatocellular carcinoma scheduled on 12/6/2022.     *Iron deficiency anemia in the setting of chronic renal insufficiency stage III, and the liver cirrhosis.   · Laboratory study on 1/13/2021 reported no evidence of hemolysis, had supratherapeutic B12 level and folate.    · He does have evidence of iron deficiency with low iron sats 10% and ferritin only 41.5 ng/mL with Hb 10.9.    · His erythropoietin 16.1, is not elevated accordingly, which indicates lack of response from his kidney to produce erythropoietin. Currently is not a candidate for CONRAD/Procrit treatment.    · We started patient on oral ferrous sulfate 325 mg 3 times a day in January 2021.   · On 3/19/2021, patient has improved hemoglobin 11.9.  Continue to monitor.  · On 6/11/2021, stable anemia Hb 11.0, however much improved with ferritin 115.3 ng/mL, and iron saturation 14%.  Will advised to continue oral iron treatment.  · On 12/17/2021 stable Hb 10.8, much improved iron saturation 34% and free iron 126, however with trending down ferritin level 86.1 ng/mL. Continue oral iron treatment.  · 6/10/2022, patient reports taking oral iron 3 times a day.  No apparent side effects except mild nausea.  Laboratory studies showed deteriorating  hemoglobin 9.6 and also worsening iron saturation 17% with free iron 61 and the ferritin 86.8 ng/mL.  Discussed with the patient, recommended intravenous iron therapy, this patient has poor iron absorption.    · Patient received 2 dose of Injectafer 750 mg x 2 in June 2022.  Lab study today on 7/6/2022 reported improved normalized iron saturation 21% with a supratherapeutic ferritin 644 ng/mL.  · On 9/28/2022 ferritin 256 ng/mL iron saturation 22% free iron 66 TIBC 295.  Supratherapeutic B12 at 155 and normal RBC folate 2175 ng/mL.  · Patient had a hemoglobin 7.2 on 9/30/2022.  Was started on Procrit injection 20,000 units during hospitalization.  No PRBC transfusion.  · Procrit is subsequently continued as outpatient every 2 weeks.  · On 10/21/2022 ferritin 198 and free iron 56 TIBC 294 iron saturation 19%.   · Improved hemoglobin 9.2 on 11/2/2022.  We will continue Procrit 20,000 units every 2 weeks.  · Hemoglobin 8.9 on 11/21/2022.  Continue Procrit injection.    * Pancytopenia with mild leukocytopenia.  This is also likely related to his fatty liver and liver cirrhosis.  Patient drinks alcohol beverage as reported in January 2021.  · Patient reports no recurrent infection.  His neutrophils marginally normal at 1830 out of WBC 3250.  · Patient had supratherapeutic B12 level 1/13/2021.  · Marginally better WBC 3720 including ANC 2100 3/19/2021.    · 6/11/2021 WBC 3530, with ANC 2180.  No recurrent infection.    · On 12/17/2021 stable leukocytopenia WBC 3780 with low normal neutrophils 2016.  Patient is asymptomatic.  Continue to monitor.  · On 6/10/2022 patient has slightly better WBC 4290 and neutrophils 2980.  · On 7/6/2022 patient had WBC 4260 ANC 2620, lymphocytes 840 and monocytes 470.  · On 9/28/2022 WBC 3380, including ANC 1780, hemoglobin 8.5, and platelets 61,000.  · On 11/2/2022 total WBC 2970 including ANC 1730.  Continue to monitor.   · On 11/21/2022 WBC 2980 including ANC 1670 and lymphocytes 820.   Patient is afebrile.  Continue to monitor.    *COVID-19 vaccination.    · 6/11/2021, patient reports that he had 2 doses of COVID-19 vaccine, had very limited side effects with some soreness at the site of injection.   · On 12/17/2021, patient reports he received a booster dose of COVID-19 vaccine on 11/6/2021.    *Ascites secondary to liver cirrhosis.  · CT scan examination on 10/15/2021 confirmed liver cirrhosis.  · On 6/10/2022 patient reports abdomen distention for the past 3 months, weight gains, 25 pounds per our records, and also has worsening dyspnea.  Physical examination shows very distended abdomen.  I think this patient has large ascites secondary to his liver cirrhosis.  I recommended to have ultrasound-guided therapeutic paracentesis with samples for routine chemistry labs cell counts and also for cytology study.  · Patient also has bilateral leg edema which is also secondary to liver cirrhosis.  Patient was seen her primary care physician next week.  I think most likely he will need to be started on diuretics.  He also needs to follow-up with his GI specialist Dr. Everardo Foote to discuss possibility of  shunt.   · On 6/17/2022 patient had ultrasound-guided paracentesis, with 11.2 L clear yellow ascites removed.  Cytology study reported negative for malignant cells.  There was a reactive mesothelial cells histiocytes and mixed inflammatory cells.  · Today on 7/6/2022 follow-up, patient reports worsening abdomen distention again.  I will arrange patient to have therapeutic paracentesis again as soon as possible, and also will arrange another paracentesis in 3 weeks.  · At the meantime I also recommended to start the patient on low-dose Lasix 20 mg daily on 7/6/2022.   · Paracentesis, with 6.4 L ascites removed 7/14/2022.  · Paracentesis 2.4 L ascites removed 8/4/2022.   · Failed attempt for paracentesis on 9/29/2022 during hospitalization, no ascites per ultrasound exam.    *Right pleural effusion.     · Patient also complains of dyspnea, x-ray examination reported right pleural effusion 7/14/2022..  · Patient had ultrasound-guided right thoracentesis with 2 L pleural effusion removed 7/27/2022.  Cytology studies negative for malignancy.  · On 8/4/2022 patient had another 2 L pleural effusion removed on the right side.    *Worsening renal function.  · Laboratory study on 7/6/2022 reported creatinine 1.37.  He had creatinine 1.02 on 12/17/2021.  Discussed with patient, I recommended referring patient to nephrology service for evaluation of possible hepatorenal syndrome due to liver cirrhosis.  Patient is agreeable.  · On 9/28/2022 patient reports that he still has no appointment for nephrology service.  · On 11/2/2022 creatinine 2.34 with BUN 47.  · On 11/21/2022 creatinine 2.33 with BUN 41.      *Newly discovered hepatic lesion by ultrasound examination on 9/29/2022.  · Patient is subsequently seen by Dr. Everardo Foote, had an MRI of the abdomen with and without contrast at the [facility] on 10/26/2022 which reported 3.2 cm liver lesion, likely hepatocellular carcinoma.  · I recommended to have AFP liver study, also discussed with patient and his wife today on 11/2/2022 we will obtain the MRI images from [facility], and arrange CT-guided core needle biopsy.  Because of his thrombocytopenia, I will arrange 1 unit platelets transfusion right before the biopsy.  I did discuss with him about the risk for bleeding post the biopsy.  This is highly suspicious for primary hepatocellular carcinoma.   · Normal AFP 2.37 ng/mL on 11/2/2022.     · Patient had CT-guided core needle biopsy on 11/16/2022. Pathology evaluation reported moderately differentiated hepatocellular carcinoma.  · I discussed with the interventional radiologist Dr. Goodson about interventional procedure with radiofrequency ablation treatment for the solitary lesion in the liver, since this patient is not a candidate for surgical  intervention due to multiple comorbidities. The procedure has been scheduled on 12/06/2022.  Due to his thrombocytopenia, we decided to transfuse him with 1 unit of platelets on 12/05/2022 and also also 1 unit platelets on 12/06/2022 after his procedure.    *Diabetes.   · Today on 11/21/2022 patient had significant elevated glucose 397.  Patient reports that he is diabetic, however his metformin was discontinued during his most recent hospitalization, and he was not put on any other medication for that.  He reports this morning's blood glucose level was about 180.  Discussed with patient, I will give him 10 units insulin today in the clinic, and also will start him on glipizide 5 mg twice a day.  Patient is agreeable and will follow up with his primary care physician for management of diabetes.    PLAN:  1. Patient will proceed with Procrit injection today. This will be repeated every 2 weeks, next one due 12/05/2022.   2. Patient will be given regular insulin 10 units today in the clinic.  3. I will start the patient on glipizide 5 mg twice a day.  I E scribed to his pharmacy today.   4. We will request his liver biopsy sample to be sent for Inter[employer]el[employer] study for further evaluation, to provide information for further treatment.  5. Arrange platelets transfusion 1 unit on 12/05/2022.   6. Patient is scheduled for radiofrequency ablation of the solitary hepatocellular carcinoma lesion by interventional radiologist Dr. Goodson on 12/6/2022.    7. We will arrange patient to receive another unit of platelets transfusion post the above procedure on 12/6/2022.   8. Patient will keep his appointment with Dr. Khan for EGD scheduled on 12/14/2022.  9. Patient will come to see me on 12/13/2022 for evaluation. We will check CBC at this time.  10. Patient will keep appointment with Dr. Foote on 12/8/2022 and he may have to adjust the timing because of his multiple procedures scheduled.  11. Patient will follow up with his primary  care physician for management of diabetes.      Discussed with the patient and his wife about the pathology results and further treatment of newly diagnosed hepatocellular carcinoma, and also ongoing treatment with Procrit.  Patient and his wife voiced understanding and agreement.      I communicated with interventional radiologist Dr. Goodson.     TERE SALINAS M.D., Ph.D.    11/21/2022.        CC:  MD Everardo Cuello M.D.     Adolfo Calvo.  MD Declan Goodson MD         Transcribed from ambient dictation for Tere Salinas MD PhD by Gaby Small.  11/21/22   17:18 EST    Patient or patient representative verbalized consent to the visit recording.  I have personally performed the services described in this document as transcribed by the above individual, and it is both accurate and complete.

## 2022-12-02 ENCOUNTER — APPOINTMENT (OUTPATIENT)
Dept: LAB | Facility: HOSPITAL | Age: 72
End: 2022-12-02
Payer: MEDICARE

## 2022-12-02 ENCOUNTER — APPOINTMENT (OUTPATIENT)
Dept: ONCOLOGY | Facility: HOSPITAL | Age: 72
End: 2022-12-02
Payer: MEDICARE

## 2022-12-05 ENCOUNTER — ANESTHESIA EVENT (OUTPATIENT)
Dept: CT IMAGING | Facility: HOSPITAL | Age: 72
End: 2022-12-05

## 2022-12-05 ENCOUNTER — LAB (OUTPATIENT)
Dept: LAB | Facility: HOSPITAL | Age: 72
End: 2022-12-05
Payer: MEDICARE

## 2022-12-05 ENCOUNTER — INFUSION (OUTPATIENT)
Dept: ONCOLOGY | Facility: HOSPITAL | Age: 72
End: 2022-12-05
Payer: MEDICARE

## 2022-12-05 ENCOUNTER — HOSPITAL ENCOUNTER (OUTPATIENT)
Dept: INFUSION THERAPY | Facility: HOSPITAL | Age: 72
Setting detail: INFUSION SERIES
Discharge: HOME | End: 2022-12-05

## 2022-12-05 VITALS
RESPIRATION RATE: 18 BRPM | HEART RATE: 80 BPM | TEMPERATURE: 98.2 F | OXYGEN SATURATION: 100 % | DIASTOLIC BLOOD PRESSURE: 66 MMHG | SYSTOLIC BLOOD PRESSURE: 143 MMHG

## 2022-12-05 DIAGNOSIS — D61.818 PANCYTOPENIA: ICD-10-CM

## 2022-12-05 DIAGNOSIS — D64.9 ANEMIA, UNSPECIFIED TYPE: ICD-10-CM

## 2022-12-05 DIAGNOSIS — C22.0 HEPATOCELLULAR CARCINOMA: ICD-10-CM

## 2022-12-05 DIAGNOSIS — N18.9 ANEMIA DUE TO CHRONIC RENAL FAILURE TREATED WITH ERYTHROPOIETIN, UNSPECIFIED STAGE: ICD-10-CM

## 2022-12-05 DIAGNOSIS — D69.6 THROMBOCYTOPENIA: ICD-10-CM

## 2022-12-05 DIAGNOSIS — D63.1 ANEMIA DUE TO CHRONIC RENAL FAILURE TREATED WITH ERYTHROPOIETIN, UNSPECIFIED STAGE: ICD-10-CM

## 2022-12-05 DIAGNOSIS — D63.1 ANEMIA DUE TO CHRONIC RENAL FAILURE TREATED WITH ERYTHROPOIETIN, UNSPECIFIED STAGE: Primary | ICD-10-CM

## 2022-12-05 DIAGNOSIS — N28.9 RENAL INSUFFICIENCY: ICD-10-CM

## 2022-12-05 DIAGNOSIS — N18.9 ANEMIA DUE TO CHRONIC RENAL FAILURE TREATED WITH ERYTHROPOIETIN, UNSPECIFIED STAGE: Primary | ICD-10-CM

## 2022-12-05 LAB
BASOPHILS # BLD AUTO: 0.03 10*3/MM3 (ref 0–0.2)
BASOPHILS NFR BLD AUTO: 0.8 % (ref 0–1.5)
DEPRECATED RDW RBC AUTO: 46.5 FL (ref 37–54)
EOSINOPHIL # BLD AUTO: 0.32 10*3/MM3 (ref 0–0.4)
EOSINOPHIL NFR BLD AUTO: 8.6 % (ref 0.3–6.2)
ERYTHROCYTE [DISTWIDTH] IN BLOOD BY AUTOMATED COUNT: 13.8 % (ref 12.3–15.4)
HCT VFR BLD AUTO: 27.2 % (ref 37.5–51)
HGB BLD-MCNC: 9.2 G/DL (ref 13–17.7)
IMM GRANULOCYTES # BLD AUTO: 0.02 10*3/MM3 (ref 0–0.05)
IMM GRANULOCYTES NFR BLD AUTO: 0.5 % (ref 0–0.5)
LYMPHOCYTES # BLD AUTO: 0.85 10*3/MM3 (ref 0.7–3.1)
LYMPHOCYTES NFR BLD AUTO: 22.9 % (ref 19.6–45.3)
MCH RBC QN AUTO: 31.3 PG (ref 26.6–33)
MCHC RBC AUTO-ENTMCNC: 33.8 G/DL (ref 31.5–35.7)
MCV RBC AUTO: 92.5 FL (ref 79–97)
MONOCYTES # BLD AUTO: 0.39 10*3/MM3 (ref 0.1–0.9)
MONOCYTES NFR BLD AUTO: 10.5 % (ref 5–12)
NEUTROPHILS NFR BLD AUTO: 2.1 10*3/MM3 (ref 1.7–7)
NEUTROPHILS NFR BLD AUTO: 56.7 % (ref 42.7–76)
NRBC BLD AUTO-RTO: 0 /100 WBC (ref 0–0.2)
PLATELET # BLD AUTO: 49 10*3/MM3 (ref 140–450)
PMV BLD AUTO: 12.1 FL (ref 6–12)
RBC # BLD AUTO: 2.94 10*6/MM3 (ref 4.14–5.8)
WBC NRBC COR # BLD: 3.71 10*3/MM3 (ref 3.4–10.8)

## 2022-12-05 PROCEDURE — 36415 COLL VENOUS BLD VENIPUNCTURE: CPT

## 2022-12-05 PROCEDURE — 85025 COMPLETE CBC W/AUTO DIFF WBC: CPT

## 2022-12-05 PROCEDURE — P9100 PATHOGEN TEST FOR PLATELETS: HCPCS

## 2022-12-05 PROCEDURE — 25010000002 EPOETIN ALFA PER 1000 UNITS: Performed by: INTERNAL MEDICINE

## 2022-12-05 PROCEDURE — P9035 PLATELET PHERES LEUKOREDUCED: HCPCS

## 2022-12-05 PROCEDURE — 36430 TRANSFUSION BLD/BLD COMPNT: CPT

## 2022-12-05 PROCEDURE — 96372 THER/PROPH/DIAG INJ SC/IM: CPT

## 2022-12-05 RX ORDER — SODIUM CHLORIDE 9 MG/ML
250 INJECTION, SOLUTION INTRAVENOUS AS NEEDED
Status: DISCONTINUED | OUTPATIENT
Start: 2022-12-05 | End: 2022-12-07 | Stop reason: HOSPADM

## 2022-12-05 RX ADMIN — ERYTHROPOIETIN 25000 UNITS: 20000 INJECTION, SOLUTION INTRAVENOUS; SUBCUTANEOUS at 08:46

## 2022-12-05 NOTE — PROGRESS NOTES
12/06/22 0001   Pre-Procedure Phone Call   Procedure Time Verified Yes   Arrival Time 0645   Procedure Location Verified Yes   NPO Status Reinforced Yes   Ride and Caregiver Arranged Yes   Patient Knows to Bring Current Medications Yes   Bring Outside Films Requested No

## 2022-12-06 ENCOUNTER — HOSPITAL ENCOUNTER (OUTPATIENT)
Dept: CT IMAGING | Facility: HOSPITAL | Age: 72
Discharge: HOME | End: 2022-12-06

## 2022-12-06 ENCOUNTER — HOSPITAL ENCOUNTER (OUTPATIENT)
Dept: INFUSION THERAPY | Facility: HOSPITAL | Age: 72
Setting detail: INFUSION SERIES
Discharge: HOME | End: 2022-12-06

## 2022-12-06 ENCOUNTER — ANESTHESIA (OUTPATIENT)
Dept: CT IMAGING | Facility: HOSPITAL | Age: 72
End: 2022-12-06

## 2022-12-06 VITALS
DIASTOLIC BLOOD PRESSURE: 65 MMHG | SYSTOLIC BLOOD PRESSURE: 151 MMHG | HEART RATE: 84 BPM | TEMPERATURE: 96.6 F | RESPIRATION RATE: 20 BRPM | OXYGEN SATURATION: 98 %

## 2022-12-06 VITALS
WEIGHT: 172 LBS | TEMPERATURE: 99.1 F | DIASTOLIC BLOOD PRESSURE: 64 MMHG | OXYGEN SATURATION: 96 % | SYSTOLIC BLOOD PRESSURE: 146 MMHG | HEART RATE: 96 BPM | HEIGHT: 66 IN | RESPIRATION RATE: 14 BRPM | BODY MASS INDEX: 27.64 KG/M2

## 2022-12-06 DIAGNOSIS — D61.818 PANCYTOPENIA: ICD-10-CM

## 2022-12-06 DIAGNOSIS — N18.9 ANEMIA DUE TO CHRONIC RENAL FAILURE TREATED WITH ERYTHROPOIETIN, UNSPECIFIED STAGE: ICD-10-CM

## 2022-12-06 DIAGNOSIS — C22.0 HEPATOCELLULAR CARCINOMA: ICD-10-CM

## 2022-12-06 DIAGNOSIS — K74.60 CIRRHOSIS OF LIVER WITH ASCITES, UNSPECIFIED HEPATIC CIRRHOSIS TYPE: ICD-10-CM

## 2022-12-06 DIAGNOSIS — D63.1 ANEMIA DUE TO CHRONIC RENAL FAILURE TREATED WITH ERYTHROPOIETIN, UNSPECIFIED STAGE: ICD-10-CM

## 2022-12-06 DIAGNOSIS — R16.0 LIVER MASS: ICD-10-CM

## 2022-12-06 DIAGNOSIS — R18.8 CIRRHOSIS OF LIVER WITH ASCITES, UNSPECIFIED HEPATIC CIRRHOSIS TYPE: ICD-10-CM

## 2022-12-06 DIAGNOSIS — D69.6 THROMBOCYTOPENIA: ICD-10-CM

## 2022-12-06 LAB
ALBUMIN SERPL-MCNC: 3.4 G/DL (ref 3.5–5.2)
ALBUMIN/GLOB SERPL: 1.1 G/DL
ALP SERPL-CCNC: 100 U/L (ref 39–117)
ALT SERPL W P-5'-P-CCNC: 22 U/L (ref 1–41)
ANION GAP SERPL CALCULATED.3IONS-SCNC: 12.1 MMOL/L (ref 5–15)
AST SERPL-CCNC: 32 U/L (ref 1–40)
BH BB BLOOD EXPIRATION DATE: NORMAL
BH BB BLOOD TYPE BARCODE: 6200
BH BB DISPENSE STATUS: NORMAL
BH BB PRODUCT CODE: NORMAL
BH BB UNIT NUMBER: NORMAL
BILIRUB SERPL-MCNC: 0.6 MG/DL (ref 0–1.2)
BUN SERPL-MCNC: 39 MG/DL (ref 8–23)
BUN/CREAT SERPL: 18.8 (ref 7–25)
CALCIUM SPEC-SCNC: 9.5 MG/DL (ref 8.6–10.5)
CHLORIDE SERPL-SCNC: 110 MMOL/L (ref 98–107)
CO2 SERPL-SCNC: 21.9 MMOL/L (ref 22–29)
CREAT SERPL-MCNC: 2.07 MG/DL (ref 0.76–1.27)
EGFRCR SERPLBLD CKD-EPI 2021: 33.4 ML/MIN/1.73
GLOBULIN UR ELPH-MCNC: 3 GM/DL
GLUCOSE BLDC GLUCOMTR-MCNC: 153 MG/DL (ref 70–130)
GLUCOSE SERPL-MCNC: 145 MG/DL (ref 65–99)
PLATELET # BLD AUTO: 56 10*3/MM3 (ref 140–450)
POTASSIUM SERPL-SCNC: 4.3 MMOL/L (ref 3.5–5.2)
PROT SERPL-MCNC: 6.4 G/DL (ref 6–8.5)
SODIUM SERPL-SCNC: 144 MMOL/L (ref 136–145)
UNIT  ABO: NORMAL
UNIT  RH: NORMAL

## 2022-12-06 PROCEDURE — 25010000002 FENTANYL CITRATE (PF) 50 MCG/ML SOLUTION: Performed by: ANESTHESIOLOGY

## 2022-12-06 PROCEDURE — 25010000002 PIPERACILLIN SOD-TAZOBACTAM PER 1 G: Performed by: RADIOLOGY

## 2022-12-06 PROCEDURE — 85610 PROTHROMBIN TIME: CPT

## 2022-12-06 PROCEDURE — P9100 PATHOGEN TEST FOR PLATELETS: HCPCS

## 2022-12-06 PROCEDURE — 80053 COMPREHEN METABOLIC PANEL: CPT | Performed by: RADIOLOGY

## 2022-12-06 PROCEDURE — 0 LIDOCAINE 1 % SOLUTION: Performed by: ANESTHESIOLOGY

## 2022-12-06 PROCEDURE — 25010000002 PROPOFOL 10 MG/ML EMULSION: Performed by: ANESTHESIOLOGY

## 2022-12-06 PROCEDURE — 0 LIDOCAINE 1 % SOLUTION: Performed by: RADIOLOGY

## 2022-12-06 PROCEDURE — 36430 TRANSFUSION BLD/BLD COMPNT: CPT

## 2022-12-06 PROCEDURE — 85049 AUTOMATED PLATELET COUNT: CPT | Performed by: RADIOLOGY

## 2022-12-06 PROCEDURE — 47382 PERCUT ABLATE LIVER RF: CPT

## 2022-12-06 PROCEDURE — 25010000002 PHENYLEPHRINE 10 MG/ML SOLUTION: Performed by: ANESTHESIOLOGY

## 2022-12-06 PROCEDURE — 82962 GLUCOSE BLOOD TEST: CPT

## 2022-12-06 PROCEDURE — P9035 PLATELET PHERES LEUKOREDUCED: HCPCS

## 2022-12-06 RX ORDER — FENTANYL CITRATE 50 UG/ML
INJECTION, SOLUTION INTRAMUSCULAR; INTRAVENOUS AS NEEDED
Status: DISCONTINUED | OUTPATIENT
Start: 2022-12-06 | End: 2022-12-06 | Stop reason: SURG

## 2022-12-06 RX ORDER — LIDOCAINE HYDROCHLORIDE 10 MG/ML
INJECTION, SOLUTION INFILTRATION; PERINEURAL AS NEEDED
Status: DISCONTINUED | OUTPATIENT
Start: 2022-12-06 | End: 2022-12-06 | Stop reason: SURG

## 2022-12-06 RX ORDER — SODIUM CHLORIDE, SODIUM LACTATE, POTASSIUM CHLORIDE, CALCIUM CHLORIDE 600; 310; 30; 20 MG/100ML; MG/100ML; MG/100ML; MG/100ML
INJECTION, SOLUTION INTRAVENOUS CONTINUOUS PRN
Status: DISCONTINUED | OUTPATIENT
Start: 2022-12-06 | End: 2022-12-06 | Stop reason: SURG

## 2022-12-06 RX ORDER — SODIUM CHLORIDE 0.9 % (FLUSH) 0.9 %
3 SYRINGE (ML) INJECTION EVERY 12 HOURS SCHEDULED
Status: DISCONTINUED | OUTPATIENT
Start: 2022-12-06 | End: 2022-12-07 | Stop reason: HOSPADM

## 2022-12-06 RX ORDER — FENTANYL CITRATE 50 UG/ML
50 INJECTION, SOLUTION INTRAMUSCULAR; INTRAVENOUS
Status: DISCONTINUED | OUTPATIENT
Start: 2022-12-06 | End: 2022-12-07 | Stop reason: HOSPADM

## 2022-12-06 RX ORDER — LIDOCAINE HYDROCHLORIDE 10 MG/ML
20 INJECTION, SOLUTION INFILTRATION; PERINEURAL ONCE
Status: COMPLETED | OUTPATIENT
Start: 2022-12-06 | End: 2022-12-06

## 2022-12-06 RX ORDER — ONDANSETRON 2 MG/ML
4 INJECTION INTRAMUSCULAR; INTRAVENOUS ONCE AS NEEDED
Status: DISCONTINUED | OUTPATIENT
Start: 2022-12-06 | End: 2022-12-07 | Stop reason: HOSPADM

## 2022-12-06 RX ORDER — ROCURONIUM BROMIDE 10 MG/ML
INJECTION, SOLUTION INTRAVENOUS AS NEEDED
Status: DISCONTINUED | OUTPATIENT
Start: 2022-12-06 | End: 2022-12-06 | Stop reason: SURG

## 2022-12-06 RX ORDER — SODIUM CHLORIDE 9 MG/ML
40 INJECTION, SOLUTION INTRAVENOUS AS NEEDED
Status: DISCONTINUED | OUTPATIENT
Start: 2022-12-06 | End: 2022-12-07 | Stop reason: HOSPADM

## 2022-12-06 RX ORDER — SODIUM CHLORIDE 9 MG/ML
25 INJECTION, SOLUTION INTRAVENOUS ONCE
Status: DISCONTINUED | OUTPATIENT
Start: 2022-12-06 | End: 2022-12-07 | Stop reason: HOSPADM

## 2022-12-06 RX ORDER — PHENYLEPHRINE HYDROCHLORIDE 10 MG/ML
INJECTION INTRAVENOUS AS NEEDED
Status: DISCONTINUED | OUTPATIENT
Start: 2022-12-06 | End: 2022-12-06 | Stop reason: SURG

## 2022-12-06 RX ORDER — SODIUM CHLORIDE 0.9 % (FLUSH) 0.9 %
10 SYRINGE (ML) INJECTION AS NEEDED
Status: DISCONTINUED | OUTPATIENT
Start: 2022-12-06 | End: 2022-12-07 | Stop reason: HOSPADM

## 2022-12-06 RX ORDER — GLYCOPYRROLATE 0.2 MG/ML
INJECTION INTRAMUSCULAR; INTRAVENOUS AS NEEDED
Status: DISCONTINUED | OUTPATIENT
Start: 2022-12-06 | End: 2022-12-06 | Stop reason: SURG

## 2022-12-06 RX ORDER — PROPOFOL 10 MG/ML
VIAL (ML) INTRAVENOUS AS NEEDED
Status: DISCONTINUED | OUTPATIENT
Start: 2022-12-06 | End: 2022-12-06 | Stop reason: SURG

## 2022-12-06 RX ORDER — SODIUM CHLORIDE 9 MG/ML
250 INJECTION, SOLUTION INTRAVENOUS AS NEEDED
Status: DISCONTINUED | OUTPATIENT
Start: 2022-12-06 | End: 2022-12-09 | Stop reason: HOSPADM

## 2022-12-06 RX ADMIN — LIDOCAINE HYDROCHLORIDE 40 MG: 10 INJECTION, SOLUTION INFILTRATION; PERINEURAL at 09:23

## 2022-12-06 RX ADMIN — SUGAMMADEX 200 MG: 100 INJECTION, SOLUTION INTRAVENOUS at 10:11

## 2022-12-06 RX ADMIN — SODIUM CHLORIDE, POTASSIUM CHLORIDE, SODIUM LACTATE AND CALCIUM CHLORIDE: 600; 310; 30; 20 INJECTION, SOLUTION INTRAVENOUS at 09:01

## 2022-12-06 RX ADMIN — GLYCOPYRROLATE 0.2 MG: 0.2 INJECTION INTRAMUSCULAR; INTRAVENOUS at 09:23

## 2022-12-06 RX ADMIN — PROPOFOL 120 MG: 10 INJECTION, EMULSION INTRAVENOUS at 09:23

## 2022-12-06 RX ADMIN — FENTANYL CITRATE 50 MCG: 50 INJECTION INTRAMUSCULAR; INTRAVENOUS at 09:45

## 2022-12-06 RX ADMIN — ROCURONIUM BROMIDE 30 MG: 50 INJECTION INTRAVENOUS at 09:25

## 2022-12-06 RX ADMIN — LIDOCAINE HYDROCHLORIDE 20 ML: 10 INJECTION, SOLUTION INFILTRATION; PERINEURAL at 09:50

## 2022-12-06 RX ADMIN — PHENYLEPHRINE HYDROCHLORIDE 100 MCG: 10 INJECTION INTRAVENOUS at 09:51

## 2022-12-06 RX ADMIN — FENTANYL CITRATE 50 MCG: 50 INJECTION INTRAMUSCULAR; INTRAVENOUS at 09:31

## 2022-12-06 RX ADMIN — TAZOBACTAM SODIUM AND PIPERACILLIN SODIUM 3.38 G: 375; 3 INJECTION, SOLUTION INTRAVENOUS at 09:10

## 2022-12-06 NOTE — POST-PROCEDURE NOTE
POST PROCEDURE NOTE    Procedure: liver ablation    Pre-Procedure Diagnosis: hcc    Post-procedure Diagnosis: same    Findings: successful microwave ablation    Complications: none    Blood loss: min    Specimen Removed: n/a    Disposition:   Discharge home

## 2022-12-06 NOTE — ANESTHESIA PREPROCEDURE EVALUATION
Anesthesia Evaluation     Patient summary reviewed and Nursing notes reviewed   no history of anesthetic complications:  NPO Solid Status: > 6 hours  NPO Liquid Status: > 6 hours           Airway   Mallampati: II  TM distance: >3 FB  Neck ROM: full  no difficulty expected and No difficulty expected  Dental - normal exam     Pulmonary - normal exam    breath sounds clear to auscultation  (+) a smoker Former,   (-) rhonchi, decreased breath sounds, wheezes, rales, stridor  Cardiovascular - normal exam    NYHA Classification: I  ECG reviewed  Rhythm: regular  Rate: normal    (+) hypertension,   (-) murmur, weak pulses, friction rub, systolic click, carotid bruits, JVD, peripheral edema      Neuro/Psych- negative ROS  GI/Hepatic/Renal/Endo    (+)   liver disease cirrhosis, renal disease CRI, diabetes mellitus type 2 well controlled,     Musculoskeletal (-) negative ROS    Abdominal  - normal exam    Abdomen: soft.   Substance History - negative use     OB/GYN negative ob/gyn ROS         Other   blood dyscrasia anemia thrombocytopenia,   history of cancer active                    Anesthesia Plan    ASA 4     general     intravenous induction     Anesthetic plan, risks, benefits, and alternatives have been provided, discussed and informed consent has been obtained with: patient.        CODE STATUS:

## 2022-12-06 NOTE — ANESTHESIA POSTPROCEDURE EVALUATION
"Patient: Dennys Lieberman    Procedure Summary     Date: 12/06/22 Room / Location: [facility] CT; [facility] INTERVENTIONAL    Anesthesia Start: 0900 Anesthesia Stop: 1034    Procedure: CT GUIDED ABLATION LIVER Diagnosis:       Liver mass      Cirrhosis of liver with ascites, unspecified hepatic cirrhosis type (HCC)      Hepatocellular carcinoma (HCC)      (Liver Mass)    Scheduled Providers:  Provider: Colton Cohen MD    Anesthesia Type: general ASA Status: 4          Anesthesia Type: general    Vitals  Vitals Value Taken Time   /64 12/06/22 1316   Temp 37.3 °C (99.1 °F) 12/06/22 0943   Pulse 93 12/06/22 1319   Resp 14 12/06/22 1315   SpO2 95 % 12/06/22 1318   Vitals shown include unvalidated device data.        Post Anesthesia Care and Evaluation    Patient location during evaluation: bedside  Patient participation: complete - patient participated  Level of consciousness: awake and alert  Pain management: adequate    Airway patency: patent  Anesthetic complications: No anesthetic complications    Cardiovascular status: acceptable  Respiratory status: acceptable  Hydration status: acceptable    Comments: /64 (BP Location: Left arm, Patient Position: Lying)   Pulse 96   Temp 37.3 °C (99.1 °F) (Temporal)   Resp 14   Ht 167.6 cm (66\")   Wt 78 kg (172 lb)   SpO2 96%   BMI 27.76 kg/m²       "

## 2022-12-07 LAB
BH BB BLOOD EXPIRATION DATE: NORMAL
BH BB BLOOD TYPE BARCODE: 5100
BH BB DISPENSE STATUS: NORMAL
BH BB PRODUCT CODE: NORMAL
BH BB UNIT NUMBER: NORMAL
LAB AP CASE REPORT: NORMAL
LAB AP CLINICAL INFORMATION: NORMAL
LAB AP DIAGNOSIS COMMENT: NORMAL
LAB AP SPECIAL STAINS: NORMAL
Lab: NORMAL
PATH REPORT.ADDENDUM SPEC: NORMAL
PATH REPORT.FINAL DX SPEC: NORMAL
PATH REPORT.GROSS SPEC: NORMAL
UNIT  ABO: NORMAL
UNIT  RH: NORMAL

## 2022-12-09 LAB
INR PPP: 1.3 (ref 0.8–1.2)
PROTHROMBIN TIME: 15.2 SECONDS (ref 12.8–15.2)

## 2022-12-13 ENCOUNTER — OFFICE VISIT (OUTPATIENT)
Dept: ONCOLOGY | Facility: CLINIC | Age: 72
End: 2022-12-13

## 2022-12-13 ENCOUNTER — LAB (OUTPATIENT)
Dept: LAB | Facility: HOSPITAL | Age: 72
End: 2022-12-13

## 2022-12-13 ENCOUNTER — INFUSION (OUTPATIENT)
Dept: ONCOLOGY | Facility: HOSPITAL | Age: 72
End: 2022-12-13

## 2022-12-13 VITALS
SYSTOLIC BLOOD PRESSURE: 150 MMHG | WEIGHT: 177 LBS | TEMPERATURE: 97.3 F | RESPIRATION RATE: 16 BRPM | BODY MASS INDEX: 28.45 KG/M2 | HEART RATE: 67 BPM | OXYGEN SATURATION: 98 % | DIASTOLIC BLOOD PRESSURE: 67 MMHG | HEIGHT: 66 IN

## 2022-12-13 DIAGNOSIS — D61.818 PANCYTOPENIA: ICD-10-CM

## 2022-12-13 DIAGNOSIS — D50.9 IRON DEFICIENCY ANEMIA, UNSPECIFIED IRON DEFICIENCY ANEMIA TYPE: ICD-10-CM

## 2022-12-13 DIAGNOSIS — N18.9 ANEMIA DUE TO CHRONIC RENAL FAILURE TREATED WITH ERYTHROPOIETIN, UNSPECIFIED STAGE: Primary | ICD-10-CM

## 2022-12-13 DIAGNOSIS — D63.1 ANEMIA DUE TO CHRONIC RENAL FAILURE TREATED WITH ERYTHROPOIETIN, UNSPECIFIED STAGE: ICD-10-CM

## 2022-12-13 DIAGNOSIS — J90 PLEURAL EFFUSION, RIGHT: Primary | ICD-10-CM

## 2022-12-13 DIAGNOSIS — T45.4X5A ADVERSE EFFECT OF IRON AND ITS COMPOUNDS, INITIAL ENCOUNTER: ICD-10-CM

## 2022-12-13 DIAGNOSIS — C22.0 HEPATOCELLULAR CARCINOMA: ICD-10-CM

## 2022-12-13 DIAGNOSIS — D63.1 ANEMIA DUE TO CHRONIC RENAL FAILURE TREATED WITH ERYTHROPOIETIN, UNSPECIFIED STAGE: Primary | ICD-10-CM

## 2022-12-13 DIAGNOSIS — N18.9 ANEMIA DUE TO CHRONIC RENAL FAILURE TREATED WITH ERYTHROPOIETIN, UNSPECIFIED STAGE: ICD-10-CM

## 2022-12-13 DIAGNOSIS — D64.9 ANEMIA, UNSPECIFIED TYPE: ICD-10-CM

## 2022-12-13 DIAGNOSIS — N28.9 RENAL INSUFFICIENCY: ICD-10-CM

## 2022-12-13 DIAGNOSIS — D69.6 THROMBOCYTOPENIA: ICD-10-CM

## 2022-12-13 DIAGNOSIS — K70.31 ASCITES DUE TO ALCOHOLIC CIRRHOSIS: ICD-10-CM

## 2022-12-13 LAB
BASOPHILS # BLD AUTO: 0.03 10*3/MM3 (ref 0–0.2)
BASOPHILS NFR BLD AUTO: 0.8 % (ref 0–1.5)
DEPRECATED RDW RBC AUTO: 47.3 FL (ref 37–54)
EOSINOPHIL # BLD AUTO: 0.29 10*3/MM3 (ref 0–0.4)
EOSINOPHIL NFR BLD AUTO: 8.2 % (ref 0.3–6.2)
ERYTHROCYTE [DISTWIDTH] IN BLOOD BY AUTOMATED COUNT: 14.2 % (ref 12.3–15.4)
FERRITIN SERPL-MCNC: 105.6 NG/ML (ref 30–400)
HCT VFR BLD AUTO: 29.2 % (ref 37.5–51)
HGB BLD-MCNC: 9.5 G/DL (ref 13–17.7)
IMM GRANULOCYTES # BLD AUTO: 0.01 10*3/MM3 (ref 0–0.05)
IMM GRANULOCYTES NFR BLD AUTO: 0.3 % (ref 0–0.5)
IRON 24H UR-MRATE: 43 MCG/DL (ref 59–158)
IRON SATN MFR SERPL: 13 % (ref 14–48)
LYMPHOCYTES # BLD AUTO: 0.83 10*3/MM3 (ref 0.7–3.1)
LYMPHOCYTES NFR BLD AUTO: 23.5 % (ref 19.6–45.3)
MCH RBC QN AUTO: 30.4 PG (ref 26.6–33)
MCHC RBC AUTO-ENTMCNC: 32.5 G/DL (ref 31.5–35.7)
MCV RBC AUTO: 93.6 FL (ref 79–97)
MONOCYTES # BLD AUTO: 0.35 10*3/MM3 (ref 0.1–0.9)
MONOCYTES NFR BLD AUTO: 9.9 % (ref 5–12)
NEUTROPHILS NFR BLD AUTO: 2.02 10*3/MM3 (ref 1.7–7)
NEUTROPHILS NFR BLD AUTO: 57.3 % (ref 42.7–76)
NRBC BLD AUTO-RTO: 0 /100 WBC (ref 0–0.2)
PLATELET # BLD AUTO: 47 10*3/MM3 (ref 140–450)
PMV BLD AUTO: 11.7 FL (ref 6–12)
RBC # BLD AUTO: 3.12 10*6/MM3 (ref 4.14–5.8)
TIBC SERPL-MCNC: 332 MCG/DL (ref 249–505)
TRANSFERRIN SERPL-MCNC: 237 MG/DL (ref 200–360)
WBC NRBC COR # BLD: 3.53 10*3/MM3 (ref 3.4–10.8)

## 2022-12-13 PROCEDURE — 99215 OFFICE O/P EST HI 40 MIN: CPT | Performed by: INTERNAL MEDICINE

## 2022-12-13 PROCEDURE — 84466 ASSAY OF TRANSFERRIN: CPT

## 2022-12-13 PROCEDURE — 85025 COMPLETE CBC W/AUTO DIFF WBC: CPT

## 2022-12-13 PROCEDURE — 83540 ASSAY OF IRON: CPT

## 2022-12-13 PROCEDURE — 36415 COLL VENOUS BLD VENIPUNCTURE: CPT

## 2022-12-13 PROCEDURE — 82728 ASSAY OF FERRITIN: CPT

## 2022-12-13 RX ORDER — ALBUMIN (HUMAN) 12.5 G/50ML
12.5 SOLUTION INTRAVENOUS ONCE
Status: CANCELLED | OUTPATIENT
Start: 2022-12-29 | End: 2022-12-13

## 2022-12-13 NOTE — PROGRESS NOTES
.     REASON FOR FOLLOWUP:     *Pancytopenia secondary to liver cirrhosis   · Thrombocytopenia secondary to liver cirrhosis.      · Mild leukocytopenia and low normal neutrophils  *  Iron deficiency anemia discovered on 1/13/2021.  Patient was started on oral iron 3 times a day.   · Because of poor iron absorption, received 2 dose of Injectafer 750 mg x 2 in June 2022.   *Newly discovered liver lesion.  · Primary hepatocellular carcinoma, biopsy confirmed on 11/16/2022. Status post CT guided ablation of the liver lesion on 12/6/2022.       HISTORY OF PRESENT ILLNESS:  The patient is a 72 y.o. year old male with hypertension diabetes, pancytopenia secondary to liver cirrhosis caused by RODRIGUEZ, and hypogonadism, who presented today on 12/13/2022 for reevaluation after his recent CT-guided ablation of the liver lesion on 12/6/2022 by interventional radiologist Dr. Declan Goodson.    The patient reports that taking oral iron has been really bothering his stomach and he is now only taking once a day. He denies any melena or hematochezia. He also denies any fever or pain. The patient reports quite a bit significant constipation from the oral iron. The patient adds that he has gained some weight, weighing today at 177 pounds and has gained 8 pounds within the last 4 weeks. The patient reports that he has an appointment with Dr. Everardo Foote on 12/29/2022 and Dr. Phill Mooney on 1/27/2022 to discuss his shunt. He also has an EGD tomorrow, 12/14/2022, with Dr. Josh Khan.    Laboratory study today on 12/13/2022 reported hemoglobin 9.5 g/dL, MCV 93.6 fL, platelets 47,000/mm3, WBC 3,530/mm3 including neutrophils 2,020/mm3, and lymphocytes 830/mm3. Iron study reported ferritin 105.6 ng/mL, however, iron saturation 13% with free iron 43 mcg/dL and TIBC 332 mcg/dL.     Past Medical History:   Diagnosis Date   • H/O Dental disorder    • History of thrombocytopenia    • Hypertension    • RODRIGUEZ (nonalcoholic steatohepatitis)    •  Psoriasis    • Type 2 diabetes mellitus (HCC)      Past Surgical History:   Procedure Laterality Date   • CATARACT EXTRACTION, BILATERAL     • COLONOSCOPY  11/2015   • ROTATOR CUFF REPAIR Left 2005     HEMATOLOGY HISTORY:  The patient is a 70 y.o. year old male with history of hypertension diabetes, RODRIGUEZ, and hypogonadism who presented today on 1/13/2021 for initial evaluation because of thrombocytopenia, referred by his primary care physician Dr. Joseph.      Patient reports he has no limitation of activity.  He does have chronic joint pains, but of note joint swelling.  His skin pruritus.  No skin rashes.  He does have easy bruising, however denies evidence of bleeding such as epistaxis, gum bleeding, rectal bleeding or hematuria.  Denies weight loss.  No low fever.  Appetite is reasonable.    I reviewed his outside laboratory studies.  Most recent also lab on 10/22/2020 reported hemoglobin 10.5, MCV 89.6, WBC 3200, including neutrophils 1900, lymphocytes 800 monocytes 300, and platelets 62,000.    Liver study on 9/2/2020 reported WBC 3200, including ANC 1900 lymphocytes 1000 monocytes 300, hemoglobin 10.5, MCV 80.0, and platelets 65,000.    His earliest CBC results available for review was from 5/8/2019 which reported WBC 4200, including ANC 2600, lymphocytes 1200 and monocytes 400, hemoglobin 13.4 and platelets 95,000.  At that time chemistry lab reported elevated liver panel including  , alk phosphatase 87 and a total bilirubin 1.1.  Total serum protein 7.0 albumin 4.1 and the globulin 2.9.  His creatinine was 1.31, with glucose of 219 been in normal electrolytes.  Hemoglobin A1c was 8.3%.    Laboratory studies on 1/13/2021 reported mild anemia with Hb 10.9, thrombocytopenia platelets 48,000, and IPF 7.0%, leukocytopenia WBC 3250 including ANC 1830.  Other labs reported no evidence of hemolysis, had supratherapeutic B12 level 1413 pg/mL and folate > 20 ng/mL.  He does have evidence of iron  deficiency with low iron sats 10% and ferritin only 41.5 ng/mL in the setting of anemia with Hb 10.9.  His erythropoietin is not elevated accordingly, which indicates lack of response from his kidney to produce erythropoietin.     Currently is not a candidate for CONRAD/Procrit treatment.  However he is adamant he is a candidate for oral iron treatment.    We will start him on ferrous sulfate 325 mg 3 times a day.  Will send prescription to his pharmacy.     Laboratory study 6/11/2021 showed a slightly improved hemoglobin 11.9, with stable thrombocytopenia platelets 50,000 IPF 8.3%, and WBC 3720 including ANC 2100.  Iron study reported ferritin 115.3 ng/mL and iron saturation 14% with free iron 55 TIBC 384.    Laboratory studies on 12/17/2021 reported stable pancytopenia with anemia Hb 10.8, with platelets 60,000 and IPF 4.1%, and WBC 3780 including neutrophils 2060 lymphocytes 1040.  Iron study reported slightly trending down ferritin 86.1 ng/mL in the much improved iron saturation 34% with free iron 126 and TIBC 368.      On 6/10/2022 reports patient has abdomen distention for about 3 months.  He also developed bilateral leg edema in December 2021.  Patient also reports worsening dyspnea and weight gain for the past several months..  Per our records, he gained 25 pounds since December 2021.    Patient reports having taken oral iron 3 times a day.  He has mild nausea but not significant and no vomiting.  He has brown-colored stool.  He also reports having loose stool some of days.  No diarrhea.    Patient received a boost dose of COVID-19 vaccine on 11/6/2021.  Patient reports abdomen distention about 3 months, leg edema 6 months, after the boost dose of COVID (December) boost on 11/6/21    Patient complains of easy bruising on his arms from scratching, he has skin pruritus, however no skin rashes.  He denies spontaneous bleeding such as epistaxis, gum bleeding, or hematochezia.    Lab study on 6/10/2022 reported  deteriorating anemia with Hb 9.6, persistent thrombocytopenia with stable platelets 69,000, and low normal WBC 4290 including neutrophils 2980.  Iron studies showed a deteriorating saturation 17%, free iron 61 TIBC 357 with a ferritin 86.8%.    Patient indeed had paracentesis on 6/17/2022 with 11.2 L ascites fluid removed.  Cytology study was negative for malignancy.    Patient reports today that he has worsening abdominal distention, also has dyspnea however no cough hemoptysis.  No chest pain.  He denies fever sweating chills.  Patient also reports worsening bilateral leg edema.    He also recently finished her 2 dose of Injectafer in late June 2022 because of iron deficiency anemia, not responding to oral iron treatment 3 times a day.    Patient continues to have easy bruising however no spontaneous bleeding such as epistaxis or gum bleeding.    Laboratory study on 7/6/2022 reported stable anemia Hb 9.7 and stable thrombocytopenia platelets 65,000, low normal WBC 4260.  Patient has worsening renal function with creatinine 1.36, and supratherapeutic ferritin 644 ng/mL.    Chest x-ray examination on 7/14/2022 reported a right-sided moderate to large pleural effusion.  We will arrange patient to have ultrasound-guided right thoracentesis.  Suspect the patient has pleural effusion due to his liver cirrhosis.  He will have x-ray examination after the thoracentesis per protocol.  We will reassess whether he needs a CT scan for the chest.    This patient also had paracentesis again on 7/14/2022 with 6.2 L ascites fluid removed.      This patient had right thoracentesis with 2 L pleural effusion removed.  Cytology studies negative for malignant cells.  He also had multiple paracentesis, in June, July and early August, cytology study was also negative for malignant cells.    Laboratory study on 9/28/2022 reported worsening anemia Hb 8.5, platelets 61,000, WBC 3380 including ANC 1780.  Laboratory study showed significant  "hyperkalemia potassium 6.4, and worsening renal function creatinine 2.36.      I saw patient recently on 9/20/2022 and he was found having worsening renal function, with hyperkalemia potassium 6.4.  Patient was sent to ER and eventually admitted for several days.  His hemoglobin was 7.5 on 9/30/2022.  Patient was started on Procrit 20,000 units during hospitalization.  After discharge, he has been given Procrit every 2 weeks.     During his hospitalization in end of September 2022, patient had ultrasound for the liver examination on 9/29/2022 which reported a hepatic lesion 27 x 24 x 23 mm, heterogeneous in the anterior liver.  There was evidence of liver cirrhosis.      Patient also had attempted ultrasound-guided paracentesis, however there was not enough ascites to be removed.     Patient also had a venous Doppler study and there was no evidence for portal vein thrombosis.    Patient reports he was seen by his GI specialist Dr. Everardo Foote old records that abdomen MRI examination with and without contrast which was done at the [facility] on 10/26/2022.  This study confirmed liver cirrhosis however it also reported a solitary liver lesion.    This patient had CT-guided core needle biopsy of the liver lesion on 11/16/2022 after 1 unit platelets transfusion.  He did well with the procedure.  Patient reports he feels \"fine.\"  He denies any pain in the area. Denied any kind of bleeding. Denies any fevers after procedure as well.     Patient reports no fever sweating chills.  He is abdomen is slightly distended but no pains.  No nausea vomiting diarrhea.  He has no abdomen pain.  Performance status ECOG 1-2.     Patient reports he noticed improved energy level since starting on Procrit injection.  Overall he still has chronic fatigue.  He denies evidence of bleeding.    Laboratory study on 11/21/2022 reported baseline platelets 44,000, hemoglobin 8.9, and WBC 2980 including ANC " 8190.      MEDICATIONS    Current Outpatient Medications:   •  allopurinol (ZYLOPRIM) 100 MG tablet, Take 100 mg by mouth Daily., Disp: , Rfl:   •  clotrimazole-betamethasone (LOTRISONE) 1-0.05 % cream, Apply 1 application topically to the appropriate area as directed Daily., Disp: , Rfl:   •  ferrous sulfate 325 (65 FE) MG tablet, Take 1 tablet by mouth 3 (Three) Times a Day With Meals. (Patient taking differently: Take 325 mg by mouth Daily With Breakfast.), Disp: 90 tablet, Rfl: 2  •  FIBER PO, Take  by mouth., Disp: , Rfl:   •  FREESTYLE LITE test strip, , Disp: , Rfl:   •  glipizide (Glucotrol) 5 MG tablet, Take 1 tablet by mouth 2 (Two) Times a Day Before Meals., Disp: 60 tablet, Rfl: 2  •  multivitamin (THERAGRAN) tablet tablet, Take  by mouth Daily., Disp: , Rfl:   •  polycarbophil 625 MG tablet tablet, Take 1 tablet by mouth 3 (Three) Times a Day., Disp: , Rfl:   •  Polyethylene Glycol 3350 (MIRALAX PO), Take  by mouth., Disp: , Rfl:   •  potassium chloride (K-DUR,KLOR-CON) 20 MEQ CR tablet, Take 1 tablet by mouth Daily., Disp: , Rfl:   •  furosemide (LASIX) 40 MG tablet, Take 1 tablet by mouth Daily for 30 days., Disp: 30 tablet, Rfl: 0  •  spironolactone (ALDACTONE) 25 MG tablet, Take 1 tablet by mouth Daily for 30 days., Disp: 30 tablet, Rfl: 0  No current facility-administered medications for this visit.    Facility-Administered Medications Ordered in Other Visits:   •  epoetin sole (EPOGEN,PROCRIT) injection 25,000 Units, 25,000 Units, Subcutaneous, Once, Milagro Salinas MD PhD    ALLERGIES:     Allergies   Allergen Reactions   • Doxycycline Itching       SOCIAL HISTORY:         Social History     Socioeconomic History   • Marital status:      Spouse name: Lisa   Tobacco Use   • Smoking status: Former     Types: Cigarettes     Quit date:      Years since quittin.9   Vaping Use   • Vaping Use: Never used   Substance and Sexual Activity   • Alcohol use: Never   · As of 2021, patient  "reports he does drink alcoholic beverage 10 drinks per week.  Denies illegal drug use.      FAMILY HISTORY:  No cancer in family, mother HTN,           Vitals:    12/13/22 0946   BP: 150/67   Pulse: 67   Resp: 16   Temp: 97.3 °F (36.3 °C)   TempSrc: Temporal   SpO2: 98%   Weight: 80.3 kg (177 lb)   Height: 167.6 cm (65.98\")   PainSc: 0-No pain     ECOG 1       PHYSICAL EXAM:    GENERAL:  Well-developed, well-nourished male, in no acute distress.  Orientated to time place and the people.  SKIN:  Warm, dry without rashes, purpura or petechiae.  HEENT:  Normocephalic.  Wearing mask.   LYMPHATICS:  No cervical, supraclavicular adenopathy.  CHEST: Normal respiratory effort.  Lungs clear to auscultation. Good airflow.  CARDIAC:  Regular rate and rhythm without murmurs. Normal S1,S2.  ABDOMEN:  Soft, nontender with no organomegaly or masses.  Bowel sounds normal.  EXTREMITIES:  No clubbing, cyanosis or edema.  NEUROLOGICAL:  Grossly intact.  No focal neurological deficits.  PSYCHIATRIC:  Normal affect and mood.          RECENT LABS:  Lab Results   Component Value Date    WBC 3.53 12/13/2022    HGB 9.5 (L) 12/13/2022    HCT 29.2 (L) 12/13/2022    MCV 93.6 12/13/2022    PLT 47 (L) 12/13/2022     Lab Results   Component Value Date    NEUTROABS 2.02 12/13/2022     Lab Results   Component Value Date    GLUCOSE 145 (H) 12/06/2022    BUN 39 (H) 12/06/2022    CREATININE 2.07 (H) 12/06/2022    EGFRIFNONA 72 12/17/2021    BCR 18.8 12/06/2022    K 4.3 12/06/2022    CO2 21.9 (L) 12/06/2022    CALCIUM 9.5 12/06/2022    ALBUMIN 3.40 (L) 12/06/2022    AST 32 12/06/2022    ALT 22 12/06/2022       Lab Results   Component Value Date    IRON 43 (L) 12/13/2022    TIBC 332 12/13/2022    FERRITIN 105.60 12/13/2022   Iron saturation 19% on 10/21/2022       Lab Results   Component Value Date    FOLATE >20.00 01/13/2021     Lab Results   Component Value Date    IIIYWNRQ40 1,155 (H) 09/28/2022         IMAGING:   MRI of the abdomen with and without " contrast on 10/26/2022 at [facility]  INDICATION:     Nonalcoholic steatohepatitis. Increasing abdominal pain and liver enzyme levels over the past 3 months.     TECHNIQUE:   MRI of the abdomen with and without contrast.     COMPARISON:     CT 08/23/2022     FINDINGS:   Cirrhotic morphology of the liver. No significant hepatic fat around a position. Spleen enlarged measuring 17.9 cm. Moderate-sized right pleural effusion similar to slightly smaller. 8 mm cyst towards the dome of the liver. 3.2 cm hepatocellular carcinoma in the lateral right hepatic lobe. This lesion is not perceptible on the comparison unenhanced study. No other liver lesion. Portal vein is patent. Recannulated paraumbilical vein.     Renal cysts. Adrenal glands and pancreas unremarkable. Gallbladder wall thickening probably related to underlying liver disease. Bowel loops are nondilated.     IMPRESSION:   Cirrhosis. 3.2 cm hepatocellular carcinoma in the lateral right hepatic lobe.     Sequela of portal hypertension detailed above.       Assessment & Plan   *  Thrombocytopenia secondary to liver cirrhosis and splenomegaly.  · Etiology of the thrombocytopenia is not clear.  However I do think it is likely related to his fatty liver which was documented from abdomen ultrasound on 7/20/2012.    · Laboratory studies on 1/13/2021 reported excellent vitamin B12 level.  He had marginally elevated IPF 7.0%.  no apparent compensation for platelets production.   · On 6/11/2021, platelets improved at 63,000 with normal IPF 5.3%.  Patient is asymptomatic.    · On 12/17/2021, stable platelets 60,000 and normal IPF 4.1%. Patient is asymptomatic.   · On 6/10/2022 platelets 69,000.  Patient reports no spontaneous bleeding.    · On 7/6/2022 platelets 65,000.  No spontaneous bleeding.    · On 9/28/2022 platelets 61,000.  Patient has easy bruising on extremities, however no spontaneous bleeding such as melena hematochezia or gum bleeding.    · On  11/2/2022 platelets 48,000.  No change of clinical condition.  Continue to monitor.  · Patient was given 1 unit platelets transfusion on 11/15/2022, platelets improved at 70,000 11/16/2022 when he had successful CT-guided core needle biopsy of liver lesion.   · On 11/21/2022 platelets decreased at baseline level of 44,000.  No spontaneous bleeding.  We will arrange patient to receive platelets transfusion prior and post point interventional procedure with radiofrequency ablation of the primary hepatocellular carcinoma scheduled on 12/6/2022.   · On 12/5/2022, the patient had platelets 49,000/mm3. We gave him 1 unit of platelets transfusion before the ablation of the liver cancer and the posttransfusion platelets was only 56,000/mm3 on 12/6/2022.   · Today on 12/13/2022, platelets is 47,000/mm3. The patient is not having any bleeding or bruising.    *Iron deficiency anemia in the setting of chronic renal insufficiency stage III, and the liver cirrhosis.   · Laboratory study on 1/13/2021 reported no evidence of hemolysis, had supratherapeutic B12 level and folate.    · He does have evidence of iron deficiency with low iron sats 10% and ferritin only 41.5 ng/mL with Hb 10.9.    · His erythropoietin 16.1, is not elevated accordingly, which indicates lack of response from his kidney to produce erythropoietin. Currently is not a candidate for CONRAD/Procrit treatment.    · We started patient on oral ferrous sulfate 325 mg 3 times a day in January 2021.   · On 3/19/2021, patient has improved hemoglobin 11.9.  Continue to monitor.  · On 6/11/2021, stable anemia Hb 11.0, however much improved with ferritin 115.3 ng/mL, and iron saturation 14%.  Will advised to continue oral iron treatment.  · On 12/17/2021 stable Hb 10.8, much improved iron saturation 34% and free iron 126, however with trending down ferritin level 86.1 ng/mL. Continue oral iron treatment.  · 6/10/2022, patient reports taking oral iron 3 times a day.  No  apparent side effects except mild nausea.  Laboratory studies showed deteriorating hemoglobin 9.6 and also worsening iron saturation 17% with free iron 61 and the ferritin 86.8 ng/mL.  Discussed with the patient, recommended intravenous iron therapy, this patient has poor iron absorption.    · Patient received 2 dose of Injectafer 750 mg x 2 in June 2022.  Lab study today on 7/6/2022 reported improved normalized iron saturation 21% with a supratherapeutic ferritin 644 ng/mL.  · On 9/28/2022 ferritin 256 ng/mL iron saturation 22% free iron 66 TIBC 295.  Supratherapeutic B12 at 155 and normal RBC folate 2175 ng/mL.  · Patient had a hemoglobin 7.2 on 9/30/2022.  Was started on Procrit injection 20,000 units during hospitalization.  No PRBC transfusion.  · Procrit is subsequently continued as outpatient every 2 weeks.  · On 10/21/2022 ferritin 198 and free iron 56 TIBC 294 iron saturation 19%.   · Improved hemoglobin 9.2 on 11/2/2022.  We will continue Procrit 20,000 units every 2 weeks.  · Hemoglobin 8.9 on 11/21/2022.  Continue Procrit injection.  · The laboratory study today on 12/13/2022 reported deteriorating and recurrent iron deficiency with iron saturation only 13% with free iron 43 mcg/dL, TIBC 332 mcg/dL, and ferritin 105.6 ng/dL. The patient will be arranged for two doses of Injectafer prior to resumption of Procrit.    * Pancytopenia with mild leukocytopenia.  This is also likely related to his fatty liver and liver cirrhosis.  Patient drinks alcohol beverage as reported in January 2021.  · Patient reports no recurrent infection.  His neutrophils marginally normal at 1830 out of WBC 3250.  · Patient had supratherapeutic B12 level 1/13/2021.  · Marginally better WBC 3720 including ANC 2100 3/19/2021.    · 6/11/2021 WBC 3530, with ANC 2180.  No recurrent infection.    · On 12/17/2021 stable leukocytopenia WBC 3780 with low normal neutrophils 2016.  Patient is asymptomatic.  Continue to monitor.  · On 6/10/2022  patient has slightly better WBC 4290 and neutrophils 2980.  · On 7/6/2022 patient had WBC 4260 ANC 2620, lymphocytes 840 and monocytes 470.  · On 9/28/2022 WBC 3380, including ANC 1780, hemoglobin 8.5, and platelets 61,000.  · On 11/2/2022 total WBC 2970 including ANC 1730.  Continue to monitor.   · On 11/21/2022 WBC 2980 including ANC 1670 and lymphocytes 820.  Patient is afebrile.  Continue to monitor.  · On 12/13/2022, WBC 3,530/mm3 including neutrophils 2,020/mm3.    *COVID-19 vaccination.    · 6/11/2021, patient reports that he had 2 doses of COVID-19 vaccine, had very limited side effects with some soreness at the site of injection.   · On 12/17/2021, patient reports he received a booster dose of COVID-19 vaccine on 11/6/2021.    *Ascites secondary to liver cirrhosis.  · CT scan examination on 10/15/2021 confirmed liver cirrhosis.  · On 6/10/2022 patient reports abdomen distention for the past 3 months, weight gains, 25 pounds per our records, and also has worsening dyspnea.  Physical examination shows very distended abdomen.  I think this patient has large ascites secondary to his liver cirrhosis.  I recommended to have ultrasound-guided therapeutic paracentesis with samples for routine chemistry labs cell counts and also for cytology study.  · Patient also has bilateral leg edema which is also secondary to liver cirrhosis.  Patient was seen her primary care physician next week.  I think most likely he will need to be started on diuretics.  He also needs to follow-up with his GI specialist Dr. Everardo Foote to discuss possibility of  shunt.   · On 6/17/2022 patient had ultrasound-guided paracentesis, with 11.2 L clear yellow ascites removed.  Cytology study reported negative for malignant cells.  There was a reactive mesothelial cells histiocytes and mixed inflammatory cells.  · Today on 7/6/2022 follow-up, patient reports worsening abdomen distention again.  I will arrange patient to have therapeutic  paracentesis again as soon as possible, and also will arrange another paracentesis in 3 weeks.  · At the meantime I also recommended to start the patient on low-dose Lasix 20 mg daily on 7/6/2022.   · Paracentesis, with 6.4 L ascites removed 7/14/2022.  · Paracentesis 2.4 L ascites removed 8/4/2022.   · Failed attempt for paracentesis on 9/29/2022 during hospitalization, no ascites per ultrasound exam.  · On 12/13/2022, the patient has more distended abdomen and also has been gaining weight for more than 10 pounds in the past 2 to 3 months. We will obtain ultrasound-guided paracentesis again.    *Right pleural effusion.    · Patient also complains of dyspnea, x-ray examination reported right pleural effusion 7/14/2022..  · Patient had ultrasound-guided right thoracentesis with 2 L pleural effusion removed 7/27/2022.  Cytology studies negative for malignancy.  · On 8/4/2022 patient had another 2 L pleural effusion removed on the right side.  · The patient had a reaccumulation of the right pleural effusion as evidenced by his CT scan from 11/16/2022. We will arrange ultrasound-guided right thoracentesis.     *Worsening renal function.  · Laboratory study on 7/6/2022 reported creatinine 1.37.  He had creatinine 1.02 on 12/17/2021.  Discussed with patient, I recommended referring patient to nephrology service for evaluation of possible hepatorenal syndrome due to liver cirrhosis.  Patient is agreeable.  · On 9/28/2022 patient reports that he still has no appointment for nephrology service.  · On 11/2/2022 creatinine 2.34 with BUN 47.  · On 11/21/2022 creatinine 2.33 with BUN 41.  · On 12/6/22, cr was 2.07.      *Hepatocellular carcinoma.    · Newly discovered hepatic lesion by ultrasound examination on 9/29/2022.  · Patient is subsequently seen by Dr. Everardo Foote, had an MRI of the abdomen with and without contrast at the [facility] on 10/26/2022 which reported 3.2 cm liver lesion, likely hepatocellular  carcinoma.  · I recommended to have AFP liver study, also discussed with patient and his wife today on 11/2/2022 we will obtain the MRI images from [facility], and arrange CT-guided core needle biopsy.  Because of his thrombocytopenia, I will arrange 1 unit platelets transfusion right before the biopsy.  I did discuss with him about the risk for bleeding post the biopsy.  This is highly suspicious for primary hepatocellular carcinoma.   · Normal AFP 2.37 ng/mL on 11/2/2022.     · Patient had CT-guided core needle biopsy on 11/16/2022. Pathology evaluation reported moderately differentiated hepatocellular carcinoma.  · I discussed with the interventional radiologist Dr. Goodson about interventional procedure with radiofrequency ablation treatment for the solitary lesion in the liver, since this patient is not a candidate for surgical intervention due to multiple comorbidities. The procedure has been scheduled on 12/06/2022.  Due to his thrombocytopenia, we decided to transfuse him with 1 unit of platelets on 12/05/2022 and also also 1 unit platelets on 12/06/2022 after his procedure.  · The patient had CT-guided ablation procedure on 12/6/2022. The patient reports good tolerance, no pains, and no fevers after procedure. Interventional radiology, Dr. Goodson, recommended to obtain abdomen MRI with and without contrast with liver protocol 2 to 3 months after the procedure for reassessment.    *Diabetes.   · Today on 11/21/2022 patient had significant elevated glucose 397.  Patient reports that he is diabetic, however his metformin was discontinued during his most recent hospitalization, and he was not put on any other medication for that.  He reports this morning's blood glucose level was about 180.  Discussed with patient, I will give him 10 units insulin today in the clinic, and also will start him on glipizide 5 mg twice a day.  Patient is agreeable and will follow up with his primary care physician for  "management of diabetes.    PLAN:  1. Hold Procrit due to recurrent iron deficiency.  2. We will arrange the patient to have Injectafer 750 mg daily for two doses.  3. We will arrange ultrasound-guided right thoracentesis, as well as, paracentesis.   4. Continue glipizide for management of diabetes.  5. Keep appointment with Dr. Josh Khan tomorrow, 12/14/2022, for EGD examination.   6. Patient will keep appointment with Dr. Foote on 12/8/2022 and he may have to adjust the timing because of his multiple procedures scheduled.  7. Patient will follow up with his primary care physician for management of diabetes.      Discussed with the patient and his wife about the pathology results and further treatment of newly diagnosed hepatocellular carcinoma, and also ongoing treatment with Procrit.  Patient and his wife voiced understanding and agreement.      I communicated with interventional radiologist Dr. Goodson.             Milagro Salinas MD PhD       CC:  MD Everardo Cuello M.D. Ramsey Nassar.  MD Declan Goodson MD      Transcribed from ambient dictation for Milagro Salinas MD PhD by Anthony Wray.  12/13/22   13:25 EST      [facility] Provider Statement:[id number]::\"Patient or patient representative verbalized consent to the visit recording.\",\"I have personally performed the services described in this document as transcribed by the above individual, and it is both accurate and complete.\"    "

## 2022-12-13 NOTE — H&P (VIEW-ONLY)
.     REASON FOR FOLLOWUP:     *Pancytopenia secondary to liver cirrhosis   · Thrombocytopenia secondary to liver cirrhosis.      · Mild leukocytopenia and low normal neutrophils  *  Iron deficiency anemia discovered on 1/13/2021.  Patient was started on oral iron 3 times a day.   · Because of poor iron absorption, received 2 dose of Injectafer 750 mg x 2 in June 2022.   *Newly discovered liver lesion.  · Primary hepatocellular carcinoma, biopsy confirmed on 11/16/2022. Status post CT guided ablation of the liver lesion on 12/6/2022.       HISTORY OF PRESENT ILLNESS:  The patient is a 72 y.o. year old male with hypertension diabetes, pancytopenia secondary to liver cirrhosis caused by RODRIGUEZ, and hypogonadism, who presented today on 12/13/2022 for reevaluation after his recent CT-guided ablation of the liver lesion on 12/6/2022 by interventional radiologist Dr. Declan Goodson.    The patient reports that taking oral iron has been really bothering his stomach and he is now only taking once a day. He denies any melena or hematochezia. He also denies any fever or pain. The patient reports quite a bit significant constipation from the oral iron. The patient adds that he has gained some weight, weighing today at 177 pounds and has gained 8 pounds within the last 4 weeks. The patient reports that he has an appointment with Dr. Everardo Foote on 12/29/2022 and Dr. Phill Mooney on 1/27/2022 to discuss his shunt. He also has an EGD tomorrow, 12/14/2022, with Dr. Josh Khan.    Laboratory study today on 12/13/2022 reported hemoglobin 9.5 g/dL, MCV 93.6 fL, platelets 47,000/mm3, WBC 3,530/mm3 including neutrophils 2,020/mm3, and lymphocytes 830/mm3. Iron study reported ferritin 105.6 ng/mL, however, iron saturation 13% with free iron 43 mcg/dL and TIBC 332 mcg/dL.     Past Medical History:   Diagnosis Date   • H/O Dental disorder    • History of thrombocytopenia    • Hypertension    • RODRIGUEZ (nonalcoholic steatohepatitis)    •  Psoriasis    • Type 2 diabetes mellitus (HCC)      Past Surgical History:   Procedure Laterality Date   • CATARACT EXTRACTION, BILATERAL     • COLONOSCOPY  11/2015   • ROTATOR CUFF REPAIR Left 2005     HEMATOLOGY HISTORY:  The patient is a 70 y.o. year old male with history of hypertension diabetes, RODRIGUEZ, and hypogonadism who presented today on 1/13/2021 for initial evaluation because of thrombocytopenia, referred by his primary care physician Dr. Joseph.      Patient reports he has no limitation of activity.  He does have chronic joint pains, but of note joint swelling.  His skin pruritus.  No skin rashes.  He does have easy bruising, however denies evidence of bleeding such as epistaxis, gum bleeding, rectal bleeding or hematuria.  Denies weight loss.  No low fever.  Appetite is reasonable.    I reviewed his outside laboratory studies.  Most recent also lab on 10/22/2020 reported hemoglobin 10.5, MCV 89.6, WBC 3200, including neutrophils 1900, lymphocytes 800 monocytes 300, and platelets 62,000.    Liver study on 9/2/2020 reported WBC 3200, including ANC 1900 lymphocytes 1000 monocytes 300, hemoglobin 10.5, MCV 80.0, and platelets 65,000.    His earliest CBC results available for review was from 5/8/2019 which reported WBC 4200, including ANC 2600, lymphocytes 1200 and monocytes 400, hemoglobin 13.4 and platelets 95,000.  At that time chemistry lab reported elevated liver panel including  , alk phosphatase 87 and a total bilirubin 1.1.  Total serum protein 7.0 albumin 4.1 and the globulin 2.9.  His creatinine was 1.31, with glucose of 219 been in normal electrolytes.  Hemoglobin A1c was 8.3%.    Laboratory studies on 1/13/2021 reported mild anemia with Hb 10.9, thrombocytopenia platelets 48,000, and IPF 7.0%, leukocytopenia WBC 3250 including ANC 1830.  Other labs reported no evidence of hemolysis, had supratherapeutic B12 level 1413 pg/mL and folate > 20 ng/mL.  He does have evidence of iron  deficiency with low iron sats 10% and ferritin only 41.5 ng/mL in the setting of anemia with Hb 10.9.  His erythropoietin is not elevated accordingly, which indicates lack of response from his kidney to produce erythropoietin.     Currently is not a candidate for CONRAD/Procrit treatment.  However he is adamant he is a candidate for oral iron treatment.    We will start him on ferrous sulfate 325 mg 3 times a day.  Will send prescription to his pharmacy.     Laboratory study 6/11/2021 showed a slightly improved hemoglobin 11.9, with stable thrombocytopenia platelets 50,000 IPF 8.3%, and WBC 3720 including ANC 2100.  Iron study reported ferritin 115.3 ng/mL and iron saturation 14% with free iron 55 TIBC 384.    Laboratory studies on 12/17/2021 reported stable pancytopenia with anemia Hb 10.8, with platelets 60,000 and IPF 4.1%, and WBC 3780 including neutrophils 2060 lymphocytes 1040.  Iron study reported slightly trending down ferritin 86.1 ng/mL in the much improved iron saturation 34% with free iron 126 and TIBC 368.      On 6/10/2022 reports patient has abdomen distention for about 3 months.  He also developed bilateral leg edema in December 2021.  Patient also reports worsening dyspnea and weight gain for the past several months..  Per our records, he gained 25 pounds since December 2021.    Patient reports having taken oral iron 3 times a day.  He has mild nausea but not significant and no vomiting.  He has brown-colored stool.  He also reports having loose stool some of days.  No diarrhea.    Patient received a boost dose of COVID-19 vaccine on 11/6/2021.  Patient reports abdomen distention about 3 months, leg edema 6 months, after the boost dose of COVID (December) boost on 11/6/21    Patient complains of easy bruising on his arms from scratching, he has skin pruritus, however no skin rashes.  He denies spontaneous bleeding such as epistaxis, gum bleeding, or hematochezia.    Lab study on 6/10/2022 reported  deteriorating anemia with Hb 9.6, persistent thrombocytopenia with stable platelets 69,000, and low normal WBC 4290 including neutrophils 2980.  Iron studies showed a deteriorating saturation 17%, free iron 61 TIBC 357 with a ferritin 86.8%.    Patient indeed had paracentesis on 6/17/2022 with 11.2 L ascites fluid removed.  Cytology study was negative for malignancy.    Patient reports today that he has worsening abdominal distention, also has dyspnea however no cough hemoptysis.  No chest pain.  He denies fever sweating chills.  Patient also reports worsening bilateral leg edema.    He also recently finished her 2 dose of Injectafer in late June 2022 because of iron deficiency anemia, not responding to oral iron treatment 3 times a day.    Patient continues to have easy bruising however no spontaneous bleeding such as epistaxis or gum bleeding.    Laboratory study on 7/6/2022 reported stable anemia Hb 9.7 and stable thrombocytopenia platelets 65,000, low normal WBC 4260.  Patient has worsening renal function with creatinine 1.36, and supratherapeutic ferritin 644 ng/mL.    Chest x-ray examination on 7/14/2022 reported a right-sided moderate to large pleural effusion.  We will arrange patient to have ultrasound-guided right thoracentesis.  Suspect the patient has pleural effusion due to his liver cirrhosis.  He will have x-ray examination after the thoracentesis per protocol.  We will reassess whether he needs a CT scan for the chest.    This patient also had paracentesis again on 7/14/2022 with 6.2 L ascites fluid removed.      This patient had right thoracentesis with 2 L pleural effusion removed.  Cytology studies negative for malignant cells.  He also had multiple paracentesis, in June, July and early August, cytology study was also negative for malignant cells.    Laboratory study on 9/28/2022 reported worsening anemia Hb 8.5, platelets 61,000, WBC 3380 including ANC 1780.  Laboratory study showed significant  hyperkalemia potassium 6.4, and worsening renal function creatinine 2.36.      I saw patient recently on 9/20/2022 and he was found having worsening renal function, with hyperkalemia potassium 6.4.  Patient was sent to ER and eventually admitted for several days.  His hemoglobin was 7.5 on 9/30/2022.  Patient was started on Procrit 20,000 units during hospitalization.  After discharge, he has been given Procrit every 2 weeks.     During his hospitalization in end of September 2022, patient had ultrasound for the liver examination on 9/29/2022 which reported a hepatic lesion 27 x 24 x 23 mm, heterogeneous in the anterior liver.  There was evidence of liver cirrhosis.      Patient also had attempted ultrasound-guided paracentesis, however there was not enough ascites to be removed.     Patient also had a venous Doppler study and there was no evidence for portal vein thrombosis.    Patient reports he was seen by his GI specialist Dr. Everardo Foote old records that abdomen MRI examination with and without contrast which was done at the [facility] on 10/26/2022.  This study confirmed liver cirrhosis however it also reported a solitary liver lesion.    This patient had CT-guided core needle biopsy of the liver lesion on 11/16/2022 after 1 unit platelets transfusion.  He did well with the procedure.  Patient reports he feels \"fine.\"  He denies any pain in the area. Denied any kind of bleeding. Denies any fevers after procedure as well.     Patient reports no fever sweating chills.  He is abdomen is slightly distended but no pains.  No nausea vomiting diarrhea.  He has no abdomen pain.  Performance status ECOG 1-2.     Patient reports he noticed improved energy level since starting on Procrit injection.  Overall he still has chronic fatigue.  He denies evidence of bleeding.    Laboratory study on 11/21/2022 reported baseline platelets 44,000, hemoglobin 8.9, and WBC 2980 including ANC  4580.      MEDICATIONS    Current Outpatient Medications:   •  allopurinol (ZYLOPRIM) 100 MG tablet, Take 100 mg by mouth Daily., Disp: , Rfl:   •  clotrimazole-betamethasone (LOTRISONE) 1-0.05 % cream, Apply 1 application topically to the appropriate area as directed Daily., Disp: , Rfl:   •  ferrous sulfate 325 (65 FE) MG tablet, Take 1 tablet by mouth 3 (Three) Times a Day With Meals. (Patient taking differently: Take 325 mg by mouth Daily With Breakfast.), Disp: 90 tablet, Rfl: 2  •  FIBER PO, Take  by mouth., Disp: , Rfl:   •  FREESTYLE LITE test strip, , Disp: , Rfl:   •  glipizide (Glucotrol) 5 MG tablet, Take 1 tablet by mouth 2 (Two) Times a Day Before Meals., Disp: 60 tablet, Rfl: 2  •  multivitamin (THERAGRAN) tablet tablet, Take  by mouth Daily., Disp: , Rfl:   •  polycarbophil 625 MG tablet tablet, Take 1 tablet by mouth 3 (Three) Times a Day., Disp: , Rfl:   •  Polyethylene Glycol 3350 (MIRALAX PO), Take  by mouth., Disp: , Rfl:   •  potassium chloride (K-DUR,KLOR-CON) 20 MEQ CR tablet, Take 1 tablet by mouth Daily., Disp: , Rfl:   •  furosemide (LASIX) 40 MG tablet, Take 1 tablet by mouth Daily for 30 days., Disp: 30 tablet, Rfl: 0  •  spironolactone (ALDACTONE) 25 MG tablet, Take 1 tablet by mouth Daily for 30 days., Disp: 30 tablet, Rfl: 0  No current facility-administered medications for this visit.    Facility-Administered Medications Ordered in Other Visits:   •  epoetin sole (EPOGEN,PROCRIT) injection 25,000 Units, 25,000 Units, Subcutaneous, Once, Milagro Salinas MD PhD    ALLERGIES:     Allergies   Allergen Reactions   • Doxycycline Itching       SOCIAL HISTORY:         Social History     Socioeconomic History   • Marital status:      Spouse name: Lisa   Tobacco Use   • Smoking status: Former     Types: Cigarettes     Quit date:      Years since quittin.9   Vaping Use   • Vaping Use: Never used   Substance and Sexual Activity   • Alcohol use: Never   · As of 2021, patient  reports he does drink alcoholic beverage 10 drinks per week.  Denies illegal drug use.      FAMILY HISTORY:  No cancer in family, mother HTN,           Vitals:    12/13/22 0946   BP: 150/67   Pulse: 67   Resp: 16   Temp: 97.3 °F (36.3 °C)   TempSrc: Temporal   SpO2: 98%   Weight: 80.3 kg (177 lb)   Height: 167.6 cm (65.98\")   PainSc: 0-No pain     ECOG 1       PHYSICAL EXAM:    GENERAL:  Well-developed, well-nourished male, in no acute distress.  Orientated to time place and the people.  SKIN:  Warm, dry without rashes, purpura or petechiae.  HEENT:  Normocephalic.  Wearing mask.   LYMPHATICS:  No cervical, supraclavicular adenopathy.  CHEST: Normal respiratory effort.  Lungs clear to auscultation. Good airflow.  CARDIAC:  Regular rate and rhythm without murmurs. Normal S1,S2.  ABDOMEN:  Soft, nontender with no organomegaly or masses.  Bowel sounds normal.  EXTREMITIES:  No clubbing, cyanosis or edema.  NEUROLOGICAL:  Grossly intact.  No focal neurological deficits.  PSYCHIATRIC:  Normal affect and mood.          RECENT LABS:  Lab Results   Component Value Date    WBC 3.53 12/13/2022    HGB 9.5 (L) 12/13/2022    HCT 29.2 (L) 12/13/2022    MCV 93.6 12/13/2022    PLT 47 (L) 12/13/2022     Lab Results   Component Value Date    NEUTROABS 2.02 12/13/2022     Lab Results   Component Value Date    GLUCOSE 145 (H) 12/06/2022    BUN 39 (H) 12/06/2022    CREATININE 2.07 (H) 12/06/2022    EGFRIFNONA 72 12/17/2021    BCR 18.8 12/06/2022    K 4.3 12/06/2022    CO2 21.9 (L) 12/06/2022    CALCIUM 9.5 12/06/2022    ALBUMIN 3.40 (L) 12/06/2022    AST 32 12/06/2022    ALT 22 12/06/2022       Lab Results   Component Value Date    IRON 43 (L) 12/13/2022    TIBC 332 12/13/2022    FERRITIN 105.60 12/13/2022   Iron saturation 19% on 10/21/2022       Lab Results   Component Value Date    FOLATE >20.00 01/13/2021     Lab Results   Component Value Date    CGETLZGM97 1,155 (H) 09/28/2022         IMAGING:   MRI of the abdomen with and without  contrast on 10/26/2022 at [facility]  INDICATION:     Nonalcoholic steatohepatitis. Increasing abdominal pain and liver enzyme levels over the past 3 months.     TECHNIQUE:   MRI of the abdomen with and without contrast.     COMPARISON:     CT 08/23/2022     FINDINGS:   Cirrhotic morphology of the liver. No significant hepatic fat around a position. Spleen enlarged measuring 17.9 cm. Moderate-sized right pleural effusion similar to slightly smaller. 8 mm cyst towards the dome of the liver. 3.2 cm hepatocellular carcinoma in the lateral right hepatic lobe. This lesion is not perceptible on the comparison unenhanced study. No other liver lesion. Portal vein is patent. Recannulated paraumbilical vein.     Renal cysts. Adrenal glands and pancreas unremarkable. Gallbladder wall thickening probably related to underlying liver disease. Bowel loops are nondilated.     IMPRESSION:   Cirrhosis. 3.2 cm hepatocellular carcinoma in the lateral right hepatic lobe.     Sequela of portal hypertension detailed above.       Assessment & Plan   *  Thrombocytopenia secondary to liver cirrhosis and splenomegaly.  · Etiology of the thrombocytopenia is not clear.  However I do think it is likely related to his fatty liver which was documented from abdomen ultrasound on 7/20/2012.    · Laboratory studies on 1/13/2021 reported excellent vitamin B12 level.  He had marginally elevated IPF 7.0%.  no apparent compensation for platelets production.   · On 6/11/2021, platelets improved at 63,000 with normal IPF 5.3%.  Patient is asymptomatic.    · On 12/17/2021, stable platelets 60,000 and normal IPF 4.1%. Patient is asymptomatic.   · On 6/10/2022 platelets 69,000.  Patient reports no spontaneous bleeding.    · On 7/6/2022 platelets 65,000.  No spontaneous bleeding.    · On 9/28/2022 platelets 61,000.  Patient has easy bruising on extremities, however no spontaneous bleeding such as melena hematochezia or gum bleeding.    · On  11/2/2022 platelets 48,000.  No change of clinical condition.  Continue to monitor.  · Patient was given 1 unit platelets transfusion on 11/15/2022, platelets improved at 70,000 11/16/2022 when he had successful CT-guided core needle biopsy of liver lesion.   · On 11/21/2022 platelets decreased at baseline level of 44,000.  No spontaneous bleeding.  We will arrange patient to receive platelets transfusion prior and post point interventional procedure with radiofrequency ablation of the primary hepatocellular carcinoma scheduled on 12/6/2022.   · On 12/5/2022, the patient had platelets 49,000/mm3. We gave him 1 unit of platelets transfusion before the ablation of the liver cancer and the posttransfusion platelets was only 56,000/mm3 on 12/6/2022.   · Today on 12/13/2022, platelets is 47,000/mm3. The patient is not having any bleeding or bruising.    *Iron deficiency anemia in the setting of chronic renal insufficiency stage III, and the liver cirrhosis.   · Laboratory study on 1/13/2021 reported no evidence of hemolysis, had supratherapeutic B12 level and folate.    · He does have evidence of iron deficiency with low iron sats 10% and ferritin only 41.5 ng/mL with Hb 10.9.    · His erythropoietin 16.1, is not elevated accordingly, which indicates lack of response from his kidney to produce erythropoietin. Currently is not a candidate for CONRAD/Procrit treatment.    · We started patient on oral ferrous sulfate 325 mg 3 times a day in January 2021.   · On 3/19/2021, patient has improved hemoglobin 11.9.  Continue to monitor.  · On 6/11/2021, stable anemia Hb 11.0, however much improved with ferritin 115.3 ng/mL, and iron saturation 14%.  Will advised to continue oral iron treatment.  · On 12/17/2021 stable Hb 10.8, much improved iron saturation 34% and free iron 126, however with trending down ferritin level 86.1 ng/mL. Continue oral iron treatment.  · 6/10/2022, patient reports taking oral iron 3 times a day.  No  apparent side effects except mild nausea.  Laboratory studies showed deteriorating hemoglobin 9.6 and also worsening iron saturation 17% with free iron 61 and the ferritin 86.8 ng/mL.  Discussed with the patient, recommended intravenous iron therapy, this patient has poor iron absorption.    · Patient received 2 dose of Injectafer 750 mg x 2 in June 2022.  Lab study today on 7/6/2022 reported improved normalized iron saturation 21% with a supratherapeutic ferritin 644 ng/mL.  · On 9/28/2022 ferritin 256 ng/mL iron saturation 22% free iron 66 TIBC 295.  Supratherapeutic B12 at 155 and normal RBC folate 2175 ng/mL.  · Patient had a hemoglobin 7.2 on 9/30/2022.  Was started on Procrit injection 20,000 units during hospitalization.  No PRBC transfusion.  · Procrit is subsequently continued as outpatient every 2 weeks.  · On 10/21/2022 ferritin 198 and free iron 56 TIBC 294 iron saturation 19%.   · Improved hemoglobin 9.2 on 11/2/2022.  We will continue Procrit 20,000 units every 2 weeks.  · Hemoglobin 8.9 on 11/21/2022.  Continue Procrit injection.  · The laboratory study today on 12/13/2022 reported deteriorating and recurrent iron deficiency with iron saturation only 13% with free iron 43 mcg/dL, TIBC 332 mcg/dL, and ferritin 105.6 ng/dL. The patient will be arranged for two doses of Injectafer prior to resumption of Procrit.    * Pancytopenia with mild leukocytopenia.  This is also likely related to his fatty liver and liver cirrhosis.  Patient drinks alcohol beverage as reported in January 2021.  · Patient reports no recurrent infection.  His neutrophils marginally normal at 1830 out of WBC 3250.  · Patient had supratherapeutic B12 level 1/13/2021.  · Marginally better WBC 3720 including ANC 2100 3/19/2021.    · 6/11/2021 WBC 3530, with ANC 2180.  No recurrent infection.    · On 12/17/2021 stable leukocytopenia WBC 3780 with low normal neutrophils 2016.  Patient is asymptomatic.  Continue to monitor.  · On 6/10/2022  patient has slightly better WBC 4290 and neutrophils 2980.  · On 7/6/2022 patient had WBC 4260 ANC 2620, lymphocytes 840 and monocytes 470.  · On 9/28/2022 WBC 3380, including ANC 1780, hemoglobin 8.5, and platelets 61,000.  · On 11/2/2022 total WBC 2970 including ANC 1730.  Continue to monitor.   · On 11/21/2022 WBC 2980 including ANC 1670 and lymphocytes 820.  Patient is afebrile.  Continue to monitor.  · On 12/13/2022, WBC 3,530/mm3 including neutrophils 2,020/mm3.    *COVID-19 vaccination.    · 6/11/2021, patient reports that he had 2 doses of COVID-19 vaccine, had very limited side effects with some soreness at the site of injection.   · On 12/17/2021, patient reports he received a booster dose of COVID-19 vaccine on 11/6/2021.    *Ascites secondary to liver cirrhosis.  · CT scan examination on 10/15/2021 confirmed liver cirrhosis.  · On 6/10/2022 patient reports abdomen distention for the past 3 months, weight gains, 25 pounds per our records, and also has worsening dyspnea.  Physical examination shows very distended abdomen.  I think this patient has large ascites secondary to his liver cirrhosis.  I recommended to have ultrasound-guided therapeutic paracentesis with samples for routine chemistry labs cell counts and also for cytology study.  · Patient also has bilateral leg edema which is also secondary to liver cirrhosis.  Patient was seen her primary care physician next week.  I think most likely he will need to be started on diuretics.  He also needs to follow-up with his GI specialist Dr. Everardo Foote to discuss possibility of  shunt.   · On 6/17/2022 patient had ultrasound-guided paracentesis, with 11.2 L clear yellow ascites removed.  Cytology study reported negative for malignant cells.  There was a reactive mesothelial cells histiocytes and mixed inflammatory cells.  · Today on 7/6/2022 follow-up, patient reports worsening abdomen distention again.  I will arrange patient to have therapeutic  paracentesis again as soon as possible, and also will arrange another paracentesis in 3 weeks.  · At the meantime I also recommended to start the patient on low-dose Lasix 20 mg daily on 7/6/2022.   · Paracentesis, with 6.4 L ascites removed 7/14/2022.  · Paracentesis 2.4 L ascites removed 8/4/2022.   · Failed attempt for paracentesis on 9/29/2022 during hospitalization, no ascites per ultrasound exam.  · On 12/13/2022, the patient has more distended abdomen and also has been gaining weight for more than 10 pounds in the past 2 to 3 months. We will obtain ultrasound-guided paracentesis again.    *Right pleural effusion.    · Patient also complains of dyspnea, x-ray examination reported right pleural effusion 7/14/2022..  · Patient had ultrasound-guided right thoracentesis with 2 L pleural effusion removed 7/27/2022.  Cytology studies negative for malignancy.  · On 8/4/2022 patient had another 2 L pleural effusion removed on the right side.  · The patient had a reaccumulation of the right pleural effusion as evidenced by his CT scan from 11/16/2022. We will arrange ultrasound-guided right thoracentesis.     *Worsening renal function.  · Laboratory study on 7/6/2022 reported creatinine 1.37.  He had creatinine 1.02 on 12/17/2021.  Discussed with patient, I recommended referring patient to nephrology service for evaluation of possible hepatorenal syndrome due to liver cirrhosis.  Patient is agreeable.  · On 9/28/2022 patient reports that he still has no appointment for nephrology service.  · On 11/2/2022 creatinine 2.34 with BUN 47.  · On 11/21/2022 creatinine 2.33 with BUN 41.  · On 12/6/22, cr was 2.07.      *Hepatocellular carcinoma.    · Newly discovered hepatic lesion by ultrasound examination on 9/29/2022.  · Patient is subsequently seen by Dr. Everardo Foote, had an MRI of the abdomen with and without contrast at the [facility] on 10/26/2022 which reported 3.2 cm liver lesion, likely hepatocellular  carcinoma.  · I recommended to have AFP liver study, also discussed with patient and his wife today on 11/2/2022 we will obtain the MRI images from [facility], and arrange CT-guided core needle biopsy.  Because of his thrombocytopenia, I will arrange 1 unit platelets transfusion right before the biopsy.  I did discuss with him about the risk for bleeding post the biopsy.  This is highly suspicious for primary hepatocellular carcinoma.   · Normal AFP 2.37 ng/mL on 11/2/2022.     · Patient had CT-guided core needle biopsy on 11/16/2022. Pathology evaluation reported moderately differentiated hepatocellular carcinoma.  · I discussed with the interventional radiologist Dr. Goodson about interventional procedure with radiofrequency ablation treatment for the solitary lesion in the liver, since this patient is not a candidate for surgical intervention due to multiple comorbidities. The procedure has been scheduled on 12/06/2022.  Due to his thrombocytopenia, we decided to transfuse him with 1 unit of platelets on 12/05/2022 and also also 1 unit platelets on 12/06/2022 after his procedure.  · The patient had CT-guided ablation procedure on 12/6/2022. The patient reports good tolerance, no pains, and no fevers after procedure. Interventional radiology, Dr. Goodson, recommended to obtain abdomen MRI with and without contrast with liver protocol 2 to 3 months after the procedure for reassessment.    *Diabetes.   · Today on 11/21/2022 patient had significant elevated glucose 397.  Patient reports that he is diabetic, however his metformin was discontinued during his most recent hospitalization, and he was not put on any other medication for that.  He reports this morning's blood glucose level was about 180.  Discussed with patient, I will give him 10 units insulin today in the clinic, and also will start him on glipizide 5 mg twice a day.  Patient is agreeable and will follow up with his primary care physician for  management of diabetes.    PLAN:  1. Hold Procrit due to recurrent iron deficiency.  2. We will arrange the patient to have Injectafer 750 mg daily for two doses.  3. We will arrange ultrasound-guided right thoracentesis, as well as, paracentesis.   4. Continue glipizide for management of diabetes.  5. Keep appointment with Dr. Josh Khan tomorrow, 12/14/2022, for EGD examination.   6. Patient will keep appointment with Dr. Foote on 12/8/2022 and he may have to adjust the timing because of his multiple procedures scheduled.  7. Patient will follow up with his primary care physician for management of diabetes.      Discussed with the patient and his wife about the pathology results and further treatment of newly diagnosed hepatocellular carcinoma, and also ongoing treatment with Procrit.  Patient and his wife voiced understanding and agreement.      I communicated with interventional radiologist Dr. Goodson.             Milagro Salinas MD PhD       CC:  MD Everardo Cuello M.D. Ramsey Nassar.  MD Declan Goodson MD      Transcribed from ambient dictation for Milagro Salinas MD PhD by Anthony Wray.  12/13/22   13:25 EST      [facility] Provider Statement:[id number]::\"Patient or patient representative verbalized consent to the visit recording.\",\"I have personally performed the services described in this document as transcribed by the above individual, and it is both accurate and complete.\"

## 2022-12-14 ENCOUNTER — ON CAMPUS - OUTPATIENT (OUTPATIENT)
Dept: URBAN - METROPOLITAN AREA HOSPITAL 108 | Facility: HOSPITAL | Age: 72
End: 2022-12-14

## 2022-12-14 DIAGNOSIS — K29.80 DUODENITIS WITHOUT BLEEDING: ICD-10-CM

## 2022-12-14 DIAGNOSIS — B96.81 HELICOBACTER PYLORI [H. PYLORI] AS THE CAUSE OF DISEASES CLA: ICD-10-CM

## 2022-12-14 DIAGNOSIS — K29.70 GASTRITIS, UNSPECIFIED, WITHOUT BLEEDING: ICD-10-CM

## 2022-12-14 DIAGNOSIS — I85.00 ESOPHAGEAL VARICES WITHOUT BLEEDING: ICD-10-CM

## 2022-12-14 DIAGNOSIS — K74.69 OTHER CIRRHOSIS OF LIVER: ICD-10-CM

## 2022-12-14 PROCEDURE — 43244 EGD VARICES LIGATION: CPT | Performed by: INTERNAL MEDICINE

## 2022-12-14 PROCEDURE — 43239 EGD BIOPSY SINGLE/MULTIPLE: CPT | Mod: 59 | Performed by: INTERNAL MEDICINE

## 2022-12-17 NOTE — PROGRESS NOTES
12/29/22 0006   Pre-Procedure Phone Call   Procedure Time Verified Yes   Arrival Time 1130   Procedure Location Verified Yes   Medical History Reviewed No   NPO Status Reinforced Yes   Ride and Caregiver Arranged Yes   Phone Number for Ride/Caregiver pt having a thoracentesis at 1130 prior to this procedure (paracentesis).   Patient Knows to Bring Current Medications No   Bring Outside Films Requested No

## 2022-12-17 NOTE — PROGRESS NOTES
12/29/22 0007   Pre-Procedure Phone Call   Procedure Time Verified Yes   Arrival Time 1130   Procedure Location Verified Yes   Medical History Reviewed No   NPO Status Reinforced Yes   Ride and Caregiver Arranged Yes   Patient Knows to Bring Current Medications No   Bring Outside Films Requested No

## 2022-12-29 ENCOUNTER — OFFICE (OUTPATIENT)
Dept: URBAN - METROPOLITAN AREA CLINIC 76 | Facility: CLINIC | Age: 72
End: 2022-12-29

## 2022-12-29 ENCOUNTER — HOSPITAL ENCOUNTER (OUTPATIENT)
Dept: ULTRASOUND IMAGING | Facility: HOSPITAL | Age: 72
Discharge: HOME | End: 2022-12-29
Payer: MEDICARE

## 2022-12-29 ENCOUNTER — HOSPITAL ENCOUNTER (OUTPATIENT)
Dept: RADIOLOGY | Facility: HOSPITAL | Age: 72
End: 2022-12-29
Payer: MEDICARE

## 2022-12-29 ENCOUNTER — HOSPITAL ENCOUNTER (OUTPATIENT)
Dept: ULTRASOUND IMAGING | Facility: HOSPITAL | Age: 72
End: 2022-12-29
Payer: MEDICARE

## 2022-12-29 ENCOUNTER — APPOINTMENT (OUTPATIENT)
Dept: ULTRASOUND IMAGING | Facility: HOSPITAL | Age: 72
End: 2022-12-29

## 2022-12-29 ENCOUNTER — APPOINTMENT (OUTPATIENT)
Dept: ULTRASOUND IMAGING | Facility: HOSPITAL | Age: 72
End: 2022-12-29
Payer: MEDICARE

## 2022-12-29 VITALS
HEART RATE: 84 BPM | SYSTOLIC BLOOD PRESSURE: 132 MMHG | OXYGEN SATURATION: 100 % | BODY MASS INDEX: 28.12 KG/M2 | HEIGHT: 66 IN | DIASTOLIC BLOOD PRESSURE: 58 MMHG | RESPIRATION RATE: 16 BRPM | TEMPERATURE: 98.6 F | WEIGHT: 175 LBS

## 2022-12-29 VITALS
HEART RATE: 78 BPM | WEIGHT: 175 LBS | DIASTOLIC BLOOD PRESSURE: 53 MMHG | SYSTOLIC BLOOD PRESSURE: 113 MMHG | HEIGHT: 66 IN

## 2022-12-29 DIAGNOSIS — K70.31 ASCITES DUE TO ALCOHOLIC CIRRHOSIS: ICD-10-CM

## 2022-12-29 DIAGNOSIS — J90 PLEURAL EFFUSION, RIGHT: ICD-10-CM

## 2022-12-29 DIAGNOSIS — C22.0 HEPATOCELLULAR CARCINOMA: ICD-10-CM

## 2022-12-29 DIAGNOSIS — K74.60 UNSPECIFIED CIRRHOSIS OF LIVER: ICD-10-CM

## 2022-12-29 DIAGNOSIS — K75.81 NONALCOHOLIC STEATOHEPATITIS (NASH): ICD-10-CM

## 2022-12-29 DIAGNOSIS — Z86.010 PERSONAL HISTORY OF COLONIC POLYPS: ICD-10-CM

## 2022-12-29 DIAGNOSIS — R18.8 OTHER ASCITES: ICD-10-CM

## 2022-12-29 DIAGNOSIS — R79.89 OTHER SPECIFIED ABNORMAL FINDINGS OF BLOOD CHEMISTRY: ICD-10-CM

## 2022-12-29 DIAGNOSIS — C22.0 LIVER CELL CARCINOMA: ICD-10-CM

## 2022-12-29 DIAGNOSIS — I85.00 ESOPHAGEAL VARICES WITHOUT BLEEDING: ICD-10-CM

## 2022-12-29 PROBLEM — C80.1 CANCER: Status: ACTIVE | Noted: 2022-12-29

## 2022-12-29 LAB
ALBUMIN SERPL-MCNC: 3.6 G/DL (ref 3.5–5.2)
ALBUMIN/GLOB SERPL: 1.2 G/DL
ALP SERPL-CCNC: 91 U/L (ref 39–117)
ALPHA-FETOPROTEIN: 2.12 NG/ML (ref 0–8.3)
ALT SERPL W P-5'-P-CCNC: 34 U/L (ref 1–41)
ANION GAP SERPL CALCULATED.3IONS-SCNC: 7.9 MMOL/L (ref 5–15)
AST SERPL-CCNC: 61 U/L (ref 1–40)
BASOPHILS # BLD MANUAL: 0.02 10*3/MM3 (ref 0–0.2)
BASOPHILS NFR BLD MANUAL: 1.1 % (ref 0–1.5)
BILIRUB SERPL-MCNC: 0.5 MG/DL (ref 0–1.2)
BUN SERPL-MCNC: 45 MG/DL (ref 8–23)
BUN/CREAT SERPL: 17.1 (ref 7–25)
CALCIUM SPEC-SCNC: 8.8 MG/DL (ref 8.6–10.5)
CHLORIDE SERPL-SCNC: 111 MMOL/L (ref 98–107)
CO2 SERPL-SCNC: 20.1 MMOL/L (ref 22–29)
CREAT SERPL-MCNC: 2.63 MG/DL (ref 0.76–1.27)
DEPRECATED RDW RBC AUTO: 44 FL (ref 37–54)
EGFRCR SERPLBLD CKD-EPI 2021: 25.1 ML/MIN/1.73
EOSINOPHIL # BLD MANUAL: 0.08 10*3/MM3 (ref 0–0.4)
EOSINOPHIL NFR BLD MANUAL: 5.3 % (ref 0.3–6.2)
ERYTHROCYTE [DISTWIDTH] IN BLOOD BY AUTOMATED COUNT: 13.5 % (ref 12.3–15.4)
GIANT PLATELETS: ABNORMAL
GLOBULIN UR ELPH-MCNC: 2.9 GM/DL
GLUCOSE SERPL-MCNC: 110 MG/DL (ref 65–99)
HCT VFR BLD AUTO: 26.3 % (ref 37.5–51)
HGB BLD-MCNC: 8.5 G/DL (ref 13–17.7)
INR PPP: 1.6 (ref 0.8–1.2)
LYMPHOCYTES # BLD MANUAL: 0.46 10*3/MM3 (ref 0.7–3.1)
LYMPHOCYTES NFR BLD MANUAL: 14.9 % (ref 5–12)
MCH RBC QN AUTO: 29 PG (ref 26.6–33)
MCHC RBC AUTO-ENTMCNC: 32.3 G/DL (ref 31.5–35.7)
MCV RBC AUTO: 89.8 FL (ref 79–97)
MONOCYTES # BLD: 0.22 10*3/MM3 (ref 0.1–0.9)
NEUTROPHILS # BLD AUTO: 0.71 10*3/MM3 (ref 1.7–7)
NEUTROPHILS NFR BLD MANUAL: 47.9 % (ref 42.7–76)
PLATELET # BLD AUTO: 44 10*3/MM3 (ref 140–450)
PMV BLD AUTO: 12.2 FL (ref 6–12)
POTASSIUM SERPL-SCNC: 4.6 MMOL/L (ref 3.5–5.2)
PROT SERPL-MCNC: 6.5 G/DL (ref 6–8.5)
PROTHROMBIN TIME: 18.3 SECONDS (ref 12.8–15.2)
RBC # BLD AUTO: 2.93 10*6/MM3 (ref 4.14–5.8)
RBC MORPH BLD: NORMAL
SMUDGE CELLS BLD QL SMEAR: ABNORMAL
SODIUM SERPL-SCNC: 139 MMOL/L (ref 136–145)
VARIANT LYMPHS NFR BLD MANUAL: 30.9 % (ref 19.6–45.3)
WBC NRBC COR # BLD: 1.48 10*3/MM3 (ref 3.4–10.8)

## 2022-12-29 PROCEDURE — 85007 BL SMEAR W/DIFF WBC COUNT: CPT | Performed by: INTERNAL MEDICINE

## 2022-12-29 PROCEDURE — 76942 ECHO GUIDE FOR BIOPSY: CPT

## 2022-12-29 PROCEDURE — 0 LIDOCAINE 1 % SOLUTION: Performed by: RADIOLOGY

## 2022-12-29 PROCEDURE — 85025 COMPLETE CBC W/AUTO DIFF WBC: CPT | Performed by: INTERNAL MEDICINE

## 2022-12-29 PROCEDURE — 82105 ALPHA-FETOPROTEIN SERUM: CPT | Performed by: INTERNAL MEDICINE

## 2022-12-29 PROCEDURE — 99214 OFFICE O/P EST MOD 30 MIN: CPT | Performed by: INTERNAL MEDICINE

## 2022-12-29 PROCEDURE — 80053 COMPREHEN METABOLIC PANEL: CPT | Performed by: INTERNAL MEDICINE

## 2022-12-29 PROCEDURE — 85610 PROTHROMBIN TIME: CPT

## 2022-12-29 RX ORDER — LIDOCAINE HYDROCHLORIDE 10 MG/ML
10 INJECTION, SOLUTION INFILTRATION; PERINEURAL ONCE
Status: COMPLETED | OUTPATIENT
Start: 2022-12-29 | End: 2022-12-29

## 2022-12-29 RX ORDER — FUROSEMIDE 40 MG/1
40 TABLET ORAL DAILY
COMMUNITY

## 2022-12-29 RX ORDER — SODIUM CHLORIDE 9 MG/ML
40 INJECTION, SOLUTION INTRAVENOUS AS NEEDED
Status: DISCONTINUED | OUTPATIENT
Start: 2022-12-29 | End: 2022-12-30 | Stop reason: HOSPADM

## 2022-12-29 RX ORDER — CLARITHROMYCIN 500 MG/1
TABLET, COATED ORAL 2 TIMES DAILY
COMMUNITY
Start: 2022-12-21 | End: 2023-03-10

## 2022-12-29 RX ORDER — ALBUMIN (HUMAN) 12.5 G/50ML
12.5 SOLUTION INTRAVENOUS ONCE
Status: DISCONTINUED | OUTPATIENT
Start: 2022-12-29 | End: 2022-12-30 | Stop reason: HOSPADM

## 2022-12-29 RX ORDER — SODIUM CHLORIDE 0.9 % (FLUSH) 0.9 %
3 SYRINGE (ML) INJECTION EVERY 12 HOURS SCHEDULED
Status: CANCELLED | OUTPATIENT
Start: 2023-02-20

## 2022-12-29 RX ORDER — METRONIDAZOLE 500 MG/1
TABLET ORAL 2 TIMES DAILY
COMMUNITY
Start: 2022-12-21

## 2022-12-29 RX ORDER — SODIUM CHLORIDE 0.9 % (FLUSH) 0.9 %
10 SYRINGE (ML) INJECTION AS NEEDED
Status: CANCELLED | OUTPATIENT
Start: 2023-02-20

## 2022-12-29 RX ADMIN — LIDOCAINE HYDROCHLORIDE 8 ML: 10 INJECTION, SOLUTION INFILTRATION; PERINEURAL at 13:26

## 2022-12-29 NOTE — DISCHARGE INSTRUCTIONS
EDUCATION /DISCHARGE INSTRUCTIONS Thoracentesis:  A thoracentesis is a procedure to remove fluid or air from the space between the lung and it's lining.  It is done to relieve lung compression and respiratory distress.  A sample can also be obtained to aid diagnosis.   Medications can be administered into the space.      During the procedure:  You will be seated comfortable leaning forward onto a pillow supported by a table.  The area will be cleaned with antiseptic soap and draped with a sterile towel.  The physician will administer a local antiseptic to numb the area.  Next, the physician will insert a needle with tubing attached into the space between the lung and lining.  Fluid is removed and a sample sent to the laboratory.   When completed a pressure dressing is applied to the site.    Risks of the procedure include but are not limited to:   *  Bleeding    *  Low blood pressure *  Infection     *  Lung collapse possibly requiring insertion of a tube to re-inflate the lung.    Benefits of the procedure:  Relief of respiratory distress and facilitation of a diagnosis.  Alternatives to the procedure:  Drug therapy with diuretics to remove fluid.  Risks of diuretic drug therapy include possible dehydration and renal failure.  The benefit of drug therapy is that it can be done at home under physician supervision.  THIS EDUCATION INFORMATION WAS REVIEWED PRIOR TO THE PROCEDURE AND CONSENT. Patient initials___________________Time__________________    Post Procedure:    *  Rest today (no pushing pulling, straining or heavy lifting).   *  Slowly increase activity tomorow.    *  If you received sedation do not drive for 24 hours.   *  Keep dressing clean and dry.   *  Leave dressing on puncture site for 24 hours.    *  You may shower when dressing removed.   *  Expect some coughing as the lung re-expands.    Call your doctor if experiencing:   *  If experiencing sudden / severe shortness of breath, chest pain or if  coughing       up blood go to the nearest emergency room.   *  Signs of infection such as redness, swelling, excessive pain and / or foul       smelling drainage from the puncture site.   *  Chills or fever over 101 degrees (by mouth).   *  Change in sputum color (yellow, green, red).   *  Unrelieved pain.   *  Any new or severe symptoms.  Following the procedure:     Follow-up with the ordering physician as directed.    Continue to take other medications as directed by your physician unless    otherwise instructed.   If applicable, resume taking your blood thinners or Aspirin on ___________.    If you have any concerns please call the Radiology Nurses Desk at [phone].  You are the most important factor in your recovery.  Follow the above instructions carefully.           EDUCATION /DISCHARGE INSTRUCTIONS  Paracentesis:  A needle is inserted into the space between your abdominal organs and the membrane that surrounds them (peritoneal space).  It is done for the diagnosis and treatment of fluid that is resistant to other therapies.  It helps determine the cause of the fluid and at the relieves pressure created by the fluid.  A sample is obtained and sent to the laboratory for study.  During the procedure:  You will lie on a bed on your back with your legs drawn up.  Your abdomen will be exposed from the chest to the pelvis.  You will otherwise be covered to maintain comfort.  A physician will clean your abdomen with antiseptic soap, place a sterile towel around the site and administer a local anesthetic to numb the area.  The physician will insert a needle into your abdominal wall.  There may be a popping sound which signifies the needle has pierced the abdominal wall. Next, the physician will attach tubing to transfer a sample into a collection bottle.  After the fluid is obtained the needle will be removed.  A pressure dressing is applied to the site.  Risks of the procedure include but are not limited  to:   *  Bleeding    *  Wound infection   *  Low blood pressure   *  Decreased urination   *  Low sodium if a large amount of fluid is removed   *  Puncture of abdominal organs by the needle    Benefits of the procedure:  Benefits include the removal of fluid from the abdomen, relief of abdominal pressure and facilitation of a diagnosis.  Alternatives to the procedure:  Possible alternatives are diuretic drug therapy or surgery to place a shunt to drain fluid.  Risks of diuretic drug therapy include possible dehydration and renal failure.  The benefit of drug therapy is that it can be done at home under physician supervision.  Risks of shunt placement include exposure to anesthesia, infection, excessive bleeding and injury to abdominal organs.  The benefit of a shunt is that it can be used to drain fluid over a longer period of time.  THIS EDUCATION INFORMATION WAS REVIEWED PRIOR TO THE PROCEDURE AND CONSENT. Patient initials__________________Time_________________    Post procedure: (Follow all the instructions below carefully)   *  Weigh yourself daily.   *  Follow your doctors dietary instructions.   *  Rest today (no pushing pulling, straining or heavy lifting).   *  Slowly increase activity tomorow.   *  If you received sedation do not drive for 24 hours.              * Skin affix  applied to puncture site. Do not try to remove, scratch or apply lotion   * Skin affix will fall off on it's own   *  You may shower tomorrow  Call your doctor if experiencing:   *  Signs of infection such as redness, swelling, excessive pain and / or foul       smelling drainage from the puncture site.   *  Chills or fever over 101 degrees (by mouth).   *  Fainting or any noted Rapid weight gain/loss   *  Unrelieved pain or any new or severe symptoms  Following the procedure:      Follow-up with the ordering physician as directed.   Continue to take other medications as directed by your physician unless    otherwise instructed.   If  applicable, resume taking your blood thinners or Aspirin in 24 hours.              If you have any concerns please call the Radiology Nurses Desk at [phone]

## 2022-12-30 ENCOUNTER — INFUSION (OUTPATIENT)
Dept: ONCOLOGY | Facility: HOSPITAL | Age: 72
End: 2022-12-30

## 2022-12-30 ENCOUNTER — TELEPHONE (OUTPATIENT)
Dept: INTERVENTIONAL RADIOLOGY/VASCULAR | Facility: HOSPITAL | Age: 72
End: 2022-12-30

## 2022-12-30 VITALS
WEIGHT: 172.6 LBS | RESPIRATION RATE: 16 BRPM | BODY MASS INDEX: 27.86 KG/M2 | TEMPERATURE: 98.8 F | HEART RATE: 64 BPM | DIASTOLIC BLOOD PRESSURE: 74 MMHG | SYSTOLIC BLOOD PRESSURE: 132 MMHG | OXYGEN SATURATION: 97 %

## 2022-12-30 DIAGNOSIS — N18.30 STAGE 3 CHRONIC KIDNEY DISEASE, UNSPECIFIED WHETHER STAGE 3A OR 3B CKD: Primary | ICD-10-CM

## 2022-12-30 DIAGNOSIS — T45.4X5A ADVERSE EFFECT OF IRON AND ITS COMPOUNDS, INITIAL ENCOUNTER: ICD-10-CM

## 2022-12-30 DIAGNOSIS — D50.9 IRON DEFICIENCY ANEMIA, UNSPECIFIED IRON DEFICIENCY ANEMIA TYPE: ICD-10-CM

## 2022-12-30 PROCEDURE — 25010000002 FERRIC CARBOXYMALTOSE 750 MG/15ML SOLUTION 15 ML VIAL: Performed by: INTERNAL MEDICINE

## 2022-12-30 PROCEDURE — 63710000001 PROCHLORPERAZINE MALEATE PER 10 MG: Performed by: INTERNAL MEDICINE

## 2022-12-30 PROCEDURE — 96374 THER/PROPH/DIAG INJ IV PUSH: CPT

## 2022-12-30 RX ORDER — SODIUM CHLORIDE 9 MG/ML
250 INJECTION, SOLUTION INTRAVENOUS ONCE
Status: COMPLETED | OUTPATIENT
Start: 2022-12-30 | End: 2022-12-30

## 2022-12-30 RX ORDER — PROCHLORPERAZINE MALEATE 10 MG
10 TABLET ORAL ONCE
Status: COMPLETED | OUTPATIENT
Start: 2022-12-30 | End: 2022-12-30

## 2022-12-30 RX ADMIN — FERRIC CARBOXYMALTOSE INJECTION 750 MG: 50 INJECTION, SOLUTION INTRAVENOUS at 13:14

## 2022-12-30 RX ADMIN — PROCHLORPERAZINE MALEATE 10 MG: 10 TABLET ORAL at 13:10

## 2022-12-30 RX ADMIN — SODIUM CHLORIDE 250 ML: 9 INJECTION, SOLUTION INTRAVENOUS at 13:12

## 2023-01-06 ENCOUNTER — INFUSION (OUTPATIENT)
Dept: ONCOLOGY | Facility: HOSPITAL | Age: 73
End: 2023-01-06
Payer: MEDICARE

## 2023-01-06 VITALS
TEMPERATURE: 97.5 F | WEIGHT: 168.4 LBS | HEART RATE: 70 BPM | BODY MASS INDEX: 27.18 KG/M2 | SYSTOLIC BLOOD PRESSURE: 158 MMHG | RESPIRATION RATE: 16 BRPM | OXYGEN SATURATION: 97 % | DIASTOLIC BLOOD PRESSURE: 56 MMHG

## 2023-01-06 DIAGNOSIS — T45.4X5A ADVERSE EFFECT OF IRON AND ITS COMPOUNDS, INITIAL ENCOUNTER: ICD-10-CM

## 2023-01-06 DIAGNOSIS — N18.30 STAGE 3 CHRONIC KIDNEY DISEASE, UNSPECIFIED WHETHER STAGE 3A OR 3B CKD: Primary | ICD-10-CM

## 2023-01-06 DIAGNOSIS — D50.9 IRON DEFICIENCY ANEMIA, UNSPECIFIED IRON DEFICIENCY ANEMIA TYPE: ICD-10-CM

## 2023-01-06 PROCEDURE — 25010000002 FERRIC CARBOXYMALTOSE 750 MG/15ML SOLUTION 15 ML VIAL: Performed by: INTERNAL MEDICINE

## 2023-01-06 PROCEDURE — 96374 THER/PROPH/DIAG INJ IV PUSH: CPT

## 2023-01-06 PROCEDURE — 63710000001 PROCHLORPERAZINE MALEATE PER 10 MG: Performed by: INTERNAL MEDICINE

## 2023-01-06 RX ORDER — SODIUM CHLORIDE 9 MG/ML
250 INJECTION, SOLUTION INTRAVENOUS ONCE
Status: COMPLETED | OUTPATIENT
Start: 2023-01-06 | End: 2023-01-06

## 2023-01-06 RX ORDER — PROCHLORPERAZINE MALEATE 10 MG
10 TABLET ORAL ONCE
Status: COMPLETED | OUTPATIENT
Start: 2023-01-06 | End: 2023-01-06

## 2023-01-06 RX ADMIN — PROCHLORPERAZINE MALEATE 10 MG: 10 TABLET ORAL at 13:13

## 2023-01-06 RX ADMIN — SODIUM CHLORIDE 250 ML: 9 INJECTION, SOLUTION INTRAVENOUS at 13:13

## 2023-01-06 RX ADMIN — FERRIC CARBOXYMALTOSE INJECTION 750 MG: 50 INJECTION, SOLUTION INTRAVENOUS at 13:24

## 2023-01-20 ENCOUNTER — ON CAMPUS - OUTPATIENT (OUTPATIENT)
Dept: URBAN - METROPOLITAN AREA HOSPITAL 108 | Facility: HOSPITAL | Age: 73
End: 2023-01-20

## 2023-01-20 DIAGNOSIS — K76.6 PORTAL HYPERTENSION: ICD-10-CM

## 2023-01-20 DIAGNOSIS — K31.89 OTHER DISEASES OF STOMACH AND DUODENUM: ICD-10-CM

## 2023-01-20 DIAGNOSIS — K29.80 DUODENITIS WITHOUT BLEEDING: ICD-10-CM

## 2023-01-20 DIAGNOSIS — I85.00 ESOPHAGEAL VARICES WITHOUT BLEEDING: ICD-10-CM

## 2023-01-20 PROCEDURE — 43235 EGD DIAGNOSTIC BRUSH WASH: CPT | Performed by: INTERNAL MEDICINE

## 2023-01-23 ENCOUNTER — INFUSION (OUTPATIENT)
Dept: ONCOLOGY | Facility: HOSPITAL | Age: 73
End: 2023-01-23
Payer: MEDICARE

## 2023-01-23 ENCOUNTER — LAB (OUTPATIENT)
Dept: LAB | Facility: HOSPITAL | Age: 73
End: 2023-01-23
Payer: MEDICARE

## 2023-01-23 DIAGNOSIS — N18.9 ANEMIA DUE TO CHRONIC RENAL FAILURE TREATED WITH ERYTHROPOIETIN, UNSPECIFIED STAGE: Primary | ICD-10-CM

## 2023-01-23 DIAGNOSIS — D63.1 ANEMIA DUE TO CHRONIC RENAL FAILURE TREATED WITH ERYTHROPOIETIN, UNSPECIFIED STAGE: ICD-10-CM

## 2023-01-23 DIAGNOSIS — N18.9 ANEMIA DUE TO CHRONIC RENAL FAILURE TREATED WITH ERYTHROPOIETIN, UNSPECIFIED STAGE: ICD-10-CM

## 2023-01-23 DIAGNOSIS — N28.9 RENAL INSUFFICIENCY: ICD-10-CM

## 2023-01-23 DIAGNOSIS — D69.6 THROMBOCYTOPENIA: ICD-10-CM

## 2023-01-23 DIAGNOSIS — C22.0 HEPATOCELLULAR CARCINOMA: ICD-10-CM

## 2023-01-23 DIAGNOSIS — D64.9 ANEMIA, UNSPECIFIED TYPE: ICD-10-CM

## 2023-01-23 DIAGNOSIS — D63.1 ANEMIA DUE TO CHRONIC RENAL FAILURE TREATED WITH ERYTHROPOIETIN, UNSPECIFIED STAGE: Primary | ICD-10-CM

## 2023-01-23 DIAGNOSIS — N18.30 STAGE 3 CHRONIC KIDNEY DISEASE, UNSPECIFIED WHETHER STAGE 3A OR 3B CKD: Primary | ICD-10-CM

## 2023-01-23 DIAGNOSIS — T45.4X5A ADVERSE EFFECT OF IRON AND ITS COMPOUNDS, INITIAL ENCOUNTER: ICD-10-CM

## 2023-01-23 DIAGNOSIS — D61.818 PANCYTOPENIA: ICD-10-CM

## 2023-01-23 DIAGNOSIS — D50.9 IRON DEFICIENCY ANEMIA, UNSPECIFIED IRON DEFICIENCY ANEMIA TYPE: ICD-10-CM

## 2023-01-23 LAB
ALBUMIN SERPL-MCNC: 3.7 G/DL (ref 3.5–5.2)
ANION GAP SERPL CALCULATED.3IONS-SCNC: 10 MMOL/L (ref 5–15)
BASOPHILS # BLD AUTO: 0.02 10*3/MM3 (ref 0–0.2)
BASOPHILS NFR BLD AUTO: 0.6 % (ref 0–1.5)
BUN SERPL-MCNC: 45 MG/DL (ref 8–23)
BUN/CREAT SERPL: 16.6 (ref 7–25)
CALCIUM SPEC-SCNC: 9.5 MG/DL (ref 8.6–10.5)
CHLORIDE SERPL-SCNC: 112 MMOL/L (ref 98–107)
CO2 SERPL-SCNC: 19 MMOL/L (ref 22–29)
CREAT SERPL-MCNC: 2.71 MG/DL (ref 0.76–1.27)
DEPRECATED RDW RBC AUTO: 54.4 FL (ref 37–54)
EGFRCR SERPLBLD CKD-EPI 2021: 24.2 ML/MIN/1.73
EOSINOPHIL # BLD AUTO: 0.18 10*3/MM3 (ref 0–0.4)
EOSINOPHIL NFR BLD AUTO: 5 % (ref 0.3–6.2)
ERYTHROCYTE [DISTWIDTH] IN BLOOD BY AUTOMATED COUNT: 15.5 % (ref 12.3–15.4)
FERRITIN SERPL-MCNC: 559.5 NG/ML (ref 30–400)
GLUCOSE SERPL-MCNC: 148 MG/DL (ref 65–99)
HCT VFR BLD AUTO: 27.2 % (ref 37.5–51)
HGB BLD-MCNC: 8.7 G/DL (ref 13–17.7)
IMM GRANULOCYTES # BLD AUTO: 0 10*3/MM3 (ref 0–0.05)
IMM GRANULOCYTES NFR BLD AUTO: 0 % (ref 0–0.5)
IRON 24H UR-MRATE: 89 MCG/DL (ref 59–158)
IRON SATN MFR SERPL: 35 % (ref 14–48)
LYMPHOCYTES # BLD AUTO: 1.01 10*3/MM3 (ref 0.7–3.1)
LYMPHOCYTES NFR BLD AUTO: 27.8 % (ref 19.6–45.3)
MCH RBC QN AUTO: 30.7 PG (ref 26.6–33)
MCHC RBC AUTO-ENTMCNC: 32 G/DL (ref 31.5–35.7)
MCV RBC AUTO: 96.1 FL (ref 79–97)
MONOCYTES # BLD AUTO: 0.26 10*3/MM3 (ref 0.1–0.9)
MONOCYTES NFR BLD AUTO: 7.2 % (ref 5–12)
NEUTROPHILS NFR BLD AUTO: 2.16 10*3/MM3 (ref 1.7–7)
NEUTROPHILS NFR BLD AUTO: 59.4 % (ref 42.7–76)
NRBC BLD AUTO-RTO: 0 /100 WBC (ref 0–0.2)
PHOSPHATE SERPL-MCNC: 2.6 MG/DL (ref 2.5–4.5)
PLATELET # BLD AUTO: 48 10*3/MM3 (ref 140–450)
PMV BLD AUTO: 10.7 FL (ref 6–12)
POTASSIUM SERPL-SCNC: 4.9 MMOL/L (ref 3.5–5.2)
RBC # BLD AUTO: 2.83 10*6/MM3 (ref 4.14–5.8)
SODIUM SERPL-SCNC: 141 MMOL/L (ref 136–145)
TIBC SERPL-MCNC: 255 MCG/DL (ref 249–505)
TRANSFERRIN SERPL-MCNC: 182 MG/DL (ref 200–360)
WBC NRBC COR # BLD: 3.63 10*3/MM3 (ref 3.4–10.8)

## 2023-01-23 PROCEDURE — 85025 COMPLETE CBC W/AUTO DIFF WBC: CPT

## 2023-01-23 PROCEDURE — 96372 THER/PROPH/DIAG INJ SC/IM: CPT

## 2023-01-23 PROCEDURE — 80069 RENAL FUNCTION PANEL: CPT

## 2023-01-23 PROCEDURE — 25010000002 EPOETIN ALFA PER 1000 UNITS: Performed by: INTERNAL MEDICINE

## 2023-01-23 PROCEDURE — 82728 ASSAY OF FERRITIN: CPT

## 2023-01-23 PROCEDURE — 36415 COLL VENOUS BLD VENIPUNCTURE: CPT

## 2023-01-23 PROCEDURE — 84466 ASSAY OF TRANSFERRIN: CPT

## 2023-01-23 PROCEDURE — 83540 ASSAY OF IRON: CPT

## 2023-01-23 RX ADMIN — ERYTHROPOIETIN 25000 UNITS: 20000 INJECTION, SOLUTION INTRAVENOUS; SUBCUTANEOUS at 10:55

## 2023-02-06 ENCOUNTER — INFUSION (OUTPATIENT)
Dept: ONCOLOGY | Facility: HOSPITAL | Age: 73
End: 2023-02-06
Payer: MEDICARE

## 2023-02-06 ENCOUNTER — LAB (OUTPATIENT)
Dept: LAB | Facility: HOSPITAL | Age: 73
End: 2023-02-06
Payer: MEDICARE

## 2023-02-06 DIAGNOSIS — T45.4X5A ADVERSE EFFECT OF IRON AND ITS COMPOUNDS, INITIAL ENCOUNTER: ICD-10-CM

## 2023-02-06 DIAGNOSIS — N18.9 ANEMIA DUE TO CHRONIC RENAL FAILURE TREATED WITH ERYTHROPOIETIN, UNSPECIFIED STAGE: ICD-10-CM

## 2023-02-06 DIAGNOSIS — D63.1 ANEMIA DUE TO CHRONIC RENAL FAILURE TREATED WITH ERYTHROPOIETIN, UNSPECIFIED STAGE: ICD-10-CM

## 2023-02-06 DIAGNOSIS — C22.0 HEPATOCELLULAR CARCINOMA: ICD-10-CM

## 2023-02-06 DIAGNOSIS — N18.9 ANEMIA DUE TO CHRONIC RENAL FAILURE TREATED WITH ERYTHROPOIETIN, UNSPECIFIED STAGE: Primary | ICD-10-CM

## 2023-02-06 DIAGNOSIS — D63.1 ANEMIA DUE TO CHRONIC RENAL FAILURE TREATED WITH ERYTHROPOIETIN, UNSPECIFIED STAGE: Primary | ICD-10-CM

## 2023-02-06 DIAGNOSIS — D50.9 IRON DEFICIENCY ANEMIA, UNSPECIFIED IRON DEFICIENCY ANEMIA TYPE: ICD-10-CM

## 2023-02-06 LAB
BASOPHILS # BLD AUTO: 0.02 10*3/MM3 (ref 0–0.2)
BASOPHILS NFR BLD AUTO: 0.5 % (ref 0–1.5)
DEPRECATED RDW RBC AUTO: 52.4 FL (ref 37–54)
EOSINOPHIL # BLD AUTO: 0.27 10*3/MM3 (ref 0–0.4)
EOSINOPHIL NFR BLD AUTO: 6.9 % (ref 0.3–6.2)
ERYTHROCYTE [DISTWIDTH] IN BLOOD BY AUTOMATED COUNT: 15.1 % (ref 12.3–15.4)
HCT VFR BLD AUTO: 27.8 % (ref 37.5–51)
HGB BLD-MCNC: 9.1 G/DL (ref 13–17.7)
IMM GRANULOCYTES # BLD AUTO: 0.06 10*3/MM3 (ref 0–0.05)
IMM GRANULOCYTES NFR BLD AUTO: 1.5 % (ref 0–0.5)
LYMPHOCYTES # BLD AUTO: 1.01 10*3/MM3 (ref 0.7–3.1)
LYMPHOCYTES NFR BLD AUTO: 25.7 % (ref 19.6–45.3)
MCH RBC QN AUTO: 30.8 PG (ref 26.6–33)
MCHC RBC AUTO-ENTMCNC: 32.7 G/DL (ref 31.5–35.7)
MCV RBC AUTO: 94.2 FL (ref 79–97)
MONOCYTES # BLD AUTO: 0.37 10*3/MM3 (ref 0.1–0.9)
MONOCYTES NFR BLD AUTO: 9.4 % (ref 5–12)
NEUTROPHILS NFR BLD AUTO: 2.2 10*3/MM3 (ref 1.7–7)
NEUTROPHILS NFR BLD AUTO: 56 % (ref 42.7–76)
NRBC BLD AUTO-RTO: 0 /100 WBC (ref 0–0.2)
PLATELET # BLD AUTO: 45 10*3/MM3 (ref 140–450)
PMV BLD AUTO: 12.1 FL (ref 6–12)
RBC # BLD AUTO: 2.95 10*6/MM3 (ref 4.14–5.8)
WBC NRBC COR # BLD: 3.93 10*3/MM3 (ref 3.4–10.8)

## 2023-02-06 PROCEDURE — 96372 THER/PROPH/DIAG INJ SC/IM: CPT

## 2023-02-06 PROCEDURE — 36415 COLL VENOUS BLD VENIPUNCTURE: CPT

## 2023-02-06 PROCEDURE — 85025 COMPLETE CBC W/AUTO DIFF WBC: CPT

## 2023-02-06 PROCEDURE — 25010000002 EPOETIN ALFA PER 1000 UNITS: Performed by: INTERNAL MEDICINE

## 2023-02-06 RX ADMIN — ERYTHROPOIETIN 25000 UNITS: 20000 INJECTION, SOLUTION INTRAVENOUS; SUBCUTANEOUS at 10:43

## 2023-02-20 ENCOUNTER — LAB (OUTPATIENT)
Dept: LAB | Facility: HOSPITAL | Age: 73
End: 2023-02-20
Payer: MEDICARE

## 2023-02-20 ENCOUNTER — INFUSION (OUTPATIENT)
Dept: ONCOLOGY | Facility: HOSPITAL | Age: 73
End: 2023-02-20
Payer: MEDICARE

## 2023-02-20 ENCOUNTER — TELEPHONE (OUTPATIENT)
Dept: ONCOLOGY | Facility: CLINIC | Age: 73
End: 2023-02-20
Payer: MEDICARE

## 2023-02-20 ENCOUNTER — HOSPITAL ENCOUNTER (OUTPATIENT)
Dept: MRI IMAGING | Facility: HOSPITAL | Age: 73
Discharge: HOME | End: 2023-02-20
Admitting: INTERNAL MEDICINE
Payer: MEDICARE

## 2023-02-20 DIAGNOSIS — C22.0 HEPATOCELLULAR CARCINOMA: ICD-10-CM

## 2023-02-20 DIAGNOSIS — N18.9 ANEMIA DUE TO CHRONIC RENAL FAILURE TREATED WITH ERYTHROPOIETIN, UNSPECIFIED STAGE: Primary | ICD-10-CM

## 2023-02-20 DIAGNOSIS — K70.31 ASCITES DUE TO ALCOHOLIC CIRRHOSIS: ICD-10-CM

## 2023-02-20 DIAGNOSIS — D63.1 ANEMIA DUE TO CHRONIC RENAL FAILURE TREATED WITH ERYTHROPOIETIN, UNSPECIFIED STAGE: Primary | ICD-10-CM

## 2023-02-20 DIAGNOSIS — J90 PLEURAL EFFUSION, RIGHT: ICD-10-CM

## 2023-02-20 LAB
ALBUMIN SERPL-MCNC: 4 G/DL (ref 3.5–5.2)
ANION GAP SERPL CALCULATED.3IONS-SCNC: 8.4 MMOL/L (ref 5–15)
BASOPHILS # BLD AUTO: 0.02 10*3/MM3 (ref 0–0.2)
BASOPHILS NFR BLD AUTO: 0.7 % (ref 0–1.5)
BUN SERPL-MCNC: 52 MG/DL (ref 8–23)
BUN/CREAT SERPL: 21.5 (ref 7–25)
CALCIUM SPEC-SCNC: 9.4 MG/DL (ref 8.6–10.5)
CHLORIDE SERPL-SCNC: 108 MMOL/L (ref 98–107)
CO2 SERPL-SCNC: 20.6 MMOL/L (ref 22–29)
CREAT BLDA-MCNC: 2.8 MG/DL (ref 0.6–1.3)
CREAT SERPL-MCNC: 2.42 MG/DL (ref 0.76–1.27)
DEPRECATED RDW RBC AUTO: 45.8 FL (ref 37–54)
EGFRCR SERPLBLD CKD-EPI 2021: 27.7 ML/MIN/1.73
EOSINOPHIL # BLD AUTO: 0.21 10*3/MM3 (ref 0–0.4)
EOSINOPHIL NFR BLD AUTO: 7 % (ref 0.3–6.2)
ERYTHROCYTE [DISTWIDTH] IN BLOOD BY AUTOMATED COUNT: 13.5 % (ref 12.3–15.4)
GLUCOSE SERPL-MCNC: 152 MG/DL (ref 65–99)
HCT VFR BLD AUTO: 27.8 % (ref 37.5–51)
HGB BLD-MCNC: 9.4 G/DL (ref 13–17.7)
LYMPHOCYTES # BLD AUTO: 0.65 10*3/MM3 (ref 0.7–3.1)
LYMPHOCYTES NFR BLD AUTO: 21.7 % (ref 19.6–45.3)
MCH RBC QN AUTO: 30.9 PG (ref 26.6–33)
MCHC RBC AUTO-ENTMCNC: 33.8 G/DL (ref 31.5–35.7)
MCV RBC AUTO: 91.4 FL (ref 79–97)
MONOCYTES # BLD AUTO: 0.22 10*3/MM3 (ref 0.1–0.9)
MONOCYTES NFR BLD AUTO: 7.4 % (ref 5–12)
NEUTROPHILS NFR BLD AUTO: 1.88 10*3/MM3 (ref 1.7–7)
NEUTROPHILS NFR BLD AUTO: 62.9 % (ref 42.7–76)
PHOSPHATE SERPL-MCNC: 3.7 MG/DL (ref 2.5–4.5)
PLATELET # BLD AUTO: 43 10*3/MM3 (ref 140–450)
PMV BLD AUTO: 12.6 FL (ref 6–12)
POTASSIUM SERPL-SCNC: 5.3 MMOL/L (ref 3.5–5.2)
PTH-INTACT SERPL-MCNC: 42.4 PG/ML (ref 15–65)
RBC # BLD AUTO: 3.04 10*6/MM3 (ref 4.14–5.8)
SODIUM SERPL-SCNC: 137 MMOL/L (ref 136–145)
WBC NRBC COR # BLD: 2.99 10*3/MM3 (ref 3.4–10.8)

## 2023-02-20 PROCEDURE — 85025 COMPLETE CBC W/AUTO DIFF WBC: CPT | Performed by: INTERNAL MEDICINE

## 2023-02-20 PROCEDURE — 74181 MRI ABDOMEN W/O CONTRAST: CPT

## 2023-02-20 PROCEDURE — 96372 THER/PROPH/DIAG INJ SC/IM: CPT

## 2023-02-20 PROCEDURE — 82565 ASSAY OF CREATININE: CPT

## 2023-02-20 PROCEDURE — 25010000002 EPOETIN ALFA PER 1000 UNITS: Performed by: INTERNAL MEDICINE

## 2023-02-20 PROCEDURE — 83970 ASSAY OF PARATHORMONE: CPT | Performed by: INTERNAL MEDICINE

## 2023-02-20 PROCEDURE — 80069 RENAL FUNCTION PANEL: CPT | Performed by: INTERNAL MEDICINE

## 2023-02-20 RX ADMIN — ERYTHROPOIETIN 31000 UNITS: 20000 INJECTION, SOLUTION INTRAVENOUS; SUBCUTANEOUS at 11:03

## 2023-02-20 NOTE — TELEPHONE ENCOUNTER
Patricia - MRI called stating patient is scheduled for a MRI Abdomen w/wo contrast today 2/20/23 and patient's GFR is 27.7, Dr Eric would prefer GFR to be 30.0 or above. Per Dr Reeves (Doc #2) patient needs to have contrast due to follow up on procedure done by Dr Vargas. Dr Reeves would like MRI to call Dr Vargas to verify if MRI would be beneficial if done without contrast or if contrast is need to follow up on procedure.

## 2023-02-20 NOTE — TELEPHONE ENCOUNTER
----- Message from Lianet Sousa sent at 2/20/2023  9:35 AM EST -----  Please call Patricia [facility] [phone] about patient labs. Thank you,Jona

## 2023-03-10 ENCOUNTER — LAB (OUTPATIENT)
Dept: LAB | Facility: HOSPITAL | Age: 73
End: 2023-03-10
Payer: MEDICARE

## 2023-03-10 ENCOUNTER — OFFICE VISIT (OUTPATIENT)
Dept: ONCOLOGY | Facility: CLINIC | Age: 73
End: 2023-03-10
Payer: MEDICARE

## 2023-03-10 ENCOUNTER — INFUSION (OUTPATIENT)
Dept: ONCOLOGY | Facility: HOSPITAL | Age: 73
End: 2023-03-10
Payer: MEDICARE

## 2023-03-10 VITALS
OXYGEN SATURATION: 98 % | HEART RATE: 51 BPM | HEIGHT: 66 IN | WEIGHT: 180.5 LBS | BODY MASS INDEX: 29.01 KG/M2 | SYSTOLIC BLOOD PRESSURE: 149 MMHG | TEMPERATURE: 97.3 F | RESPIRATION RATE: 18 BRPM | DIASTOLIC BLOOD PRESSURE: 66 MMHG

## 2023-03-10 DIAGNOSIS — D50.9 IRON DEFICIENCY ANEMIA, UNSPECIFIED IRON DEFICIENCY ANEMIA TYPE: ICD-10-CM

## 2023-03-10 DIAGNOSIS — T45.4X5A ADVERSE EFFECT OF IRON AND ITS COMPOUNDS, INITIAL ENCOUNTER: ICD-10-CM

## 2023-03-10 DIAGNOSIS — D69.6 THROMBOCYTOPENIA: ICD-10-CM

## 2023-03-10 DIAGNOSIS — D61.818 PANCYTOPENIA: ICD-10-CM

## 2023-03-10 DIAGNOSIS — D63.1 ANEMIA DUE TO CHRONIC RENAL FAILURE TREATED WITH ERYTHROPOIETIN, UNSPECIFIED STAGE: ICD-10-CM

## 2023-03-10 DIAGNOSIS — K74.60 CIRRHOSIS OF LIVER WITH ASCITES, UNSPECIFIED HEPATIC CIRRHOSIS TYPE: ICD-10-CM

## 2023-03-10 DIAGNOSIS — R18.8 CIRRHOSIS OF LIVER WITH ASCITES, UNSPECIFIED HEPATIC CIRRHOSIS TYPE: ICD-10-CM

## 2023-03-10 DIAGNOSIS — N18.30 STAGE 3 CHRONIC KIDNEY DISEASE, UNSPECIFIED WHETHER STAGE 3A OR 3B CKD: ICD-10-CM

## 2023-03-10 DIAGNOSIS — N18.9 ANEMIA DUE TO CHRONIC RENAL FAILURE TREATED WITH ERYTHROPOIETIN, UNSPECIFIED STAGE: Primary | ICD-10-CM

## 2023-03-10 DIAGNOSIS — C22.0 HEPATOCELLULAR CARCINOMA: Primary | ICD-10-CM

## 2023-03-10 DIAGNOSIS — C22.0 HEPATOCELLULAR CARCINOMA: ICD-10-CM

## 2023-03-10 DIAGNOSIS — N18.9 ANEMIA DUE TO CHRONIC RENAL FAILURE TREATED WITH ERYTHROPOIETIN, UNSPECIFIED STAGE: ICD-10-CM

## 2023-03-10 DIAGNOSIS — D63.1 ANEMIA DUE TO CHRONIC RENAL FAILURE TREATED WITH ERYTHROPOIETIN, UNSPECIFIED STAGE: Primary | ICD-10-CM

## 2023-03-10 LAB
BASOPHILS # BLD AUTO: 0.01 10*3/MM3 (ref 0–0.2)
BASOPHILS NFR BLD AUTO: 0.4 % (ref 0–1.5)
DEPRECATED RDW RBC AUTO: 49.2 FL (ref 37–54)
EOSINOPHIL # BLD AUTO: 0.21 10*3/MM3 (ref 0–0.4)
EOSINOPHIL NFR BLD AUTO: 7.7 % (ref 0.3–6.2)
ERYTHROCYTE [DISTWIDTH] IN BLOOD BY AUTOMATED COUNT: 14 % (ref 12.3–15.4)
FERRITIN SERPL-MCNC: 270.6 NG/ML (ref 30–400)
HCT VFR BLD AUTO: 30.3 % (ref 37.5–51)
HGB BLD-MCNC: 9.8 G/DL (ref 13–17.7)
IMM GRANULOCYTES # BLD AUTO: 0 10*3/MM3 (ref 0–0.05)
IMM GRANULOCYTES NFR BLD AUTO: 0 % (ref 0–0.5)
IRON 24H UR-MRATE: 82 MCG/DL (ref 59–158)
IRON SATN MFR SERPL: 28 % (ref 14–48)
LYMPHOCYTES # BLD AUTO: 0.7 10*3/MM3 (ref 0.7–3.1)
LYMPHOCYTES NFR BLD AUTO: 25.8 % (ref 19.6–45.3)
MCH RBC QN AUTO: 31.1 PG (ref 26.6–33)
MCHC RBC AUTO-ENTMCNC: 32.3 G/DL (ref 31.5–35.7)
MCV RBC AUTO: 96.2 FL (ref 79–97)
MONOCYTES # BLD AUTO: 0.22 10*3/MM3 (ref 0.1–0.9)
MONOCYTES NFR BLD AUTO: 8.1 % (ref 5–12)
NEUTROPHILS NFR BLD AUTO: 1.57 10*3/MM3 (ref 1.7–7)
NEUTROPHILS NFR BLD AUTO: 58 % (ref 42.7–76)
NRBC BLD AUTO-RTO: 0 /100 WBC (ref 0–0.2)
PLATELET # BLD AUTO: 41 10*3/MM3 (ref 140–450)
PMV BLD AUTO: 10.9 FL (ref 6–12)
RBC # BLD AUTO: 3.15 10*6/MM3 (ref 4.14–5.8)
TIBC SERPL-MCNC: 293 MCG/DL (ref 249–505)
TRANSFERRIN SERPL-MCNC: 209 MG/DL (ref 200–360)
WBC NRBC COR # BLD: 2.71 10*3/MM3 (ref 3.4–10.8)

## 2023-03-10 PROCEDURE — 36415 COLL VENOUS BLD VENIPUNCTURE: CPT

## 2023-03-10 PROCEDURE — 84466 ASSAY OF TRANSFERRIN: CPT

## 2023-03-10 PROCEDURE — 96372 THER/PROPH/DIAG INJ SC/IM: CPT

## 2023-03-10 PROCEDURE — 83540 ASSAY OF IRON: CPT

## 2023-03-10 PROCEDURE — 99214 OFFICE O/P EST MOD 30 MIN: CPT | Performed by: INTERNAL MEDICINE

## 2023-03-10 PROCEDURE — 82728 ASSAY OF FERRITIN: CPT

## 2023-03-10 PROCEDURE — 25010000002 EPOETIN ALFA PER 1000 UNITS: Performed by: INTERNAL MEDICINE

## 2023-03-10 PROCEDURE — 85025 COMPLETE CBC W/AUTO DIFF WBC: CPT

## 2023-03-10 RX ORDER — SIMVASTATIN 20 MG
TABLET ORAL
COMMUNITY
Start: 2023-02-18 | End: 2023-03-10

## 2023-03-10 RX ORDER — CARVEDILOL 3.12 MG/1
TABLET ORAL
COMMUNITY
Start: 2023-02-18 | End: 2023-03-10

## 2023-03-10 RX ORDER — PENTOXIFYLLINE 400 MG/1
TABLET, EXTENDED RELEASE ORAL
COMMUNITY
Start: 2023-03-02

## 2023-03-10 RX ORDER — CARVEDILOL 12.5 MG/1
TABLET ORAL
COMMUNITY
Start: 2023-03-02

## 2023-03-10 RX ORDER — POTASSIUM CHLORIDE 1500 MG/1
TABLET, FILM COATED, EXTENDED RELEASE ORAL
COMMUNITY
Start: 2023-03-02

## 2023-03-10 RX ORDER — METOPROLOL SUCCINATE 50 MG/1
TABLET, EXTENDED RELEASE ORAL
COMMUNITY
Start: 2023-02-10 | End: 2023-03-10

## 2023-03-10 RX ORDER — PANTOPRAZOLE SODIUM 40 MG/1
TABLET, DELAYED RELEASE ORAL
COMMUNITY
Start: 2023-01-12

## 2023-03-10 RX ADMIN — ERYTHROPOIETIN 31000 UNITS: 20000 INJECTION, SOLUTION INTRAVENOUS; SUBCUTANEOUS at 10:29

## 2023-03-10 NOTE — PROGRESS NOTES
.     REASON FOR FOLLOWUP:     *Pancytopenia secondary to liver cirrhosis   · Thrombocytopenia secondary to liver cirrhosis.      · Mild leukocytopenia and low normal neutrophils  *  Iron deficiency anemia discovered on 1/13/2021.  Patient was started on oral iron 3 times a day.   · Because of poor iron absorption, received 2 dose of Injectafer 750 mg x 2 in June 2022.   *Newly discovered liver lesion.  · Primary hepatocellular carcinoma, biopsy confirmed on 11/16/2022. Status post CT guided ablation of the liver lesion on 12/6/2022.       HISTORY OF PRESENT ILLNESS:  The patient is a 72 y.o. year old male with hypertension diabetes, pancytopenia secondary to liver cirrhosis caused by RODRIGUEZ, and hypogonadism, who presents today on 03/10/2023 for a 3-month follow-up evaluation, after recent MRI examination of the abdomen ( liver ).    The MRI study obtained on 02/20/2023 reported post ablation defect in the hepatic segment 8 measuring 3.4 x 2.8 cm. The study was done without iv contrast due to chronic renal disease. There was splenomegaly stable at 17.1 cm. No enlarged lymph nodes. No suspicious osseous lesion. There was liver cirrhosis. There was a small to moderate size right sided pleural effusion.    The patient reports he is gaining a little weight, he thinks he is starting to get some fluid in his abdomen. He weighed 173 pounds on 03/06/2023 and 178 pounds on 03/07/2023.     He denies feeling significantly distended in the abdomen. No nausea, no vomiting. By examination, I do not feel patient has percussion dullness indicating ascites.     The patient reports he had an EGD examination, and his wife reports that he had some Varises banded. However, no other major discovery. No active bleeding.    Patient reports no fever sweating chills.  No easy bleeding or bruising.      Past Medical History:   Diagnosis Date   • Cancer (HCC)     liver cancer   • H/O Dental disorder    • History of thrombocytopenia    •  Hypertension    • RODRIGUEZ (nonalcoholic steatohepatitis)    • Psoriasis    • Type 2 diabetes mellitus (HCC)      Past Surgical History:   Procedure Laterality Date   • CATARACT EXTRACTION, BILATERAL     • COLONOSCOPY  11/2015   • ROTATOR CUFF REPAIR Left 2005     HEMATOLOGY HISTORY:  The patient is a 70 y.o. year old male with history of hypertension diabetes, RODRIGUEZ, and hypogonadism who presented today on 1/13/2021 for initial evaluation because of thrombocytopenia, referred by his primary care physician Dr. Joseph.      Patient reports he has no limitation of activity.  He does have chronic joint pains, but of note joint swelling.  His skin pruritus.  No skin rashes.  He does have easy bruising, however denies evidence of bleeding such as epistaxis, gum bleeding, rectal bleeding or hematuria.  Denies weight loss.  No low fever.  Appetite is reasonable.    I reviewed his outside laboratory studies.  Most recent also lab on 10/22/2020 reported hemoglobin 10.5, MCV 89.6, WBC 3200, including neutrophils 1900, lymphocytes 800 monocytes 300, and platelets 62,000.    Liver study on 9/2/2020 reported WBC 3200, including ANC 1900 lymphocytes 1000 monocytes 300, hemoglobin 10.5, MCV 80.0, and platelets 65,000.    His earliest CBC results available for review was from 5/8/2019 which reported WBC 4200, including ANC 2600, lymphocytes 1200 and monocytes 400, hemoglobin 13.4 and platelets 95,000.  At that time chemistry lab reported elevated liver panel including  , alk phosphatase 87 and a total bilirubin 1.1.  Total serum protein 7.0 albumin 4.1 and the globulin 2.9.  His creatinine was 1.31, with glucose of 219 been in normal electrolytes.  Hemoglobin A1c was 8.3%.    Laboratory studies on 1/13/2021 reported mild anemia with Hb 10.9, thrombocytopenia platelets 48,000, and IPF 7.0%, leukocytopenia WBC 3250 including ANC 1830.  Other labs reported no evidence of hemolysis, had supratherapeutic B12 level 1413 pg/mL  and folate > 20 ng/mL.  He does have evidence of iron deficiency with low iron sats 10% and ferritin only 41.5 ng/mL in the setting of anemia with Hb 10.9.  His erythropoietin is not elevated accordingly, which indicates lack of response from his kidney to produce erythropoietin.     Currently is not a candidate for CONRAD/Procrit treatment.  However he is adamant he is a candidate for oral iron treatment.    We will start him on ferrous sulfate 325 mg 3 times a day.  Will send prescription to his pharmacy.     Laboratory study 6/11/2021 showed a slightly improved hemoglobin 11.9, with stable thrombocytopenia platelets 50,000 IPF 8.3%, and WBC 3720 including ANC 2100.  Iron study reported ferritin 115.3 ng/mL and iron saturation 14% with free iron 55 TIBC 384.    Laboratory studies on 12/17/2021 reported stable pancytopenia with anemia Hb 10.8, with platelets 60,000 and IPF 4.1%, and WBC 3780 including neutrophils 2060 lymphocytes 1040.  Iron study reported slightly trending down ferritin 86.1 ng/mL in the much improved iron saturation 34% with free iron 126 and TIBC 368.      On 6/10/2022 reports patient has abdomen distention for about 3 months.  He also developed bilateral leg edema in December 2021.  Patient also reports worsening dyspnea and weight gain for the past several months..  Per our records, he gained 25 pounds since December 2021.    Patient reports having taken oral iron 3 times a day.  He has mild nausea but not significant and no vomiting.  He has brown-colored stool.  He also reports having loose stool some of days.  No diarrhea.    Patient received a boost dose of COVID-19 vaccine on 11/6/2021.  Patient reports abdomen distention about 3 months, leg edema 6 months, after the boost dose of COVID (December) boost on 11/6/21    Patient complains of easy bruising on his arms from scratching, he has skin pruritus, however no skin rashes.  He denies spontaneous bleeding such as epistaxis, gum bleeding, or  hematochezia.    Lab study on 6/10/2022 reported deteriorating anemia with Hb 9.6, persistent thrombocytopenia with stable platelets 69,000, and low normal WBC 4290 including neutrophils 2980.  Iron studies showed a deteriorating saturation 17%, free iron 61 TIBC 357 with a ferritin 86.8%.    Patient indeed had paracentesis on 6/17/2022 with 11.2 L ascites fluid removed.  Cytology study was negative for malignancy.    Patient reports today that he has worsening abdominal distention, also has dyspnea however no cough hemoptysis.  No chest pain.  He denies fever sweating chills.  Patient also reports worsening bilateral leg edema.    He also recently finished her 2 dose of Injectafer in late June 2022 because of iron deficiency anemia, not responding to oral iron treatment 3 times a day.    Patient continues to have easy bruising however no spontaneous bleeding such as epistaxis or gum bleeding.    Laboratory study on 7/6/2022 reported stable anemia Hb 9.7 and stable thrombocytopenia platelets 65,000, low normal WBC 4260.  Patient has worsening renal function with creatinine 1.36, and supratherapeutic ferritin 644 ng/mL.    Chest x-ray examination on 7/14/2022 reported a right-sided moderate to large pleural effusion.  We will arrange patient to have ultrasound-guided right thoracentesis.  Suspect the patient has pleural effusion due to his liver cirrhosis.  He will have x-ray examination after the thoracentesis per protocol.  We will reassess whether he needs a CT scan for the chest.    This patient also had paracentesis again on 7/14/2022 with 6.2 L ascites fluid removed.      This patient had right thoracentesis with 2 L pleural effusion removed.  Cytology studies negative for malignant cells.  He also had multiple paracentesis, in June, July and early August, cytology study was also negative for malignant cells.    Laboratory study on 9/28/2022 reported worsening anemia Hb 8.5, platelets 61,000, WBC 3380 including  "ANC 1780.  Laboratory study showed significant hyperkalemia potassium 6.4, and worsening renal function creatinine 2.36.      I saw patient recently on 9/20/2022 and he was found having worsening renal function, with hyperkalemia potassium 6.4.  Patient was sent to ER and eventually admitted for several days.  His hemoglobin was 7.5 on 9/30/2022.  Patient was started on Procrit 20,000 units during hospitalization.  After discharge, he has been given Procrit every 2 weeks.     During his hospitalization in end of September 2022, patient had ultrasound for the liver examination on 9/29/2022 which reported a hepatic lesion 27 x 24 x 23 mm, heterogeneous in the anterior liver.  There was evidence of liver cirrhosis.      Patient also had attempted ultrasound-guided paracentesis, however there was not enough ascites to be removed.     Patient also had a venous Doppler study and there was no evidence for portal vein thrombosis.    Patient reports he was seen by his GI specialist Dr. Everardo Foote old records that abdomen MRI examination with and without contrast which was done at the [facility] on 10/26/2022.  This study confirmed liver cirrhosis however it also reported a solitary liver lesion.    This patient had CT-guided core needle biopsy of the liver lesion on 11/16/2022 after 1 unit platelets transfusion.  He did well with the procedure.  Patient reports he feels \"fine.\"  He denies any pain in the area. Denied any kind of bleeding. Denies any fevers after procedure as well.     Patient reports no fever sweating chills.  He is abdomen is slightly distended but no pains.  No nausea vomiting diarrhea.  He has no abdomen pain.  Performance status ECOG 1-2.     Patient reports he noticed improved energy level since starting on Procrit injection.  Overall he still has chronic fatigue.  He denies evidence of bleeding.    Laboratory study on 11/21/2022 reported baseline platelets 44,000, hemoglobin 8.9, and WBC " 2980 including ANC 1670.    Reevaluation after his recent CT-guided ablation of the liver lesion on 12/6/2022 by interventional radiologist Dr. Declan Goodson.    The patient reports that taking oral iron has been really bothering his stomach and he is now only taking once a day. He denies any melena or hematochezia. He also denies any fever or pain. The patient reports quite a bit significant constipation from the oral iron. The patient adds that he has gained some weight, weighing today at 177 pounds and has gained 8 pounds within the last 4 weeks. The patient reports that he has an appointment with Dr. Everardo Foote on 12/29/2022 and Dr. Phill Mooney on 1/27/2022 to discuss his shunt. He also has an EGD tomorrow, 12/14/2022, with Dr. Josh Khan.    Laboratory study on 12/13/2022 reported hemoglobin 9.5 g/dL, MCV 93.6 fL, platelets 47,000/mm3, WBC 3,530/mm3 including neutrophils 2,020/mm3, and lymphocytes 830/mm3. Iron study reported ferritin 105.6 ng/mL, however, iron saturation 13% with free iron 43 mcg/dL and TIBC 332 mcg/dL.     MEDICATIONS    Current Outpatient Medications:   •  allopurinol (ZYLOPRIM) 100 MG tablet, Take 100 mg by mouth Daily., Disp: , Rfl:   •  carvedilol (COREG) 12.5 MG tablet, , Disp: , Rfl:   •  carvedilol (COREG) 3.125 MG tablet, , Disp: , Rfl:   •  clarithromycin (BIAXIN) 500 MG tablet, 2 (Two) Times a Day., Disp: , Rfl:   •  clotrimazole-betamethasone (LOTRISONE) 1-0.05 % cream, Apply 1 application topically to the appropriate area as directed Daily., Disp: , Rfl:   •  ferrous sulfate 325 (65 FE) MG tablet, Take 1 tablet by mouth 3 (Three) Times a Day With Meals. (Patient taking differently: Take 325 mg by mouth Daily With Breakfast.), Disp: 90 tablet, Rfl: 2  •  FIBER PO, Take  by mouth., Disp: , Rfl:   •  FREESTYLE LITE test strip, , Disp: , Rfl:   •  furosemide (LASIX) 40 MG tablet, Take 1 tablet by mouth Daily for 30 days., Disp: 30 tablet, Rfl: 0  •  furosemide (LASIX) 40 MG  tablet, Take 40 mg by mouth Daily., Disp: , Rfl:   •  glipizide (Glucotrol) 5 MG tablet, Take 1 tablet by mouth 2 (Two) Times a Day Before Meals., Disp: 60 tablet, Rfl: 2  •  metoprolol succinate XL (TOPROL-XL) 50 MG 24 hr tablet, , Disp: , Rfl:   •  metroNIDAZOLE (FLAGYL) 500 MG tablet, 2 (Two) Times a Day., Disp: , Rfl:   •  multivitamin (THERAGRAN) tablet tablet, Take  by mouth Daily., Disp: , Rfl:   •  pantoprazole (PROTONIX) 40 MG EC tablet, , Disp: , Rfl:   •  pentoxifylline (TRENtal) 400 MG CR tablet, , Disp: , Rfl:   •  polycarbophil 625 MG tablet tablet, Take 1 tablet by mouth 3 (Three) Times a Day., Disp: , Rfl:   •  Polyethylene Glycol 3350 (MIRALAX PO), Take  by mouth., Disp: , Rfl:   •  potassium chloride (K-DUR,KLOR-CON) 20 MEQ CR tablet, Take 1 tablet by mouth Daily., Disp: , Rfl:   •  potassium chloride ER (K-TAB) 20 MEQ tablet controlled-release ER tablet, , Disp: , Rfl:   •  simvastatin (ZOCOR) 20 MG tablet, , Disp: , Rfl:   •  spironolactone (ALDACTONE) 25 MG tablet, Take 1 tablet by mouth Daily for 30 days., Disp: 30 tablet, Rfl: 0  No current facility-administered medications for this visit.    Facility-Administered Medications Ordered in Other Visits:   •  epoetin sole (EPOGEN,PROCRIT) injection 31,000 Units, 31,000 Units, Subcutaneous, Once, Milagro Salinas MD PhD    ALLERGIES:     Allergies   Allergen Reactions   • Doxycycline Itching   • Rosuvastatin Other (See Comments)      Liver enzymes abnormal       SOCIAL HISTORY:         Social History     Socioeconomic History   • Marital status:      Spouse name: Lisa   Tobacco Use   • Smoking status: Former     Types: Cigarettes     Quit date:      Years since quittin.2   Vaping Use   • Vaping Use: Never used   Substance and Sexual Activity   • Alcohol use: Never   · As of 2021, patient reports he does drink alcoholic beverage 10 drinks per week.  Denies illegal drug use.      FAMILY HISTORY:  No cancer in family, mother  "HTN,           Vitals:    03/10/23 0939   Resp: 18   Temp: 97.3 °F (36.3 °C)   TempSrc: Temporal   Weight: 81.9 kg (180 lb 8 oz)   Height: 167.6 cm (65.98\")     ECOG 1       PHYSICAL EXAM:    GENERAL:  Well-developed, well-nourished male in no acute distress.  Orientated to time place and the people.  SKIN:  Warm, dry without rashes, purpura or petechiae.  HEENT:  Normocephalic.  Wearing mask.   LYMPHATICS:  No cervical, supraclavicular adenopathy.  CHEST: Normal respiratory effort.  Lungs clear to auscultation. Good airflow.  CARDIAC:  Regular rate and rhythm without murmurs. Normal S1,S2.  ABDOMEN:  Soft, nontender with no organomegaly or masses.  Bowel sounds normal.  EXTREMITIES:  No legs edema.  NEUROLOGICAL:  Grossly intact.  No focal neurological deficits.  PSYCHIATRIC:  Normal affect and mood.        RECENT LABS:  Lab Results   Component Value Date    WBC 2.71 (L) 03/10/2023    HGB 9.8 (L) 03/10/2023    HCT 30.3 (L) 03/10/2023    MCV 96.2 03/10/2023    PLT 41 (L) 03/10/2023     Lab Results   Component Value Date    NEUTROABS 1.57 (L) 03/10/2023     Lab Results   Component Value Date    GLUCOSE 152 (H) 02/20/2023    BUN 52 (H) 02/20/2023    CREATININE 2.42 (H) 02/20/2023    EGFRIFNONA 72 12/17/2021    BCR 21.5 02/20/2023    K 5.3 (H) 02/20/2023    CO2 20.6 (L) 02/20/2023    CALCIUM 9.4 02/20/2023    ALBUMIN 4.0 02/20/2023    AST 61 (H) 12/29/2022    ALT 34 12/29/2022       Lab Results   Component Value Date    IRON 89 01/23/2023    TIBC 255 01/23/2023    FERRITIN 559.50 (H) 01/23/2023   Iron saturation 19% on 10/21/2022     Lab Results   Component Value Date    IRON 82 03/10/2023    TIBC 293 03/10/2023    FERRITIN 270.60 03/10/2023   Iron saturation 28% on 3/10/2023.      Lab Results   Component Value Date    FOLATE >20.00 01/13/2021     Lab Results   Component Value Date    [id number] 1,155 (H) 09/28/2022         IMAGING:   MRI Abdomen Without Contrast  Narrative: Examination: MRI of the abdomen without " contrast per protocol     TECHNIQUE: MRI of the abdomen was obtained without contrast per protocol     HISTORY: Liver cancer     COMPARISON: 10/26/2022     FINDINGS: There is a small to moderate-sized right-sided pleural  effusion. The liver is cirrhotic in configuration, without steatosis.  There is an intrinsically T1 hyperintense ablation defect in hepatic  segment 8 measuring 3.4 x 2.8 cm on series 800, image 38 in the site of  previously identified hepatocellular carcinoma. The examination is  limited in the absence of intravenous contrast, but no additional  lesions are seen.     There is a right renal cyst. The gallbladder, adrenal glands, left  kidney, pancreas, stomach, and visualized large and small bowel and  abdominal vasculature are normal. There is splenomegaly, with the spleen  measuring approximately 17.1 cm craniocaudally. No enlarged lymph nodes  are seen. No suspicious osseous lesions are seen.     Impression: 1. Hepatic segment 8 ablation defect with hemorrhage.  Evaluation for residual or recurrent disease is limited in the absence  of intravenous contrast.  2. Right-sided pleural effusion.     This report was finalized on 2/20/2023 11:00 AM by Dr. Jorge Che M.D.           Assessment & Plan   1.  Thrombocytopenia secondary to liver cirrhosis and splenomegaly.  · Etiology of the thrombocytopenia is not clear.  However I do think it is likely related to his fatty liver which was documented from abdomen ultrasound on 7/20/2012.    · Laboratory studies on 1/13/2021 reported excellent vitamin B12 level.  He had marginally elevated IPF 7.0%.  no apparent compensation for platelets production.   · On 6/11/2021, platelets improved at 63,000 with normal IPF 5.3%.  Patient is asymptomatic.    · On 12/17/2021, stable platelets 60,000 and normal IPF 4.1%. Patient is asymptomatic.   · On 6/10/2022 platelets 69,000.  Patient reports no spontaneous bleeding.    · On 7/6/2022 platelets 65,000.  No  spontaneous bleeding.    · On 9/28/2022 platelets 61,000.  Patient has easy bruising on extremities, however no spontaneous bleeding such as melena hematochezia or gum bleeding.    · On 11/2/2022 platelets 48,000.  No change of clinical condition.  Continue to monitor.  · Patient was given 1 unit platelets transfusion on 11/15/2022, platelets improved at 70,000 11/16/2022 when he had successful CT-guided core needle biopsy of liver lesion.   · On 11/21/2022 platelets decreased at baseline level of 44,000.  No spontaneous bleeding.  We will arrange patient to receive platelets transfusion prior and post point interventional procedure with radiofrequency ablation of the primary hepatocellular carcinoma scheduled on 12/6/2022.   · On 12/5/2022, the patient had platelets 49,000/mm3. We gave him 1 unit of platelets transfusion before the ablation of the liver cancer and the posttransfusion platelets was only 56,000/mm3 on 12/6/2022.   · On 12/13/2022, platelets is 47,000/mm3. The patient is not having any bleeding or bruising.  · Patient has platelets 41,000 today on 03/10/2023, and this is stable at the baseline condition. Patient has no excessive bleeding or bruising.  ·     2.  Iron deficiency anemia in the setting of chronic renal insufficiency stage III, and the liver cirrhosis.   · Laboratory study on 1/13/2021 reported no evidence of hemolysis, had supratherapeutic B12 level and folate.    · He does have evidence of iron deficiency with low iron sats 10% and ferritin only 41.5 ng/mL with Hb 10.9.    · His erythropoietin 16.1, is not elevated accordingly, which indicates lack of response from his kidney to produce erythropoietin. Currently is not a candidate for CONRAD/Procrit treatment.    · We started patient on oral ferrous sulfate 325 mg 3 times a day in January 2021.   · On 3/19/2021, patient has improved hemoglobin 11.9.  Continue to monitor.  · On 6/11/2021, stable anemia Hb 11.0, however much improved with  ferritin 115.3 ng/mL, and iron saturation 14%.  Will advised to continue oral iron treatment.  · On 12/17/2021 stable Hb 10.8, much improved iron saturation 34% and free iron 126, however with trending down ferritin level 86.1 ng/mL. Continue oral iron treatment.  · 6/10/2022, patient reports taking oral iron 3 times a day.  No apparent side effects except mild nausea.  Laboratory studies showed deteriorating hemoglobin 9.6 and also worsening iron saturation 17% with free iron 61 and the ferritin 86.8 ng/mL.  Discussed with the patient, recommended intravenous iron therapy, this patient has poor iron absorption.    · Patient received 2 dose of Injectafer 750 mg x 2 in June 2022.  Lab study today on 7/6/2022 reported improved normalized iron saturation 21% with a supratherapeutic ferritin 644 ng/mL.  · On 9/28/2022 ferritin 256 ng/mL iron saturation 22% free iron 66 TIBC 295.  Supratherapeutic B12 at 155 and normal RBC folate 2175 ng/mL.  · Patient had a hemoglobin 7.2 on 9/30/2022.  Was started on Procrit injection 20,000 units during hospitalization.  No PRBC transfusion.  · Procrit is subsequently continued as outpatient every 2 weeks.  · On 10/21/2022 ferritin 198 and free iron 56 TIBC 294 iron saturation 19%.   · Improved hemoglobin 9.2 on 11/2/2022.  We will continue Procrit 20,000 units every 2 weeks.  · Hemoglobin 8.9 on 11/21/2022.  Continue Procrit injection.  · The laboratory study today on 12/13/2022 reported deteriorating and recurrent iron deficiency with iron saturation only 13% with free iron 43 mcg/dL, TIBC 332 mcg/dL, and ferritin 105.6 ng/dL. The patient will be arranged for two doses of Injectafer prior to resumption of Procrit.  · Post treatment iron study on 01/23/2023 reported significantly improved ferritin 559 and normalized iron saturation 35 percent with a free iron 89, TIBC 255. Patient was continued on Procrit every 2 weeks.   · Today on 03/10/2023, the patient has hemoglobin 9.8, and  normal iron study with ferritin 270 ng/mL and iron saturation 28% and free iron 82, TIBC 293.    3.  Pancytopenia with mild leukocytopenia.  This is also likely related to his fatty liver and liver cirrhosis.  Patient drinks alcohol beverage as reported in January 2021.  · Patient reports no recurrent infection.  His neutrophils marginally normal at 1830 out of WBC 3250.  · Patient had supratherapeutic B12 level 1/13/2021.  · Marginally better WBC 3720 including ANC 2100 3/19/2021.    · 6/11/2021 WBC 3530, with ANC 2180.  No recurrent infection.    · On 12/17/2021 stable leukocytopenia WBC 3780 with low normal neutrophils 2016.  Patient is asymptomatic.  Continue to monitor.  · On 6/10/2022 patient has slightly better WBC 4290 and neutrophils 2980.  · On 7/6/2022 patient had WBC 4260 ANC 2620, lymphocytes 840 and monocytes 470.  · On 9/28/2022 WBC 3380, including ANC 1780, hemoglobin 8.5, and platelets 61,000.  · On 11/2/2022 total WBC 2970 including ANC 1730.  Continue to monitor.   · On 11/21/2022 WBC 2980 including ANC 1670 and lymphocytes 820.  Patient is afebrile.  Continue to monitor.  · On 12/13/2022, WBC 3,530/mm3 including neutrophils 2,020/mm3.  · On 03/10/2023, WBC 2710 including neutrophils of 1570. The patient reports no fever, sweating, or chills. Most recent peak ANC was 2200 on 02/06/2023.  ·     *COVID-19 vaccination.    · 6/11/2021, patient reports that he had 2 doses of COVID-19 vaccine, had very limited side effects with some soreness at the site of injection.   · On 12/17/2021, patient reports he received a booster dose of COVID-19 vaccine on 11/6/2021.    4.  Ascites secondary to liver cirrhosis.  · CT scan examination on 10/15/2021 confirmed liver cirrhosis.  · On 6/10/2022 patient reports abdomen distention for the past 3 months, weight gains, 25 pounds per our records, and also has worsening dyspnea.  Physical examination shows very distended abdomen.  I think this patient has large ascites  secondary to his liver cirrhosis.  I recommended to have ultrasound-guided therapeutic paracentesis with samples for routine chemistry labs cell counts and also for cytology study.  · Patient also has bilateral leg edema which is also secondary to liver cirrhosis.  Patient was seen her primary care physician next week.  I think most likely he will need to be started on diuretics.  He also needs to follow-up with his GI specialist Dr. Everardo Foote to discuss possibility of  shunt.   · On 6/17/2022 patient had ultrasound-guided paracentesis, with 11.2 L clear yellow ascites removed.  Cytology study reported negative for malignant cells.  There was a reactive mesothelial cells histiocytes and mixed inflammatory cells.  · Today on 7/6/2022 follow-up, patient reports worsening abdomen distention again.  I will arrange patient to have therapeutic paracentesis again as soon as possible, and also will arrange another paracentesis in 3 weeks.  · At the meantime I also recommended to start the patient on low-dose Lasix 20 mg daily on 7/6/2022.   · Paracentesis, with 6.4 L ascites removed 7/14/2022.  · Paracentesis 2.4 L ascites removed 8/4/2022.   · Failed attempt for paracentesis on 9/29/2022 during hospitalization, no ascites per ultrasound exam.  · On 12/13/2022, the patient has more distended abdomen and also has been gaining weight for more than 10 pounds in the past 2 to 3 months. We will obtain ultrasound-guided paracentesis again.  · Abnormal MRI on 2/20/2023 reported no significant ascites.  · On 03/10/2023, the patient presents for reevaluation. His wife reports the patient is gaining about 10 to 12 pounds since the beginning of 01/2023. By physical examination, I do not feel patient has ascites by percussion of the abdomen. He has trace edema in both legs. Patient is on Lasix 40 mg daily and spironolactone. The patient had attempted a paracentesis under ultrasound guidance on 12/29/2023, however, there were no  ascites or examination, so the procedure was obtained and abandoned.    5.  Right pleural effusion.    · Patient also complains of dyspnea, x-ray examination reported right pleural effusion 7/14/2022..  · Patient had ultrasound-guided right thoracentesis with 2 L pleural effusion removed 7/27/2022.  Cytology studies negative for malignancy.  · On 8/4/2022 patient had another 2 L pleural effusion removed on the right side.  · The patient had a reaccumulation of the right pleural effusion as evidenced by his CT scan from 11/16/2022. We will arrange ultrasound-guided right thoracentesis.   · The patient had ultrasound-guided right paracentesis on 12/29/2022 with a 500 mL of pleural effusion removed.   · Abnormal MRI examination on 02/20/2023 reports small to moderate pleural effusion. I reviewed the images with the patient today on 03/10/2023, this is significant less than previous images. He is asymptomatic. We decided to observe for now.    6.  Worsening renal function.  · Laboratory study on 7/6/2022 reported creatinine 1.37.  He had creatinine 1.02 on 12/17/2021.  Discussed with patient, I recommended referring patient to nephrology service for evaluation of possible hepatorenal syndrome due to liver cirrhosis.  Patient is agreeable.  · On 9/28/2022 patient reports that he still has no appointment for nephrology service.  · On 11/2/2022 creatinine 2.34 with BUN 47.  · On 11/21/2022 creatinine 2.33 with BUN 41.  · On 12/6/22, cr was 2.07.  · On 02/20/2023, the patient had a creatinine 2.42, so MRI was done without IV contrast as we had requested. Patient will continue to follow up with the nephrologist for evaluation and management.    7.  Hepatocellular carcinoma.    · Newly discovered hepatic lesion by ultrasound examination on 9/29/2022.  · Patient is subsequently seen by Dr. Everardo Foote, had an MRI of the abdomen with and without contrast at the [facility] on 10/26/2022 which reported 3.2 cm liver  lesion, likely hepatocellular carcinoma.  · I recommended to have AFP liver study, also discussed with patient and his wife today on 11/2/2022 we will obtain the MRI images from [facility], and arrange CT-guided core needle biopsy.  Because of his thrombocytopenia, I will arrange 1 unit platelets transfusion right before the biopsy.  I did discuss with him about the risk for bleeding post the biopsy.  This is highly suspicious for primary hepatocellular carcinoma.   · Normal AFP 2.37 ng/mL on 11/2/2022.     · Patient had CT-guided core needle biopsy on 11/16/2022. Pathology evaluation reported moderately differentiated hepatocellular carcinoma.  · I discussed with the interventional radiologist Dr. Goodson about interventional procedure with radiofrequency ablation treatment for the solitary lesion in the liver, since this patient is not a candidate for surgical intervention due to multiple comorbidities. The procedure has been scheduled on 12/06/2022.  Due to his thrombocytopenia, we decided to transfuse him with 1 unit of platelets on 12/05/2022 and also also 1 unit platelets on 12/06/2022 after his procedure.  · The patient had CT-guided ablation procedure on 12/6/2022. The patient reports good tolerance, no pains, and no fevers after procedure. Interventional radiology, Dr. Goodson, recommended to obtain abdomen MRI with and without contrast with liver protocol 2 to 3 months after the procedure for reassessment.  · The patient had MRI of the abdomen on 02/20/2023, contrast was not given due to worsening renal function. Nevertheless, the study reported post treatment changes. No evidence for new lesions. As I discussed with the patient and his wife today on 03/10/2023, I recommended to obtain MRI in 3 months for reassessment. We will request IV contrast if his renal function is improving.    8.  Diabetes.   · On 11/21/2022 patient had significant elevated glucose 397.  Patient reports that he is  "diabetic, however his metformin was discontinued during his most recent hospitalization, and he was not put on any other medication for that.  He reports this morning's blood glucose level was about 180.  Discussed with patient, I will give him 10 units insulin today in the clinic, and also will start him on glipizide 5 mg twice a day.  Patient is agreeable and will follow up with his primary care physician for management of diabetes.  · On 2/20/2023 glucose 152.      PLAN:  1.  Proceed with Procrit today, repeat it every 2 weeks. Iron studies will be per protocol.  2. We will arrange for abnormal MRI with and without contrast to be done in 3 months for reassessment of hepatocellular carcinoma.  3. There is no need for paracentesis or thoracentesis currently.   4. The patient will continue to follow-up with his nephrologist, GI specialist and also primary care physician for management.  5.  I will bring the patient back for reevaluation in 3 months, 1 or 2 weeks after the MRI examination.      Discussed with the patient and his wife about the abdomen MRI examination and laboratory studies.  We discussed further management plan.            Milagro Salinas MD PhD       CC:  MD Everardo Cuello M.D. Ramsey Nassar.  MD Declan Goodson MD        Transcribed from ambient dictation for Milagro Salinas MD PhD by Neva Sandy.  03/10/23   11:00 EST    [facility] Provider Statement:[id number]::\"Patient or patient representative verbalized consent to the visit recording.\",\"I have personally performed the services described in this document as transcribed by the above individual, and it is both accurate and complete.\"      "

## 2023-03-17 ENCOUNTER — OFFICE (OUTPATIENT)
Dept: URBAN - METROPOLITAN AREA CLINIC 76 | Facility: CLINIC | Age: 73
End: 2023-03-17

## 2023-03-17 VITALS
DIASTOLIC BLOOD PRESSURE: 60 MMHG | OXYGEN SATURATION: 96 % | WEIGHT: 184 LBS | SYSTOLIC BLOOD PRESSURE: 140 MMHG | HEIGHT: 66 IN | HEART RATE: 62 BPM

## 2023-03-17 DIAGNOSIS — I85.00 ESOPHAGEAL VARICES WITHOUT BLEEDING: ICD-10-CM

## 2023-03-17 DIAGNOSIS — K74.60 UNSPECIFIED CIRRHOSIS OF LIVER: ICD-10-CM

## 2023-03-17 DIAGNOSIS — R79.89 OTHER SPECIFIED ABNORMAL FINDINGS OF BLOOD CHEMISTRY: ICD-10-CM

## 2023-03-17 DIAGNOSIS — Z86.010 PERSONAL HISTORY OF COLONIC POLYPS: ICD-10-CM

## 2023-03-17 DIAGNOSIS — C22.0 LIVER CELL CARCINOMA: ICD-10-CM

## 2023-03-17 DIAGNOSIS — K75.81 NONALCOHOLIC STEATOHEPATITIS (NASH): ICD-10-CM

## 2023-03-17 DIAGNOSIS — R18.8 OTHER ASCITES: ICD-10-CM

## 2023-03-17 PROCEDURE — 99214 OFFICE O/P EST MOD 30 MIN: CPT

## 2023-03-17 RX ORDER — PANTOPRAZOLE 40 MG/1
40 TABLET, DELAYED RELEASE ORAL
Qty: 90 | Refills: 3 | Status: ACTIVE
Start: 2022-12-21

## 2023-03-24 ENCOUNTER — LAB (OUTPATIENT)
Dept: LAB | Facility: HOSPITAL | Age: 73
End: 2023-03-24
Payer: MEDICARE

## 2023-03-24 ENCOUNTER — INFUSION (OUTPATIENT)
Dept: ONCOLOGY | Facility: HOSPITAL | Age: 73
End: 2023-03-24
Payer: MEDICARE

## 2023-03-24 DIAGNOSIS — C22.0 HEPATOCELLULAR CARCINOMA: ICD-10-CM

## 2023-03-24 DIAGNOSIS — N18.30 STAGE 3 CHRONIC KIDNEY DISEASE, UNSPECIFIED WHETHER STAGE 3A OR 3B CKD: ICD-10-CM

## 2023-03-24 DIAGNOSIS — N18.9 ANEMIA DUE TO CHRONIC RENAL FAILURE TREATED WITH ERYTHROPOIETIN, UNSPECIFIED STAGE: Primary | ICD-10-CM

## 2023-03-24 DIAGNOSIS — D63.1 ANEMIA DUE TO CHRONIC RENAL FAILURE TREATED WITH ERYTHROPOIETIN, UNSPECIFIED STAGE: ICD-10-CM

## 2023-03-24 DIAGNOSIS — N18.9 ANEMIA DUE TO CHRONIC RENAL FAILURE TREATED WITH ERYTHROPOIETIN, UNSPECIFIED STAGE: ICD-10-CM

## 2023-03-24 DIAGNOSIS — D63.1 ANEMIA DUE TO CHRONIC RENAL FAILURE TREATED WITH ERYTHROPOIETIN, UNSPECIFIED STAGE: Primary | ICD-10-CM

## 2023-03-24 LAB
BASOPHILS # BLD AUTO: 0.03 10*3/MM3 (ref 0–0.2)
BASOPHILS NFR BLD AUTO: 1 % (ref 0–1.5)
DEPRECATED RDW RBC AUTO: 47.8 FL (ref 37–54)
EOSINOPHIL # BLD AUTO: 0.24 10*3/MM3 (ref 0–0.4)
EOSINOPHIL NFR BLD AUTO: 7.7 % (ref 0.3–6.2)
ERYTHROCYTE [DISTWIDTH] IN BLOOD BY AUTOMATED COUNT: 14.1 % (ref 12.3–15.4)
HCT VFR BLD AUTO: 29.9 % (ref 37.5–51)
HGB BLD-MCNC: 9.8 G/DL (ref 13–17.7)
IMM GRANULOCYTES # BLD AUTO: 0.05 10*3/MM3 (ref 0–0.05)
IMM GRANULOCYTES NFR BLD AUTO: 1.6 % (ref 0–0.5)
LYMPHOCYTES # BLD AUTO: 0.88 10*3/MM3 (ref 0.7–3.1)
LYMPHOCYTES NFR BLD AUTO: 28.4 % (ref 19.6–45.3)
MCH RBC QN AUTO: 30.7 PG (ref 26.6–33)
MCHC RBC AUTO-ENTMCNC: 32.8 G/DL (ref 31.5–35.7)
MCV RBC AUTO: 93.7 FL (ref 79–97)
MONOCYTES # BLD AUTO: 0.3 10*3/MM3 (ref 0.1–0.9)
MONOCYTES NFR BLD AUTO: 9.7 % (ref 5–12)
NEUTROPHILS NFR BLD AUTO: 1.6 10*3/MM3 (ref 1.7–7)
NEUTROPHILS NFR BLD AUTO: 51.6 % (ref 42.7–76)
NRBC BLD AUTO-RTO: 0 /100 WBC (ref 0–0.2)
PLATELET # BLD AUTO: 42 10*3/MM3 (ref 140–450)
PMV BLD AUTO: 12.1 FL (ref 6–12)
RBC # BLD AUTO: 3.19 10*6/MM3 (ref 4.14–5.8)
WBC NRBC COR # BLD: 3.1 10*3/MM3 (ref 3.4–10.8)

## 2023-03-24 PROCEDURE — 96372 THER/PROPH/DIAG INJ SC/IM: CPT

## 2023-03-24 PROCEDURE — 36415 COLL VENOUS BLD VENIPUNCTURE: CPT

## 2023-03-24 PROCEDURE — 25010000002 EPOETIN ALFA PER 1000 UNITS: Performed by: INTERNAL MEDICINE

## 2023-03-24 PROCEDURE — 85025 COMPLETE CBC W/AUTO DIFF WBC: CPT

## 2023-03-24 RX ADMIN — ERYTHROPOIETIN 31000 UNITS: 20000 INJECTION, SOLUTION INTRAVENOUS; SUBCUTANEOUS at 09:42

## 2023-04-07 ENCOUNTER — LAB (OUTPATIENT)
Dept: LAB | Facility: HOSPITAL | Age: 73
End: 2023-04-07
Payer: MEDICARE

## 2023-04-07 ENCOUNTER — INFUSION (OUTPATIENT)
Dept: ONCOLOGY | Facility: HOSPITAL | Age: 73
End: 2023-04-07
Payer: MEDICARE

## 2023-04-07 DIAGNOSIS — D63.1 ANEMIA DUE TO CHRONIC RENAL FAILURE TREATED WITH ERYTHROPOIETIN, UNSPECIFIED STAGE: ICD-10-CM

## 2023-04-07 DIAGNOSIS — C22.0 HEPATOCELLULAR CARCINOMA: ICD-10-CM

## 2023-04-07 DIAGNOSIS — N18.30 STAGE 3 CHRONIC KIDNEY DISEASE, UNSPECIFIED WHETHER STAGE 3A OR 3B CKD: ICD-10-CM

## 2023-04-07 DIAGNOSIS — N18.9 ANEMIA DUE TO CHRONIC RENAL FAILURE TREATED WITH ERYTHROPOIETIN, UNSPECIFIED STAGE: ICD-10-CM

## 2023-04-07 LAB
BASOPHILS # BLD AUTO: 0.03 10*3/MM3 (ref 0–0.2)
BASOPHILS NFR BLD AUTO: 0.9 % (ref 0–1.5)
DEPRECATED RDW RBC AUTO: 47.5 FL (ref 37–54)
EOSINOPHIL # BLD AUTO: 0.21 10*3/MM3 (ref 0–0.4)
EOSINOPHIL NFR BLD AUTO: 6.2 % (ref 0.3–6.2)
ERYTHROCYTE [DISTWIDTH] IN BLOOD BY AUTOMATED COUNT: 14.1 % (ref 12.3–15.4)
HCT VFR BLD AUTO: 30.3 % (ref 37.5–51)
HGB BLD-MCNC: 10.1 G/DL (ref 13–17.7)
IMM GRANULOCYTES # BLD AUTO: 0.15 10*3/MM3 (ref 0–0.05)
IMM GRANULOCYTES NFR BLD AUTO: 4.4 % (ref 0–0.5)
LYMPHOCYTES # BLD AUTO: 0.83 10*3/MM3 (ref 0.7–3.1)
LYMPHOCYTES NFR BLD AUTO: 24.3 % (ref 19.6–45.3)
MCH RBC QN AUTO: 30.7 PG (ref 26.6–33)
MCHC RBC AUTO-ENTMCNC: 33.3 G/DL (ref 31.5–35.7)
MCV RBC AUTO: 92.1 FL (ref 79–97)
MONOCYTES # BLD AUTO: 0.32 10*3/MM3 (ref 0.1–0.9)
MONOCYTES NFR BLD AUTO: 9.4 % (ref 5–12)
NEUTROPHILS NFR BLD AUTO: 1.87 10*3/MM3 (ref 1.7–7)
NEUTROPHILS NFR BLD AUTO: 54.8 % (ref 42.7–76)
NRBC BLD AUTO-RTO: 0 /100 WBC (ref 0–0.2)
PLATELET # BLD AUTO: 40 10*3/MM3 (ref 140–450)
PMV BLD AUTO: 12.5 FL (ref 6–12)
RBC # BLD AUTO: 3.29 10*6/MM3 (ref 4.14–5.8)
WBC NRBC COR # BLD: 3.41 10*3/MM3 (ref 3.4–10.8)

## 2023-04-07 PROCEDURE — 85025 COMPLETE CBC W/AUTO DIFF WBC: CPT

## 2023-04-07 PROCEDURE — 36415 COLL VENOUS BLD VENIPUNCTURE: CPT

## 2023-04-07 PROCEDURE — G0463 HOSPITAL OUTPT CLINIC VISIT: HCPCS

## 2023-04-21 ENCOUNTER — INFUSION (OUTPATIENT)
Dept: ONCOLOGY | Facility: HOSPITAL | Age: 73
End: 2023-04-21
Payer: MEDICARE

## 2023-04-21 ENCOUNTER — LAB (OUTPATIENT)
Dept: LAB | Facility: HOSPITAL | Age: 73
End: 2023-04-21
Payer: MEDICARE

## 2023-04-21 DIAGNOSIS — N18.9 ANEMIA DUE TO CHRONIC RENAL FAILURE TREATED WITH ERYTHROPOIETIN, UNSPECIFIED STAGE: Primary | ICD-10-CM

## 2023-04-21 DIAGNOSIS — D63.1 ANEMIA DUE TO CHRONIC RENAL FAILURE TREATED WITH ERYTHROPOIETIN, UNSPECIFIED STAGE: Primary | ICD-10-CM

## 2023-04-21 DIAGNOSIS — D63.1 ANEMIA DUE TO CHRONIC RENAL FAILURE TREATED WITH ERYTHROPOIETIN, UNSPECIFIED STAGE: ICD-10-CM

## 2023-04-21 DIAGNOSIS — N18.30 STAGE 3 CHRONIC KIDNEY DISEASE, UNSPECIFIED WHETHER STAGE 3A OR 3B CKD: ICD-10-CM

## 2023-04-21 DIAGNOSIS — N18.9 ANEMIA DUE TO CHRONIC RENAL FAILURE TREATED WITH ERYTHROPOIETIN, UNSPECIFIED STAGE: ICD-10-CM

## 2023-04-21 DIAGNOSIS — C22.0 HEPATOCELLULAR CARCINOMA: ICD-10-CM

## 2023-04-21 LAB
BASOPHILS # BLD AUTO: 0.01 10*3/MM3 (ref 0–0.2)
BASOPHILS NFR BLD AUTO: 0.4 % (ref 0–1.5)
DEPRECATED RDW RBC AUTO: 47 FL (ref 37–54)
EOSINOPHIL # BLD AUTO: 0.17 10*3/MM3 (ref 0–0.4)
EOSINOPHIL NFR BLD AUTO: 6.8 % (ref 0.3–6.2)
ERYTHROCYTE [DISTWIDTH] IN BLOOD BY AUTOMATED COUNT: 14.2 % (ref 12.3–15.4)
HCT VFR BLD AUTO: 26 % (ref 37.5–51)
HGB BLD-MCNC: 8.7 G/DL (ref 13–17.7)
IMM GRANULOCYTES # BLD AUTO: 0.02 10*3/MM3 (ref 0–0.05)
IMM GRANULOCYTES NFR BLD AUTO: 0.8 % (ref 0–0.5)
LYMPHOCYTES # BLD AUTO: 0.68 10*3/MM3 (ref 0.7–3.1)
LYMPHOCYTES NFR BLD AUTO: 27.1 % (ref 19.6–45.3)
MCH RBC QN AUTO: 30.4 PG (ref 26.6–33)
MCHC RBC AUTO-ENTMCNC: 33.5 G/DL (ref 31.5–35.7)
MCV RBC AUTO: 90.9 FL (ref 79–97)
MONOCYTES # BLD AUTO: 0.26 10*3/MM3 (ref 0.1–0.9)
MONOCYTES NFR BLD AUTO: 10.4 % (ref 5–12)
NEUTROPHILS NFR BLD AUTO: 1.37 10*3/MM3 (ref 1.7–7)
NEUTROPHILS NFR BLD AUTO: 54.5 % (ref 42.7–76)
NRBC BLD AUTO-RTO: 0 /100 WBC (ref 0–0.2)
PLATELET # BLD AUTO: 40 10*3/MM3 (ref 140–450)
PMV BLD AUTO: 12.8 FL (ref 6–12)
RBC # BLD AUTO: 2.86 10*6/MM3 (ref 4.14–5.8)
WBC NRBC COR # BLD: 2.51 10*3/MM3 (ref 3.4–10.8)

## 2023-04-21 PROCEDURE — 96372 THER/PROPH/DIAG INJ SC/IM: CPT

## 2023-04-21 PROCEDURE — 85025 COMPLETE CBC W/AUTO DIFF WBC: CPT

## 2023-04-21 PROCEDURE — 36415 COLL VENOUS BLD VENIPUNCTURE: CPT

## 2023-04-21 PROCEDURE — 25010000002 EPOETIN ALFA PER 1000 UNITS: Performed by: INTERNAL MEDICINE

## 2023-04-21 RX ADMIN — ERYTHROPOIETIN 23000 UNITS: 20000 INJECTION, SOLUTION INTRAVENOUS; SUBCUTANEOUS at 09:40

## 2023-05-05 ENCOUNTER — INFUSION (OUTPATIENT)
Dept: ONCOLOGY | Facility: HOSPITAL | Age: 73
End: 2023-05-05
Payer: MEDICARE

## 2023-05-05 ENCOUNTER — LAB (OUTPATIENT)
Dept: LAB | Facility: HOSPITAL | Age: 73
End: 2023-05-05
Payer: MEDICARE

## 2023-05-05 DIAGNOSIS — D63.1 ANEMIA DUE TO CHRONIC RENAL FAILURE TREATED WITH ERYTHROPOIETIN, UNSPECIFIED STAGE: Primary | ICD-10-CM

## 2023-05-05 DIAGNOSIS — N18.9 ANEMIA DUE TO CHRONIC RENAL FAILURE TREATED WITH ERYTHROPOIETIN, UNSPECIFIED STAGE: Primary | ICD-10-CM

## 2023-05-05 DIAGNOSIS — N18.30 STAGE 3 CHRONIC KIDNEY DISEASE, UNSPECIFIED WHETHER STAGE 3A OR 3B CKD: ICD-10-CM

## 2023-05-05 DIAGNOSIS — C22.0 HEPATOCELLULAR CARCINOMA: ICD-10-CM

## 2023-05-05 DIAGNOSIS — D63.1 ANEMIA DUE TO CHRONIC RENAL FAILURE TREATED WITH ERYTHROPOIETIN, UNSPECIFIED STAGE: ICD-10-CM

## 2023-05-05 DIAGNOSIS — N18.9 ANEMIA DUE TO CHRONIC RENAL FAILURE TREATED WITH ERYTHROPOIETIN, UNSPECIFIED STAGE: ICD-10-CM

## 2023-05-05 LAB
BASOPHILS # BLD AUTO: 0.02 10*3/MM3 (ref 0–0.2)
BASOPHILS NFR BLD AUTO: 0.8 % (ref 0–1.5)
DEPRECATED RDW RBC AUTO: 52.2 FL (ref 37–54)
EOSINOPHIL # BLD AUTO: 0.19 10*3/MM3 (ref 0–0.4)
EOSINOPHIL NFR BLD AUTO: 7.2 % (ref 0.3–6.2)
ERYTHROCYTE [DISTWIDTH] IN BLOOD BY AUTOMATED COUNT: 14.9 % (ref 12.3–15.4)
FERRITIN SERPL-MCNC: 200.7 NG/ML (ref 30–400)
HCT VFR BLD AUTO: 27.1 % (ref 37.5–51)
HGB BLD-MCNC: 8.6 G/DL (ref 13–17.7)
IMM GRANULOCYTES # BLD AUTO: 0.01 10*3/MM3 (ref 0–0.05)
IMM GRANULOCYTES NFR BLD AUTO: 0.4 % (ref 0–0.5)
IRON 24H UR-MRATE: 55 MCG/DL (ref 59–158)
IRON SATN MFR SERPL: 18 % (ref 14–48)
LYMPHOCYTES # BLD AUTO: 0.7 10*3/MM3 (ref 0.7–3.1)
LYMPHOCYTES NFR BLD AUTO: 26.4 % (ref 19.6–45.3)
MCH RBC QN AUTO: 30.4 PG (ref 26.6–33)
MCHC RBC AUTO-ENTMCNC: 31.7 G/DL (ref 31.5–35.7)
MCV RBC AUTO: 95.8 FL (ref 79–97)
MONOCYTES # BLD AUTO: 0.19 10*3/MM3 (ref 0.1–0.9)
MONOCYTES NFR BLD AUTO: 7.2 % (ref 5–12)
NEUTROPHILS NFR BLD AUTO: 1.54 10*3/MM3 (ref 1.7–7)
NEUTROPHILS NFR BLD AUTO: 58 % (ref 42.7–76)
NRBC BLD AUTO-RTO: 0 /100 WBC (ref 0–0.2)
PLATELET # BLD AUTO: 38 10*3/MM3 (ref 140–450)
PMV BLD AUTO: 11.7 FL (ref 6–12)
RBC # BLD AUTO: 2.83 10*6/MM3 (ref 4.14–5.8)
TIBC SERPL-MCNC: 309 MCG/DL (ref 249–505)
TRANSFERRIN SERPL-MCNC: 221 MG/DL (ref 200–360)
WBC NRBC COR # BLD: 2.65 10*3/MM3 (ref 3.4–10.8)

## 2023-05-05 PROCEDURE — 96372 THER/PROPH/DIAG INJ SC/IM: CPT

## 2023-05-05 PROCEDURE — 36415 COLL VENOUS BLD VENIPUNCTURE: CPT

## 2023-05-05 PROCEDURE — 83540 ASSAY OF IRON: CPT

## 2023-05-05 PROCEDURE — 25010000002 EPOETIN ALFA PER 1000 UNITS: Performed by: INTERNAL MEDICINE

## 2023-05-05 PROCEDURE — 85025 COMPLETE CBC W/AUTO DIFF WBC: CPT

## 2023-05-05 PROCEDURE — 82728 ASSAY OF FERRITIN: CPT

## 2023-05-05 PROCEDURE — 84466 ASSAY OF TRANSFERRIN: CPT

## 2023-05-05 RX ADMIN — ERYTHROPOIETIN 23000 UNITS: 20000 INJECTION, SOLUTION INTRAVENOUS; SUBCUTANEOUS at 09:51

## 2023-05-15 ENCOUNTER — TELEPHONE (OUTPATIENT)
Dept: ONCOLOGY | Facility: CLINIC | Age: 73
End: 2023-05-15

## 2023-05-15 NOTE — TELEPHONE ENCOUNTER
Caller: Dennys Lieberman    Relationship: Self    Best call back number: [phone]    What is the best time to reach you: ASAP    Who are you requesting to speak with (clinical staff, provider,  specific staff member):     What was the call regarding: [facility]/21 OR 4/24, THEY HAVE SENT THE PT BACK A LETTER SAYING THEY HAVE NOT HEARD BACK FROM US.  THE PT NOTICED THEY DID NOT PUT DR. ANDREWS'S LAST NAME ON THE REQUEST FORM.  HE IS ASKING IF THERE IS ANY WAY HE CAN  THE RECORDS LATER THIS WEEK, OR CAN THE OFFICE SEND THIS TO [facility], OR DOES HE NEED TO CALL RELEASE OF INFORMATION TO GET THIS TAKEN CARE OF?    Do you require a callback: YES, PLEASE CALL PT BACK TO ADVISE IF HE CAN  THE RECORDS NEEDED THIS WEEK, OR HOW HE NEEDS TO PROCEED.

## 2023-05-19 ENCOUNTER — LAB (OUTPATIENT)
Dept: LAB | Facility: HOSPITAL | Age: 73
End: 2023-05-19
Payer: MEDICARE

## 2023-05-19 ENCOUNTER — INFUSION (OUTPATIENT)
Dept: ONCOLOGY | Facility: HOSPITAL | Age: 73
End: 2023-05-19
Payer: MEDICARE

## 2023-05-19 DIAGNOSIS — D63.1 ANEMIA DUE TO CHRONIC RENAL FAILURE TREATED WITH ERYTHROPOIETIN, UNSPECIFIED STAGE: Primary | ICD-10-CM

## 2023-05-19 DIAGNOSIS — C22.0 HEPATOCELLULAR CARCINOMA: ICD-10-CM

## 2023-05-19 DIAGNOSIS — N18.9 ANEMIA DUE TO CHRONIC RENAL FAILURE TREATED WITH ERYTHROPOIETIN, UNSPECIFIED STAGE: ICD-10-CM

## 2023-05-19 DIAGNOSIS — N18.30 STAGE 3 CHRONIC KIDNEY DISEASE, UNSPECIFIED WHETHER STAGE 3A OR 3B CKD: ICD-10-CM

## 2023-05-19 DIAGNOSIS — D63.1 ANEMIA DUE TO CHRONIC RENAL FAILURE TREATED WITH ERYTHROPOIETIN, UNSPECIFIED STAGE: ICD-10-CM

## 2023-05-19 DIAGNOSIS — N18.9 ANEMIA DUE TO CHRONIC RENAL FAILURE TREATED WITH ERYTHROPOIETIN, UNSPECIFIED STAGE: Primary | ICD-10-CM

## 2023-05-19 LAB
ALBUMIN SERPL-MCNC: 3.8 G/DL (ref 3.5–5.2)
ANION GAP SERPL CALCULATED.3IONS-SCNC: 13.3 MMOL/L (ref 5–15)
BASOPHILS # BLD AUTO: 0.02 10*3/MM3 (ref 0–0.2)
BASOPHILS NFR BLD AUTO: 0.7 % (ref 0–1.5)
BUN SERPL-MCNC: 70 MG/DL (ref 8–23)
BUN/CREAT SERPL: 26.9 (ref 7–25)
CALCIUM SPEC-SCNC: 9.3 MG/DL (ref 8.6–10.5)
CHLORIDE SERPL-SCNC: 109 MMOL/L (ref 98–107)
CO2 SERPL-SCNC: 18.7 MMOL/L (ref 22–29)
CREAT SERPL-MCNC: 2.6 MG/DL (ref 0.76–1.27)
DEPRECATED RDW RBC AUTO: 51.6 FL (ref 37–54)
EGFRCR SERPLBLD CKD-EPI 2021: 25.4 ML/MIN/1.73
EOSINOPHIL # BLD AUTO: 0.17 10*3/MM3 (ref 0–0.4)
EOSINOPHIL NFR BLD AUTO: 6 % (ref 0.3–6.2)
ERYTHROCYTE [DISTWIDTH] IN BLOOD BY AUTOMATED COUNT: 14.9 % (ref 12.3–15.4)
GLUCOSE SERPL-MCNC: 104 MG/DL (ref 65–99)
HCT VFR BLD AUTO: 26.3 % (ref 37.5–51)
HGB BLD-MCNC: 8.4 G/DL (ref 13–17.7)
IMM GRANULOCYTES # BLD AUTO: 0.01 10*3/MM3 (ref 0–0.05)
IMM GRANULOCYTES NFR BLD AUTO: 0.4 % (ref 0–0.5)
LYMPHOCYTES # BLD AUTO: 0.53 10*3/MM3 (ref 0.7–3.1)
LYMPHOCYTES NFR BLD AUTO: 18.7 % (ref 19.6–45.3)
MCH RBC QN AUTO: 30.7 PG (ref 26.6–33)
MCHC RBC AUTO-ENTMCNC: 31.9 G/DL (ref 31.5–35.7)
MCV RBC AUTO: 96 FL (ref 79–97)
MONOCYTES # BLD AUTO: 0.27 10*3/MM3 (ref 0.1–0.9)
MONOCYTES NFR BLD AUTO: 9.5 % (ref 5–12)
NEUTROPHILS NFR BLD AUTO: 1.83 10*3/MM3 (ref 1.7–7)
NEUTROPHILS NFR BLD AUTO: 64.7 % (ref 42.7–76)
NRBC BLD AUTO-RTO: 0 /100 WBC (ref 0–0.2)
PHOSPHATE SERPL-MCNC: 4.7 MG/DL (ref 2.5–4.5)
PLATELET # BLD AUTO: 41 10*3/MM3 (ref 140–450)
PMV BLD AUTO: 11.9 FL (ref 6–12)
POTASSIUM SERPL-SCNC: 4.7 MMOL/L (ref 3.5–5.2)
RBC # BLD AUTO: 2.74 10*6/MM3 (ref 4.14–5.8)
SODIUM SERPL-SCNC: 141 MMOL/L (ref 136–145)
WBC NRBC COR # BLD: 2.83 10*3/MM3 (ref 3.4–10.8)

## 2023-05-19 PROCEDURE — 36415 COLL VENOUS BLD VENIPUNCTURE: CPT

## 2023-05-19 PROCEDURE — 96372 THER/PROPH/DIAG INJ SC/IM: CPT

## 2023-05-19 PROCEDURE — 85025 COMPLETE CBC W/AUTO DIFF WBC: CPT

## 2023-05-19 PROCEDURE — 25010000002 EPOETIN ALFA PER 1000 UNITS: Performed by: INTERNAL MEDICINE

## 2023-05-19 PROCEDURE — 80069 RENAL FUNCTION PANEL: CPT

## 2023-05-19 RX ADMIN — ERYTHROPOIETIN 23000 UNITS: 20000 INJECTION, SOLUTION INTRAVENOUS; SUBCUTANEOUS at 09:49

## 2023-06-02 ENCOUNTER — LAB (OUTPATIENT)
Dept: LAB | Facility: HOSPITAL | Age: 73
End: 2023-06-02
Payer: MEDICARE

## 2023-06-02 ENCOUNTER — INFUSION (OUTPATIENT)
Dept: ONCOLOGY | Facility: HOSPITAL | Age: 73
End: 2023-06-02
Payer: MEDICARE

## 2023-06-02 DIAGNOSIS — N18.9 ANEMIA DUE TO CHRONIC RENAL FAILURE TREATED WITH ERYTHROPOIETIN, UNSPECIFIED STAGE: Primary | ICD-10-CM

## 2023-06-02 DIAGNOSIS — D63.1 ANEMIA DUE TO CHRONIC RENAL FAILURE TREATED WITH ERYTHROPOIETIN, UNSPECIFIED STAGE: Primary | ICD-10-CM

## 2023-06-02 DIAGNOSIS — C22.0 HEPATOCELLULAR CARCINOMA: ICD-10-CM

## 2023-06-02 DIAGNOSIS — N18.30 STAGE 3 CHRONIC KIDNEY DISEASE, UNSPECIFIED WHETHER STAGE 3A OR 3B CKD: ICD-10-CM

## 2023-06-02 DIAGNOSIS — N18.9 ANEMIA DUE TO CHRONIC RENAL FAILURE TREATED WITH ERYTHROPOIETIN, UNSPECIFIED STAGE: ICD-10-CM

## 2023-06-02 DIAGNOSIS — D63.1 ANEMIA DUE TO CHRONIC RENAL FAILURE TREATED WITH ERYTHROPOIETIN, UNSPECIFIED STAGE: ICD-10-CM

## 2023-06-02 LAB
BASOPHILS # BLD AUTO: 0.03 10*3/MM3 (ref 0–0.2)
BASOPHILS NFR BLD AUTO: 0.8 % (ref 0–1.5)
DEPRECATED RDW RBC AUTO: 49.1 FL (ref 37–54)
EOSINOPHIL # BLD AUTO: 0.29 10*3/MM3 (ref 0–0.4)
EOSINOPHIL NFR BLD AUTO: 8.1 % (ref 0.3–6.2)
ERYTHROCYTE [DISTWIDTH] IN BLOOD BY AUTOMATED COUNT: 14.6 % (ref 12.3–15.4)
HCT VFR BLD AUTO: 28 % (ref 37.5–51)
HGB BLD-MCNC: 9.3 G/DL (ref 13–17.7)
IMM GRANULOCYTES # BLD AUTO: 0.12 10*3/MM3 (ref 0–0.05)
IMM GRANULOCYTES NFR BLD AUTO: 3.4 % (ref 0–0.5)
LYMPHOCYTES # BLD AUTO: 0.81 10*3/MM3 (ref 0.7–3.1)
LYMPHOCYTES NFR BLD AUTO: 22.8 % (ref 19.6–45.3)
MCH RBC QN AUTO: 30.7 PG (ref 26.6–33)
MCHC RBC AUTO-ENTMCNC: 33.2 G/DL (ref 31.5–35.7)
MCV RBC AUTO: 92.4 FL (ref 79–97)
MONOCYTES # BLD AUTO: 0.25 10*3/MM3 (ref 0.1–0.9)
MONOCYTES NFR BLD AUTO: 7 % (ref 5–12)
NEUTROPHILS NFR BLD AUTO: 2.06 10*3/MM3 (ref 1.7–7)
NEUTROPHILS NFR BLD AUTO: 57.9 % (ref 42.7–76)
NRBC BLD AUTO-RTO: 0 /100 WBC (ref 0–0.2)
PLATELET # BLD AUTO: 41 10*3/MM3 (ref 140–450)
PMV BLD AUTO: 12.2 FL (ref 6–12)
RBC # BLD AUTO: 3.03 10*6/MM3 (ref 4.14–5.8)
WBC NRBC COR # BLD: 3.56 10*3/MM3 (ref 3.4–10.8)

## 2023-06-02 PROCEDURE — 25010000002 EPOETIN ALFA PER 1000 UNITS: Performed by: INTERNAL MEDICINE

## 2023-06-02 PROCEDURE — 85025 COMPLETE CBC W/AUTO DIFF WBC: CPT

## 2023-06-02 PROCEDURE — 96372 THER/PROPH/DIAG INJ SC/IM: CPT

## 2023-06-02 PROCEDURE — 36415 COLL VENOUS BLD VENIPUNCTURE: CPT

## 2023-06-02 RX ADMIN — ERYTHROPOIETIN 23000 UNITS: 20000 INJECTION, SOLUTION INTRAVENOUS; SUBCUTANEOUS at 09:47

## 2023-06-12 ENCOUNTER — HOSPITAL ENCOUNTER (OUTPATIENT)
Dept: MRI IMAGING | Facility: HOSPITAL | Age: 73
Discharge: HOME | End: 2023-06-12
Admitting: INTERNAL MEDICINE
Payer: MEDICARE

## 2023-06-12 DIAGNOSIS — C22.0 HEPATOCELLULAR CARCINOMA: ICD-10-CM

## 2023-06-12 LAB — CREAT BLDA-MCNC: 3.3 MG/DL (ref 0.6–1.3)

## 2023-06-12 PROCEDURE — 82565 ASSAY OF CREATININE: CPT

## 2023-06-12 PROCEDURE — 74181 MRI ABDOMEN W/O CONTRAST: CPT

## 2023-06-16 ENCOUNTER — LAB (OUTPATIENT)
Dept: LAB | Facility: HOSPITAL | Age: 73
End: 2023-06-16
Payer: MEDICARE

## 2023-06-16 ENCOUNTER — INFUSION (OUTPATIENT)
Dept: ONCOLOGY | Facility: HOSPITAL | Age: 73
End: 2023-06-16
Payer: MEDICARE

## 2023-06-16 DIAGNOSIS — D63.1 ANEMIA DUE TO CHRONIC RENAL FAILURE TREATED WITH ERYTHROPOIETIN, UNSPECIFIED STAGE: ICD-10-CM

## 2023-06-16 DIAGNOSIS — C22.0 HEPATOCELLULAR CARCINOMA: ICD-10-CM

## 2023-06-16 DIAGNOSIS — N18.30 STAGE 3 CHRONIC KIDNEY DISEASE, UNSPECIFIED WHETHER STAGE 3A OR 3B CKD: ICD-10-CM

## 2023-06-16 DIAGNOSIS — N18.9 ANEMIA DUE TO CHRONIC RENAL FAILURE TREATED WITH ERYTHROPOIETIN, UNSPECIFIED STAGE: ICD-10-CM

## 2023-06-16 DIAGNOSIS — D63.1 ANEMIA DUE TO CHRONIC RENAL FAILURE TREATED WITH ERYTHROPOIETIN, UNSPECIFIED STAGE: Primary | ICD-10-CM

## 2023-06-16 DIAGNOSIS — N18.9 ANEMIA DUE TO CHRONIC RENAL FAILURE TREATED WITH ERYTHROPOIETIN, UNSPECIFIED STAGE: Primary | ICD-10-CM

## 2023-06-16 LAB
BASOPHILS # BLD AUTO: 0.02 10*3/MM3 (ref 0–0.2)
BASOPHILS NFR BLD AUTO: 0.6 % (ref 0–1.5)
DEPRECATED RDW RBC AUTO: 48.7 FL (ref 37–54)
EOSINOPHIL # BLD AUTO: 0.29 10*3/MM3 (ref 0–0.4)
EOSINOPHIL NFR BLD AUTO: 8.6 % (ref 0.3–6.2)
ERYTHROCYTE [DISTWIDTH] IN BLOOD BY AUTOMATED COUNT: 14.6 % (ref 12.3–15.4)
HCT VFR BLD AUTO: 28.6 % (ref 37.5–51)
HGB BLD-MCNC: 9.5 G/DL (ref 13–17.7)
IMM GRANULOCYTES # BLD AUTO: 0.06 10*3/MM3 (ref 0–0.05)
IMM GRANULOCYTES NFR BLD AUTO: 1.8 % (ref 0–0.5)
LYMPHOCYTES # BLD AUTO: 0.78 10*3/MM3 (ref 0.7–3.1)
LYMPHOCYTES NFR BLD AUTO: 23 % (ref 19.6–45.3)
MCH RBC QN AUTO: 30.5 PG (ref 26.6–33)
MCHC RBC AUTO-ENTMCNC: 33.2 G/DL (ref 31.5–35.7)
MCV RBC AUTO: 92 FL (ref 79–97)
MONOCYTES # BLD AUTO: 0.29 10*3/MM3 (ref 0.1–0.9)
MONOCYTES NFR BLD AUTO: 8.6 % (ref 5–12)
NEUTROPHILS NFR BLD AUTO: 1.95 10*3/MM3 (ref 1.7–7)
NEUTROPHILS NFR BLD AUTO: 57.4 % (ref 42.7–76)
NRBC BLD AUTO-RTO: 0 /100 WBC (ref 0–0.2)
PLATELET # BLD AUTO: 34 10*3/MM3 (ref 140–450)
PMV BLD AUTO: 12.8 FL (ref 6–12)
RBC # BLD AUTO: 3.11 10*6/MM3 (ref 4.14–5.8)
WBC NRBC COR # BLD: 3.39 10*3/MM3 (ref 3.4–10.8)

## 2023-06-16 PROCEDURE — 25010000002 EPOETIN ALFA PER 1000 UNITS: Performed by: INTERNAL MEDICINE

## 2023-06-16 PROCEDURE — 85025 COMPLETE CBC W/AUTO DIFF WBC: CPT

## 2023-06-16 PROCEDURE — 96372 THER/PROPH/DIAG INJ SC/IM: CPT

## 2023-06-16 PROCEDURE — 36415 COLL VENOUS BLD VENIPUNCTURE: CPT

## 2023-06-16 RX ADMIN — ERYTHROPOIETIN 30000 UNITS: 20000 INJECTION, SOLUTION INTRAVENOUS; SUBCUTANEOUS at 09:39

## 2023-06-16 NOTE — PROGRESS NOTES
Lab Results   Component Value Date    WBC 3.39 (L) 06/16/2023    HGB 9.5 (L) 06/16/2023    HCT 28.6 (L) 06/16/2023    MCV 92.0 06/16/2023    PLT 34 (L) 06/16/2023     Patient denies any bleeding or new bruising.  Discussed bleeding precautions. Patient v/u.

## 2023-07-28 ENCOUNTER — LAB (OUTPATIENT)
Dept: LAB | Facility: HOSPITAL | Age: 73
End: 2023-07-28
Payer: MEDICARE

## 2023-07-28 ENCOUNTER — INFUSION (OUTPATIENT)
Dept: ONCOLOGY | Facility: HOSPITAL | Age: 73
End: 2023-07-28
Payer: MEDICARE

## 2023-07-28 DIAGNOSIS — N18.9 ANEMIA DUE TO CHRONIC RENAL FAILURE TREATED WITH ERYTHROPOIETIN, UNSPECIFIED STAGE: Primary | ICD-10-CM

## 2023-07-28 DIAGNOSIS — D63.1 ANEMIA DUE TO CHRONIC RENAL FAILURE TREATED WITH ERYTHROPOIETIN, UNSPECIFIED STAGE: Primary | ICD-10-CM

## 2023-07-28 DIAGNOSIS — N18.9 ANEMIA DUE TO CHRONIC RENAL FAILURE TREATED WITH ERYTHROPOIETIN, UNSPECIFIED STAGE: ICD-10-CM

## 2023-07-28 DIAGNOSIS — C22.0 HEPATOCELLULAR CARCINOMA: ICD-10-CM

## 2023-07-28 DIAGNOSIS — D63.1 ANEMIA DUE TO CHRONIC RENAL FAILURE TREATED WITH ERYTHROPOIETIN, UNSPECIFIED STAGE: ICD-10-CM

## 2023-07-28 DIAGNOSIS — N18.30 STAGE 3 CHRONIC KIDNEY DISEASE, UNSPECIFIED WHETHER STAGE 3A OR 3B CKD: ICD-10-CM

## 2023-07-28 LAB
BASOPHILS # BLD AUTO: 0.02 10*3/MM3 (ref 0–0.2)
BASOPHILS NFR BLD AUTO: 0.8 % (ref 0–1.5)
DEPRECATED RDW RBC AUTO: 49.7 FL (ref 37–54)
EOSINOPHIL # BLD AUTO: 0.16 10*3/MM3 (ref 0–0.4)
EOSINOPHIL NFR BLD AUTO: 6.3 % (ref 0.3–6.2)
ERYTHROCYTE [DISTWIDTH] IN BLOOD BY AUTOMATED COUNT: 14.3 % (ref 12.3–15.4)
FERRITIN SERPL-MCNC: 95.3 NG/ML (ref 30–400)
HCT VFR BLD AUTO: 27.1 % (ref 37.5–51)
HGB BLD-MCNC: 8.9 G/DL (ref 13–17.7)
IMM GRANULOCYTES # BLD AUTO: 0.01 10*3/MM3 (ref 0–0.05)
IMM GRANULOCYTES NFR BLD AUTO: 0.4 % (ref 0–0.5)
IRON 24H UR-MRATE: 53 MCG/DL (ref 59–158)
IRON SATN MFR SERPL: 19 % (ref 20–50)
LYMPHOCYTES # BLD AUTO: 0.69 10*3/MM3 (ref 0.7–3.1)
LYMPHOCYTES NFR BLD AUTO: 27.1 % (ref 19.6–45.3)
MCH RBC QN AUTO: 30.7 PG (ref 26.6–33)
MCHC RBC AUTO-ENTMCNC: 32.8 G/DL (ref 31.5–35.7)
MCV RBC AUTO: 93.4 FL (ref 79–97)
MONOCYTES # BLD AUTO: 0.24 10*3/MM3 (ref 0.1–0.9)
MONOCYTES NFR BLD AUTO: 9.4 % (ref 5–12)
NEUTROPHILS NFR BLD AUTO: 1.43 10*3/MM3 (ref 1.7–7)
NEUTROPHILS NFR BLD AUTO: 56 % (ref 42.7–76)
NRBC BLD AUTO-RTO: 0 /100 WBC (ref 0–0.2)
PLATELET # BLD AUTO: 37 10*3/MM3 (ref 140–450)
PMV BLD AUTO: 12.3 FL (ref 6–12)
RBC # BLD AUTO: 2.9 10*6/MM3 (ref 4.14–5.8)
TIBC SERPL-MCNC: 286 MCG/DL (ref 298–536)
TRANSFERRIN SERPL-MCNC: 204 MG/DL (ref 200–360)
WBC NRBC COR # BLD: 2.55 10*3/MM3 (ref 3.4–10.8)

## 2023-07-28 PROCEDURE — 96372 THER/PROPH/DIAG INJ SC/IM: CPT

## 2023-07-28 PROCEDURE — 85025 COMPLETE CBC W/AUTO DIFF WBC: CPT

## 2023-07-28 PROCEDURE — 36415 COLL VENOUS BLD VENIPUNCTURE: CPT

## 2023-07-28 PROCEDURE — 25010000002 EPOETIN ALFA PER 1000 UNITS: Performed by: INTERNAL MEDICINE

## 2023-07-28 PROCEDURE — 82728 ASSAY OF FERRITIN: CPT

## 2023-07-28 PROCEDURE — 84466 ASSAY OF TRANSFERRIN: CPT

## 2023-07-28 PROCEDURE — 83540 ASSAY OF IRON: CPT

## 2023-07-28 RX ADMIN — ERYTHROPOIETIN 30000 UNITS: 20000 INJECTION, SOLUTION INTRAVENOUS; SUBCUTANEOUS at 14:07

## 2023-08-11 ENCOUNTER — INFUSION (OUTPATIENT)
Dept: ONCOLOGY | Facility: HOSPITAL | Age: 73
End: 2023-08-11
Payer: MEDICARE

## 2023-08-11 ENCOUNTER — LAB (OUTPATIENT)
Dept: LAB | Facility: HOSPITAL | Age: 73
End: 2023-08-11
Payer: MEDICARE

## 2023-08-11 DIAGNOSIS — D50.9 IRON DEFICIENCY ANEMIA, UNSPECIFIED IRON DEFICIENCY ANEMIA TYPE: Primary | ICD-10-CM

## 2023-08-11 DIAGNOSIS — N18.30 STAGE 3 CHRONIC KIDNEY DISEASE, UNSPECIFIED WHETHER STAGE 3A OR 3B CKD: ICD-10-CM

## 2023-08-11 DIAGNOSIS — D63.1 ANEMIA DUE TO CHRONIC RENAL FAILURE TREATED WITH ERYTHROPOIETIN, UNSPECIFIED STAGE: ICD-10-CM

## 2023-08-11 DIAGNOSIS — C22.0 HEPATOCELLULAR CARCINOMA: ICD-10-CM

## 2023-08-11 DIAGNOSIS — N18.9 ANEMIA DUE TO CHRONIC RENAL FAILURE TREATED WITH ERYTHROPOIETIN, UNSPECIFIED STAGE: ICD-10-CM

## 2023-08-11 DIAGNOSIS — T45.4X5A ADVERSE EFFECT OF IRON AND ITS COMPOUNDS, INITIAL ENCOUNTER: ICD-10-CM

## 2023-08-11 LAB
BASOPHILS # BLD AUTO: 0.02 10*3/MM3 (ref 0–0.2)
BASOPHILS NFR BLD AUTO: 0.6 % (ref 0–1.5)
DEPRECATED RDW RBC AUTO: 49.2 FL (ref 37–54)
EOSINOPHIL # BLD AUTO: 0.2 10*3/MM3 (ref 0–0.4)
EOSINOPHIL NFR BLD AUTO: 6.4 % (ref 0.3–6.2)
ERYTHROCYTE [DISTWIDTH] IN BLOOD BY AUTOMATED COUNT: 14.6 % (ref 12.3–15.4)
HCT VFR BLD AUTO: 27.9 % (ref 37.5–51)
HGB BLD-MCNC: 9.2 G/DL (ref 13–17.7)
IMM GRANULOCYTES # BLD AUTO: 0.08 10*3/MM3 (ref 0–0.05)
IMM GRANULOCYTES NFR BLD AUTO: 2.6 % (ref 0–0.5)
LYMPHOCYTES # BLD AUTO: 0.76 10*3/MM3 (ref 0.7–3.1)
LYMPHOCYTES NFR BLD AUTO: 24.4 % (ref 19.6–45.3)
MCH RBC QN AUTO: 30.5 PG (ref 26.6–33)
MCHC RBC AUTO-ENTMCNC: 33 G/DL (ref 31.5–35.7)
MCV RBC AUTO: 92.4 FL (ref 79–97)
MONOCYTES # BLD AUTO: 0.3 10*3/MM3 (ref 0.1–0.9)
MONOCYTES NFR BLD AUTO: 9.6 % (ref 5–12)
NEUTROPHILS NFR BLD AUTO: 1.75 10*3/MM3 (ref 1.7–7)
NEUTROPHILS NFR BLD AUTO: 56.4 % (ref 42.7–76)
NRBC BLD AUTO-RTO: 0 /100 WBC (ref 0–0.2)
PLATELET # BLD AUTO: 37 10*3/MM3 (ref 140–450)
PMV BLD AUTO: 12.9 FL (ref 6–12)
RBC # BLD AUTO: 3.02 10*6/MM3 (ref 4.14–5.8)
WBC NRBC COR # BLD: 3.11 10*3/MM3 (ref 3.4–10.8)

## 2023-08-11 PROCEDURE — 85025 COMPLETE CBC W/AUTO DIFF WBC: CPT

## 2023-08-11 PROCEDURE — 36415 COLL VENOUS BLD VENIPUNCTURE: CPT

## 2023-08-11 NOTE — PROGRESS NOTES
Reviewed CBC with Dr Salinas , and hold Procrit today and arrange for IV iron.   Scheduling to call patient.

## 2023-08-18 ENCOUNTER — INFUSION (OUTPATIENT)
Dept: ONCOLOGY | Facility: HOSPITAL | Age: 73
End: 2023-08-18
Payer: MEDICARE

## 2023-08-18 VITALS
RESPIRATION RATE: 16 BRPM | DIASTOLIC BLOOD PRESSURE: 65 MMHG | WEIGHT: 188.2 LBS | TEMPERATURE: 98.2 F | SYSTOLIC BLOOD PRESSURE: 163 MMHG | OXYGEN SATURATION: 99 % | BODY MASS INDEX: 30.39 KG/M2 | HEART RATE: 50 BPM

## 2023-08-18 DIAGNOSIS — D50.9 IRON DEFICIENCY ANEMIA, UNSPECIFIED IRON DEFICIENCY ANEMIA TYPE: ICD-10-CM

## 2023-08-18 DIAGNOSIS — T45.4X5A ADVERSE EFFECT OF IRON AND ITS COMPOUNDS, INITIAL ENCOUNTER: Primary | ICD-10-CM

## 2023-08-18 DIAGNOSIS — N18.30 STAGE 3 CHRONIC KIDNEY DISEASE, UNSPECIFIED WHETHER STAGE 3A OR 3B CKD: ICD-10-CM

## 2023-08-18 PROCEDURE — 63710000001 PROCHLORPERAZINE MALEATE PER 10 MG: Performed by: INTERNAL MEDICINE

## 2023-08-18 PROCEDURE — 25010000002 FERRIC CARBOXYMALTOSE 750 MG/15ML SOLUTION 15 ML VIAL: Performed by: INTERNAL MEDICINE

## 2023-08-18 PROCEDURE — 96374 THER/PROPH/DIAG INJ IV PUSH: CPT

## 2023-08-18 PROCEDURE — A9270 NON-COVERED ITEM OR SERVICE: HCPCS | Performed by: INTERNAL MEDICINE

## 2023-08-18 RX ORDER — PROCHLORPERAZINE MALEATE 10 MG
10 TABLET ORAL ONCE
Status: COMPLETED | OUTPATIENT
Start: 2023-08-18 | End: 2023-08-18

## 2023-08-18 RX ORDER — SODIUM CHLORIDE 9 MG/ML
250 INJECTION, SOLUTION INTRAVENOUS ONCE
Status: COMPLETED | OUTPATIENT
Start: 2023-08-18 | End: 2023-08-18

## 2023-08-18 RX ADMIN — FERRIC CARBOXYMALTOSE INJECTION 750 MG: 50 INJECTION, SOLUTION INTRAVENOUS at 14:18

## 2023-08-18 RX ADMIN — SODIUM CHLORIDE 250 ML: 9 INJECTION, SOLUTION INTRAVENOUS at 14:05

## 2023-08-18 RX ADMIN — PROCHLORPERAZINE MALEATE 10 MG: 10 TABLET ORAL at 14:05

## 2023-08-25 ENCOUNTER — INFUSION (OUTPATIENT)
Dept: ONCOLOGY | Facility: HOSPITAL | Age: 73
End: 2023-08-25
Payer: MEDICARE

## 2023-08-25 VITALS
DIASTOLIC BLOOD PRESSURE: 58 MMHG | WEIGHT: 187.2 LBS | TEMPERATURE: 98.7 F | OXYGEN SATURATION: 97 % | RESPIRATION RATE: 16 BRPM | SYSTOLIC BLOOD PRESSURE: 150 MMHG | HEART RATE: 54 BPM | BODY MASS INDEX: 30.23 KG/M2

## 2023-08-25 DIAGNOSIS — N28.9 RENAL INSUFFICIENCY: ICD-10-CM

## 2023-08-25 DIAGNOSIS — D50.9 IRON DEFICIENCY ANEMIA, UNSPECIFIED IRON DEFICIENCY ANEMIA TYPE: ICD-10-CM

## 2023-08-25 DIAGNOSIS — N18.30 STAGE 3 CHRONIC KIDNEY DISEASE, UNSPECIFIED WHETHER STAGE 3A OR 3B CKD: ICD-10-CM

## 2023-08-25 DIAGNOSIS — T45.4X5A ADVERSE EFFECT OF IRON AND ITS COMPOUNDS, INITIAL ENCOUNTER: Primary | ICD-10-CM

## 2023-08-25 LAB
ALBUMIN SERPL-MCNC: 3.6 G/DL (ref 3.5–5.2)
ANION GAP SERPL CALCULATED.3IONS-SCNC: 12.7 MMOL/L (ref 5–15)
BASOPHILS # BLD AUTO: 0.02 10*3/MM3 (ref 0–0.2)
BASOPHILS NFR BLD AUTO: 0.7 % (ref 0–1.5)
BILIRUB UR QL STRIP: NEGATIVE
BUN SERPL-MCNC: 58 MG/DL (ref 8–23)
BUN/CREAT SERPL: 22.2 (ref 7–25)
CALCIUM SPEC-SCNC: 8.4 MG/DL (ref 8.6–10.5)
CHLORIDE SERPL-SCNC: 107 MMOL/L (ref 98–107)
CLARITY UR: CLEAR
CO2 SERPL-SCNC: 17.3 MMOL/L (ref 22–29)
COLOR UR: YELLOW
CREAT SERPL-MCNC: 2.61 MG/DL (ref 0.7–1.3)
CREAT UR-MCNC: 41.3 MG/DL
DEPRECATED RDW RBC AUTO: 49.8 FL (ref 37–54)
EGFRCR SERPLBLD CKD-EPI 2021: 25.3 ML/MIN/1.73
EOSINOPHIL # BLD AUTO: 0.17 10*3/MM3 (ref 0–0.4)
EOSINOPHIL NFR BLD AUTO: 6.1 % (ref 0.3–6.2)
ERYTHROCYTE [DISTWIDTH] IN BLOOD BY AUTOMATED COUNT: 14.6 % (ref 12.3–15.4)
GLUCOSE SERPL-MCNC: 192 MG/DL (ref 65–99)
GLUCOSE UR STRIP-MCNC: NEGATIVE MG/DL
HCT VFR BLD AUTO: 26.8 % (ref 37.5–51)
HGB BLD-MCNC: 8.8 G/DL (ref 13–17.7)
HGB UR QL STRIP.AUTO: NEGATIVE
IMM GRANULOCYTES # BLD AUTO: 0.01 10*3/MM3 (ref 0–0.05)
IMM GRANULOCYTES NFR BLD AUTO: 0.4 % (ref 0–0.5)
KETONES UR QL STRIP: NEGATIVE
LEUKOCYTE ESTERASE UR QL STRIP.AUTO: NEGATIVE
LYMPHOCYTES # BLD AUTO: 0.79 10*3/MM3 (ref 0.7–3.1)
LYMPHOCYTES NFR BLD AUTO: 28.5 % (ref 19.6–45.3)
MCH RBC QN AUTO: 30.8 PG (ref 26.6–33)
MCHC RBC AUTO-ENTMCNC: 32.8 G/DL (ref 31.5–35.7)
MCV RBC AUTO: 93.7 FL (ref 79–97)
MONOCYTES # BLD AUTO: 0.22 10*3/MM3 (ref 0.1–0.9)
MONOCYTES NFR BLD AUTO: 7.9 % (ref 5–12)
NEUTROPHILS NFR BLD AUTO: 1.56 10*3/MM3 (ref 1.7–7)
NEUTROPHILS NFR BLD AUTO: 56.4 % (ref 42.7–76)
NITRITE UR QL STRIP: NEGATIVE
NRBC BLD AUTO-RTO: 0 /100 WBC (ref 0–0.2)
PH UR STRIP.AUTO: 5.5 [PH] (ref 4.5–8)
PHOSPHATE SERPL-MCNC: 3.9 MG/DL (ref 2.5–4.5)
PLATELET # BLD AUTO: 42 10*3/MM3 (ref 140–450)
PMV BLD AUTO: 12 FL (ref 6–12)
POTASSIUM SERPL-SCNC: 4.6 MMOL/L (ref 3.5–5.2)
PROT ?TM UR-MCNC: 4.3 MG/DL
PROT UR QL STRIP: NEGATIVE
PROT/CREAT UR: 104.1 MG/G CREA (ref 0–200)
PTH-INTACT SERPL-MCNC: 89.7 PG/ML (ref 15–65)
RBC # BLD AUTO: 2.86 10*6/MM3 (ref 4.14–5.8)
SODIUM SERPL-SCNC: 137 MMOL/L (ref 136–145)
SP GR UR STRIP: 1.01 (ref 1–1.03)
UROBILINOGEN UR QL STRIP: NORMAL
WBC NRBC COR # BLD: 2.77 10*3/MM3 (ref 3.4–10.8)

## 2023-08-25 PROCEDURE — A9270 NON-COVERED ITEM OR SERVICE: HCPCS | Performed by: INTERNAL MEDICINE

## 2023-08-25 PROCEDURE — 81003 URINALYSIS AUTO W/O SCOPE: CPT | Performed by: INTERNAL MEDICINE

## 2023-08-25 PROCEDURE — 63710000001 PROCHLORPERAZINE MALEATE PER 10 MG: Performed by: INTERNAL MEDICINE

## 2023-08-25 PROCEDURE — 83970 ASSAY OF PARATHORMONE: CPT | Performed by: INTERNAL MEDICINE

## 2023-08-25 PROCEDURE — 84156 ASSAY OF PROTEIN URINE: CPT | Performed by: INTERNAL MEDICINE

## 2023-08-25 PROCEDURE — 80069 RENAL FUNCTION PANEL: CPT

## 2023-08-25 PROCEDURE — 25010000002 FERRIC CARBOXYMALTOSE 750 MG/15ML SOLUTION 15 ML VIAL: Performed by: INTERNAL MEDICINE

## 2023-08-25 PROCEDURE — 82570 ASSAY OF URINE CREATININE: CPT | Performed by: INTERNAL MEDICINE

## 2023-08-25 PROCEDURE — 85025 COMPLETE CBC W/AUTO DIFF WBC: CPT | Performed by: INTERNAL MEDICINE

## 2023-08-25 PROCEDURE — 96374 THER/PROPH/DIAG INJ IV PUSH: CPT

## 2023-08-25 RX ORDER — PROCHLORPERAZINE MALEATE 10 MG
10 TABLET ORAL ONCE
Status: COMPLETED | OUTPATIENT
Start: 2023-08-25 | End: 2023-08-25

## 2023-08-25 RX ORDER — SODIUM CHLORIDE 9 MG/ML
250 INJECTION, SOLUTION INTRAVENOUS ONCE
Status: COMPLETED | OUTPATIENT
Start: 2023-08-25 | End: 2023-08-25

## 2023-08-25 RX ADMIN — SODIUM CHLORIDE 250 ML: 9 INJECTION, SOLUTION INTRAVENOUS at 14:29

## 2023-08-25 RX ADMIN — FERRIC CARBOXYMALTOSE INJECTION 750 MG: 50 INJECTION, SOLUTION INTRAVENOUS at 14:30

## 2023-08-25 RX ADMIN — PROCHLORPERAZINE MALEATE 10 MG: 10 TABLET ORAL at 14:28

## 2023-09-08 ENCOUNTER — LAB (OUTPATIENT)
Dept: LAB | Facility: HOSPITAL | Age: 73
End: 2023-09-08
Payer: MEDICARE

## 2023-09-08 ENCOUNTER — INFUSION (OUTPATIENT)
Dept: ONCOLOGY | Facility: HOSPITAL | Age: 73
End: 2023-09-08
Payer: MEDICARE

## 2023-09-08 DIAGNOSIS — N18.9 ANEMIA DUE TO CHRONIC RENAL FAILURE TREATED WITH ERYTHROPOIETIN, UNSPECIFIED STAGE: ICD-10-CM

## 2023-09-08 DIAGNOSIS — N18.9 ANEMIA DUE TO CHRONIC RENAL FAILURE TREATED WITH ERYTHROPOIETIN, UNSPECIFIED STAGE: Primary | ICD-10-CM

## 2023-09-08 DIAGNOSIS — N18.30 STAGE 3 CHRONIC KIDNEY DISEASE, UNSPECIFIED WHETHER STAGE 3A OR 3B CKD: ICD-10-CM

## 2023-09-08 DIAGNOSIS — C22.0 HEPATOCELLULAR CARCINOMA: ICD-10-CM

## 2023-09-08 DIAGNOSIS — D63.1 ANEMIA DUE TO CHRONIC RENAL FAILURE TREATED WITH ERYTHROPOIETIN, UNSPECIFIED STAGE: ICD-10-CM

## 2023-09-08 DIAGNOSIS — T45.4X5A ADVERSE EFFECT OF IRON AND ITS COMPOUNDS, INITIAL ENCOUNTER: ICD-10-CM

## 2023-09-08 DIAGNOSIS — D63.1 ANEMIA DUE TO CHRONIC RENAL FAILURE TREATED WITH ERYTHROPOIETIN, UNSPECIFIED STAGE: Primary | ICD-10-CM

## 2023-09-08 DIAGNOSIS — D50.9 IRON DEFICIENCY ANEMIA, UNSPECIFIED IRON DEFICIENCY ANEMIA TYPE: ICD-10-CM

## 2023-09-08 LAB
BASOPHILS # BLD AUTO: 0.02 10*3/MM3 (ref 0–0.2)
BASOPHILS NFR BLD AUTO: 0.8 % (ref 0–1.5)
DEPRECATED RDW RBC AUTO: 53 FL (ref 37–54)
EOSINOPHIL # BLD AUTO: 0.18 10*3/MM3 (ref 0–0.4)
EOSINOPHIL NFR BLD AUTO: 7 % (ref 0.3–6.2)
ERYTHROCYTE [DISTWIDTH] IN BLOOD BY AUTOMATED COUNT: 15 % (ref 12.3–15.4)
FERRITIN SERPL-MCNC: 582 NG/ML (ref 30–400)
HCT VFR BLD AUTO: 26.4 % (ref 37.5–51)
HGB BLD-MCNC: 8.5 G/DL (ref 13–17.7)
IMM GRANULOCYTES # BLD AUTO: 0.01 10*3/MM3 (ref 0–0.05)
IMM GRANULOCYTES NFR BLD AUTO: 0.4 % (ref 0–0.5)
IRON 24H UR-MRATE: 84 MCG/DL (ref 59–158)
IRON SATN MFR SERPL: 35 % (ref 20–50)
LYMPHOCYTES # BLD AUTO: 0.72 10*3/MM3 (ref 0.7–3.1)
LYMPHOCYTES NFR BLD AUTO: 27.9 % (ref 19.6–45.3)
MCH RBC QN AUTO: 31.4 PG (ref 26.6–33)
MCHC RBC AUTO-ENTMCNC: 32.2 G/DL (ref 31.5–35.7)
MCV RBC AUTO: 97.4 FL (ref 79–97)
MONOCYTES # BLD AUTO: 0.24 10*3/MM3 (ref 0.1–0.9)
MONOCYTES NFR BLD AUTO: 9.3 % (ref 5–12)
NEUTROPHILS NFR BLD AUTO: 1.41 10*3/MM3 (ref 1.7–7)
NEUTROPHILS NFR BLD AUTO: 54.6 % (ref 42.7–76)
NRBC BLD AUTO-RTO: 0 /100 WBC (ref 0–0.2)
PLATELET # BLD AUTO: 36 10*3/MM3 (ref 140–450)
PMV BLD AUTO: 13.4 FL (ref 6–12)
RBC # BLD AUTO: 2.71 10*6/MM3 (ref 4.14–5.8)
TIBC SERPL-MCNC: 242 MCG/DL (ref 298–536)
TRANSFERRIN SERPL-MCNC: 173 MG/DL (ref 200–360)
WBC NRBC COR # BLD: 2.58 10*3/MM3 (ref 3.4–10.8)

## 2023-09-08 PROCEDURE — 96372 THER/PROPH/DIAG INJ SC/IM: CPT

## 2023-09-08 PROCEDURE — 85025 COMPLETE CBC W/AUTO DIFF WBC: CPT

## 2023-09-08 PROCEDURE — 82728 ASSAY OF FERRITIN: CPT

## 2023-09-08 PROCEDURE — 84466 ASSAY OF TRANSFERRIN: CPT

## 2023-09-08 PROCEDURE — 36415 COLL VENOUS BLD VENIPUNCTURE: CPT

## 2023-09-08 PROCEDURE — 25010000002 EPOETIN ALFA PER 1000 UNITS: Performed by: INTERNAL MEDICINE

## 2023-09-08 PROCEDURE — 83540 ASSAY OF IRON: CPT

## 2023-09-08 RX ADMIN — ERYTHROPOIETIN 37000 UNITS: 20000 INJECTION, SOLUTION INTRAVENOUS; SUBCUTANEOUS at 14:01

## 2023-09-12 ENCOUNTER — TRANSCRIBE ORDERS (OUTPATIENT)
Dept: ADMINISTRATIVE | Facility: HOSPITAL | Age: 73
End: 2023-09-12
Payer: MEDICARE

## 2023-09-12 DIAGNOSIS — R18.8 ASCITES OF LIVER: Primary | ICD-10-CM

## 2023-09-20 ENCOUNTER — HOSPITAL ENCOUNTER (OUTPATIENT)
Facility: HOSPITAL | Age: 73
Discharge: HOME | End: 2023-09-20
Admitting: INTERNAL MEDICINE
Payer: MEDICARE

## 2023-09-20 DIAGNOSIS — R18.8 ASCITES OF LIVER: ICD-10-CM

## 2023-09-20 PROCEDURE — 76700 US EXAM ABDOM COMPLETE: CPT

## 2023-09-22 ENCOUNTER — OFFICE VISIT (OUTPATIENT)
Dept: ONCOLOGY | Facility: CLINIC | Age: 73
End: 2023-09-22
Payer: MEDICARE

## 2023-09-22 ENCOUNTER — INFUSION (OUTPATIENT)
Dept: ONCOLOGY | Facility: HOSPITAL | Age: 73
End: 2023-09-22
Payer: MEDICARE

## 2023-09-22 ENCOUNTER — LAB (OUTPATIENT)
Dept: LAB | Facility: HOSPITAL | Age: 73
End: 2023-09-22
Payer: MEDICARE

## 2023-09-22 VITALS
WEIGHT: 186.3 LBS | OXYGEN SATURATION: 97 % | HEART RATE: 57 BPM | DIASTOLIC BLOOD PRESSURE: 70 MMHG | RESPIRATION RATE: 18 BRPM | BODY MASS INDEX: 29.94 KG/M2 | HEIGHT: 66 IN | TEMPERATURE: 98.4 F | SYSTOLIC BLOOD PRESSURE: 152 MMHG

## 2023-09-22 DIAGNOSIS — N18.9 ANEMIA DUE TO CHRONIC RENAL FAILURE TREATED WITH ERYTHROPOIETIN, UNSPECIFIED STAGE: Primary | ICD-10-CM

## 2023-09-22 DIAGNOSIS — N18.30 STAGE 3 CHRONIC KIDNEY DISEASE, UNSPECIFIED WHETHER STAGE 3A OR 3B CKD: ICD-10-CM

## 2023-09-22 DIAGNOSIS — C22.0 HEPATOCELLULAR CARCINOMA: ICD-10-CM

## 2023-09-22 DIAGNOSIS — D63.1 ANEMIA DUE TO CHRONIC RENAL FAILURE TREATED WITH ERYTHROPOIETIN, UNSPECIFIED STAGE: Primary | ICD-10-CM

## 2023-09-22 DIAGNOSIS — N18.9 ANEMIA DUE TO CHRONIC RENAL FAILURE TREATED WITH ERYTHROPOIETIN, UNSPECIFIED STAGE: ICD-10-CM

## 2023-09-22 DIAGNOSIS — D63.1 ANEMIA DUE TO CHRONIC RENAL FAILURE TREATED WITH ERYTHROPOIETIN, UNSPECIFIED STAGE: ICD-10-CM

## 2023-09-22 LAB
BASOPHILS # BLD AUTO: 0.03 10*3/MM3 (ref 0–0.2)
BASOPHILS NFR BLD AUTO: 0.9 % (ref 0–1.5)
DEPRECATED RDW RBC AUTO: 51.5 FL (ref 37–54)
EOSINOPHIL # BLD AUTO: 0.18 10*3/MM3 (ref 0–0.4)
EOSINOPHIL NFR BLD AUTO: 5.2 % (ref 0.3–6.2)
ERYTHROCYTE [DISTWIDTH] IN BLOOD BY AUTOMATED COUNT: 15 % (ref 12.3–15.4)
HCT VFR BLD AUTO: 28.1 % (ref 37.5–51)
HGB BLD-MCNC: 9.4 G/DL (ref 13–17.7)
IMM GRANULOCYTES # BLD AUTO: 0.22 10*3/MM3 (ref 0–0.05)
IMM GRANULOCYTES NFR BLD AUTO: 6.4 % (ref 0–0.5)
LYMPHOCYTES # BLD AUTO: 0.76 10*3/MM3 (ref 0.7–3.1)
LYMPHOCYTES NFR BLD AUTO: 22.1 % (ref 19.6–45.3)
MCH RBC QN AUTO: 31.1 PG (ref 26.6–33)
MCHC RBC AUTO-ENTMCNC: 33.5 G/DL (ref 31.5–35.7)
MCV RBC AUTO: 93 FL (ref 79–97)
MONOCYTES # BLD AUTO: 0.35 10*3/MM3 (ref 0.1–0.9)
MONOCYTES NFR BLD AUTO: 10.2 % (ref 5–12)
NEUTROPHILS NFR BLD AUTO: 1.9 10*3/MM3 (ref 1.7–7)
NEUTROPHILS NFR BLD AUTO: 55.2 % (ref 42.7–76)
NRBC BLD AUTO-RTO: 0.6 /100 WBC (ref 0–0.2)
PLATELET # BLD AUTO: 36 10*3/MM3 (ref 140–450)
PMV BLD AUTO: 13.7 FL (ref 6–12)
RBC # BLD AUTO: 3.02 10*6/MM3 (ref 4.14–5.8)
WBC NRBC COR # BLD: 3.44 10*3/MM3 (ref 3.4–10.8)

## 2023-09-22 PROCEDURE — 25010000002 EPOETIN ALFA PER 1000 UNITS: Performed by: INTERNAL MEDICINE

## 2023-09-22 PROCEDURE — 36415 COLL VENOUS BLD VENIPUNCTURE: CPT

## 2023-09-22 PROCEDURE — 96372 THER/PROPH/DIAG INJ SC/IM: CPT

## 2023-09-22 PROCEDURE — 85025 COMPLETE CBC W/AUTO DIFF WBC: CPT

## 2023-09-22 RX ORDER — CALCIUM POLYCARBOPHIL 625 MG
625 TABLET ORAL
COMMUNITY

## 2023-09-22 RX ORDER — HYDROXYZINE HYDROCHLORIDE 10 MG/1
10 TABLET, FILM COATED ORAL
COMMUNITY
Start: 2023-08-11

## 2023-09-22 RX ADMIN — ERYTHROPOIETIN 37000 UNITS: 20000 INJECTION, SOLUTION INTRAVENOUS; SUBCUTANEOUS at 15:31

## 2023-09-22 NOTE — PROGRESS NOTES
.     REASON FOR FOLLOWUP:     *. Pancytopenia secondary to liver cirrhosis   Thrombocytopenia secondary to liver cirrhosis.      Mild leukocytopenia and low normal neutrophils  *. Iron deficiency anemia discovered on 1/13/2021.  Patient was started on oral iron 3 times a day.   Because of poor iron absorption, received 2 dose of Injectafer 750 mg x 2 in June 2022.   *. Newly discovered liver lesion.  Primary hepatocellular carcinoma, biopsy confirmed on 11/16/2022. Status post CT guided ablation of the liver lesion on 12/6/2022.       HISTORY OF PRESENT ILLNESS:  The patient is a 73 y.o. year old male with hypertension diabetes, pancytopenia secondary to liver cirrhosis caused by RODRIGUEZ, and hypogonadism, hepatocellular carcinoma status post ablation, who presented today, 09/22/2023, for a 3-month follow-up evaluation. The patient reports that recently his nephrologist increased his diuretics with Lasix 40 mg twice a day, and he noticed improvement in his leg edema, which was almost normal.    The patient reports an improved energy level after the intravenous iron treatment. Recently, the patient had a recurrent iron deficiency anemia with a hemoglobin of 8.9, ferritin of 95.3, free iron of 53, and iron saturation of 19% on 7/28/2023. The patient was injected with two doses in 08/2023. His Procrit was on hold during that period. On 09/08/2023 his hemoglobin was 8.5, and he was resumed on Procrit with 37,000 units.    Today's laboratory study showed hemoglobin 9.4 with persistent stable severe thrombocytopenia, platelets of 36,000, and WBC of 3440, including neutrophils of 1900. The patient reports no fever, sweating, chills, bleeding, or bruising.      Past Medical History:   Diagnosis Date    Cancer     liver cancer    H/O Dental disorder     History of thrombocytopenia     Hypertension     RODRIGUEZ (nonalcoholic steatohepatitis)     Psoriasis     Type 2 diabetes mellitus      Past Surgical History:   Procedure  Laterality Date    CATARACT EXTRACTION, BILATERAL      COLONOSCOPY  11/2015    ROTATOR CUFF REPAIR Left 2005     HEMATOLOGY HISTORY:  The patient is a 70 y.o. year old male with history of hypertension diabetes, RODRIGUEZ, and hypogonadism who presented today on 1/13/2021 for initial evaluation because of thrombocytopenia, referred by his primary care physician Dr. Joseph.      Patient reports he has no limitation of activity.  He does have chronic joint pains, but of note joint swelling.  His skin pruritus.  No skin rashes.  He does have easy bruising, however denies evidence of bleeding such as epistaxis, gum bleeding, rectal bleeding or hematuria.  Denies weight loss.  No low fever.  Appetite is reasonable.    I reviewed his outside laboratory studies.  Most recent also lab on 10/22/2020 reported hemoglobin 10.5, MCV 89.6, WBC 3200, including neutrophils 1900, lymphocytes 800 monocytes 300, and platelets 62,000.    Liver study on 9/2/2020 reported WBC 3200, including ANC 1900 lymphocytes 1000 monocytes 300, hemoglobin 10.5, MCV 80.0, and platelets 65,000.    His earliest CBC results available for review was from 5/8/2019 which reported WBC 4200, including ANC 2600, lymphocytes 1200 and monocytes 400, hemoglobin 13.4 and platelets 95,000.  At that time chemistry lab reported elevated liver panel including  , alk phosphatase 87 and a total bilirubin 1.1.  Total serum protein 7.0 albumin 4.1 and the globulin 2.9.  His creatinine was 1.31, with glucose of 219 been in normal electrolytes.  Hemoglobin A1c was 8.3%.    Laboratory studies on 1/13/2021 reported mild anemia with Hb 10.9, thrombocytopenia platelets 48,000, and IPF 7.0%, leukocytopenia WBC 3250 including ANC 1830.  Other labs reported no evidence of hemolysis, had supratherapeutic B12 level 1413 pg/mL and folate > 20 ng/mL.  He does have evidence of iron deficiency with low iron sats 10% and ferritin only 41.5 ng/mL in the setting of anemia with Hb  10.9.  His erythropoietin is not elevated accordingly, which indicates lack of response from his kidney to produce erythropoietin.     Currently is not a candidate for CONRAD/Procrit treatment.  However he is adamant he is a candidate for oral iron treatment.    We will start him on ferrous sulfate 325 mg 3 times a day.  Will send prescription to his pharmacy.     Laboratory study 6/11/2021 showed a slightly improved hemoglobin 11.9, with stable thrombocytopenia platelets 50,000 IPF 8.3%, and WBC 3720 including ANC 2100.  Iron study reported ferritin 115.3 ng/mL and iron saturation 14% with free iron 55 TIBC 384.    Laboratory studies on 12/17/2021 reported stable pancytopenia with anemia Hb 10.8, with platelets 60,000 and IPF 4.1%, and WBC 3780 including neutrophils 2060 lymphocytes 1040.  Iron study reported slightly trending down ferritin 86.1 ng/mL in the much improved iron saturation 34% with free iron 126 and TIBC 368.      On 6/10/2022 reports patient has abdomen distention for about 3 months.  He also developed bilateral leg edema in December 2021.  Patient also reports worsening dyspnea and weight gain for the past several months..  Per our records, he gained 25 pounds since December 2021.    Patient reports having taken oral iron 3 times a day.  He has mild nausea but not significant and no vomiting.  He has brown-colored stool.  He also reports having loose stool some of days.  No diarrhea.    Patient received a boost dose of COVID-19 vaccine on 11/6/2021.  Patient reports abdomen distention about 3 months, leg edema 6 months, after the boost dose of COVID (December) boost on 11/6/21    Patient complains of easy bruising on his arms from scratching, he has skin pruritus, however no skin rashes.  He denies spontaneous bleeding such as epistaxis, gum bleeding, or hematochezia.    Lab study on 6/10/2022 reported deteriorating anemia with Hb 9.6, persistent thrombocytopenia with stable platelets 69,000, and low  normal WBC 4290 including neutrophils 2980.  Iron studies showed a deteriorating saturation 17%, free iron 61 TIBC 357 with a ferritin 86.8%.    Patient indeed had paracentesis on 6/17/2022 with 11.2 L ascites fluid removed.  Cytology study was negative for malignancy.    Patient reports today that he has worsening abdominal distention, also has dyspnea however no cough hemoptysis.  No chest pain.  He denies fever sweating chills.  Patient also reports worsening bilateral leg edema.    He also recently finished her 2 dose of Injectafer in late June 2022 because of iron deficiency anemia, not responding to oral iron treatment 3 times a day.    Patient continues to have easy bruising however no spontaneous bleeding such as epistaxis or gum bleeding.    Laboratory study on 7/6/2022 reported stable anemia Hb 9.7 and stable thrombocytopenia platelets 65,000, low normal WBC 4260.  Patient has worsening renal function with creatinine 1.36, and supratherapeutic ferritin 644 ng/mL.    Chest x-ray examination on 7/14/2022 reported a right-sided moderate to large pleural effusion.  We will arrange patient to have ultrasound-guided right thoracentesis.  Suspect the patient has pleural effusion due to his liver cirrhosis.  He will have x-ray examination after the thoracentesis per protocol.  We will reassess whether he needs a CT scan for the chest.    This patient also had paracentesis again on 7/14/2022 with 6.2 L ascites fluid removed.      This patient had right thoracentesis with 2 L pleural effusion removed.  Cytology studies negative for malignant cells.  He also had multiple paracentesis, in June, July and early August, cytology study was also negative for malignant cells.    Laboratory study on 9/28/2022 reported worsening anemia Hb 8.5, platelets 61,000, WBC 3380 including ANC 1780.  Laboratory study showed significant hyperkalemia potassium 6.4, and worsening renal function creatinine 2.36.      I saw patient recently  "on 9/20/2022 and he was found having worsening renal function, with hyperkalemia potassium 6.4.  Patient was sent to ER and eventually admitted for several days.  His hemoglobin was 7.5 on 9/30/2022.  Patient was started on Procrit 20,000 units during hospitalization.  After discharge, he has been given Procrit every 2 weeks.     During his hospitalization in end of September 2022, patient had ultrasound for the liver examination on 9/29/2022 which reported a hepatic lesion 27 x 24 x 23 mm, heterogeneous in the anterior liver.  There was evidence of liver cirrhosis.      Patient also had attempted ultrasound-guided paracentesis, however there was not enough ascites to be removed.     Patient also had a venous Doppler study and there was no evidence for portal vein thrombosis.    Patient reports he was seen by his GI specialist Dr. Everardo Foote old records that abdomen MRI examination with and without contrast which was done at the [facility] on 10/26/2022.  This study confirmed liver cirrhosis however it also reported a solitary liver lesion.    This patient had CT-guided core needle biopsy of the liver lesion on 11/16/2022 after 1 unit platelets transfusion.  He did well with the procedure.  Patient reports he feels \"fine.\"  He denies any pain in the area. Denied any kind of bleeding. Denies any fevers after procedure as well.     Patient reports no fever sweating chills.  He is abdomen is slightly distended but no pains.  No nausea vomiting diarrhea.  He has no abdomen pain.  Performance status ECOG 1-2.     Patient reports he noticed improved energy level since starting on Procrit injection.  Overall he still has chronic fatigue.  He denies evidence of bleeding.    Laboratory study on 11/21/2022 reported baseline platelets 44,000, hemoglobin 8.9, and WBC 2980 including ANC 1670.    Reevaluation after his recent CT-guided ablation of the liver lesion on 12/6/2022 by interventional radiologist Dr. Manriquez " Kellee.    The patient reports that taking oral iron has been really bothering his stomach and he is now only taking once a day. He denies any melena or hematochezia. He also denies any fever or pain. The patient reports quite a bit significant constipation from the oral iron. The patient adds that he has gained some weight, weighing today at 177 pounds and has gained 8 pounds within the last 4 weeks. The patient reports that he has an appointment with Dr. Everardo Foote on 12/29/2022 and Dr. Phill Mooney on 1/27/2022 to discuss his shunt. He also has an EGD tomorrow, 12/14/2022, with Dr. Josh Khan.    Laboratory study on 12/13/2022 reported hemoglobin 9.5 g/dL, MCV 93.6 fL, platelets 47,000/mm3, WBC 3,530/mm3 including neutrophils 2,020/mm3, and lymphocytes 830/mm3. Iron study reported ferritin 105.6 ng/mL, however, iron saturation 13% with free iron 43 mcg/dL and TIBC 332 mcg/dL.     The MRI study obtained on 02/20/2023 reported post ablation defect in the hepatic segment 8 measuring 3.4 x 2.8 cm. The study was done without iv contrast due to chronic renal disease. There was splenomegaly stable at 17.1 cm. No enlarged lymph nodes. No suspicious osseous lesion. There was liver cirrhosis. There was a small to moderate size right sided pleural effusion.    Laboratory studies today on 6/30/2023 reported stable severe thrombocytopenia platelets 40,000, hemoglobin 9.3, and WBC 3200 including neutrophils 1930, lymphocytes 760, and monocytes 270.    Most recent iron study was on 05/05/2023 which reported ferritin 200.7 ng/mL, free iron 55, TIBC 309, and iron saturation 18%.    Recent MRI examination without IV contrast obtained on 06/12/2023 reported limited examination in the absence of intravenous contrast (creatinine 3.3), but it demonstrated a stable right hepatic lobe ablation defect. There was hepatic cirrhosis and splenomegaly.        MEDICATIONS    Current Outpatient Medications:     allopurinol (ZYLOPRIM) 100  MG tablet, Take 1 tablet by mouth Daily., Disp: , Rfl:     carvedilol (COREG) 12.5 MG tablet, , Disp: , Rfl:     docusate sodium 100 MG capsule, Take 1 capsule by mouth., Disp: , Rfl:     ferrous sulfate 325 (65 FE) MG tablet, Take 1 tablet by mouth 3 (Three) Times a Day With Meals. (Patient taking differently: Take 1 tablet by mouth Daily With Breakfast.), Disp: 90 tablet, Rfl: 2    FIBER PO, Take  by mouth., Disp: , Rfl:     FREESTYLE LITE test strip, , Disp: , Rfl:     furosemide (LASIX) 40 MG tablet, Take 1 tablet by mouth Daily., Disp: , Rfl:     glipizide (Glucotrol) 5 MG tablet, Take 1 tablet by mouth 2 (Two) Times a Day Before Meals., Disp: 60 tablet, Rfl: 2    hydrOXYzine (ATARAX) 10 MG tablet, Take 1 tablet by mouth., Disp: , Rfl:     multivitamin (THERAGRAN) tablet tablet, Take  by mouth Daily., Disp: , Rfl:     nystatin (MYCOSTATIN) 027229 UNIT/GM cream, Apply 1 application  topically to the appropriate area as directed As Needed., Disp: , Rfl:     pantoprazole (PROTONIX) 40 MG EC tablet, , Disp: , Rfl:     pentoxifylline (TRENtal) 400 MG CR tablet, , Disp: , Rfl:     polycarbophil 625 MG tablet tablet, Take 1 tablet by mouth., Disp: , Rfl:     Polyethylene Glycol 3350 (MIRALAX PO), Take  by mouth., Disp: , Rfl:     simvastatin (ZOCOR) 20 MG tablet, Take 1 tablet by mouth Daily., Disp: , Rfl:     spironolactone (ALDACTONE) 25 MG tablet, Take 1 tablet by mouth Daily for 30 days., Disp: 30 tablet, Rfl: 0  No current facility-administered medications for this visit.    Facility-Administered Medications Ordered in Other Visits:     epoetin sole (EPOGEN,PROCRIT) injection 37,000 Units, 37,000 Units, Subcutaneous, Once, Milagro Salinas MD PhD    ALLERGIES:     Allergies   Allergen Reactions    Doxycycline Itching    Rosuvastatin Other (See Comments)      Liver enzymes abnormal       SOCIAL HISTORY:         Social History     Socioeconomic History    Marital status:      Spouse name: Lisa   Tobacco Use  "   Smoking status: Former     Types: Cigarettes     Quit date:      Years since quittin.7   Vaping Use    Vaping Use: Never used   Substance and Sexual Activity    Alcohol use: Never   As of 2021, patient reports he does drink alcoholic beverage 10 drinks per week.  Denies illegal drug use.      FAMILY HISTORY:  No cancer in family, mother HTN,      REVIEW OF SYSTEM:  See HPI.         Vitals:    23 1419   BP: 152/70   Pulse: 57   Resp: 18   Temp: 98.4 °F (36.9 °C)   TempSrc: Temporal   SpO2: 97%   Weight: 84.5 kg (186 lb 4.8 oz)   Height: 167.6 cm (65.98\")   PainSc: 0-No pain     ECOG 1       PHYSICAL EXAM: 2023  GENERAL:  Well-developed, well-nourished male, chronically ill looking, in no acute distress.  Orientated to time place and the people.  SKIN:  Warm, dry without rashes, purpura or petechiae.  HEENT:  Normocephalic.   LYMPHATICS:  No cervical, supraclavicular adenopathy.  CHEST: Normal respiratory effort.  Lungs clear to auscultation. Good airflow.  CARDIAC:  Regular rate and rhythm. Normal S1,S2.  ABDOMEN:  Soft, no tender.  Bowel sounds normal.  EXTREMITIES: Trace edema in both ankles.      RECENT LABS:  Lab Results   Component Value Date    WBC 3.44 2023    HGB 9.4 (L) 2023    HCT 28.1 (L) 2023    MCV 93.0 2023    PLT 36 (L) 2023     Lab Results   Component Value Date    NEUTROABS 1.90 2023     Lab Results   Component Value Date    GLUCOSE 192 (H) 2023    BUN 58 (H) 2023    CREATININE 2.61 (C) 2023    EGFRIFNONA 72 2021    BCR 22.2 2023    K 4.6 2023    CO2 17.3 (L) 2023    CALCIUM 8.4 (L) 2023    ALBUMIN 3.6 2023    AST 61 (H) 2022    ALT 34 2022       Lab Results   Component Value Date    IRON 84 2023    TIBC 242 (L) 2023    FERRITIN 582.00 (H) 2023   Iron saturation 35% on 2023.       Lab Results   Component Value Date    FOLATE >20.00 2021     Lab " Results   Component Value Date    OTRWZSNZ96 1,155 (H) 09/28/2022         IMAGING:   US Abdomen Complete  Narrative: Examination: Abdominal sonogram     TECHNIQUE: Sonographic images of the abdomen were obtained     HISTORY: Hepatocellular carcinoma status post ablation     COMPARISON: 12/29/2022 and MRI of 6/12/2023     FINDINGS: No ascites is seen. The liver is nodular in surface contour  and increased in echogenicity. An echogenic focus, probably ablation  defect, measures 2.6 x 1.2 x 1.2 cm. Visualized portions of the IVC are  normal. The abdominal aorta measures 2, 1.9, and 1.6 cm in its proximal,  mid, and distal aspects respectively. The pancreatic neck is grossly  normal. The portal vein is patent, with antegrade flow. The gallbladder  contains polyps measuring up to 3 mm, below the size requiring follow-up  and likely secondary to cholesterolosis. The common duct measures 3 mm  in its midportion. A simple appearing right renal cyst measures up to  1.7 cm. The kidneys are normal in echogenicity, without hydronephrosis,  the right measuring approximately 10 cm, and the left 12 cm. There is  splenomegaly, with the spleen measuring approximately 16.5 cm  craniocaudally.     Impression: 1. Right hepatic lobe lesion is a cirrhotic liver, probably ablation  defect     2. Hepatic steatosis and cirrhosis with splenomegaly.     3. Gallbladder polyps, below the size requiring follow-up and likely  secondary to cholesterolosis.     This report was finalized on 9/21/2023 8:49 AM by Dr. Jorge Che M.D.           Assessment & Plan   1.  Thrombocytopenia secondary to liver cirrhosis and splenomegaly.  Etiology of the thrombocytopenia is not clear.  However I do think it is likely related to his fatty liver which was documented from abdomen ultrasound on 7/20/2012.    Laboratory studies on 1/13/2021 reported excellent vitamin B12 level.  He had marginally elevated IPF 7.0%.  no apparent compensation for platelets  production.   On 6/11/2021, platelets improved at 63,000 with normal IPF 5.3%.  Patient is asymptomatic.    On 12/17/2021, stable platelets 60,000 and normal IPF 4.1%. Patient is asymptomatic.   On 6/10/2022 platelets 69,000.  Patient reports no spontaneous bleeding.    On 7/6/2022 platelets 65,000.  No spontaneous bleeding.    On 9/28/2022 platelets 61,000.  Patient has easy bruising on extremities, however no spontaneous bleeding such as melena hematochezia or gum bleeding.    On 11/2/2022 platelets 48,000.  No change of clinical condition.  Continue to monitor.  Patient was given 1 unit platelets transfusion on 11/15/2022, platelets improved at 70,000 11/16/2022 when he had successful CT-guided core needle biopsy of liver lesion.   On 11/21/2022 platelets decreased at baseline level of 44,000.  No spontaneous bleeding.  We will arrange patient to receive platelets transfusion prior and post point interventional procedure with radiofrequency ablation of the primary hepatocellular carcinoma scheduled on 12/6/2022.   On 12/5/2022, the patient had platelets 49,000/mm3. We gave him 1 unit of platelets transfusion before the ablation of the liver cancer and the posttransfusion platelets was only 56,000/mm3 on 12/6/2022.   On 12/13/2022, platelets is 47,000/mm3. The patient is not having any bleeding or bruising.  Patient has platelets 41,000 on 03/10/2023, and this is stable at the baseline condition. Patient has no excessive bleeding or bruising.  Lab study on 06/30/2023 reported stable thrombocytopenia with platelets 40,000. Patient has easy bruising, however, no spontaneous bleeding. Continue to monitor.   A laboratory study today, on 9/22/2023, reported stable severe thrombocytopenia with platelets of 36,000. This is his baseline level.    2.  Iron deficiency anemia in the setting of chronic renal insufficiency stage III, and liver cirrhosis.   Laboratory study on 1/13/2021 reported no evidence of hemolysis, had  supratherapeutic B12 level and folate.    He does have evidence of iron deficiency with low iron sats 10% and ferritin only 41.5 ng/mL with Hb 10.9.    His erythropoietin 16.1, is not elevated accordingly, which indicates lack of response from his kidney to produce erythropoietin. Currently is not a candidate for CONRAD/Procrit treatment.    We started patient on oral ferrous sulfate 325 mg 3 times a day in January 2021.   On 3/19/2021, patient has improved hemoglobin 11.9.  Continue to monitor.  On 6/11/2021, stable anemia Hb 11.0, however much improved with ferritin 115.3 ng/mL, and iron saturation 14%.  Will advised to continue oral iron treatment.  On 12/17/2021 stable Hb 10.8, much improved iron saturation 34% and free iron 126, however with trending down ferritin level 86.1 ng/mL. Continue oral iron treatment.  6/10/2022, patient reports taking oral iron 3 times a day.  No apparent side effects except mild nausea.  Laboratory studies showed deteriorating hemoglobin 9.6 and also worsening iron saturation 17% with free iron 61 and the ferritin 86.8 ng/mL.  Discussed with the patient, recommended intravenous iron therapy, this patient has poor iron absorption.    Patient received 2 dose of Injectafer 750 mg x 2 in June 2022.  Lab study today on 7/6/2022 reported improved normalized iron saturation 21% with a supratherapeutic ferritin 644 ng/mL.  On 9/28/2022 ferritin 256 ng/mL iron saturation 22% free iron 66 TIBC 295.  Supratherapeutic B12 at 155 and normal RBC folate 2175 ng/mL.  Patient had a hemoglobin 7.2 on 9/30/2022.  Was started on Procrit injection 20,000 units during hospitalization.  No PRBC transfusion.  Procrit is subsequently continued as outpatient every 2 weeks.  On 10/21/2022 ferritin 198 and free iron 56 TIBC 294 iron saturation 19%.   Improved hemoglobin 9.2 on 11/2/2022.  We will continue Procrit 20,000 units every 2 weeks.  Hemoglobin 8.9 on 11/21/2022.  Continue Procrit injection.  The  laboratory study today on 12/13/2022 reported deteriorating and recurrent iron deficiency with iron saturation only 13% with free iron 43 mcg/dL, TIBC 332 mcg/dL, and ferritin 105.6 ng/dL. The patient will be arranged for two doses of Injectafer prior to resumption of Procrit.  Post treatment iron study on 01/23/2023 reported significantly improved ferritin 559 and normalized iron saturation 35 percent with a free iron 89, TIBC 255. Patient was continued on Procrit every 2 weeks.   On 03/10/2023, the patient has hemoglobin 9.8, and normal iron study with ferritin 270 ng/mL and iron saturation 28% and free iron 82, TIBC 293.  Lab study on 05/05/2023 reported ferritin 200.7 ng/mL, free iron 55, TIBC 309, iron saturation 18%.   Patient continues on CONRAD/Procrit every 2 weeks.  The patient had a recurrent iron deficiency anemia with a hemoglobin of 8.9, ferritin of 95.3, free iron of 53, and iron saturation of 19% on 7/28/2023.  Today, 09/22/2023, the patient has hemoglobin 9.4. The patient will be given CONRAD/Procrit 37,000 units and will continue every 2 weeks with CBC monitorin    3.  Pancytopenia with mild leukocytopenia.  This is also likely related to his fatty liver and liver cirrhosis.  Patient drinks alcohol beverage as reported in January 2021.  Patient reports no recurrent infection.  His neutrophils marginally normal at 1830 out of WBC 3250.  Patient had supratherapeutic B12 level 1/13/2021.  Marginally better WBC 3720 including ANC 2100 3/19/2021.    6/11/2021 WBC 3530, with ANC 2180.  No recurrent infection.    On 12/17/2021 stable leukocytopenia WBC 3780 with low normal neutrophils 2016.  Patient is asymptomatic.  Continue to monitor.  On 6/10/2022 patient has slightly better WBC 4290 and neutrophils 2980.  On 7/6/2022 patient had WBC 4260 ANC 2620, lymphocytes 840 and monocytes 470.  On 9/28/2022 WBC 3380, including ANC 1780, hemoglobin 8.5, and platelets 61,000.  On 11/2/2022 total WBC 2970 including ANC  1730.  Continue to monitor.   On 11/21/2022 WBC 2980 including ANC 1670 and lymphocytes 820.  Patient is afebrile.  Continue to monitor.  On 12/13/2022, WBC 3,530/mm3 including neutrophils 2,020/mm3.  On 03/10/2023, WBC 2710 including neutrophils of 1570. The patient reports no fever, sweating, or chills. Most recent peak ANC was 2200 on 02/06/2023.  On 06/30/2023, patient is stable leukocytopenia with WBC 3200, including neutrophils 1930.  On 9/22/2023, the patient had WBC of 3440, including neutrophils of 1900, hemoglobin of 9.4, and platelets of 36,000.    4. COVID-19 vaccination.    6/11/2021, patient reports that he had 2 doses of COVID-19 vaccine, had very limited side effects with some soreness at the site of injection.   On 12/17/2021, patient reports he received a booster dose of COVID-19 vaccine on 11/6/2021.    5.  Ascites secondary to liver cirrhosis.  CT scan examination on 10/15/2021 confirmed liver cirrhosis.  On 6/10/2022 patient reports abdomen distention for the past 3 months, weight gains, 25 pounds per our records, and also has worsening dyspnea.  Physical examination shows very distended abdomen.  I think this patient has large ascites secondary to his liver cirrhosis.  I recommended to have ultrasound-guided therapeutic paracentesis with samples for routine chemistry labs cell counts and also for cytology study.  Patient also has bilateral leg edema which is also secondary to liver cirrhosis.  Patient was seen her primary care physician next week.  I think most likely he will need to be started on diuretics.  He also needs to follow-up with his GI specialist Dr. Everardo Foote to discuss possibility of  shunt.   On 6/17/2022 patient had ultrasound-guided paracentesis, with 11.2 L clear yellow ascites removed.  Cytology study reported negative for malignant cells.  There was a reactive mesothelial cells histiocytes and mixed inflammatory cells.  Today on 7/6/2022 follow-up, patient reports  worsening abdomen distention again.  I will arrange patient to have therapeutic paracentesis again as soon as possible, and also will arrange another paracentesis in 3 weeks.  At the meantime I also recommended to start the patient on low-dose Lasix 20 mg daily on 7/6/2022.   Paracentesis, with 6.4 L ascites removed 7/14/2022.  Paracentesis 2.4 L ascites removed 8/4/2022.   Failed attempt for paracentesis on 9/29/2022 during hospitalization, no ascites per ultrasound exam.  On 12/13/2022, the patient has more distended abdomen and also has been gaining weight for more than 10 pounds in the past 2 to 3 months. The patient had attempted paracentesis under ultrasound guidance on 12/29/2022, however, there were no ascites or examination, so the procedure was abandoned.   Abnormal MRI on 2/20/2023 reported no significant ascites.  On 03/10/2023, the patient presents for reevaluation. His wife reports the patient is gaining about 10 to 12 pounds since the beginning of 01/2023. By physical examination, I do not feel patient has ascites by percussion of the abdomen. He has trace edema in both legs. Patient is on Lasix 40 mg daily and spironolactone.   Ultrasound for the abdomen on 9/20/2023 reported liver cirrhosis and splenomegaly, no apparent significant ascites.    6.  Right pleural effusion.    Patient also complains of dyspnea, x-ray examination reported right pleural effusion 7/14/2022..  Patient had ultrasound-guided right thoracentesis with 2 L pleural effusion removed 7/27/2022.  Cytology studies negative for malignancy.  On 8/4/2022 patient had another 2 L pleural effusion removed on the right side.  The patient had a reaccumulation of the right pleural effusion as evidenced by his CT scan from 11/16/2022. We will arrange ultrasound-guided right thoracentesis.   The patient had ultrasound-guided right paracentesis on 12/29/2022 with a 500 mL of pleural effusion removed.   Abnormal MRI examination on 02/20/2023  reports small to moderate pleural effusion. I reviewed the images with the patient today on 03/10/2023, this is significant less than previous images. He is asymptomatic. We decided to observe for now.  MRI examination 06/12/2023 reported a right-sided pleural effusion. Physical examination today 06/30/2023, patient does have decreased but present breathing sounds at the left lung base.  Stable condition on 9/22/2023.    7.  Worsening renal function.  Laboratory study on 7/6/2022 reported creatinine 1.37.  He had creatinine 1.02 on 12/17/2021.  Discussed with patient, I recommended referring patient to nephrology service for evaluation of possible hepatorenal syndrome due to liver cirrhosis.  Patient is agreeable.  On 9/28/2022 patient reports that he still has no appointment for nephrology service.  On 11/2/2022 creatinine 2.34 with BUN 47.  On 11/21/2022 creatinine 2.33 with BUN 41.  On 12/6/22, cr was 2.07.  On 02/20/2023, the patient had a creatinine 2.42, so MRI was done without IV contrast as we had requested. Patient will continue to follow up with the nephrologist for evaluation and management.  On 05/19/2023, creatinine 2.60.  On 8/25/2023, creatinine was 2.61.    8.  Hepatocellular carcinoma.    Newly discovered hepatic lesion by ultrasound examination on 9/29/2022.  Patient is subsequently seen by Dr. Everardo oFote, had an MRI of the abdomen with and without contrast at the [facility] on 10/26/2022 which reported 3.2 cm liver lesion, likely hepatocellular carcinoma.  I recommended to have AFP liver study, also discussed with patient and his wife today on 11/2/2022 we will obtain the MRI images from [facility], and arrange CT-guided core needle biopsy.  Because of his thrombocytopenia, I will arrange 1 unit platelets transfusion right before the biopsy.  I did discuss with him about the risk for bleeding post the biopsy.  This is highly suspicious for primary hepatocellular carcinoma.    Normal AFP 2.37 ng/mL on 11/2/2022.     Patient had CT-guided core needle biopsy on 11/16/2022. Pathology evaluation reported moderately differentiated hepatocellular carcinoma.  I discussed with the interventional radiologist Dr. Goodson about interventional procedure with radiofrequency ablation treatment for the solitary lesion in the liver, since this patient is not a candidate for surgical intervention due to multiple comorbidities. The procedure has been scheduled on 12/06/2022.  Due to his thrombocytopenia, we decided to transfuse him with 1 unit of platelets on 12/05/2022 and also also 1 unit platelets on 12/06/2022 after his procedure.  The patient had CT-guided ablation procedure on 12/6/2022. The patient reports good tolerance, no pains, and no fevers after procedure. Interventional radiology, Dr. Goodson, recommended to obtain abdomen MRI with and without contrast with liver protocol 2 to 3 months after the procedure for reassessment.  The patient had MRI of the abdomen on 02/20/2023, contrast was not given due to worsening renal function. Nevertheless, the study reported post treatment changes. No evidence for new lesions. As I discussed with the patient and his wife today on 03/10/2023, I recommended to obtain MRI in 3 months for reassessment. We will request IV contrast if his renal function is improving.  MRI examination was obtained on 06/12/2023, reported post treatment changes of the liver lesion, overall stable size.  The patient had an ultrasound examination of the abdomen obtained on 9/20/2023, as requested by his nephrologist, and the study reported a right hepatic lobe lesion measuring 2.6 x 1 x 1.2 cm. There is also splenomegaly measuring 16.5 cm. There was hepatic steatosis and cirrhosis. There were also gallbladder polyps.      9.  Diabetes.   On 11/21/2022 patient had significant elevated glucose 397.  Patient reports that he is diabetic, however his metformin was discontinued during his  most recent hospitalization, and he was not put on any other medication for that.  He reports this morning's blood glucose level was about 180.  Discussed with patient, I will give him 10 units insulin today in the clinic, and also will start him on glipizide 5 mg twice a day.  Patient is agreeable and will follow up with his primary care physician for management of diabetes.  On 2/20/2023 glucose 152.  Lab study on 05/19/2023 reported glucose 104.   On 08/25/2023, glucose was 192. The patient will continue to follow up with a primary care physician for management.      PLAN:  1. Procrit 37,000 units daily. This will be repeated every 2 weeks with CBC monitoring.  2. The patient will continue taking oral iron once a day.  3. We will arrange an abdomen MRI without contrast to be done in the middle of 12/2023, 6 months from the previous MRI examination, for reassessment of the liver lesion.  4. The patient will see me after the MRI examination for reevaluation.    I discussed with the patient about laboratory results and further management plan.  Patient voiced understanding and agreeable.        Milagro Salinas MD PhD       CC:  MD Everardo Cuello M.D. Ramsey Nassar.  MD Declan Goodson MD        Transcribed from ambient dictation for Milagro Salinas MD PhD by Joselo Rendon.  09/22/23   18:10 EDT    Patient or patient representative verbalized consent to the visit recording.  I have personally performed the services described in this document as transcribed by the above individual, and it is both accurate and complete.    Milagro Salinas MD PhD

## 2023-10-06 ENCOUNTER — INFUSION (OUTPATIENT)
Dept: ONCOLOGY | Facility: HOSPITAL | Age: 73
End: 2023-10-06
Payer: MEDICARE

## 2023-10-06 ENCOUNTER — LAB (OUTPATIENT)
Dept: LAB | Facility: HOSPITAL | Age: 73
End: 2023-10-06
Payer: MEDICARE

## 2023-10-06 DIAGNOSIS — N18.9 ANEMIA DUE TO CHRONIC RENAL FAILURE TREATED WITH ERYTHROPOIETIN, UNSPECIFIED STAGE: ICD-10-CM

## 2023-10-06 DIAGNOSIS — N18.30 STAGE 3 CHRONIC KIDNEY DISEASE, UNSPECIFIED WHETHER STAGE 3A OR 3B CKD: ICD-10-CM

## 2023-10-06 DIAGNOSIS — D63.1 ANEMIA DUE TO CHRONIC RENAL FAILURE TREATED WITH ERYTHROPOIETIN, UNSPECIFIED STAGE: ICD-10-CM

## 2023-10-06 LAB
BASOPHILS # BLD AUTO: 0.06 10*3/MM3 (ref 0–0.2)
BASOPHILS NFR BLD AUTO: 1.4 % (ref 0–1.5)
DEPRECATED RDW RBC AUTO: 48.4 FL (ref 37–54)
EOSINOPHIL # BLD AUTO: 0.24 10*3/MM3 (ref 0–0.4)
EOSINOPHIL NFR BLD AUTO: 5.6 % (ref 0.3–6.2)
ERYTHROCYTE [DISTWIDTH] IN BLOOD BY AUTOMATED COUNT: 14.4 % (ref 12.3–15.4)
HCT VFR BLD AUTO: 31.2 % (ref 37.5–51)
HGB BLD-MCNC: 10.8 G/DL (ref 13–17.7)
IMM GRANULOCYTES # BLD AUTO: 0.97 10*3/MM3 (ref 0–0.05)
IMM GRANULOCYTES NFR BLD AUTO: 22.7 % (ref 0–0.5)
LYMPHOCYTES # BLD AUTO: 0.7 10*3/MM3 (ref 0.7–3.1)
LYMPHOCYTES NFR BLD AUTO: 16.4 % (ref 19.6–45.3)
MCH RBC QN AUTO: 31.8 PG (ref 26.6–33)
MCHC RBC AUTO-ENTMCNC: 34.6 G/DL (ref 31.5–35.7)
MCV RBC AUTO: 91.8 FL (ref 79–97)
MONOCYTES # BLD AUTO: 0.23 10*3/MM3 (ref 0.1–0.9)
MONOCYTES NFR BLD AUTO: 5.4 % (ref 5–12)
NEUTROPHILS NFR BLD AUTO: 2.08 10*3/MM3 (ref 1.7–7)
NEUTROPHILS NFR BLD AUTO: 48.5 % (ref 42.7–76)
NRBC BLD AUTO-RTO: 2.8 /100 WBC (ref 0–0.2)
PLATELET # BLD AUTO: 50 10*3/MM3 (ref 140–450)
PMV BLD AUTO: 13.2 FL (ref 6–12)
RBC # BLD AUTO: 3.4 10*6/MM3 (ref 4.14–5.8)
WBC NRBC COR # BLD: 4.28 10*3/MM3 (ref 3.4–10.8)

## 2023-10-06 PROCEDURE — G0463 HOSPITAL OUTPT CLINIC VISIT: HCPCS

## 2023-10-06 PROCEDURE — 36415 COLL VENOUS BLD VENIPUNCTURE: CPT

## 2023-10-06 PROCEDURE — 85025 COMPLETE CBC W/AUTO DIFF WBC: CPT

## 2023-10-06 NOTE — PROGRESS NOTES
Hgb today is 10.8.  No procrit today per guidelines.   Patient without questions or concerns at this time.   Given copy of labs.

## 2023-10-20 ENCOUNTER — INFUSION (OUTPATIENT)
Dept: ONCOLOGY | Facility: HOSPITAL | Age: 73
End: 2023-10-20
Payer: MEDICARE

## 2023-10-20 ENCOUNTER — LAB (OUTPATIENT)
Dept: LAB | Facility: HOSPITAL | Age: 73
End: 2023-10-20
Payer: MEDICARE

## 2023-10-20 DIAGNOSIS — E11.22 TYPE 2 DIABETES MELLITUS WITH ESRD (END-STAGE RENAL DISEASE): Primary | ICD-10-CM

## 2023-10-20 DIAGNOSIS — N18.9 ANEMIA DUE TO CHRONIC RENAL FAILURE TREATED WITH ERYTHROPOIETIN, UNSPECIFIED STAGE: ICD-10-CM

## 2023-10-20 DIAGNOSIS — D63.1 ANEMIA DUE TO CHRONIC RENAL FAILURE TREATED WITH ERYTHROPOIETIN, UNSPECIFIED STAGE: ICD-10-CM

## 2023-10-20 DIAGNOSIS — D63.1 ANEMIA DUE TO CHRONIC RENAL FAILURE TREATED WITH ERYTHROPOIETIN, UNSPECIFIED STAGE: Primary | ICD-10-CM

## 2023-10-20 DIAGNOSIS — N18.30 STAGE 3 CHRONIC KIDNEY DISEASE, UNSPECIFIED WHETHER STAGE 3A OR 3B CKD: ICD-10-CM

## 2023-10-20 DIAGNOSIS — N18.9 ANEMIA DUE TO CHRONIC RENAL FAILURE TREATED WITH ERYTHROPOIETIN, UNSPECIFIED STAGE: Primary | ICD-10-CM

## 2023-10-20 DIAGNOSIS — N18.6 TYPE 2 DIABETES MELLITUS WITH ESRD (END-STAGE RENAL DISEASE): Primary | ICD-10-CM

## 2023-10-20 LAB
BASOPHILS # BLD AUTO: 0.02 10*3/MM3 (ref 0–0.2)
BASOPHILS NFR BLD AUTO: 0.8 % (ref 0–1.5)
DEPRECATED RDW RBC AUTO: 46.6 FL (ref 37–54)
EOSINOPHIL # BLD AUTO: 0.19 10*3/MM3 (ref 0–0.4)
EOSINOPHIL NFR BLD AUTO: 7.1 % (ref 0.3–6.2)
ERYTHROCYTE [DISTWIDTH] IN BLOOD BY AUTOMATED COUNT: 13.7 % (ref 12.3–15.4)
HBA1C MFR BLD: 7.2 % (ref 4.8–5.6)
HCT VFR BLD AUTO: 28.4 % (ref 37.5–51)
HGB BLD-MCNC: 9.5 G/DL (ref 13–17.7)
IMM GRANULOCYTES # BLD AUTO: 0.01 10*3/MM3 (ref 0–0.05)
IMM GRANULOCYTES NFR BLD AUTO: 0.4 % (ref 0–0.5)
LYMPHOCYTES # BLD AUTO: 0.65 10*3/MM3 (ref 0.7–3.1)
LYMPHOCYTES NFR BLD AUTO: 24.4 % (ref 19.6–45.3)
MCH RBC QN AUTO: 31.1 PG (ref 26.6–33)
MCHC RBC AUTO-ENTMCNC: 33.5 G/DL (ref 31.5–35.7)
MCV RBC AUTO: 93.1 FL (ref 79–97)
MONOCYTES # BLD AUTO: 0.2 10*3/MM3 (ref 0.1–0.9)
MONOCYTES NFR BLD AUTO: 7.5 % (ref 5–12)
NEUTROPHILS NFR BLD AUTO: 1.59 10*3/MM3 (ref 1.7–7)
NEUTROPHILS NFR BLD AUTO: 59.8 % (ref 42.7–76)
NRBC BLD AUTO-RTO: 0 /100 WBC (ref 0–0.2)
PLATELET # BLD AUTO: 41 10*3/MM3 (ref 140–450)
PMV BLD AUTO: 12.3 FL (ref 6–12)
RBC # BLD AUTO: 3.05 10*6/MM3 (ref 4.14–5.8)
WBC NRBC COR # BLD: 2.66 10*3/MM3 (ref 3.4–10.8)

## 2023-10-20 PROCEDURE — 25010000002 EPOETIN ALFA PER 1000 UNITS: Performed by: INTERNAL MEDICINE

## 2023-10-20 PROCEDURE — 96372 THER/PROPH/DIAG INJ SC/IM: CPT

## 2023-10-20 PROCEDURE — 85025 COMPLETE CBC W/AUTO DIFF WBC: CPT

## 2023-10-20 PROCEDURE — 36415 COLL VENOUS BLD VENIPUNCTURE: CPT

## 2023-10-20 PROCEDURE — 83036 HEMOGLOBIN GLYCOSYLATED A1C: CPT | Performed by: FAMILY MEDICINE

## 2023-10-20 RX ADMIN — ERYTHROPOIETIN 27000 UNITS: 20000 INJECTION, SOLUTION INTRAVENOUS; SUBCUTANEOUS at 12:47

## 2023-11-03 ENCOUNTER — INFUSION (OUTPATIENT)
Dept: ONCOLOGY | Facility: HOSPITAL | Age: 73
End: 2023-11-03
Payer: MEDICARE

## 2023-11-03 ENCOUNTER — LAB (OUTPATIENT)
Dept: LAB | Facility: HOSPITAL | Age: 73
End: 2023-11-03
Payer: MEDICARE

## 2023-11-03 DIAGNOSIS — N18.9 ANEMIA DUE TO CHRONIC RENAL FAILURE TREATED WITH ERYTHROPOIETIN, UNSPECIFIED STAGE: Primary | ICD-10-CM

## 2023-11-03 DIAGNOSIS — D63.1 ANEMIA DUE TO CHRONIC RENAL FAILURE TREATED WITH ERYTHROPOIETIN, UNSPECIFIED STAGE: Primary | ICD-10-CM

## 2023-11-03 DIAGNOSIS — N18.9 ANEMIA DUE TO CHRONIC RENAL FAILURE TREATED WITH ERYTHROPOIETIN, UNSPECIFIED STAGE: ICD-10-CM

## 2023-11-03 DIAGNOSIS — N18.30 STAGE 3 CHRONIC KIDNEY DISEASE, UNSPECIFIED WHETHER STAGE 3A OR 3B CKD: ICD-10-CM

## 2023-11-03 DIAGNOSIS — D63.1 ANEMIA DUE TO CHRONIC RENAL FAILURE TREATED WITH ERYTHROPOIETIN, UNSPECIFIED STAGE: ICD-10-CM

## 2023-11-03 LAB
BASOPHILS # BLD AUTO: 0.04 10*3/MM3 (ref 0–0.2)
BASOPHILS NFR BLD AUTO: 1.1 % (ref 0–1.5)
DEPRECATED RDW RBC AUTO: 48.8 FL (ref 37–54)
EOSINOPHIL # BLD AUTO: 0.21 10*3/MM3 (ref 0–0.4)
EOSINOPHIL NFR BLD AUTO: 6 % (ref 0.3–6.2)
ERYTHROCYTE [DISTWIDTH] IN BLOOD BY AUTOMATED COUNT: 14.4 % (ref 12.3–15.4)
HCT VFR BLD AUTO: 28.6 % (ref 37.5–51)
HGB BLD-MCNC: 9.7 G/DL (ref 13–17.7)
IMM GRANULOCYTES # BLD AUTO: 0.07 10*3/MM3 (ref 0–0.05)
IMM GRANULOCYTES NFR BLD AUTO: 2 % (ref 0–0.5)
LYMPHOCYTES # BLD AUTO: 1.01 10*3/MM3 (ref 0.7–3.1)
LYMPHOCYTES NFR BLD AUTO: 28.9 % (ref 19.6–45.3)
MCH RBC QN AUTO: 31.7 PG (ref 26.6–33)
MCHC RBC AUTO-ENTMCNC: 33.9 G/DL (ref 31.5–35.7)
MCV RBC AUTO: 93.5 FL (ref 79–97)
MONOCYTES # BLD AUTO: 0.25 10*3/MM3 (ref 0.1–0.9)
MONOCYTES NFR BLD AUTO: 7.1 % (ref 5–12)
NEUTROPHILS NFR BLD AUTO: 1.92 10*3/MM3 (ref 1.7–7)
NEUTROPHILS NFR BLD AUTO: 54.9 % (ref 42.7–76)
NRBC BLD AUTO-RTO: 0 /100 WBC (ref 0–0.2)
PLATELET # BLD AUTO: 36 10*3/MM3 (ref 140–450)
PMV BLD AUTO: 13.2 FL (ref 6–12)
RBC # BLD AUTO: 3.06 10*6/MM3 (ref 4.14–5.8)
WBC NRBC COR # BLD: 3.5 10*3/MM3 (ref 3.4–10.8)

## 2023-11-03 PROCEDURE — 36415 COLL VENOUS BLD VENIPUNCTURE: CPT

## 2023-11-03 PROCEDURE — 25010000002 EPOETIN ALFA PER 1000 UNITS: Performed by: INTERNAL MEDICINE

## 2023-11-03 PROCEDURE — 96372 THER/PROPH/DIAG INJ SC/IM: CPT

## 2023-11-03 PROCEDURE — 85025 COMPLETE CBC W/AUTO DIFF WBC: CPT

## 2023-11-03 RX ADMIN — ERYTHROPOIETIN 27000 UNITS: 20000 INJECTION, SOLUTION INTRAVENOUS; SUBCUTANEOUS at 11:59

## 2023-11-17 ENCOUNTER — INFUSION (OUTPATIENT)
Dept: ONCOLOGY | Facility: HOSPITAL | Age: 73
End: 2023-11-17
Payer: MEDICARE

## 2023-11-17 ENCOUNTER — LAB (OUTPATIENT)
Dept: LAB | Facility: HOSPITAL | Age: 73
End: 2023-11-17
Payer: MEDICARE

## 2023-11-17 DIAGNOSIS — N18.9 ANEMIA DUE TO CHRONIC RENAL FAILURE TREATED WITH ERYTHROPOIETIN, UNSPECIFIED STAGE: ICD-10-CM

## 2023-11-17 DIAGNOSIS — D63.1 ANEMIA DUE TO CHRONIC RENAL FAILURE TREATED WITH ERYTHROPOIETIN, UNSPECIFIED STAGE: ICD-10-CM

## 2023-11-17 DIAGNOSIS — N18.30 STAGE 3 CHRONIC KIDNEY DISEASE, UNSPECIFIED WHETHER STAGE 3A OR 3B CKD: ICD-10-CM

## 2023-11-17 LAB
BASOPHILS # BLD AUTO: 0.03 10*3/MM3 (ref 0–0.2)
BASOPHILS NFR BLD AUTO: 0.8 % (ref 0–1.5)
DEPRECATED RDW RBC AUTO: 45.5 FL (ref 37–54)
EOSINOPHIL # BLD AUTO: 0.22 10*3/MM3 (ref 0–0.4)
EOSINOPHIL NFR BLD AUTO: 5.9 % (ref 0.3–6.2)
ERYTHROCYTE [DISTWIDTH] IN BLOOD BY AUTOMATED COUNT: 13.5 % (ref 12.3–15.4)
HCT VFR BLD AUTO: 30.2 % (ref 37.5–51)
HGB BLD-MCNC: 10.5 G/DL (ref 13–17.7)
IMM GRANULOCYTES # BLD AUTO: 0.18 10*3/MM3 (ref 0–0.05)
IMM GRANULOCYTES NFR BLD AUTO: 4.8 % (ref 0–0.5)
LYMPHOCYTES # BLD AUTO: 0.93 10*3/MM3 (ref 0.7–3.1)
LYMPHOCYTES NFR BLD AUTO: 24.7 % (ref 19.6–45.3)
MCH RBC QN AUTO: 32 PG (ref 26.6–33)
MCHC RBC AUTO-ENTMCNC: 34.8 G/DL (ref 31.5–35.7)
MCV RBC AUTO: 92.1 FL (ref 79–97)
MONOCYTES # BLD AUTO: 0.28 10*3/MM3 (ref 0.1–0.9)
MONOCYTES NFR BLD AUTO: 7.4 % (ref 5–12)
NEUTROPHILS NFR BLD AUTO: 2.12 10*3/MM3 (ref 1.7–7)
NEUTROPHILS NFR BLD AUTO: 56.4 % (ref 42.7–76)
NRBC BLD AUTO-RTO: 0 /100 WBC (ref 0–0.2)
PLATELET # BLD AUTO: 39 10*3/MM3 (ref 140–450)
PMV BLD AUTO: 12.7 FL (ref 6–12)
RBC # BLD AUTO: 3.28 10*6/MM3 (ref 4.14–5.8)
WBC NRBC COR # BLD AUTO: 3.76 10*3/MM3 (ref 3.4–10.8)

## 2023-11-17 PROCEDURE — G0463 HOSPITAL OUTPT CLINIC VISIT: HCPCS

## 2023-11-17 PROCEDURE — 36415 COLL VENOUS BLD VENIPUNCTURE: CPT

## 2023-11-17 PROCEDURE — 85025 COMPLETE CBC W/AUTO DIFF WBC: CPT

## 2023-11-17 NOTE — PROGRESS NOTES
Hgb today is 10.5.  No procrit today per guidelines.   Patient without questions or concerns at this time.   Given copy of labs.

## 2023-12-01 ENCOUNTER — LAB (OUTPATIENT)
Dept: LAB | Facility: HOSPITAL | Age: 73
End: 2023-12-01
Payer: MEDICARE

## 2023-12-01 ENCOUNTER — INFUSION (OUTPATIENT)
Dept: ONCOLOGY | Facility: HOSPITAL | Age: 73
End: 2023-12-01
Payer: MEDICARE

## 2023-12-01 DIAGNOSIS — N18.30 STAGE 3 CHRONIC KIDNEY DISEASE, UNSPECIFIED WHETHER STAGE 3A OR 3B CKD: ICD-10-CM

## 2023-12-01 DIAGNOSIS — D63.1 ANEMIA DUE TO CHRONIC RENAL FAILURE TREATED WITH ERYTHROPOIETIN, UNSPECIFIED STAGE: ICD-10-CM

## 2023-12-01 DIAGNOSIS — D63.1 ANEMIA DUE TO CHRONIC RENAL FAILURE TREATED WITH ERYTHROPOIETIN, UNSPECIFIED STAGE: Primary | ICD-10-CM

## 2023-12-01 DIAGNOSIS — N18.9 ANEMIA DUE TO CHRONIC RENAL FAILURE TREATED WITH ERYTHROPOIETIN, UNSPECIFIED STAGE: Primary | ICD-10-CM

## 2023-12-01 DIAGNOSIS — N18.4 CHRONIC KIDNEY DISEASE, STAGE IV (SEVERE): Primary | ICD-10-CM

## 2023-12-01 DIAGNOSIS — N18.9 ANEMIA DUE TO CHRONIC RENAL FAILURE TREATED WITH ERYTHROPOIETIN, UNSPECIFIED STAGE: ICD-10-CM

## 2023-12-01 DIAGNOSIS — D63.1 ERYTHROPOIETIN DEFICIENCY ANEMIA: ICD-10-CM

## 2023-12-01 LAB
ALBUMIN SERPL-MCNC: 3.6 G/DL (ref 3.5–5.2)
ANION GAP SERPL CALCULATED.3IONS-SCNC: 12.2 MMOL/L (ref 5–15)
BASOPHILS # BLD AUTO: 0.02 10*3/MM3 (ref 0–0.2)
BASOPHILS NFR BLD AUTO: 0.5 % (ref 0–1.5)
BILIRUB UR QL STRIP: NEGATIVE
BUN SERPL-MCNC: 69 MG/DL (ref 8–23)
BUN/CREAT SERPL: 21 (ref 7–25)
CALCIUM SPEC-SCNC: 9 MG/DL (ref 8.6–10.5)
CHLORIDE SERPL-SCNC: 100 MMOL/L (ref 98–107)
CLARITY UR: CLEAR
CO2 SERPL-SCNC: 21.8 MMOL/L (ref 22–29)
COLOR UR: YELLOW
CREAT SERPL-MCNC: 3.29 MG/DL (ref 0.7–1.3)
CREAT UR-MCNC: 33.4 MG/DL
DEPRECATED RDW RBC AUTO: 43.8 FL (ref 37–54)
EGFRCR SERPLBLD CKD-EPI 2021: 19 ML/MIN/1.73
EOSINOPHIL # BLD AUTO: 0.28 10*3/MM3 (ref 0–0.4)
EOSINOPHIL NFR BLD AUTO: 7.5 % (ref 0.3–6.2)
ERYTHROCYTE [DISTWIDTH] IN BLOOD BY AUTOMATED COUNT: 13.1 % (ref 12.3–15.4)
FERRITIN SERPL-MCNC: 360 NG/ML (ref 30–400)
GLUCOSE SERPL-MCNC: 257 MG/DL (ref 65–99)
GLUCOSE UR STRIP-MCNC: NEGATIVE MG/DL
HCT VFR BLD AUTO: 28.7 % (ref 37.5–51)
HGB BLD-MCNC: 9.7 G/DL (ref 13–17.7)
HGB UR QL STRIP.AUTO: NEGATIVE
IMM GRANULOCYTES # BLD AUTO: 0.01 10*3/MM3 (ref 0–0.05)
IMM GRANULOCYTES NFR BLD AUTO: 0.3 % (ref 0–0.5)
IRON 24H UR-MRATE: 76 MCG/DL (ref 59–158)
IRON SATN MFR SERPL: 29 % (ref 20–50)
KETONES UR QL STRIP: NEGATIVE
LEUKOCYTE ESTERASE UR QL STRIP.AUTO: NEGATIVE
LYMPHOCYTES # BLD AUTO: 0.91 10*3/MM3 (ref 0.7–3.1)
LYMPHOCYTES NFR BLD AUTO: 24.5 % (ref 19.6–45.3)
MCH RBC QN AUTO: 31 PG (ref 26.6–33)
MCHC RBC AUTO-ENTMCNC: 33.8 G/DL (ref 31.5–35.7)
MCV RBC AUTO: 91.7 FL (ref 79–97)
MONOCYTES # BLD AUTO: 0.25 10*3/MM3 (ref 0.1–0.9)
MONOCYTES NFR BLD AUTO: 6.7 % (ref 5–12)
NEUTROPHILS NFR BLD AUTO: 2.25 10*3/MM3 (ref 1.7–7)
NEUTROPHILS NFR BLD AUTO: 60.5 % (ref 42.7–76)
NITRITE UR QL STRIP: NEGATIVE
NRBC BLD AUTO-RTO: 0 /100 WBC (ref 0–0.2)
PH UR STRIP.AUTO: 5.5 [PH] (ref 4.5–8)
PHOSPHATE SERPL-MCNC: 3.8 MG/DL (ref 2.5–4.5)
PLATELET # BLD AUTO: 43 10*3/MM3 (ref 140–450)
PMV BLD AUTO: 11.4 FL (ref 6–12)
POTASSIUM SERPL-SCNC: 4.7 MMOL/L (ref 3.5–5.2)
PROT ?TM UR-MCNC: <4 MG/DL
PROT UR QL STRIP: NEGATIVE
PROT/CREAT UR: NORMAL MG/G{CREAT}
PTH-INTACT SERPL-MCNC: 74 PG/ML (ref 15–65)
RBC # BLD AUTO: 3.13 10*6/MM3 (ref 4.14–5.8)
SODIUM SERPL-SCNC: 134 MMOL/L (ref 136–145)
SP GR UR STRIP: 1.01 (ref 1–1.03)
TIBC SERPL-MCNC: 259 MCG/DL (ref 298–536)
TRANSFERRIN SERPL-MCNC: 185 MG/DL (ref 200–360)
UROBILINOGEN UR QL STRIP: NORMAL
WBC NRBC COR # BLD AUTO: 3.72 10*3/MM3 (ref 3.4–10.8)

## 2023-12-01 PROCEDURE — 83540 ASSAY OF IRON: CPT

## 2023-12-01 PROCEDURE — 36415 COLL VENOUS BLD VENIPUNCTURE: CPT

## 2023-12-01 PROCEDURE — 84466 ASSAY OF TRANSFERRIN: CPT

## 2023-12-01 PROCEDURE — 82728 ASSAY OF FERRITIN: CPT

## 2023-12-01 PROCEDURE — 25010000002 EPOETIN ALFA PER 1000 UNITS: Performed by: INTERNAL MEDICINE

## 2023-12-01 PROCEDURE — 80069 RENAL FUNCTION PANEL: CPT

## 2023-12-01 PROCEDURE — 82570 ASSAY OF URINE CREATININE: CPT | Performed by: INTERNAL MEDICINE

## 2023-12-01 PROCEDURE — 83970 ASSAY OF PARATHORMONE: CPT | Performed by: INTERNAL MEDICINE

## 2023-12-01 PROCEDURE — 85025 COMPLETE CBC W/AUTO DIFF WBC: CPT

## 2023-12-01 PROCEDURE — 84156 ASSAY OF PROTEIN URINE: CPT | Performed by: INTERNAL MEDICINE

## 2023-12-01 PROCEDURE — 81003 URINALYSIS AUTO W/O SCOPE: CPT

## 2023-12-01 PROCEDURE — 96372 THER/PROPH/DIAG INJ SC/IM: CPT

## 2023-12-01 RX ADMIN — ERYTHROPOIETIN 30000 UNITS: 20000 INJECTION, SOLUTION INTRAVENOUS; SUBCUTANEOUS at 12:32

## 2023-12-08 ENCOUNTER — HOSPITAL ENCOUNTER (OUTPATIENT)
Dept: MRI IMAGING | Facility: HOSPITAL | Age: 73
Discharge: HOME | End: 2023-12-08
Admitting: INTERNAL MEDICINE
Payer: MEDICARE

## 2023-12-08 DIAGNOSIS — N18.9 ANEMIA DUE TO CHRONIC RENAL FAILURE TREATED WITH ERYTHROPOIETIN, UNSPECIFIED STAGE: ICD-10-CM

## 2023-12-08 DIAGNOSIS — D63.1 ANEMIA DUE TO CHRONIC RENAL FAILURE TREATED WITH ERYTHROPOIETIN, UNSPECIFIED STAGE: ICD-10-CM

## 2023-12-08 DIAGNOSIS — C22.0 HEPATOCELLULAR CARCINOMA: ICD-10-CM

## 2023-12-08 PROCEDURE — 74181 MRI ABDOMEN W/O CONTRAST: CPT

## 2023-12-15 ENCOUNTER — INFUSION (OUTPATIENT)
Dept: ONCOLOGY | Facility: HOSPITAL | Age: 73
End: 2023-12-15
Payer: MEDICARE

## 2023-12-15 ENCOUNTER — OFFICE VISIT (OUTPATIENT)
Dept: ONCOLOGY | Facility: CLINIC | Age: 73
End: 2023-12-15
Payer: MEDICARE

## 2023-12-15 ENCOUNTER — LAB (OUTPATIENT)
Dept: LAB | Facility: HOSPITAL | Age: 73
End: 2023-12-15
Payer: MEDICARE

## 2023-12-15 VITALS
SYSTOLIC BLOOD PRESSURE: 138 MMHG | HEART RATE: 54 BPM | DIASTOLIC BLOOD PRESSURE: 69 MMHG | WEIGHT: 183.6 LBS | BODY MASS INDEX: 29.51 KG/M2 | HEIGHT: 66 IN | OXYGEN SATURATION: 98 % | TEMPERATURE: 98 F

## 2023-12-15 DIAGNOSIS — C22.0 HEPATOCELLULAR CARCINOMA: Primary | ICD-10-CM

## 2023-12-15 DIAGNOSIS — D63.1 ANEMIA DUE TO CHRONIC RENAL FAILURE TREATED WITH ERYTHROPOIETIN, UNSPECIFIED STAGE: Primary | ICD-10-CM

## 2023-12-15 DIAGNOSIS — D69.6 THROMBOCYTOPENIA: ICD-10-CM

## 2023-12-15 DIAGNOSIS — D63.1 ANEMIA DUE TO CHRONIC RENAL FAILURE TREATED WITH ERYTHROPOIETIN, UNSPECIFIED STAGE: ICD-10-CM

## 2023-12-15 DIAGNOSIS — D61.818 PANCYTOPENIA: ICD-10-CM

## 2023-12-15 DIAGNOSIS — K74.60 CIRRHOSIS OF LIVER WITH ASCITES, UNSPECIFIED HEPATIC CIRRHOSIS TYPE: ICD-10-CM

## 2023-12-15 DIAGNOSIS — K76.0 FATTY LIVER: ICD-10-CM

## 2023-12-15 DIAGNOSIS — N18.30 STAGE 3 CHRONIC KIDNEY DISEASE, UNSPECIFIED WHETHER STAGE 3A OR 3B CKD: ICD-10-CM

## 2023-12-15 DIAGNOSIS — N18.9 ANEMIA DUE TO CHRONIC RENAL FAILURE TREATED WITH ERYTHROPOIETIN, UNSPECIFIED STAGE: Primary | ICD-10-CM

## 2023-12-15 DIAGNOSIS — R18.8 CIRRHOSIS OF LIVER WITH ASCITES, UNSPECIFIED HEPATIC CIRRHOSIS TYPE: ICD-10-CM

## 2023-12-15 DIAGNOSIS — N18.9 ANEMIA DUE TO CHRONIC RENAL FAILURE TREATED WITH ERYTHROPOIETIN, UNSPECIFIED STAGE: ICD-10-CM

## 2023-12-15 LAB
BASOPHILS # BLD AUTO: 0.02 10*3/MM3 (ref 0–0.2)
BASOPHILS NFR BLD AUTO: 0.6 % (ref 0–1.5)
DEPRECATED RDW RBC AUTO: 47.6 FL (ref 37–54)
EOSINOPHIL # BLD AUTO: 0.2 10*3/MM3 (ref 0–0.4)
EOSINOPHIL NFR BLD AUTO: 6.1 % (ref 0.3–6.2)
ERYTHROCYTE [DISTWIDTH] IN BLOOD BY AUTOMATED COUNT: 14 % (ref 12.3–15.4)
HCT VFR BLD AUTO: 27.8 % (ref 37.5–51)
HGB BLD-MCNC: 9.3 G/DL (ref 13–17.7)
IMM GRANULOCYTES # BLD AUTO: 0.03 10*3/MM3 (ref 0–0.05)
IMM GRANULOCYTES NFR BLD AUTO: 0.9 % (ref 0–0.5)
LYMPHOCYTES # BLD AUTO: 0.85 10*3/MM3 (ref 0.7–3.1)
LYMPHOCYTES NFR BLD AUTO: 26.1 % (ref 19.6–45.3)
MCH RBC QN AUTO: 31.1 PG (ref 26.6–33)
MCHC RBC AUTO-ENTMCNC: 33.5 G/DL (ref 31.5–35.7)
MCV RBC AUTO: 93 FL (ref 79–97)
MONOCYTES # BLD AUTO: 0.26 10*3/MM3 (ref 0.1–0.9)
MONOCYTES NFR BLD AUTO: 8 % (ref 5–12)
NEUTROPHILS NFR BLD AUTO: 1.9 10*3/MM3 (ref 1.7–7)
NEUTROPHILS NFR BLD AUTO: 58.3 % (ref 42.7–76)
NRBC BLD AUTO-RTO: 0 /100 WBC (ref 0–0.2)
PLATELET # BLD AUTO: 40 10*3/MM3 (ref 140–450)
PMV BLD AUTO: 13.4 FL (ref 6–12)
RBC # BLD AUTO: 2.99 10*6/MM3 (ref 4.14–5.8)
WBC NRBC COR # BLD AUTO: 3.26 10*3/MM3 (ref 3.4–10.8)

## 2023-12-15 PROCEDURE — 96372 THER/PROPH/DIAG INJ SC/IM: CPT

## 2023-12-15 PROCEDURE — 85025 COMPLETE CBC W/AUTO DIFF WBC: CPT

## 2023-12-15 PROCEDURE — 36415 COLL VENOUS BLD VENIPUNCTURE: CPT

## 2023-12-15 PROCEDURE — 25010000002 EPOETIN ALFA PER 1000 UNITS: Performed by: INTERNAL MEDICINE

## 2023-12-15 RX ADMIN — ERYTHROPOIETIN 30000 UNITS: 20000 INJECTION, SOLUTION INTRAVENOUS; SUBCUTANEOUS at 11:52

## 2023-12-15 NOTE — PROGRESS NOTES
.     REASON FOR FOLLOWUP:     *. Pancytopenia secondary to liver cirrhosis   Thrombocytopenia secondary to liver cirrhosis.      Mild leukocytopenia and low normal neutrophils  *. Iron deficiency anemia discovered on 1/13/2021.  Patient was started on oral iron 3 times a day.   Because of poor iron absorption, received 2 dose of Injectafer 750 mg x 2 in June 2022.   *. Newly discovered liver lesion.  Primary hepatocellular carcinoma, biopsy confirmed on 11/16/2022. Status post CT guided ablation of the liver lesion on 12/6/2022.       HISTORY OF PRESENT ILLNESS:  The patient is a 73 y.o. year old male with hypertension diabetes, pancytopenia secondary to liver cirrhosis caused by RODRIGUEZ, and hypogonadism, hepatocellular carcinoma status post ablation, who presented today on 12/15/2023 for a 3-month follow-up evaluation.    The patient reports he has been taking furosemide twice a day. Examination shows trace edema in both lower legs. He denies any abdominal pain. The patient reports no nausea, vomiting, no abdominal pain. He has no distention of the abdomen. He continues on diuretics and the leg swelling has improved. He uses a cane to help walk around. Denies melena or hematochezia.    This patient had MRI of the abdomen without contrast obtained on 12/08/2023. This study reported severely limited exam but a grossly stable hepatic segment 8 ablation defect measuring 3.7 x 2.7 cm. The study was without a contrast due to his chronic adrenal insufficiency.    Laboratory study today shows hemoglobin 9.3 g/dL, MCV 93.0, MCHC 33.5, platelets 40,000, WBC 3260, including neutrophils 1900, and lymphocytes 850.    His recent laboratory study on 12/01/2023 reported ferritin 360, free iron 76, TIBC 259, and iron saturation 29%. His creatinine was 3.29 and glucose 257.         Past Medical History:   Diagnosis Date    Cancer     liver cancer    H/O Dental disorder     History of thrombocytopenia     Hypertension     RODRIGUEZ  (nonalcoholic steatohepatitis)     Psoriasis     Type 2 diabetes mellitus      Past Surgical History:   Procedure Laterality Date    CATARACT EXTRACTION, BILATERAL      COLONOSCOPY  11/2015    ROTATOR CUFF REPAIR Left 2005     HEMATOLOGY HISTORY:  The patient is a 70 y.o. year old male with history of hypertension diabetes, RODRIGUEZ, and hypogonadism who presented today on 1/13/2021 for initial evaluation because of thrombocytopenia, referred by his primary care physician Dr. Joseph.      Patient reports he has no limitation of activity.  He does have chronic joint pains, but of note joint swelling.  His skin pruritus.  No skin rashes.  He does have easy bruising, however denies evidence of bleeding such as epistaxis, gum bleeding, rectal bleeding or hematuria.  Denies weight loss.  No low fever.  Appetite is reasonable.    I reviewed his outside laboratory studies.  Most recent also lab on 10/22/2020 reported hemoglobin 10.5, MCV 89.6, WBC 3200, including neutrophils 1900, lymphocytes 800 monocytes 300, and platelets 62,000.    Liver study on 9/2/2020 reported WBC 3200, including ANC 1900 lymphocytes 1000 monocytes 300, hemoglobin 10.5, MCV 80.0, and platelets 65,000.    His earliest CBC results available for review was from 5/8/2019 which reported WBC 4200, including ANC 2600, lymphocytes 1200 and monocytes 400, hemoglobin 13.4 and platelets 95,000.  At that time chemistry lab reported elevated liver panel including  , alk phosphatase 87 and a total bilirubin 1.1.  Total serum protein 7.0 albumin 4.1 and the globulin 2.9.  His creatinine was 1.31, with glucose of 219 been in normal electrolytes.  Hemoglobin A1c was 8.3%.    Laboratory studies on 1/13/2021 reported mild anemia with Hb 10.9, thrombocytopenia platelets 48,000, and IPF 7.0%, leukocytopenia WBC 3250 including ANC 1830.  Other labs reported no evidence of hemolysis, had supratherapeutic B12 level 1413 pg/mL and folate > 20 ng/mL.  He does have  evidence of iron deficiency with low iron sats 10% and ferritin only 41.5 ng/mL in the setting of anemia with Hb 10.9.  His erythropoietin is not elevated accordingly, which indicates lack of response from his kidney to produce erythropoietin.     Currently is not a candidate for CONRAD/Procrit treatment.  However he is adamant he is a candidate for oral iron treatment.    We will start him on ferrous sulfate 325 mg 3 times a day.  Will send prescription to his pharmacy.     Laboratory study 6/11/2021 showed a slightly improved hemoglobin 11.9, with stable thrombocytopenia platelets 50,000 IPF 8.3%, and WBC 3720 including ANC 2100.  Iron study reported ferritin 115.3 ng/mL and iron saturation 14% with free iron 55 TIBC 384.    Laboratory studies on 12/17/2021 reported stable pancytopenia with anemia Hb 10.8, with platelets 60,000 and IPF 4.1%, and WBC 3780 including neutrophils 2060 lymphocytes 1040.  Iron study reported slightly trending down ferritin 86.1 ng/mL in the much improved iron saturation 34% with free iron 126 and TIBC 368.      On 6/10/2022 reports patient has abdomen distention for about 3 months.  He also developed bilateral leg edema in December 2021.  Patient also reports worsening dyspnea and weight gain for the past several months..  Per our records, he gained 25 pounds since December 2021.    Patient reports having taken oral iron 3 times a day.  He has mild nausea but not significant and no vomiting.  He has brown-colored stool.  He also reports having loose stool some of days.  No diarrhea.    Patient received a boost dose of COVID-19 vaccine on 11/6/2021.  Patient reports abdomen distention about 3 months, leg edema 6 months, after the boost dose of COVID (December) boost on 11/6/21    Patient complains of easy bruising on his arms from scratching, he has skin pruritus, however no skin rashes.  He denies spontaneous bleeding such as epistaxis, gum bleeding, or hematochezia.    Lab study on  6/10/2022 reported deteriorating anemia with Hb 9.6, persistent thrombocytopenia with stable platelets 69,000, and low normal WBC 4290 including neutrophils 2980.  Iron studies showed a deteriorating saturation 17%, free iron 61 TIBC 357 with a ferritin 86.8%.    Patient indeed had paracentesis on 6/17/2022 with 11.2 L ascites fluid removed.  Cytology study was negative for malignancy.    Patient reports today that he has worsening abdominal distention, also has dyspnea however no cough hemoptysis.  No chest pain.  He denies fever sweating chills.  Patient also reports worsening bilateral leg edema.    He also recently finished her 2 dose of Injectafer in late June 2022 because of iron deficiency anemia, not responding to oral iron treatment 3 times a day.    Patient continues to have easy bruising however no spontaneous bleeding such as epistaxis or gum bleeding.    Laboratory study on 7/6/2022 reported stable anemia Hb 9.7 and stable thrombocytopenia platelets 65,000, low normal WBC 4260.  Patient has worsening renal function with creatinine 1.36, and supratherapeutic ferritin 644 ng/mL.    Chest x-ray examination on 7/14/2022 reported a right-sided moderate to large pleural effusion.  We will arrange patient to have ultrasound-guided right thoracentesis.  Suspect the patient has pleural effusion due to his liver cirrhosis.  He will have x-ray examination after the thoracentesis per protocol.  We will reassess whether he needs a CT scan for the chest.    This patient also had paracentesis again on 7/14/2022 with 6.2 L ascites fluid removed.      This patient had right thoracentesis with 2 L pleural effusion removed.  Cytology studies negative for malignant cells.  He also had multiple paracentesis, in June, July and early August, cytology study was also negative for malignant cells.    Laboratory study on 9/28/2022 reported worsening anemia Hb 8.5, platelets 61,000, WBC 3380 including ANC 1780.  Laboratory study  "showed significant hyperkalemia potassium 6.4, and worsening renal function creatinine 2.36.      I saw patient recently on 9/20/2022 and he was found having worsening renal function, with hyperkalemia potassium 6.4.  Patient was sent to ER and eventually admitted for several days.  His hemoglobin was 7.5 on 9/30/2022.  Patient was started on Procrit 20,000 units during hospitalization.  After discharge, he has been given Procrit every 2 weeks.     During his hospitalization in end of September 2022, patient had ultrasound for the liver examination on 9/29/2022 which reported a hepatic lesion 27 x 24 x 23 mm, heterogeneous in the anterior liver.  There was evidence of liver cirrhosis.      Patient also had attempted ultrasound-guided paracentesis, however there was not enough ascites to be removed.     Patient also had a venous Doppler study and there was no evidence for portal vein thrombosis.    Patient reports he was seen by his GI specialist Dr. Everardo Foote old records that abdomen MRI examination with and without contrast which was done at the [facility] on 10/26/2022.  This study confirmed liver cirrhosis however it also reported a solitary liver lesion.    This patient had CT-guided core needle biopsy of the liver lesion on 11/16/2022 after 1 unit platelets transfusion.  He did well with the procedure.  Patient reports he feels \"fine.\"  He denies any pain in the area. Denied any kind of bleeding. Denies any fevers after procedure as well.     Patient reports no fever sweating chills.  He is abdomen is slightly distended but no pains.  No nausea vomiting diarrhea.  He has no abdomen pain.  Performance status ECOG 1-2.     Patient reports he noticed improved energy level since starting on Procrit injection.  Overall he still has chronic fatigue.  He denies evidence of bleeding.    Laboratory study on 11/21/2022 reported baseline platelets 44,000, hemoglobin 8.9, and WBC 2980 including ANC " 1670.    Reevaluation after his recent CT-guided ablation of the liver lesion on 12/6/2022 by interventional radiologist Dr. Declan Goodson.    The patient reports that taking oral iron has been really bothering his stomach and he is now only taking once a day. He denies any melena or hematochezia. He also denies any fever or pain. The patient reports quite a bit significant constipation from the oral iron. The patient adds that he has gained some weight, weighing today at 177 pounds and has gained 8 pounds within the last 4 weeks. The patient reports that he has an appointment with Dr. Everardo Foote on 12/29/2022 and Dr. Phill Mooney on 1/27/2022 to discuss his shunt. He also has an EGD tomorrow, 12/14/2022, with Dr. Josh Khan.    Laboratory study on 12/13/2022 reported hemoglobin 9.5 g/dL, MCV 93.6 fL, platelets 47,000/mm3, WBC 3,530/mm3 including neutrophils 2,020/mm3, and lymphocytes 830/mm3. Iron study reported ferritin 105.6 ng/mL, however, iron saturation 13% with free iron 43 mcg/dL and TIBC 332 mcg/dL.     The MRI study obtained on 02/20/2023 reported post ablation defect in the hepatic segment 8 measuring 3.4 x 2.8 cm. The study was done without iv contrast due to chronic renal disease. There was splenomegaly stable at 17.1 cm. No enlarged lymph nodes. No suspicious osseous lesion. There was liver cirrhosis. There was a small to moderate size right sided pleural effusion.    Laboratory studies today on 6/30/2023 reported stable severe thrombocytopenia platelets 40,000, hemoglobin 9.3, and WBC 3200 including neutrophils 1930, lymphocytes 760, and monocytes 270.    Most recent iron study was on 05/05/2023 which reported ferritin 200.7 ng/mL, free iron 55, TIBC 309, and iron saturation 18%.    Recent MRI examination without IV contrast obtained on 06/12/2023 reported limited examination in the absence of intravenous contrast (creatinine 3.3), but it demonstrated a stable right hepatic lobe ablation defect.  There was hepatic cirrhosis and splenomegaly.        MEDICATIONS    Current Outpatient Medications:     allopurinol (ZYLOPRIM) 100 MG tablet, Take 1 tablet by mouth Daily., Disp: , Rfl:     carvedilol (COREG) 12.5 MG tablet, , Disp: , Rfl:     docusate sodium 100 MG capsule, Take 1 capsule by mouth., Disp: , Rfl:     ferrous sulfate 325 (65 FE) MG tablet, Take 1 tablet by mouth 3 (Three) Times a Day With Meals. (Patient taking differently: Take 1 tablet by mouth Daily With Breakfast.), Disp: 90 tablet, Rfl: 2    FIBER PO, Take  by mouth., Disp: , Rfl:     FREESTYLE LITE test strip, , Disp: , Rfl:     furosemide (LASIX) 40 MG tablet, Take 1 tablet by mouth Daily., Disp: , Rfl:     glipizide (Glucotrol) 5 MG tablet, Take 1 tablet by mouth 2 (Two) Times a Day Before Meals., Disp: 60 tablet, Rfl: 2    hydrOXYzine (ATARAX) 10 MG tablet, Take 1 tablet by mouth., Disp: , Rfl:     multivitamin (THERAGRAN) tablet tablet, Take  by mouth Daily., Disp: , Rfl:     nystatin (MYCOSTATIN) 370416 UNIT/GM cream, Apply 1 Application topically to the appropriate area as directed As Needed., Disp: , Rfl:     pantoprazole (PROTONIX) 40 MG EC tablet, , Disp: , Rfl:     pentoxifylline (TRENtal) 400 MG CR tablet, , Disp: , Rfl:     polycarbophil 625 MG tablet tablet, Take 1 tablet by mouth., Disp: , Rfl:     Polyethylene Glycol 3350 (MIRALAX PO), Take  by mouth., Disp: , Rfl:     simvastatin (ZOCOR) 20 MG tablet, Take 1 tablet by mouth Daily., Disp: , Rfl:     spironolactone (ALDACTONE) 25 MG tablet, Take 1 tablet by mouth Daily for 30 days., Disp: 30 tablet, Rfl: 0  No current facility-administered medications for this visit.    Facility-Administered Medications Ordered in Other Visits:     epoetin sole (EPOGEN,PROCRIT) 30,000 Units 2 mL injection, 30,000 Units, Subcutaneous, Once, Milagro Salinas MD PhD    ALLERGIES:     Allergies   Allergen Reactions    Doxycycline Itching    Rosuvastatin Other (See Comments)      Liver enzymes abnormal  "      SOCIAL HISTORY:         Social History     Socioeconomic History    Marital status:      Spouse name: Lisa   Tobacco Use    Smoking status: Former     Types: Cigarettes     Quit date:      Years since quittin.9   Vaping Use    Vaping Use: Never used   Substance and Sexual Activity    Alcohol use: Never   As of 2021, patient reports he does drink alcoholic beverage 10 drinks per week.  Denies illegal drug use.      FAMILY HISTORY:  No cancer in family, mother HTN,      REVIEW OF SYSTEM:  See HPI.         Vitals:    12/15/23 1043   BP: 138/69   Pulse: 54   Temp: 98 °F (36.7 °C)   SpO2: 98%   Weight: 83.3 kg (183 lb 9.6 oz)   Height: 167.6 cm (65.98\")   PainSc: 0-No pain     ECOG 1       PHYSICAL EXAM:   12/15/2023   GENERAL:  Well-developed, well-nourished male in no acute distress.  Orientated to time place and the people.  SKIN:  Warm, dry without rashes, purpura or petechiae.  HEENT:  Normocephalic.   LYMPHATICS:  No cervical, supraclavicular adenopathy.  CHEST: Normal respiratory effort.  Lungs clear to auscultation. Good airflow.  CARDIAC:  Regular rate and rhythm without murmurs. Normal S1,S2.  ABDOMEN:  Soft, nontender with no organomegaly or masses.  Bowel sounds normal.  EXTREMITIES: Trace edema in both lower legs.      RECENT LABS:  Lab Results   Component Value Date    WBC 3.26 (L) 12/15/2023    HGB 9.3 (L) 12/15/2023    HCT 27.8 (L) 12/15/2023    MCV 93.0 12/15/2023    PLT 40 (L) 12/15/2023     Lab Results   Component Value Date    NEUTROABS 1.90 12/15/2023     Lab Results   Component Value Date    GLUCOSE 257 (H) 2023    BUN 69 (H) 2023    CREATININE 3.29 (C) 2023    EGFRIFNONA 72 2021    BCR 21.0 2023    K 4.7 2023    CO2 21.8 (L) 2023    CALCIUM 9.0 2023    ALBUMIN 3.6 2023    AST 61 (H) 2022    ALT 34 2022     Lab Results   Component Value Date    IRON 76 2023    TIBC 259 (L) 2023    FERRITIN 360.00 " 12/01/2023   Iron saturation 29% on 12/1/2023, was 35% on 9/8/2023.       Lab Results   Component Value Date    FOLATE >20.00 01/13/2021     Lab Results   Component Value Date    OYJRYMFH87 1,155 (H) 09/28/2022         IMAGING:   MRI Abdomen Without Contrast  Narrative: Examination: MRI of the abdomen without contrast per protocol     TECHNIQUE: MRI of the abdomen was obtained without contrast per protocol     HISTORY: Liver cancer and chronic renal disease     COMPARISON: 6/12/2023     FINDINGS: The examination is severely limited in the absence of  intravenous contrast. The liver is cirrhotic in configuration. There is  a grossly stable hepatic segment 8 ablation defect, measuring 3.7 x 2.7  cm on series 5, image 21. Cysts are seen in the liver. There is  splenomegaly. The adrenal glands, left kidney, pancreas, stomach, and  visualized large and small bowel and abdominal vasculature are normal.  There is a right renal cyst. No enlarged lymph nodes are seen. No  suspicious osseous lesions are demonstrated. There are small right and  trace left-sided pleural effusions.     Impression: Severely limited exam but grossly stable hepatic segment 8  ablation defect.     This report was finalized on 12/11/2023 12:54 PM by Dr. Jorge Che M.D on Workstation: [id number]           Assessment & Plan   1.  Thrombocytopenia secondary to liver cirrhosis and splenomegaly.  Etiology of the thrombocytopenia is not clear.  However I do think it is likely related to his fatty liver which was documented from abdomen ultrasound on 7/20/2012.    Laboratory studies on 1/13/2021 reported excellent vitamin B12 level.  He had marginally elevated IPF 7.0%.  no apparent compensation for platelets production.   On 6/11/2021, platelets improved at 63,000 with normal IPF 5.3%.  Patient is asymptomatic.    On 12/17/2021, stable platelets 60,000 and normal IPF 4.1%. Patient is asymptomatic.   On 6/10/2022 platelets 69,000.  Patient reports no  spontaneous bleeding.    On 7/6/2022 platelets 65,000.  No spontaneous bleeding.    On 9/28/2022 platelets 61,000.  Patient has easy bruising on extremities, however no spontaneous bleeding such as melena hematochezia or gum bleeding.    On 11/2/2022 platelets 48,000.  No change of clinical condition.  Continue to monitor.  Patient was given 1 unit platelets transfusion on 11/15/2022, platelets improved at 70,000 11/16/2022 when he had successful CT-guided core needle biopsy of liver lesion.   On 11/21/2022 platelets decreased at baseline level of 44,000.  No spontaneous bleeding.  We will arrange patient to receive platelets transfusion prior and post point interventional procedure with radiofrequency ablation of the primary hepatocellular carcinoma scheduled on 12/6/2022.   On 12/5/2022, the patient had platelets 49,000/mm3. We gave him 1 unit of platelets transfusion before the ablation of the liver cancer and the posttransfusion platelets was only 56,000/mm3 on 12/6/2022.   On 12/13/2022, platelets is 47,000/mm3. The patient is not having any bleeding or bruising.  Patient has platelets 41,000 on 03/10/2023, and this is stable at the baseline condition. Patient has no excessive bleeding or bruising.  Lab study on 06/30/2023 reported stable thrombocytopenia with platelets 40,000. Patient has easy bruising, however, no spontaneous bleeding. Continue to monitor.   A laboratory study today, on 9/22/2023, reported stable severe thrombocytopenia with platelets of 36,000. This is his baseline level.  Today on 12/15/2023, the patient has a stable but severe thrombocytopenia with platelets 40,000. The patient asked about for stimulating platelet growth. I researched the UptoDate, and this seems only being used for patient prior to surgical procedure to improve the platelets, but no indication for long term use. We will further investigate this issue.    2.  Iron deficiency anemia in the setting of chronic renal  insufficiency stage III, and liver cirrhosis.   Laboratory study on 1/13/2021 reported no evidence of hemolysis, had supratherapeutic B12 level and folate.    He does have evidence of iron deficiency with low iron sats 10% and ferritin only 41.5 ng/mL with Hb 10.9.    His erythropoietin 16.1, is not elevated accordingly, which indicates lack of response from his kidney to produce erythropoietin. Currently is not a candidate for CONRAD/Procrit treatment.    We started patient on oral ferrous sulfate 325 mg 3 times a day in January 2021.   On 3/19/2021, patient has improved hemoglobin 11.9.  Continue to monitor.  On 6/11/2021, stable anemia Hb 11.0, however much improved with ferritin 115.3 ng/mL, and iron saturation 14%.  Will advised to continue oral iron treatment.  On 12/17/2021 stable Hb 10.8, much improved iron saturation 34% and free iron 126, however with trending down ferritin level 86.1 ng/mL. Continue oral iron treatment.  6/10/2022, patient reports taking oral iron 3 times a day.  No apparent side effects except mild nausea.  Laboratory studies showed deteriorating hemoglobin 9.6 and also worsening iron saturation 17% with free iron 61 and the ferritin 86.8 ng/mL.  Discussed with the patient, recommended intravenous iron therapy, this patient has poor iron absorption.    Patient received 2 dose of Injectafer 750 mg x 2 in June 2022.  Lab study today on 7/6/2022 reported improved normalized iron saturation 21% with a supratherapeutic ferritin 644 ng/mL.  On 9/28/2022 ferritin 256 ng/mL iron saturation 22% free iron 66 TIBC 295.  Supratherapeutic B12 at 155 and normal RBC folate 2175 ng/mL.  Patient had a hemoglobin 7.2 on 9/30/2022.  Was started on Procrit injection 20,000 units during hospitalization.  No PRBC transfusion.  Procrit is subsequently continued as outpatient every 2 weeks.  On 10/21/2022 ferritin 198 and free iron 56 TIBC 294 iron saturation 19%.   Improved hemoglobin 9.2 on 11/2/2022.  We will  continue Procrit 20,000 units every 2 weeks.  Hemoglobin 8.9 on 11/21/2022.  Continue Procrit injection.  The laboratory study today on 12/13/2022 reported deteriorating and recurrent iron deficiency with iron saturation only 13% with free iron 43 mcg/dL, TIBC 332 mcg/dL, and ferritin 105.6 ng/dL. The patient will be arranged for two doses of Injectafer prior to resumption of Procrit.  Post treatment iron study on 01/23/2023 reported significantly improved ferritin 559 and normalized iron saturation 35 percent with a free iron 89, TIBC 255. Patient was continued on Procrit every 2 weeks.   On 03/10/2023, the patient has hemoglobin 9.8, and normal iron study with ferritin 270 ng/mL and iron saturation 28% and free iron 82, TIBC 293.  Lab study on 05/05/2023 reported ferritin 200.7 ng/mL, free iron 55, TIBC 309, iron saturation 18%.   Patient continues on CONRAD/Procrit every 2 weeks.  The patient had a recurrent iron deficiency anemia with a hemoglobin of 8.9, ferritin of 95.3, free iron of 53, and iron saturation of 19% on 7/28/2023.  09/22/2023, the patient has hemoglobin 9.4. The patient will be given CONRAD/Procrit 37,000 units and will continue every 2 weeks with CBC monitorin  His recent laboratory study on 12/01/2023 reported ferritin 360, free iron 76, TIBC 259, and iron saturation 29%.  On 12/15/2023, the patient has hemoglobin 9.3. He will continue epoetin sole/Procrit every 2 weeks.    3.  Pancytopenia with mild leukocytopenia.  This is also likely related to his fatty liver and liver cirrhosis.  Patient drinks alcohol beverage as reported in January 2021.  Patient reports no recurrent infection.  His neutrophils marginally normal at 1830 out of WBC 3250.  Patient had supratherapeutic B12 level 1/13/2021.  Marginally better WBC 3720 including ANC 2100 3/19/2021.    6/11/2021 WBC 3530, with ANC 2180.  No recurrent infection.    On 12/17/2021 stable leukocytopenia WBC 3780 with low normal neutrophils 2016.   Patient is asymptomatic.  Continue to monitor.  On 6/10/2022 patient has slightly better WBC 4290 and neutrophils 2980.  On 7/6/2022 patient had WBC 4260 ANC 2620, lymphocytes 840 and monocytes 470.  On 9/28/2022 WBC 3380, including ANC 1780, hemoglobin 8.5, and platelets 61,000.  On 11/2/2022 total WBC 2970 including ANC 1730.  Continue to monitor.   On 11/21/2022 WBC 2980 including ANC 1670 and lymphocytes 820.  Patient is afebrile.  Continue to monitor.  On 12/13/2022, WBC 3,530/mm3 including neutrophils 2,020/mm3.  On 03/10/2023, WBC 2710 including neutrophils of 1570. The patient reports no fever, sweating, or chills. Most recent peak ANC was 2200 on 02/06/2023.  On 06/30/2023, patient is stable leukocytopenia with WBC 3200, including neutrophils 1930.  On 9/22/2023, the patient had WBC of 3440, including neutrophils of 1900, hemoglobin of 9.4, and platelets of 36,000.  Today on 12/15/2023, the patient has WBC 3260 including neutrophils 1900. The patient has no fever, sweating, or chills.    4. COVID-19 vaccination.    6/11/2021, patient reports that he had 2 doses of COVID-19 vaccine, had very limited side effects with some soreness at the site of injection.   On 12/17/2021, patient reports he received a booster dose of COVID-19 vaccine on 11/6/2021.      5.  Ascites secondary to liver cirrhosis.  CT scan examination on 10/15/2021 confirmed liver cirrhosis.  On 6/10/2022 patient reports abdomen distention for the past 3 months, weight gains, 25 pounds per our records, and also has worsening dyspnea.  Physical examination shows very distended abdomen.  I think this patient has large ascites secondary to his liver cirrhosis.  I recommended to have ultrasound-guided therapeutic paracentesis with samples for routine chemistry labs cell counts and also for cytology study.  Patient also has bilateral leg edema which is also secondary to liver cirrhosis.  Patient was seen her primary care physician next week.  I  think most likely he will need to be started on diuretics.  He also needs to follow-up with his GI specialist Dr. Everardo Foote to discuss possibility of  shunt.   On 6/17/2022 patient had ultrasound-guided paracentesis, with 11.2 L clear yellow ascites removed.  Cytology study reported negative for malignant cells.  There was a reactive mesothelial cells histiocytes and mixed inflammatory cells.  Today on 7/6/2022 follow-up, patient reports worsening abdomen distention again.  I will arrange patient to have therapeutic paracentesis again as soon as possible, and also will arrange another paracentesis in 3 weeks.  At the meantime I also recommended to start the patient on low-dose Lasix 20 mg daily on 7/6/2022.   Paracentesis, with 6.4 L ascites removed 7/14/2022.  Paracentesis 2.4 L ascites removed 8/4/2022.   Failed attempt for paracentesis on 9/29/2022 during hospitalization, no ascites per ultrasound exam.  On 12/13/2022, the patient has more distended abdomen and also has been gaining weight for more than 10 pounds in the past 2 to 3 months. The patient had attempted paracentesis under ultrasound guidance on 12/29/2022, however, there were no ascites or examination, so the procedure was abandoned.   Abnormal MRI on 2/20/2023 reported no significant ascites.  On 03/10/2023, the patient presents for reevaluation. His wife reports the patient is gaining about 10 to 12 pounds since the beginning of 01/2023. By physical examination, I do not feel patient has ascites by percussion of the abdomen. He has trace edema in both legs. Patient is on Lasix 40 mg daily and spironolactone.   Ultrasound for the abdomen on 9/20/2023 reported liver cirrhosis and splenomegaly, no apparent significant ascites.  On 12/15/2023, the patient reports no distention of the abdomen.    6.  Right pleural effusion.    Patient also complains of dyspnea, x-ray examination reported right pleural effusion 7/14/2022..  Patient had  ultrasound-guided right thoracentesis with 2 L pleural effusion removed 7/27/2022.  Cytology studies negative for malignancy.  On 8/4/2022 patient had another 2 L pleural effusion removed on the right side.  The patient had a reaccumulation of the right pleural effusion as evidenced by his CT scan from 11/16/2022. We will arrange ultrasound-guided right thoracentesis.   The patient had ultrasound-guided right paracentesis on 12/29/2022 with a 500 mL of pleural effusion removed.   Abnormal MRI examination on 02/20/2023 reports small to moderate pleural effusion. I reviewed the images with the patient today on 03/10/2023, this is significant less than previous images. He is asymptomatic. We decided to observe for now.  MRI examination 06/12/2023 reported a right-sided pleural effusion. Physical examination today 06/30/2023, patient does have decreased but present breathing sounds at the left lung base.  Stable condition on 9/22/2023.  On 12/15/2023, the patient has no dyspnea.    7.  Worsening renal function.  Laboratory study on 7/6/2022 reported creatinine 1.37.  He had creatinine 1.02 on 12/17/2021.  Discussed with patient, I recommended referring patient to nephrology service for evaluation of possible hepatorenal syndrome due to liver cirrhosis.  Patient is agreeable.  On 9/28/2022 patient reports that he still has no appointment for nephrology service.  On 11/2/2022 creatinine 2.34 with BUN 47.  On 11/21/2022 creatinine 2.33 with BUN 41.  On 12/6/22, cr was 2.07.  On 02/20/2023, the patient had a creatinine 2.42, so MRI was done without IV contrast as we had requested. Patient will continue to follow up with the nephrologist for evaluation and management.  On 05/19/2023, creatinine 2.60.  On 8/25/2023, creatinine was 2.61.  On 12/01/2023, creatinine 3.29. The patient reports he recently had a follow-up with his nephrologist last week.    8.  Hepatocellular carcinoma.    Newly discovered hepatic lesion by  ultrasound examination on 9/29/2022.  Patient is subsequently seen by Dr. Everardo Foote, had an MRI of the abdomen with and without contrast at the [facility] on 10/26/2022 which reported 3.2 cm liver lesion, likely hepatocellular carcinoma.  I recommended to have AFP liver study, also discussed with patient and his wife today on 11/2/2022 we will obtain the MRI images from [facility], and arrange CT-guided core needle biopsy.  Because of his thrombocytopenia, I will arrange 1 unit platelets transfusion right before the biopsy.  I did discuss with him about the risk for bleeding post the biopsy.  This is highly suspicious for primary hepatocellular carcinoma.   Normal AFP 2.37 ng/mL on 11/2/2022.     Patient had CT-guided core needle biopsy on 11/16/2022. Pathology evaluation reported moderately differentiated hepatocellular carcinoma.  I discussed with the interventional radiologist Dr. Goodson about interventional procedure with radiofrequency ablation treatment for the solitary lesion in the liver, since this patient is not a candidate for surgical intervention due to multiple comorbidities. The procedure has been scheduled on 12/06/2022.  Due to his thrombocytopenia, we decided to transfuse him with 1 unit of platelets on 12/05/2022 and also also 1 unit platelets on 12/06/2022 after his procedure.  The patient had CT-guided ablation procedure on 12/6/2022. The patient reports good tolerance, no pains, and no fevers after procedure. Interventional radiology, Dr. Goodson, recommended to obtain abdomen MRI with and without contrast with liver protocol 2 to 3 months after the procedure for reassessment.  The patient had MRI of the abdomen on 02/20/2023, contrast was not given due to worsening renal function. Nevertheless, the study reported post treatment changes. No evidence for new lesions. As I discussed with the patient and his wife today on 03/10/2023, I recommended to obtain MRI in 3 months  for reassessment. We will request IV contrast if his renal function is improving.  MRI examination was obtained on 06/12/2023, reported post treatment changes of the liver lesion, overall stable size.  The patient had an ultrasound examination of the abdomen obtained on 9/20/2023, as requested by his nephrologist, and the study reported a right hepatic lobe lesion measuring 2.6 x 1 x 1.2 cm. There is also splenomegaly measuring 16.5 cm. There was hepatic steatosis and cirrhosis. There were also gallbladder polyps.  The patient had MRI examination of the abdomen without contrast obtained on 12/08/2023, which reported stable segment 8 lesion 3.7 x 2.7 cm. This was compared to the previous MRI from 06/12/2023.      9.  Diabetes.   On 11/21/2022 patient had significant elevated glucose 397.  Patient reports that he is diabetic, however his metformin was discontinued during his most recent hospitalization, and he was not put on any other medication for that.  He reports this morning's blood glucose level was about 180.  Discussed with patient, I will give him 10 units insulin today in the clinic, and also will start him on glipizide 5 mg twice a day.  Patient is agreeable and will follow up with his primary care physician for management of diabetes.  On 2/20/2023 glucose 152.  Lab study on 05/19/2023 reported glucose 104.   On 08/25/2023, glucose was 192. The patient will continue to follow up with a primary care physician for management.  On 12/15/2023, the patient reports that he was started on insulin treatment with glargine 24 units and it started last night. His glucose level this morning was 160.      PLAN:  We will proceed ahead with CONRAD/Procardia today and repeat every 2 weeks. Dose adjustment per protocol.  Continue oral iron once a day.  I will bring the patient back for reevaluation in 3 months, we will repeat laboratory studies. Iron studies per protocol.  Plan to have abnormal MRI examination in about 6  months for reassessment.    I discussed with the patient about laboratory results and further management plan.  Patient voiced understanding and agreeable.          Milagro Salinas MD PhD       CC:  MD Everardo Cuello M.D. Ramsey Nassar. MD            Transcribed from ambient dictation for Milagro Salinas MD PhD by Char Nation.  12/15/23   13:00 EST    Patient or patient representative verbalized consent to the visit recording.  I have personally performed the services described in this document as transcribed by the above individual, and it is both accurate and complete.      Milagro Salinas MD PhD

## 2023-12-29 ENCOUNTER — INFUSION (OUTPATIENT)
Dept: ONCOLOGY | Facility: HOSPITAL | Age: 73
End: 2023-12-29
Payer: MEDICARE

## 2023-12-29 ENCOUNTER — LAB (OUTPATIENT)
Dept: LAB | Facility: HOSPITAL | Age: 73
End: 2023-12-29
Payer: MEDICARE

## 2023-12-29 DIAGNOSIS — N18.30 STAGE 3 CHRONIC KIDNEY DISEASE, UNSPECIFIED WHETHER STAGE 3A OR 3B CKD: ICD-10-CM

## 2023-12-29 DIAGNOSIS — D63.1 ANEMIA DUE TO CHRONIC RENAL FAILURE TREATED WITH ERYTHROPOIETIN, UNSPECIFIED STAGE: Primary | ICD-10-CM

## 2023-12-29 DIAGNOSIS — D63.1 ANEMIA DUE TO CHRONIC RENAL FAILURE TREATED WITH ERYTHROPOIETIN, UNSPECIFIED STAGE: ICD-10-CM

## 2023-12-29 DIAGNOSIS — N18.9 ANEMIA DUE TO CHRONIC RENAL FAILURE TREATED WITH ERYTHROPOIETIN, UNSPECIFIED STAGE: ICD-10-CM

## 2023-12-29 DIAGNOSIS — N18.9 ANEMIA DUE TO CHRONIC RENAL FAILURE TREATED WITH ERYTHROPOIETIN, UNSPECIFIED STAGE: Primary | ICD-10-CM

## 2023-12-29 LAB
BASOPHILS # BLD AUTO: 0.03 10*3/MM3 (ref 0–0.2)
BASOPHILS NFR BLD AUTO: 1 % (ref 0–1.5)
DEPRECATED RDW RBC AUTO: 47.8 FL (ref 37–54)
EOSINOPHIL # BLD AUTO: 0.17 10*3/MM3 (ref 0–0.4)
EOSINOPHIL NFR BLD AUTO: 5.7 % (ref 0.3–6.2)
ERYTHROCYTE [DISTWIDTH] IN BLOOD BY AUTOMATED COUNT: 13.7 % (ref 12.3–15.4)
HCT VFR BLD AUTO: 28.8 % (ref 37.5–51)
HGB BLD-MCNC: 9.5 G/DL (ref 13–17.7)
IMM GRANULOCYTES # BLD AUTO: 0.02 10*3/MM3 (ref 0–0.05)
IMM GRANULOCYTES NFR BLD AUTO: 0.7 % (ref 0–0.5)
LYMPHOCYTES # BLD AUTO: 0.79 10*3/MM3 (ref 0.7–3.1)
LYMPHOCYTES NFR BLD AUTO: 26.7 % (ref 19.6–45.3)
MCH RBC QN AUTO: 31.4 PG (ref 26.6–33)
MCHC RBC AUTO-ENTMCNC: 33 G/DL (ref 31.5–35.7)
MCV RBC AUTO: 95 FL (ref 79–97)
MONOCYTES # BLD AUTO: 0.23 10*3/MM3 (ref 0.1–0.9)
MONOCYTES NFR BLD AUTO: 7.8 % (ref 5–12)
NEUTROPHILS NFR BLD AUTO: 1.72 10*3/MM3 (ref 1.7–7)
NEUTROPHILS NFR BLD AUTO: 58.1 % (ref 42.7–76)
NRBC BLD AUTO-RTO: 0 /100 WBC (ref 0–0.2)
PLATELET # BLD AUTO: 42 10*3/MM3 (ref 140–450)
PMV BLD AUTO: 12.5 FL (ref 6–12)
RBC # BLD AUTO: 3.03 10*6/MM3 (ref 4.14–5.8)
WBC NRBC COR # BLD AUTO: 2.96 10*3/MM3 (ref 3.4–10.8)

## 2023-12-29 PROCEDURE — 85025 COMPLETE CBC W/AUTO DIFF WBC: CPT

## 2023-12-29 PROCEDURE — 96372 THER/PROPH/DIAG INJ SC/IM: CPT

## 2023-12-29 PROCEDURE — 36415 COLL VENOUS BLD VENIPUNCTURE: CPT

## 2023-12-29 PROCEDURE — 25010000002 EPOETIN ALFA PER 1000 UNITS: Performed by: INTERNAL MEDICINE

## 2023-12-29 RX ADMIN — ERYTHROPOIETIN 30000 UNITS: 20000 INJECTION, SOLUTION INTRAVENOUS; SUBCUTANEOUS at 12:20

## 2024-01-12 ENCOUNTER — INFUSION (OUTPATIENT)
Dept: ONCOLOGY | Facility: HOSPITAL | Age: 74
End: 2024-01-12
Payer: MEDICARE

## 2024-01-12 ENCOUNTER — LAB (OUTPATIENT)
Dept: LAB | Facility: HOSPITAL | Age: 74
End: 2024-01-12
Payer: MEDICARE

## 2024-01-12 DIAGNOSIS — N18.9 ANEMIA DUE TO CHRONIC RENAL FAILURE TREATED WITH ERYTHROPOIETIN, UNSPECIFIED STAGE: Primary | ICD-10-CM

## 2024-01-12 DIAGNOSIS — D63.1 ANEMIA DUE TO CHRONIC RENAL FAILURE TREATED WITH ERYTHROPOIETIN, UNSPECIFIED STAGE: Primary | ICD-10-CM

## 2024-01-12 DIAGNOSIS — D63.1 ANEMIA DUE TO CHRONIC RENAL FAILURE TREATED WITH ERYTHROPOIETIN, UNSPECIFIED STAGE: ICD-10-CM

## 2024-01-12 DIAGNOSIS — N18.30 STAGE 3 CHRONIC KIDNEY DISEASE, UNSPECIFIED WHETHER STAGE 3A OR 3B CKD: ICD-10-CM

## 2024-01-12 DIAGNOSIS — N18.9 ANEMIA DUE TO CHRONIC RENAL FAILURE TREATED WITH ERYTHROPOIETIN, UNSPECIFIED STAGE: ICD-10-CM

## 2024-01-12 LAB
BASOPHILS # BLD AUTO: 0.02 10*3/MM3 (ref 0–0.2)
BASOPHILS NFR BLD AUTO: 0.6 % (ref 0–1.5)
DEPRECATED RDW RBC AUTO: 45.8 FL (ref 37–54)
EOSINOPHIL # BLD AUTO: 0.21 10*3/MM3 (ref 0–0.4)
EOSINOPHIL NFR BLD AUTO: 6 % (ref 0.3–6.2)
ERYTHROCYTE [DISTWIDTH] IN BLOOD BY AUTOMATED COUNT: 13.9 % (ref 12.3–15.4)
HCT VFR BLD AUTO: 29.5 % (ref 37.5–51)
HGB BLD-MCNC: 9.9 G/DL (ref 13–17.7)
IMM GRANULOCYTES # BLD AUTO: 0.01 10*3/MM3 (ref 0–0.05)
IMM GRANULOCYTES NFR BLD AUTO: 0.3 % (ref 0–0.5)
LYMPHOCYTES # BLD AUTO: 0.74 10*3/MM3 (ref 0.7–3.1)
LYMPHOCYTES NFR BLD AUTO: 21.1 % (ref 19.6–45.3)
MCH RBC QN AUTO: 30.4 PG (ref 26.6–33)
MCHC RBC AUTO-ENTMCNC: 33.6 G/DL (ref 31.5–35.7)
MCV RBC AUTO: 90.5 FL (ref 79–97)
MONOCYTES # BLD AUTO: 0.26 10*3/MM3 (ref 0.1–0.9)
MONOCYTES NFR BLD AUTO: 7.4 % (ref 5–12)
NEUTROPHILS NFR BLD AUTO: 2.27 10*3/MM3 (ref 1.7–7)
NEUTROPHILS NFR BLD AUTO: 64.6 % (ref 42.7–76)
NRBC BLD AUTO-RTO: 0 /100 WBC (ref 0–0.2)
PLATELET # BLD AUTO: 43 10*3/MM3 (ref 140–450)
PMV BLD AUTO: 13.2 FL (ref 6–12)
RBC # BLD AUTO: 3.26 10*6/MM3 (ref 4.14–5.8)
WBC NRBC COR # BLD AUTO: 3.51 10*3/MM3 (ref 3.4–10.8)

## 2024-01-12 PROCEDURE — 96372 THER/PROPH/DIAG INJ SC/IM: CPT

## 2024-01-12 PROCEDURE — 85025 COMPLETE CBC W/AUTO DIFF WBC: CPT

## 2024-01-12 PROCEDURE — 36415 COLL VENOUS BLD VENIPUNCTURE: CPT

## 2024-01-12 PROCEDURE — 25010000002 EPOETIN ALFA PER 1000 UNITS: Performed by: INTERNAL MEDICINE

## 2024-01-12 RX ADMIN — ERYTHROPOIETIN 30000 UNITS: 20000 INJECTION, SOLUTION INTRAVENOUS; SUBCUTANEOUS at 12:43

## 2024-01-26 ENCOUNTER — LAB (OUTPATIENT)
Dept: LAB | Facility: HOSPITAL | Age: 74
End: 2024-01-26
Payer: MEDICARE

## 2024-01-26 ENCOUNTER — INFUSION (OUTPATIENT)
Dept: ONCOLOGY | Facility: HOSPITAL | Age: 74
End: 2024-01-26
Payer: MEDICARE

## 2024-01-26 DIAGNOSIS — N18.9 ANEMIA DUE TO CHRONIC RENAL FAILURE TREATED WITH ERYTHROPOIETIN, UNSPECIFIED STAGE: ICD-10-CM

## 2024-01-26 DIAGNOSIS — N18.30 STAGE 3 CHRONIC KIDNEY DISEASE, UNSPECIFIED WHETHER STAGE 3A OR 3B CKD: ICD-10-CM

## 2024-01-26 DIAGNOSIS — N18.9 ANEMIA DUE TO CHRONIC RENAL FAILURE TREATED WITH ERYTHROPOIETIN, UNSPECIFIED STAGE: Primary | ICD-10-CM

## 2024-01-26 DIAGNOSIS — D63.1 ANEMIA DUE TO CHRONIC RENAL FAILURE TREATED WITH ERYTHROPOIETIN, UNSPECIFIED STAGE: Primary | ICD-10-CM

## 2024-01-26 DIAGNOSIS — D63.1 ANEMIA DUE TO CHRONIC RENAL FAILURE TREATED WITH ERYTHROPOIETIN, UNSPECIFIED STAGE: ICD-10-CM

## 2024-01-26 LAB
BASOPHILS # BLD AUTO: 0.02 10*3/MM3 (ref 0–0.2)
BASOPHILS NFR BLD AUTO: 0.8 % (ref 0–1.5)
DEPRECATED RDW RBC AUTO: 45.3 FL (ref 37–54)
EOSINOPHIL # BLD AUTO: 0.15 10*3/MM3 (ref 0–0.4)
EOSINOPHIL NFR BLD AUTO: 6.3 % (ref 0.3–6.2)
ERYTHROCYTE [DISTWIDTH] IN BLOOD BY AUTOMATED COUNT: 13.9 % (ref 12.3–15.4)
HCT VFR BLD AUTO: 27.9 % (ref 37.5–51)
HGB BLD-MCNC: 9.3 G/DL (ref 13–17.7)
IMM GRANULOCYTES # BLD AUTO: 0.03 10*3/MM3 (ref 0–0.05)
IMM GRANULOCYTES NFR BLD AUTO: 1.3 % (ref 0–0.5)
LYMPHOCYTES # BLD AUTO: 0.67 10*3/MM3 (ref 0.7–3.1)
LYMPHOCYTES NFR BLD AUTO: 27.9 % (ref 19.6–45.3)
MCH RBC QN AUTO: 30.1 PG (ref 26.6–33)
MCHC RBC AUTO-ENTMCNC: 33.3 G/DL (ref 31.5–35.7)
MCV RBC AUTO: 90.3 FL (ref 79–97)
MONOCYTES # BLD AUTO: 0.19 10*3/MM3 (ref 0.1–0.9)
MONOCYTES NFR BLD AUTO: 7.9 % (ref 5–12)
NEUTROPHILS NFR BLD AUTO: 1.34 10*3/MM3 (ref 1.7–7)
NEUTROPHILS NFR BLD AUTO: 55.8 % (ref 42.7–76)
NRBC BLD AUTO-RTO: 0 /100 WBC (ref 0–0.2)
PLATELET # BLD AUTO: 41 10*3/MM3 (ref 140–450)
PMV BLD AUTO: 13.2 FL (ref 6–12)
RBC # BLD AUTO: 3.09 10*6/MM3 (ref 4.14–5.8)
WBC NRBC COR # BLD AUTO: 2.4 10*3/MM3 (ref 3.4–10.8)

## 2024-01-26 PROCEDURE — 85025 COMPLETE CBC W/AUTO DIFF WBC: CPT

## 2024-01-26 PROCEDURE — 96372 THER/PROPH/DIAG INJ SC/IM: CPT

## 2024-01-26 PROCEDURE — 36415 COLL VENOUS BLD VENIPUNCTURE: CPT

## 2024-01-26 PROCEDURE — 25010000002 EPOETIN ALFA PER 1000 UNITS: Performed by: INTERNAL MEDICINE

## 2024-01-26 RX ADMIN — ERYTHROPOIETIN 37000 UNITS: 20000 INJECTION, SOLUTION INTRAVENOUS; SUBCUTANEOUS at 11:18

## 2024-02-09 ENCOUNTER — LAB (OUTPATIENT)
Dept: LAB | Facility: HOSPITAL | Age: 74
End: 2024-02-09
Payer: MEDICARE

## 2024-02-09 ENCOUNTER — INFUSION (OUTPATIENT)
Dept: ONCOLOGY | Facility: HOSPITAL | Age: 74
End: 2024-02-09
Payer: MEDICARE

## 2024-02-09 DIAGNOSIS — N18.9 ANEMIA DUE TO CHRONIC RENAL FAILURE TREATED WITH ERYTHROPOIETIN, UNSPECIFIED STAGE: ICD-10-CM

## 2024-02-09 DIAGNOSIS — D63.1 ANEMIA DUE TO CHRONIC RENAL FAILURE TREATED WITH ERYTHROPOIETIN, UNSPECIFIED STAGE: ICD-10-CM

## 2024-02-09 DIAGNOSIS — N18.30 STAGE 3 CHRONIC KIDNEY DISEASE, UNSPECIFIED WHETHER STAGE 3A OR 3B CKD: ICD-10-CM

## 2024-02-09 LAB
BASOPHILS # BLD AUTO: 0.03 10*3/MM3 (ref 0–0.2)
BASOPHILS NFR BLD AUTO: 0.9 % (ref 0–1.5)
DEPRECATED RDW RBC AUTO: 46.8 FL (ref 37–54)
EOSINOPHIL # BLD AUTO: 0.21 10*3/MM3 (ref 0–0.4)
EOSINOPHIL NFR BLD AUTO: 6.5 % (ref 0.3–6.2)
ERYTHROCYTE [DISTWIDTH] IN BLOOD BY AUTOMATED COUNT: 14.2 % (ref 12.3–15.4)
HCT VFR BLD AUTO: 30 % (ref 37.5–51)
HGB BLD-MCNC: 10.1 G/DL (ref 13–17.7)
IMM GRANULOCYTES # BLD AUTO: 0.05 10*3/MM3 (ref 0–0.05)
IMM GRANULOCYTES NFR BLD AUTO: 1.6 % (ref 0–0.5)
LYMPHOCYTES # BLD AUTO: 0.77 10*3/MM3 (ref 0.7–3.1)
LYMPHOCYTES NFR BLD AUTO: 24 % (ref 19.6–45.3)
MCH RBC QN AUTO: 30.4 PG (ref 26.6–33)
MCHC RBC AUTO-ENTMCNC: 33.7 G/DL (ref 31.5–35.7)
MCV RBC AUTO: 90.4 FL (ref 79–97)
MONOCYTES # BLD AUTO: 0.26 10*3/MM3 (ref 0.1–0.9)
MONOCYTES NFR BLD AUTO: 8.1 % (ref 5–12)
NEUTROPHILS NFR BLD AUTO: 1.89 10*3/MM3 (ref 1.7–7)
NEUTROPHILS NFR BLD AUTO: 58.9 % (ref 42.7–76)
NRBC BLD AUTO-RTO: 0 /100 WBC (ref 0–0.2)
PLATELET # BLD AUTO: 45 10*3/MM3 (ref 140–450)
PMV BLD AUTO: 12.9 FL (ref 6–12)
RBC # BLD AUTO: 3.32 10*6/MM3 (ref 4.14–5.8)
WBC NRBC COR # BLD AUTO: 3.21 10*3/MM3 (ref 3.4–10.8)

## 2024-02-09 PROCEDURE — 85025 COMPLETE CBC W/AUTO DIFF WBC: CPT

## 2024-02-09 PROCEDURE — G0463 HOSPITAL OUTPT CLINIC VISIT: HCPCS

## 2024-02-09 PROCEDURE — 36415 COLL VENOUS BLD VENIPUNCTURE: CPT

## 2024-02-09 NOTE — PROGRESS NOTES
Hgb today is 10.1.  No procrit today per guidelines.   Patient without questions or concerns at this time.   Given copy of labs.

## 2024-02-23 ENCOUNTER — INFUSION (OUTPATIENT)
Dept: ONCOLOGY | Facility: HOSPITAL | Age: 74
End: 2024-02-23
Payer: MEDICARE

## 2024-02-23 ENCOUNTER — LAB (OUTPATIENT)
Dept: LAB | Facility: HOSPITAL | Age: 74
End: 2024-02-23
Payer: MEDICARE

## 2024-02-23 DIAGNOSIS — K74.60 HEPATIC CIRRHOSIS, UNSPECIFIED HEPATIC CIRRHOSIS TYPE, UNSPECIFIED WHETHER ASCITES PRESENT: Primary | ICD-10-CM

## 2024-02-23 DIAGNOSIS — K70.30 ESOPHAGEAL VARICES IN ALCOHOLIC CIRRHOSIS: ICD-10-CM

## 2024-02-23 DIAGNOSIS — D75.1 ERYTHROCYTOSIS DUE TO HEPATOMA: ICD-10-CM

## 2024-02-23 DIAGNOSIS — I85.10 ESOPHAGEAL VARICES IN ALCOHOLIC CIRRHOSIS: ICD-10-CM

## 2024-02-23 DIAGNOSIS — N18.9 ANEMIA DUE TO CHRONIC RENAL FAILURE TREATED WITH ERYTHROPOIETIN, UNSPECIFIED STAGE: ICD-10-CM

## 2024-02-23 DIAGNOSIS — D63.1 ANEMIA OF CHRONIC RENAL FAILURE, UNSPECIFIED CKD STAGE: ICD-10-CM

## 2024-02-23 DIAGNOSIS — D63.1 ANEMIA DUE TO CHRONIC RENAL FAILURE TREATED WITH ERYTHROPOIETIN, UNSPECIFIED STAGE: ICD-10-CM

## 2024-02-23 DIAGNOSIS — D63.1 ERYTHROPOIETIN DEFICIENCY ANEMIA: ICD-10-CM

## 2024-02-23 DIAGNOSIS — D63.1 ANEMIA DUE TO CHRONIC RENAL FAILURE TREATED WITH ERYTHROPOIETIN, UNSPECIFIED STAGE: Primary | ICD-10-CM

## 2024-02-23 DIAGNOSIS — N18.9 ANEMIA OF CHRONIC RENAL FAILURE, UNSPECIFIED CKD STAGE: ICD-10-CM

## 2024-02-23 DIAGNOSIS — N18.30 STAGE 3 CHRONIC KIDNEY DISEASE, UNSPECIFIED WHETHER STAGE 3A OR 3B CKD: ICD-10-CM

## 2024-02-23 DIAGNOSIS — C22.0 ERYTHROCYTOSIS DUE TO HEPATOMA: ICD-10-CM

## 2024-02-23 DIAGNOSIS — D50.9 IRON DEFICIENCY ANEMIA, UNSPECIFIED IRON DEFICIENCY ANEMIA TYPE: ICD-10-CM

## 2024-02-23 DIAGNOSIS — N18.9 ANEMIA DUE TO CHRONIC RENAL FAILURE TREATED WITH ERYTHROPOIETIN, UNSPECIFIED STAGE: Primary | ICD-10-CM

## 2024-02-23 DIAGNOSIS — N18.4 CHRONIC KIDNEY DISEASE, STAGE IV (SEVERE): ICD-10-CM

## 2024-02-23 LAB
ALBUMIN SERPL-MCNC: 3.5 G/DL (ref 3.5–5.2)
ALBUMIN/GLOB SERPL: 1.2 G/DL
ALP SERPL-CCNC: 160 U/L (ref 39–117)
ALPHA-FETOPROTEIN: <2 NG/ML (ref 0–8.3)
ALT SERPL W P-5'-P-CCNC: 36 U/L (ref 1–41)
ANION GAP SERPL CALCULATED.3IONS-SCNC: 7.2 MMOL/L (ref 5–15)
AST SERPL-CCNC: 48 U/L (ref 1–40)
BASOPHILS # BLD AUTO: 0.02 10*3/MM3 (ref 0–0.2)
BASOPHILS NFR BLD AUTO: 0.7 % (ref 0–1.5)
BILIRUB CONJ SERPL-MCNC: 0.2 MG/DL (ref 0–0.3)
BILIRUB SERPL-MCNC: 0.4 MG/DL (ref 0–1.2)
BILIRUB UR QL STRIP: NEGATIVE
BUN SERPL-MCNC: 55 MG/DL (ref 8–23)
BUN/CREAT SERPL: 21.5 (ref 7–25)
CALCIUM SPEC-SCNC: 8.8 MG/DL (ref 8.6–10.5)
CHLORIDE SERPL-SCNC: 108 MMOL/L (ref 98–107)
CLARITY UR: CLEAR
CO2 SERPL-SCNC: 20.8 MMOL/L (ref 22–29)
COLOR UR: YELLOW
CREAT SERPL-MCNC: 2.56 MG/DL (ref 0.76–1.27)
CREAT UR-MCNC: 55.2 MG/DL
DEPRECATED RDW RBC AUTO: 48.5 FL (ref 37–54)
EGFRCR SERPLBLD CKD-EPI 2021: 25.7 ML/MIN/1.73
EOSINOPHIL # BLD AUTO: 0.12 10*3/MM3 (ref 0–0.4)
EOSINOPHIL NFR BLD AUTO: 4.3 % (ref 0.3–6.2)
ERYTHROCYTE [DISTWIDTH] IN BLOOD BY AUTOMATED COUNT: 14.6 % (ref 12.3–15.4)
GLOBULIN UR ELPH-MCNC: 2.9 GM/DL
GLUCOSE SERPL-MCNC: 293 MG/DL (ref 65–99)
GLUCOSE UR STRIP-MCNC: NEGATIVE MG/DL
HCT VFR BLD AUTO: 28.1 % (ref 37.5–51)
HGB BLD-MCNC: 9.1 G/DL (ref 13–17.7)
HGB UR QL STRIP.AUTO: NEGATIVE
IMM GRANULOCYTES # BLD AUTO: 0 10*3/MM3 (ref 0–0.05)
IMM GRANULOCYTES NFR BLD AUTO: 0 % (ref 0–0.5)
INR PPP: 1.29 (ref 0.9–1.1)
KETONES UR QL STRIP: NEGATIVE
LEUKOCYTE ESTERASE UR QL STRIP.AUTO: NEGATIVE
LYMPHOCYTES # BLD AUTO: 0.76 10*3/MM3 (ref 0.7–3.1)
LYMPHOCYTES NFR BLD AUTO: 27.4 % (ref 19.6–45.3)
MCH RBC QN AUTO: 29.9 PG (ref 26.6–33)
MCHC RBC AUTO-ENTMCNC: 32.4 G/DL (ref 31.5–35.7)
MCV RBC AUTO: 92.4 FL (ref 79–97)
MONOCYTES # BLD AUTO: 0.2 10*3/MM3 (ref 0.1–0.9)
MONOCYTES NFR BLD AUTO: 7.2 % (ref 5–12)
NEUTROPHILS NFR BLD AUTO: 1.67 10*3/MM3 (ref 1.7–7)
NEUTROPHILS NFR BLD AUTO: 60.4 % (ref 42.7–76)
NITRITE UR QL STRIP: NEGATIVE
NRBC BLD AUTO-RTO: 0 /100 WBC (ref 0–0.2)
PH UR STRIP.AUTO: 5.5 [PH] (ref 4.5–8)
PLATELET # BLD AUTO: 41 10*3/MM3 (ref 140–450)
PMV BLD AUTO: 11.9 FL (ref 6–12)
POTASSIUM SERPL-SCNC: 5.3 MMOL/L (ref 3.5–5.2)
PROT ?TM UR-MCNC: <4 MG/DL
PROT SERPL-MCNC: 6.4 G/DL (ref 6–8.5)
PROT UR QL STRIP: NEGATIVE
PROT/CREAT UR: NORMAL MG/G{CREAT}
PROTHROMBIN TIME: 16.3 SECONDS (ref 11.7–14.2)
RBC # BLD AUTO: 3.04 10*6/MM3 (ref 4.14–5.8)
SODIUM SERPL-SCNC: 136 MMOL/L (ref 136–145)
SP GR UR STRIP: 1.01 (ref 1–1.03)
UROBILINOGEN UR QL STRIP: NORMAL
WBC NRBC COR # BLD AUTO: 2.77 10*3/MM3 (ref 3.4–10.8)

## 2024-02-23 PROCEDURE — 25010000002 EPOETIN ALFA PER 1000 UNITS: Performed by: INTERNAL MEDICINE

## 2024-02-23 PROCEDURE — 80053 COMPREHEN METABOLIC PANEL: CPT

## 2024-02-23 PROCEDURE — 84156 ASSAY OF PROTEIN URINE: CPT | Performed by: INTERNAL MEDICINE

## 2024-02-23 PROCEDURE — 85610 PROTHROMBIN TIME: CPT | Performed by: INTERNAL MEDICINE

## 2024-02-23 PROCEDURE — 36415 COLL VENOUS BLD VENIPUNCTURE: CPT

## 2024-02-23 PROCEDURE — 82570 ASSAY OF URINE CREATININE: CPT | Performed by: INTERNAL MEDICINE

## 2024-02-23 PROCEDURE — 82248 BILIRUBIN DIRECT: CPT

## 2024-02-23 PROCEDURE — 82105 ALPHA-FETOPROTEIN SERUM: CPT | Performed by: INTERNAL MEDICINE

## 2024-02-23 PROCEDURE — 85025 COMPLETE CBC W/AUTO DIFF WBC: CPT

## 2024-02-23 PROCEDURE — 81003 URINALYSIS AUTO W/O SCOPE: CPT

## 2024-02-23 PROCEDURE — 96372 THER/PROPH/DIAG INJ SC/IM: CPT

## 2024-02-23 RX ADMIN — ERYTHROPOIETIN 30000 UNITS: 20000 INJECTION, SOLUTION INTRAVENOUS; SUBCUTANEOUS at 11:50

## 2024-03-08 ENCOUNTER — LAB (OUTPATIENT)
Dept: LAB | Facility: HOSPITAL | Age: 74
End: 2024-03-08
Payer: MEDICARE

## 2024-03-08 ENCOUNTER — INFUSION (OUTPATIENT)
Dept: ONCOLOGY | Facility: HOSPITAL | Age: 74
End: 2024-03-08
Payer: MEDICARE

## 2024-03-08 DIAGNOSIS — C22.0 HEPATOCELLULAR CARCINOMA: ICD-10-CM

## 2024-03-08 DIAGNOSIS — N18.9 ANEMIA DUE TO CHRONIC RENAL FAILURE TREATED WITH ERYTHROPOIETIN, UNSPECIFIED STAGE: ICD-10-CM

## 2024-03-08 DIAGNOSIS — D63.1 ANEMIA DUE TO CHRONIC RENAL FAILURE TREATED WITH ERYTHROPOIETIN, UNSPECIFIED STAGE: ICD-10-CM

## 2024-03-08 DIAGNOSIS — N18.30 STAGE 3 CHRONIC KIDNEY DISEASE, UNSPECIFIED WHETHER STAGE 3A OR 3B CKD: ICD-10-CM

## 2024-03-08 DIAGNOSIS — N18.9 ANEMIA DUE TO CHRONIC RENAL FAILURE TREATED WITH ERYTHROPOIETIN, UNSPECIFIED STAGE: Primary | ICD-10-CM

## 2024-03-08 DIAGNOSIS — D63.1 ANEMIA DUE TO CHRONIC RENAL FAILURE TREATED WITH ERYTHROPOIETIN, UNSPECIFIED STAGE: Primary | ICD-10-CM

## 2024-03-08 LAB
BASOPHILS # BLD AUTO: 0.01 10*3/MM3 (ref 0–0.2)
BASOPHILS NFR BLD AUTO: 0.5 % (ref 0–1.5)
DEPRECATED RDW RBC AUTO: 52.5 FL (ref 37–54)
EOSINOPHIL # BLD AUTO: 0.17 10*3/MM3 (ref 0–0.4)
EOSINOPHIL NFR BLD AUTO: 7.9 % (ref 0.3–6.2)
ERYTHROCYTE [DISTWIDTH] IN BLOOD BY AUTOMATED COUNT: 15.5 % (ref 12.3–15.4)
FERRITIN SERPL-MCNC: 131 NG/ML (ref 30–400)
HCT VFR BLD AUTO: 24.8 % (ref 37.5–51)
HGB BLD-MCNC: 8.2 G/DL (ref 13–17.7)
IMM GRANULOCYTES # BLD AUTO: 0.02 10*3/MM3 (ref 0–0.05)
IMM GRANULOCYTES NFR BLD AUTO: 0.9 % (ref 0–0.5)
IRON 24H UR-MRATE: 45 MCG/DL (ref 59–158)
IRON SATN MFR SERPL: 17 % (ref 20–50)
LYMPHOCYTES # BLD AUTO: 0.57 10*3/MM3 (ref 0.7–3.1)
LYMPHOCYTES NFR BLD AUTO: 26.4 % (ref 19.6–45.3)
MCH RBC QN AUTO: 30.8 PG (ref 26.6–33)
MCHC RBC AUTO-ENTMCNC: 33.1 G/DL (ref 31.5–35.7)
MCV RBC AUTO: 93.2 FL (ref 79–97)
MONOCYTES # BLD AUTO: 0.19 10*3/MM3 (ref 0.1–0.9)
MONOCYTES NFR BLD AUTO: 8.8 % (ref 5–12)
NEUTROPHILS NFR BLD AUTO: 1.2 10*3/MM3 (ref 1.7–7)
NEUTROPHILS NFR BLD AUTO: 55.5 % (ref 42.7–76)
NRBC BLD AUTO-RTO: 0 /100 WBC (ref 0–0.2)
PLATELET # BLD AUTO: 33 10*3/MM3 (ref 140–450)
PMV BLD AUTO: 12.7 FL (ref 6–12)
RBC # BLD AUTO: 2.66 10*6/MM3 (ref 4.14–5.8)
TIBC SERPL-MCNC: 270 MCG/DL (ref 298–536)
TRANSFERRIN SERPL-MCNC: 193 MG/DL (ref 200–360)
WBC NRBC COR # BLD AUTO: 2.16 10*3/MM3 (ref 3.4–10.8)

## 2024-03-08 PROCEDURE — 25010000002 EPOETIN ALFA PER 1000 UNITS: Performed by: INTERNAL MEDICINE

## 2024-03-08 PROCEDURE — 82728 ASSAY OF FERRITIN: CPT

## 2024-03-08 PROCEDURE — 84466 ASSAY OF TRANSFERRIN: CPT

## 2024-03-08 PROCEDURE — 36415 COLL VENOUS BLD VENIPUNCTURE: CPT

## 2024-03-08 PROCEDURE — 85025 COMPLETE CBC W/AUTO DIFF WBC: CPT

## 2024-03-08 PROCEDURE — 96372 THER/PROPH/DIAG INJ SC/IM: CPT

## 2024-03-08 PROCEDURE — 83540 ASSAY OF IRON: CPT

## 2024-03-08 RX ADMIN — ERYTHROPOIETIN 30000 UNITS: 20000 INJECTION, SOLUTION INTRAVENOUS; SUBCUTANEOUS at 11:48

## 2024-03-22 ENCOUNTER — LAB (OUTPATIENT)
Dept: LAB | Facility: HOSPITAL | Age: 74
End: 2024-03-22
Payer: MEDICARE

## 2024-03-22 ENCOUNTER — INFUSION (OUTPATIENT)
Dept: ONCOLOGY | Facility: HOSPITAL | Age: 74
End: 2024-03-22
Payer: MEDICARE

## 2024-03-22 DIAGNOSIS — D63.1 ANEMIA DUE TO CHRONIC RENAL FAILURE TREATED WITH ERYTHROPOIETIN, UNSPECIFIED STAGE: ICD-10-CM

## 2024-03-22 DIAGNOSIS — C22.0 HEPATOCELLULAR CARCINOMA: ICD-10-CM

## 2024-03-22 DIAGNOSIS — N18.9 ANEMIA DUE TO CHRONIC RENAL FAILURE TREATED WITH ERYTHROPOIETIN, UNSPECIFIED STAGE: Primary | ICD-10-CM

## 2024-03-22 DIAGNOSIS — N18.9 ANEMIA DUE TO CHRONIC RENAL FAILURE TREATED WITH ERYTHROPOIETIN, UNSPECIFIED STAGE: ICD-10-CM

## 2024-03-22 DIAGNOSIS — N18.30 STAGE 3 CHRONIC KIDNEY DISEASE, UNSPECIFIED WHETHER STAGE 3A OR 3B CKD: ICD-10-CM

## 2024-03-22 DIAGNOSIS — D63.1 ANEMIA DUE TO CHRONIC RENAL FAILURE TREATED WITH ERYTHROPOIETIN, UNSPECIFIED STAGE: Primary | ICD-10-CM

## 2024-03-22 LAB
BASOPHILS # BLD AUTO: 0.03 10*3/MM3 (ref 0–0.2)
BASOPHILS NFR BLD AUTO: 0.7 % (ref 0–1.5)
DEPRECATED RDW RBC AUTO: 50.5 FL (ref 37–54)
EOSINOPHIL # BLD AUTO: 0.22 10*3/MM3 (ref 0–0.4)
EOSINOPHIL NFR BLD AUTO: 5.4 % (ref 0.3–6.2)
ERYTHROCYTE [DISTWIDTH] IN BLOOD BY AUTOMATED COUNT: 15.3 % (ref 12.3–15.4)
HCT VFR BLD AUTO: 28.9 % (ref 37.5–51)
HGB BLD-MCNC: 9.9 G/DL (ref 13–17.7)
IMM GRANULOCYTES # BLD AUTO: 0.22 10*3/MM3 (ref 0–0.05)
IMM GRANULOCYTES NFR BLD AUTO: 5.4 % (ref 0–0.5)
LYMPHOCYTES # BLD AUTO: 1.01 10*3/MM3 (ref 0.7–3.1)
LYMPHOCYTES NFR BLD AUTO: 24.7 % (ref 19.6–45.3)
MCH RBC QN AUTO: 31.3 PG (ref 26.6–33)
MCHC RBC AUTO-ENTMCNC: 34.3 G/DL (ref 31.5–35.7)
MCV RBC AUTO: 91.5 FL (ref 79–97)
MONOCYTES # BLD AUTO: 0.31 10*3/MM3 (ref 0.1–0.9)
MONOCYTES NFR BLD AUTO: 7.6 % (ref 5–12)
NEUTROPHILS NFR BLD AUTO: 2.3 10*3/MM3 (ref 1.7–7)
NEUTROPHILS NFR BLD AUTO: 56.2 % (ref 42.7–76)
NRBC BLD AUTO-RTO: 0 /100 WBC (ref 0–0.2)
PLATELET # BLD AUTO: 39 10*3/MM3 (ref 140–450)
PMV BLD AUTO: 12.2 FL (ref 6–12)
RBC # BLD AUTO: 3.16 10*6/MM3 (ref 4.14–5.8)
WBC NRBC COR # BLD AUTO: 4.09 10*3/MM3 (ref 3.4–10.8)

## 2024-03-22 PROCEDURE — 36415 COLL VENOUS BLD VENIPUNCTURE: CPT

## 2024-03-22 PROCEDURE — 25010000002 EPOETIN ALFA PER 1000 UNITS: Performed by: INTERNAL MEDICINE

## 2024-03-22 PROCEDURE — 85025 COMPLETE CBC W/AUTO DIFF WBC: CPT

## 2024-03-22 PROCEDURE — 96372 THER/PROPH/DIAG INJ SC/IM: CPT

## 2024-03-22 RX ADMIN — ERYTHROPOIETIN 30000 UNITS: 10000 INJECTION, SOLUTION INTRAVENOUS; SUBCUTANEOUS at 11:37

## 2024-04-05 ENCOUNTER — LAB (OUTPATIENT)
Dept: LAB | Facility: HOSPITAL | Age: 74
End: 2024-04-05
Payer: OTHER GOVERNMENT

## 2024-04-05 ENCOUNTER — INFUSION (OUTPATIENT)
Dept: ONCOLOGY | Facility: HOSPITAL | Age: 74
End: 2024-04-05
Payer: OTHER GOVERNMENT

## 2024-04-05 ENCOUNTER — OFFICE VISIT (OUTPATIENT)
Dept: ONCOLOGY | Facility: CLINIC | Age: 74
End: 2024-04-05
Payer: OTHER GOVERNMENT

## 2024-04-05 VITALS
WEIGHT: 185.4 LBS | DIASTOLIC BLOOD PRESSURE: 65 MMHG | HEIGHT: 66 IN | HEART RATE: 56 BPM | OXYGEN SATURATION: 98 % | SYSTOLIC BLOOD PRESSURE: 142 MMHG | RESPIRATION RATE: 16 BRPM | TEMPERATURE: 97.1 F | BODY MASS INDEX: 29.8 KG/M2

## 2024-04-05 DIAGNOSIS — N18.9 ANEMIA DUE TO CHRONIC RENAL FAILURE TREATED WITH ERYTHROPOIETIN, UNSPECIFIED STAGE: ICD-10-CM

## 2024-04-05 DIAGNOSIS — D61.818 PANCYTOPENIA: ICD-10-CM

## 2024-04-05 DIAGNOSIS — D63.1 ANEMIA DUE TO CHRONIC RENAL FAILURE TREATED WITH ERYTHROPOIETIN, UNSPECIFIED STAGE: ICD-10-CM

## 2024-04-05 DIAGNOSIS — N18.9 ANEMIA DUE TO CHRONIC RENAL FAILURE TREATED WITH ERYTHROPOIETIN, UNSPECIFIED STAGE: Primary | ICD-10-CM

## 2024-04-05 DIAGNOSIS — D63.1 ANEMIA DUE TO CHRONIC RENAL FAILURE TREATED WITH ERYTHROPOIETIN, UNSPECIFIED STAGE: Primary | ICD-10-CM

## 2024-04-05 DIAGNOSIS — C22.0 HEPATOCELLULAR CARCINOMA: ICD-10-CM

## 2024-04-05 DIAGNOSIS — D50.9 IRON DEFICIENCY ANEMIA, UNSPECIFIED IRON DEFICIENCY ANEMIA TYPE: ICD-10-CM

## 2024-04-05 DIAGNOSIS — N18.30 STAGE 3 CHRONIC KIDNEY DISEASE, UNSPECIFIED WHETHER STAGE 3A OR 3B CKD: ICD-10-CM

## 2024-04-05 DIAGNOSIS — K74.60 CIRRHOSIS OF LIVER WITH ASCITES, UNSPECIFIED HEPATIC CIRRHOSIS TYPE: ICD-10-CM

## 2024-04-05 DIAGNOSIS — D69.6 THROMBOCYTOPENIA: ICD-10-CM

## 2024-04-05 DIAGNOSIS — R18.8 CIRRHOSIS OF LIVER WITH ASCITES, UNSPECIFIED HEPATIC CIRRHOSIS TYPE: ICD-10-CM

## 2024-04-05 LAB
BASOPHILS # BLD AUTO: 0.02 10*3/MM3 (ref 0–0.2)
BASOPHILS NFR BLD AUTO: 0.7 % (ref 0–1.5)
DEPRECATED RDW RBC AUTO: 50.5 FL (ref 37–54)
EOSINOPHIL # BLD AUTO: 0.23 10*3/MM3 (ref 0–0.4)
EOSINOPHIL NFR BLD AUTO: 8.4 % (ref 0.3–6.2)
ERYTHROCYTE [DISTWIDTH] IN BLOOD BY AUTOMATED COUNT: 14.6 % (ref 12.3–15.4)
HCT VFR BLD AUTO: 30.1 % (ref 37.5–51)
HGB BLD-MCNC: 9.7 G/DL (ref 13–17.7)
IMM GRANULOCYTES # BLD AUTO: 0.02 10*3/MM3 (ref 0–0.05)
IMM GRANULOCYTES NFR BLD AUTO: 0.7 % (ref 0–0.5)
LYMPHOCYTES # BLD AUTO: 0.63 10*3/MM3 (ref 0.7–3.1)
LYMPHOCYTES NFR BLD AUTO: 23 % (ref 19.6–45.3)
MCH RBC QN AUTO: 30.1 PG (ref 26.6–33)
MCHC RBC AUTO-ENTMCNC: 32.2 G/DL (ref 31.5–35.7)
MCV RBC AUTO: 93.5 FL (ref 79–97)
MONOCYTES # BLD AUTO: 0.21 10*3/MM3 (ref 0.1–0.9)
MONOCYTES NFR BLD AUTO: 7.7 % (ref 5–12)
NEUTROPHILS NFR BLD AUTO: 1.63 10*3/MM3 (ref 1.7–7)
NEUTROPHILS NFR BLD AUTO: 59.5 % (ref 42.7–76)
NRBC BLD AUTO-RTO: 0 /100 WBC (ref 0–0.2)
PLATELET # BLD AUTO: 41 10*3/MM3 (ref 140–450)
PMV BLD AUTO: 13.3 FL (ref 6–12)
RBC # BLD AUTO: 3.22 10*6/MM3 (ref 4.14–5.8)
WBC NRBC COR # BLD AUTO: 2.74 10*3/MM3 (ref 3.4–10.8)

## 2024-04-05 PROCEDURE — 36415 COLL VENOUS BLD VENIPUNCTURE: CPT

## 2024-04-05 PROCEDURE — 25010000002 EPOETIN ALFA PER 1000 UNITS: Performed by: INTERNAL MEDICINE

## 2024-04-05 PROCEDURE — 85025 COMPLETE CBC W/AUTO DIFF WBC: CPT

## 2024-04-05 PROCEDURE — 96372 THER/PROPH/DIAG INJ SC/IM: CPT

## 2024-04-05 RX ORDER — INSULIN GLARGINE 100 [IU]/ML
25 INJECTION, SOLUTION SUBCUTANEOUS DAILY
COMMUNITY

## 2024-04-05 RX ADMIN — ERYTHROPOIETIN 30000 UNITS: 20000 INJECTION, SOLUTION INTRAVENOUS; SUBCUTANEOUS at 10:23

## 2024-04-05 NOTE — PROGRESS NOTES
.     REASON FOR FOLLOWUP:     *. Pancytopenia secondary to liver cirrhosis   Thrombocytopenia secondary to liver cirrhosis.      Mild leukocytopenia and low normal neutrophils  *. Iron deficiency anemia discovered on 1/13/2021.  Patient was started on oral iron 3 times a day.   Because of poor iron absorption, received 2 dose of Injectafer 750 mg x 2 in June 2022.   *. Newly discovered liver lesion.  Primary hepatocellular carcinoma, biopsy confirmed on 11/16/2022. Status post CT guided ablation of the liver lesion on 12/6/2022.       HISTORY OF PRESENT ILLNESS:  The patient is a 73 y.o. year old male with hypertension diabetes, pancytopenia secondary to liver cirrhosis caused by RODRIGUEZ, and hypogonadism, hepatocellular carcinoma status post ablation, who presents for a 3-month follow-up evaluation.    The patient denies the presence of epistaxis, gingival bleeding, or hematochezia. He maintains his baseline health status and denies experiencing any abdominal distension.     Laboratory studies today on 4/5/2024 reports hemoglobin 9.7, platelets 41,000 and WBC 2740 neutrophils 1630.        Past Medical History:   Diagnosis Date    Cancer     liver cancer    H/O Dental disorder     History of thrombocytopenia     Hypertension     RODRIGUEZ (nonalcoholic steatohepatitis)     Psoriasis     Type 2 diabetes mellitus      Past Surgical History:   Procedure Laterality Date    CATARACT EXTRACTION, BILATERAL      COLONOSCOPY  11/2015    ROTATOR CUFF REPAIR Left 2005     HEMATOLOGY HISTORY:  The patient is a 70 y.o. year old male with history of hypertension diabetes, RODRIGUEZ, and hypogonadism who presented today on 1/13/2021 for initial evaluation because of thrombocytopenia, referred by his primary care physician Dr. Joseph.      Patient reports he has no limitation of activity.  He does have chronic joint pains, but of note joint swelling.  His skin pruritus.  No skin rashes.  He does have easy bruising, however denies evidence  of bleeding such as epistaxis, gum bleeding, rectal bleeding or hematuria.  Denies weight loss.  No low fever.  Appetite is reasonable.    I reviewed his outside laboratory studies.  Most recent also lab on 10/22/2020 reported hemoglobin 10.5, MCV 89.6, WBC 3200, including neutrophils 1900, lymphocytes 800 monocytes 300, and platelets 62,000.    Liver study on 9/2/2020 reported WBC 3200, including ANC 1900 lymphocytes 1000 monocytes 300, hemoglobin 10.5, MCV 80.0, and platelets 65,000.    His earliest CBC results available for review was from 5/8/2019 which reported WBC 4200, including ANC 2600, lymphocytes 1200 and monocytes 400, hemoglobin 13.4 and platelets 95,000.  At that time chemistry lab reported elevated liver panel including  , alk phosphatase 87 and a total bilirubin 1.1.  Total serum protein 7.0 albumin 4.1 and the globulin 2.9.  His creatinine was 1.31, with glucose of 219 been in normal electrolytes.  Hemoglobin A1c was 8.3%.    Laboratory studies on 1/13/2021 reported mild anemia with Hb 10.9, thrombocytopenia platelets 48,000, and IPF 7.0%, leukocytopenia WBC 3250 including ANC 1830.  Other labs reported no evidence of hemolysis, had supratherapeutic B12 level 1413 pg/mL and folate > 20 ng/mL.  He does have evidence of iron deficiency with low iron sats 10% and ferritin only 41.5 ng/mL in the setting of anemia with Hb 10.9.  His erythropoietin is not elevated accordingly, which indicates lack of response from his kidney to produce erythropoietin.     Currently is not a candidate for CONRAD/Procrit treatment.  However he is adamant he is a candidate for oral iron treatment.    We will start him on ferrous sulfate 325 mg 3 times a day.  Will send prescription to his pharmacy.     Laboratory study 6/11/2021 showed a slightly improved hemoglobin 11.9, with stable thrombocytopenia platelets 50,000 IPF 8.3%, and WBC 3720 including ANC 2100.  Iron study reported ferritin 115.3 ng/mL and iron  saturation 14% with free iron 55 TIBC 384.    Laboratory studies on 12/17/2021 reported stable pancytopenia with anemia Hb 10.8, with platelets 60,000 and IPF 4.1%, and WBC 3780 including neutrophils 2060 lymphocytes 1040.  Iron study reported slightly trending down ferritin 86.1 ng/mL in the much improved iron saturation 34% with free iron 126 and TIBC 368.      On 6/10/2022 reports patient has abdomen distention for about 3 months.  He also developed bilateral leg edema in December 2021.  Patient also reports worsening dyspnea and weight gain for the past several months..  Per our records, he gained 25 pounds since December 2021.    Patient reports having taken oral iron 3 times a day.  He has mild nausea but not significant and no vomiting.  He has brown-colored stool.  He also reports having loose stool some of days.  No diarrhea.    Patient received a boost dose of COVID-19 vaccine on 11/6/2021.  Patient reports abdomen distention about 3 months, leg edema 6 months, after the boost dose of COVID (December) boost on 11/6/21    Patient complains of easy bruising on his arms from scratching, he has skin pruritus, however no skin rashes.  He denies spontaneous bleeding such as epistaxis, gum bleeding, or hematochezia.    Lab study on 6/10/2022 reported deteriorating anemia with Hb 9.6, persistent thrombocytopenia with stable platelets 69,000, and low normal WBC 4290 including neutrophils 2980.  Iron studies showed a deteriorating saturation 17%, free iron 61 TIBC 357 with a ferritin 86.8%.    Patient indeed had paracentesis on 6/17/2022 with 11.2 L ascites fluid removed.  Cytology study was negative for malignancy.    Patient reports today that he has worsening abdominal distention, also has dyspnea however no cough hemoptysis.  No chest pain.  He denies fever sweating chills.  Patient also reports worsening bilateral leg edema.    He also recently finished her 2 dose of Injectafer in late June 2022 because of iron  deficiency anemia, not responding to oral iron treatment 3 times a day.    Patient continues to have easy bruising however no spontaneous bleeding such as epistaxis or gum bleeding.    Laboratory study on 7/6/2022 reported stable anemia Hb 9.7 and stable thrombocytopenia platelets 65,000, low normal WBC 4260.  Patient has worsening renal function with creatinine 1.36, and supratherapeutic ferritin 644 ng/mL.    Chest x-ray examination on 7/14/2022 reported a right-sided moderate to large pleural effusion.  We will arrange patient to have ultrasound-guided right thoracentesis.  Suspect the patient has pleural effusion due to his liver cirrhosis.  He will have x-ray examination after the thoracentesis per protocol.  We will reassess whether he needs a CT scan for the chest.    This patient also had paracentesis again on 7/14/2022 with 6.2 L ascites fluid removed.      This patient had right thoracentesis with 2 L pleural effusion removed.  Cytology studies negative for malignant cells.  He also had multiple paracentesis, in June, July and early August, cytology study was also negative for malignant cells.    Laboratory study on 9/28/2022 reported worsening anemia Hb 8.5, platelets 61,000, WBC 3380 including ANC 1780.  Laboratory study showed significant hyperkalemia potassium 6.4, and worsening renal function creatinine 2.36.      I saw patient recently on 9/20/2022 and he was found having worsening renal function, with hyperkalemia potassium 6.4.  Patient was sent to ER and eventually admitted for several days.  His hemoglobin was 7.5 on 9/30/2022.  Patient was started on Procrit 20,000 units during hospitalization.  After discharge, he has been given Procrit every 2 weeks.     During his hospitalization in end of September 2022, patient had ultrasound for the liver examination on 9/29/2022 which reported a hepatic lesion 27 x 24 x 23 mm, heterogeneous in the anterior liver.  There was evidence of liver cirrhosis.   "    Patient also had attempted ultrasound-guided paracentesis, however there was not enough ascites to be removed.     Patient also had a venous Doppler study and there was no evidence for portal vein thrombosis.    Patient reports he was seen by his GI specialist Dr. Everardo Foote old records that abdomen MRI examination with and without contrast which was done at the [facility] on 10/26/2022.  This study confirmed liver cirrhosis however it also reported a solitary liver lesion.    This patient had CT-guided core needle biopsy of the liver lesion on 11/16/2022 after 1 unit platelets transfusion.  He did well with the procedure.  Patient reports he feels \"fine.\"  He denies any pain in the area. Denied any kind of bleeding. Denies any fevers after procedure as well.     Patient reports no fever sweating chills.  He is abdomen is slightly distended but no pains.  No nausea vomiting diarrhea.  He has no abdomen pain.  Performance status ECOG 1-2.     Patient reports he noticed improved energy level since starting on Procrit injection.  Overall he still has chronic fatigue.  He denies evidence of bleeding.    Laboratory study on 11/21/2022 reported baseline platelets 44,000, hemoglobin 8.9, and WBC 2980 including ANC 1670.    Reevaluation after his recent CT-guided ablation of the liver lesion on 12/6/2022 by interventional radiologist Dr. Declan Goodson.    The patient reports that taking oral iron has been really bothering his stomach and he is now only taking once a day. He denies any melena or hematochezia. He also denies any fever or pain. The patient reports quite a bit significant constipation from the oral iron. The patient adds that he has gained some weight, weighing today at 177 pounds and has gained 8 pounds within the last 4 weeks. The patient reports that he has an appointment with Dr. Everardo Foote on 12/29/2022 and Dr. Phill Mooney on 1/27/2022 to discuss his shunt. He also has an EGD " tomorrow, 12/14/2022, with Dr. Josh Khan.    Laboratory study on 12/13/2022 reported hemoglobin 9.5 g/dL, MCV 93.6 fL, platelets 47,000/mm3, WBC 3,530/mm3 including neutrophils 2,020/mm3, and lymphocytes 830/mm3. Iron study reported ferritin 105.6 ng/mL, however, iron saturation 13% with free iron 43 mcg/dL and TIBC 332 mcg/dL.     The MRI study obtained on 02/20/2023 reported post ablation defect in the hepatic segment 8 measuring 3.4 x 2.8 cm. The study was done without iv contrast due to chronic renal disease. There was splenomegaly stable at 17.1 cm. No enlarged lymph nodes. No suspicious osseous lesion. There was liver cirrhosis. There was a small to moderate size right sided pleural effusion.    Laboratory studies today on 6/30/2023 reported stable severe thrombocytopenia platelets 40,000, hemoglobin 9.3, and WBC 3200 including neutrophils 1930, lymphocytes 760, and monocytes 270.    Most recent iron study was on 05/05/2023 which reported ferritin 200.7 ng/mL, free iron 55, TIBC 309, and iron saturation 18%.    Recent MRI examination without IV contrast obtained on 06/12/2023 reported limited examination in the absence of intravenous contrast (creatinine 3.3), but it demonstrated a stable right hepatic lobe ablation defect. There was hepatic cirrhosis and splenomegaly.      This patient had MRI of the abdomen without contrast obtained on 12/08/2023. This study reported severely limited exam but a grossly stable hepatic segment 8 ablation defect measuring 3.7 x 2.7 cm. The study was without a contrast due to his chronic adrenal insufficiency.    Laboratory study 12/15/2023  shows hemoglobin 9.3 g/dL, MCV 93.0, MCHC 33.5, platelets 40,000, WBC 3260, including neutrophils 1900, and lymphocytes 850.    His recent laboratory study on 12/01/2023 reported ferritin 360, free iron 76, TIBC 259, and iron saturation 29%. His creatinine was 3.29 and glucose 257.         MEDICATIONS    Current Outpatient Medications:      allopurinol (ZYLOPRIM) 100 MG tablet, Take 1 tablet by mouth Daily., Disp: , Rfl:     carvedilol (COREG) 12.5 MG tablet, , Disp: , Rfl:     docusate sodium 100 MG capsule, Take 1 capsule by mouth., Disp: , Rfl:     ferrous sulfate 325 (65 FE) MG tablet, Take 1 tablet by mouth 3 (Three) Times a Day With Meals. (Patient taking differently: Take 1 tablet by mouth Daily With Breakfast.), Disp: 90 tablet, Rfl: 2    FIBER PO, Take  by mouth., Disp: , Rfl:     FREESTYLE LITE test strip, , Disp: , Rfl:     furosemide (LASIX) 40 MG tablet, Take 1 tablet by mouth Daily., Disp: , Rfl:     glipizide (Glucotrol) 5 MG tablet, Take 1 tablet by mouth 2 (Two) Times a Day Before Meals., Disp: 60 tablet, Rfl: 2    hydrOXYzine (ATARAX) 10 MG tablet, Take 1 tablet by mouth., Disp: , Rfl:     insulin glargine (LANTUS, SEMGLEE) 100 UNIT/ML injection, Inject 25 Units under the skin into the appropriate area as directed Daily., Disp: , Rfl:     multivitamin (THERAGRAN) tablet tablet, Take  by mouth Daily., Disp: , Rfl:     nystatin (MYCOSTATIN) 331189 UNIT/GM cream, Apply 1 Application topically to the appropriate area as directed As Needed., Disp: , Rfl:     pantoprazole (PROTONIX) 40 MG EC tablet, , Disp: , Rfl:     pentoxifylline (TRENtal) 400 MG CR tablet, , Disp: , Rfl:     polycarbophil 625 MG tablet tablet, Take 1 tablet by mouth., Disp: , Rfl:     Polyethylene Glycol 3350 (MIRALAX PO), Take  by mouth., Disp: , Rfl:     simvastatin (ZOCOR) 20 MG tablet, Take 1 tablet by mouth Daily., Disp: , Rfl:     spironolactone (ALDACTONE) 25 MG tablet, Take 1 tablet by mouth Daily for 30 days., Disp: 30 tablet, Rfl: 0    ALLERGIES:     Allergies   Allergen Reactions    Doxycycline Itching    Rosuvastatin Other (See Comments)      Liver enzymes abnormal       SOCIAL HISTORY:         Social History     Socioeconomic History    Marital status:      Spouse name: Lisa   Tobacco Use    Smoking status: Former     Current packs/day: 0.00      "Types: Cigarettes     Quit date:      Years since quittin.2   Vaping Use    Vaping status: Never Used   Substance and Sexual Activity    Alcohol use: Never   As of 2021, patient reports he does drink alcoholic beverage 10 drinks per week.  Denies illegal drug use.      FAMILY HISTORY:  No cancer in family, mother HTN,      REVIEW OF SYSTEM:  See HPI.         Vitals:    24 0946   BP: 142/65   Pulse: 56   Resp: 16   Temp: 97.1 °F (36.2 °C)   TempSrc: Temporal   SpO2: 98%   Weight: 84.1 kg (185 lb 6.4 oz)   Height: 167.6 cm (65.98\")   PainSc: 0-No pain     ECOG 1       PHYSICAL EXAM:   2024  GENERAL:  Well-developed, well-nourished in no acute distress.  Orientated to time place and the people.  SKIN:  Warm, dry without rashes, purpura or petechiae.  HEENT:  Normocephalic.   LYMPHATICS:  No cervical, supraclavicular adenopathy.  CHEST: Normal respiratory effort.  Lungs clear to auscultation. Good airflow.  CARDIAC:  Regular rate and rhythm without murmurs. Normal S1,S2.  ABDOMEN:  Soft, nontender with no organomegaly or masses.  Bowel sounds normal.  EXTREMITIES:  No lower extremity edema.        RECENT LABS:  Lab Results   Component Value Date    WBC 2.74 (L) 2024    HGB 9.7 (L) 2024    HCT 30.1 (L) 2024    MCV 93.5 2024    PLT 41 (L) 2024     Lab Results   Component Value Date    NEUTROABS 1.63 (L) 2024     Lab Results   Component Value Date    GLUCOSE 293 (H) 2024    BUN 55 (H) 2024    CREATININE 2.56 (C) 2024    EGFRIFNONA 72 2021    BCR 21.5 2024    K 5.3 (H) 2024    CO2 20.8 (L) 2024    CALCIUM 8.8 2024    ALBUMIN 3.5 2024    AST 48 (H) 2024    ALT 36 2024     Lab Results   Component Value Date    IRON 45 (L) 2024    TIBC 270 (L) 2024    FERRITIN 131.00 2024   Iron saturation 17% on 3/8/2024, was 29% on 2023, was 35% on 2023.       Lab Results   Component Value " Date    FOLATE >20.00 01/13/2021     Lab Results   Component Value Date    LVTFPUXA61 1,155 (H) 09/28/2022         IMAGING:   MRI Abdomen Without Contrast  Narrative: Examination: MRI of the abdomen without contrast per protocol     TECHNIQUE: MRI of the abdomen was obtained without contrast per protocol     HISTORY: Liver cancer and chronic renal disease     COMPARISON: 6/12/2023     FINDINGS: The examination is severely limited in the absence of  intravenous contrast. The liver is cirrhotic in configuration. There is  a grossly stable hepatic segment 8 ablation defect, measuring 3.7 x 2.7  cm on series 5, image 21. Cysts are seen in the liver. There is  splenomegaly. The adrenal glands, left kidney, pancreas, stomach, and  visualized large and small bowel and abdominal vasculature are normal.  There is a right renal cyst. No enlarged lymph nodes are seen. No  suspicious osseous lesions are demonstrated. There are small right and  trace left-sided pleural effusions.     Impression: Severely limited exam but grossly stable hepatic segment 8  ablation defect.     This report was finalized on 12/11/2023 12:54 PM by Dr. Jorge Che M.D on Workstation: [id number]         Assessment & Plan   1.  Thrombocytopenia secondary to liver cirrhosis and splenomegaly.  Etiology of the thrombocytopenia is not clear.  However I do think it is likely related to his fatty liver which was documented from abdomen ultrasound on 7/20/2012.    Laboratory studies on 1/13/2021 reported excellent vitamin B12 level.  He had marginally elevated IPF 7.0%.  no apparent compensation for platelets production.   On 6/11/2021, platelets improved at 63,000 with normal IPF 5.3%.  Patient is asymptomatic.    On 12/17/2021, stable platelets 60,000 and normal IPF 4.1%. Patient is asymptomatic.   On 6/10/2022 platelets 69,000.  Patient reports no spontaneous bleeding.    On 7/6/2022 platelets 65,000.  No spontaneous bleeding.    On 9/28/2022 platelets  61,000.  Patient has easy bruising on extremities, however no spontaneous bleeding such as melena hematochezia or gum bleeding.    On 11/2/2022 platelets 48,000.  No change of clinical condition.  Continue to monitor.  Patient was given 1 unit platelets transfusion on 11/15/2022, platelets improved at 70,000 11/16/2022 when he had successful CT-guided core needle biopsy of liver lesion.   On 11/21/2022 platelets decreased at baseline level of 44,000.  No spontaneous bleeding.  We will arrange patient to receive platelets transfusion prior and post point interventional procedure with radiofrequency ablation of the primary hepatocellular carcinoma scheduled on 12/6/2022.   On 12/5/2022, the patient had platelets 49,000/mm3. We gave him 1 unit of platelets transfusion before the ablation of the liver cancer and the posttransfusion platelets was only 56,000/mm3 on 12/6/2022.   On 12/13/2022, platelets is 47,000/mm3. The patient is not having any bleeding or bruising.  Patient has platelets 41,000 on 03/10/2023, and this is stable at the baseline condition. Patient has no excessive bleeding or bruising.  Lab study on 06/30/2023 reported stable thrombocytopenia with platelets 40,000. Patient has easy bruising, however, no spontaneous bleeding. Continue to monitor.   A laboratory study today, on 9/22/2023, reported stable severe thrombocytopenia with platelets of 36,000. This is his baseline level.  on 12/15/2023, the patient has a stable but severe thrombocytopenia with platelets 40,000. The patient asked about for stimulating platelet growth. I researched the UptoDate, and this seems only being used for patient prior to surgical procedure to improve the platelets, but no indication for long term use.   Today on 04/05/2024, the patient exhibits stable severe thrombocytopenia with platelet count of 41,000/mm3. He reports no instances of easy bleeding or bruising.    2.  Iron deficiency anemia in the setting of chronic  renal insufficiency stage III, and liver cirrhosis.   Laboratory study on 1/13/2021 reported no evidence of hemolysis, had supratherapeutic B12 level and folate.    He does have evidence of iron deficiency with low iron sats 10% and ferritin only 41.5 ng/mL with Hb 10.9.    His erythropoietin 16.1, is not elevated accordingly, which indicates lack of response from his kidney to produce erythropoietin. Currently is not a candidate for CONRAD/Procrit treatment.    We started patient on oral ferrous sulfate 325 mg 3 times a day in January 2021.   On 3/19/2021, patient has improved hemoglobin 11.9.  Continue to monitor.  On 6/11/2021, stable anemia Hb 11.0, however much improved with ferritin 115.3 ng/mL, and iron saturation 14%.  Will advised to continue oral iron treatment.  On 12/17/2021 stable Hb 10.8, much improved iron saturation 34% and free iron 126, however with trending down ferritin level 86.1 ng/mL. Continue oral iron treatment.  6/10/2022, patient reports taking oral iron 3 times a day.  No apparent side effects except mild nausea.  Laboratory studies showed deteriorating hemoglobin 9.6 and also worsening iron saturation 17% with free iron 61 and the ferritin 86.8 ng/mL.  Discussed with the patient, recommended intravenous iron therapy, this patient has poor iron absorption.    Patient received 2 dose of Injectafer 750 mg x 2 in June 2022.  Lab study today on 7/6/2022 reported improved normalized iron saturation 21% with a supratherapeutic ferritin 644 ng/mL.  On 9/28/2022 ferritin 256 ng/mL iron saturation 22% free iron 66 TIBC 295.  Supratherapeutic B12 at 155 and normal RBC folate 2175 ng/mL.  Patient had a hemoglobin 7.2 on 9/30/2022.  Was started on Procrit injection 20,000 units during hospitalization.  No PRBC transfusion.  Procrit is subsequently continued as outpatient every 2 weeks.  On 10/21/2022 ferritin 198 and free iron 56 TIBC 294 iron saturation 19%.   Improved hemoglobin 9.2 on 11/2/2022.  We  will continue Procrit 20,000 units every 2 weeks.  Hemoglobin 8.9 on 11/21/2022.  Continue Procrit injection.  The laboratory study today on 12/13/2022 reported deteriorating and recurrent iron deficiency with iron saturation only 13% with free iron 43 mcg/dL, TIBC 332 mcg/dL, and ferritin 105.6 ng/dL. The patient will be arranged for two doses of Injectafer prior to resumption of Procrit.  Post treatment iron study on 01/23/2023 reported significantly improved ferritin 559 and normalized iron saturation 35 percent with a free iron 89, TIBC 255. Patient was continued on Procrit every 2 weeks.   On 03/10/2023, the patient has hemoglobin 9.8, and normal iron study with ferritin 270 ng/mL and iron saturation 28% and free iron 82, TIBC 293.  Lab study on 05/05/2023 reported ferritin 200.7 ng/mL, free iron 55, TIBC 309, iron saturation 18%.   Patient continues on CONRAD/Procrit every 2 weeks.  The patient had a recurrent iron deficiency anemia with a hemoglobin of 8.9, ferritin of 95.3, free iron of 53, and iron saturation of 19% on 7/28/2023.  09/22/2023, the patient has hemoglobin 9.4. The patient will be given CONRAD/Procrit 37,000 units and will continue every 2 weeks with CBC monitorin.    His recent laboratory study on 12/01/2023 reported ferritin 360, free iron 76, TIBC 259, and iron saturation 29%.  On 12/15/2023, the patient has hemoglobin 9.3. He will continue epoetin sole/Procrit every 2 weeks.  2. Iron deficiency anemia.    The patient's hemoglobin level is 9.7% as of 04/05/2024, and he reports no instances of bleeding. The current treatment plan includes CONRAD/Procrit every 2 weeks.    3.  Pancytopenia with mild leukocytopenia.  This is also likely related to his fatty liver and liver cirrhosis.  Patient drinks alcohol beverage as reported in January 2021.  Patient reports no recurrent infection.  His neutrophils marginally normal at 1830 out of WBC 3250.  Patient had supratherapeutic B12 level  1/13/2021.  Marginally better WBC 3720 including ANC 2100 3/19/2021.    6/11/2021 WBC 3530, with ANC 2180.  No recurrent infection.    On 12/17/2021 stable leukocytopenia WBC 3780 with low normal neutrophils 2016.  Patient is asymptomatic.  Continue to monitor.  On 6/10/2022 patient has slightly better WBC 4290 and neutrophils 2980.  On 7/6/2022 patient had WBC 4260 ANC 2620, lymphocytes 840 and monocytes 470.  On 9/28/2022 WBC 3380, including ANC 1780, hemoglobin 8.5, and platelets 61,000.  On 11/2/2022 total WBC 2970 including ANC 1730.  Continue to monitor.   On 11/21/2022 WBC 2980 including ANC 1670 and lymphocytes 820.  Patient is afebrile.  Continue to monitor.  On 12/13/2022, WBC 3,530/mm3 including neutrophils 2,020/mm3.  On 03/10/2023, WBC 2710 including neutrophils of 1570. The patient reports no fever, sweating, or chills. Most recent peak ANC was 2200 on 02/06/2023.  On 06/30/2023, patient is stable leukocytopenia with WBC 3200, including neutrophils 1930.  On 9/22/2023, the patient had WBC of 3440, including neutrophils of 1900, hemoglobin of 9.4, and platelets of 36,000.  on 12/15/2023, the patient has WBC 3260 including neutrophils 1900. The patient has no fever, sweating, or chills.    Today on 04/05/2024, the patient presents with leukocytopenia with WBC of 2740 and moderate neutropenia with ANC of 1630.  However, his neutrophil count was normal at 2300 and WBC of 4090 as of 03/22/2024.    4. COVID-19 vaccination.    6/11/2021, patient reports that he had 2 doses of COVID-19 vaccine, had very limited side effects with some soreness at the site of injection.   On 12/17/2021, patient reports he received a booster dose of COVID-19 vaccine on 11/6/2021.      5.  Ascites secondary to liver cirrhosis.  CT scan examination on 10/15/2021 confirmed liver cirrhosis.  On 6/10/2022 patient reports abdomen distention for the past 3 months, weight gains, 25 pounds per our records, and also has worsening dyspnea.   Physical examination shows very distended abdomen.  I think this patient has large ascites secondary to his liver cirrhosis.  I recommended to have ultrasound-guided therapeutic paracentesis with samples for routine chemistry labs cell counts and also for cytology study.  Patient also has bilateral leg edema which is also secondary to liver cirrhosis.  Patient was seen her primary care physician next week.  I think most likely he will need to be started on diuretics.  He also needs to follow-up with his GI specialist Dr. Everardo Foote to discuss possibility of  shunt.   On 6/17/2022 patient had ultrasound-guided paracentesis, with 11.2 L clear yellow ascites removed.  Cytology study reported negative for malignant cells.  There was a reactive mesothelial cells histiocytes and mixed inflammatory cells.  Today on 7/6/2022 follow-up, patient reports worsening abdomen distention again.  I will arrange patient to have therapeutic paracentesis again as soon as possible, and also will arrange another paracentesis in 3 weeks.  At the meantime I also recommended to start the patient on low-dose Lasix 20 mg daily on 7/6/2022.   Paracentesis, with 6.4 L ascites removed 7/14/2022.  Paracentesis 2.4 L ascites removed 8/4/2022.   Failed attempt for paracentesis on 9/29/2022 during hospitalization, no ascites per ultrasound exam.  On 12/13/2022, the patient has more distended abdomen and also has been gaining weight for more than 10 pounds in the past 2 to 3 months. The patient had attempted paracentesis under ultrasound guidance on 12/29/2022, however, there were no ascites or examination, so the procedure was abandoned.   Abnormal MRI on 2/20/2023 reported no significant ascites.  On 03/10/2023, the patient presents for reevaluation. His wife reports the patient is gaining about 10 to 12 pounds since the beginning of 01/2023. By physical examination, I do not feel patient has ascites by percussion of the abdomen. He has trace  edema in both legs. Patient is on Lasix 40 mg daily and spironolactone.   Ultrasound for the abdomen on 9/20/2023 reported liver cirrhosis and splenomegaly, no apparent significant ascites.  On 12/15/2023, the patient reports no distention of the abdomen.   Today on 04/05/2024, the patient reports no distention of the abdomen.       6.  Right pleural effusion.    Patient also complains of dyspnea, x-ray examination reported right pleural effusion 7/14/2022..  Patient had ultrasound-guided right thoracentesis with 2 L pleural effusion removed 7/27/2022.  Cytology studies negative for malignancy.  On 8/4/2022 patient had another 2 L pleural effusion removed on the right side.  The patient had a reaccumulation of the right pleural effusion as evidenced by his CT scan from 11/16/2022. We will arrange ultrasound-guided right thoracentesis.   The patient had ultrasound-guided right paracentesis on 12/29/2022 with a 500 mL of pleural effusion removed.   Abnormal MRI examination on 02/20/2023 reports small to moderate pleural effusion. I reviewed the images with the patient today on 03/10/2023, this is significant less than previous images. He is asymptomatic. We decided to observe for now.  MRI examination 06/12/2023 reported a right-sided pleural effusion. Physical examination today 06/30/2023, patient does have decreased but present breathing sounds at the left lung base.  Stable condition on 9/22/2023.  On 12/15/2023, the patient has no dyspnea.    On 04/05/2025, the patient has no complaints of dyspnea. His examination reveals clear breathing sounds bilaterally.    7.  Worsening renal function.  Laboratory study on 7/6/2022 reported creatinine 1.37.  He had creatinine 1.02 on 12/17/2021.  Discussed with patient, I recommended referring patient to nephrology service for evaluation of possible hepatorenal syndrome due to liver cirrhosis.  Patient is agreeable.  On 9/28/2022 patient reports that he still has no  appointment for nephrology service.  On 11/2/2022 creatinine 2.34 with BUN 47.  On 11/21/2022 creatinine 2.33 with BUN 41.  On 12/6/22, cr was 2.07.  On 02/20/2023, the patient had a creatinine 2.42, so MRI was done without IV contrast as we had requested. Patient will continue to follow up with the nephrologist for evaluation and management.  On 05/19/2023, creatinine 2.60.  On 8/25/2023, creatinine was 2.61.  On 12/01/2023, creatinine 3.29. The patient reports he recently had a follow-up with his nephrologist last week.   On 03/23/2024, the patient's creatinine level was 2.56 mg/dL and BUN of 55 mg/dL. The patient will continue to follow up with his nephrologist.     8.  Hepatocellular carcinoma.    Newly discovered hepatic lesion by ultrasound examination on 9/29/2022.  Patient is subsequently seen by Dr. Everardo Foote, had an MRI of the abdomen with and without contrast at the [facility] on 10/26/2022 which reported 3.2 cm liver lesion, likely hepatocellular carcinoma.  I recommended to have AFP study, also discussed with patient and his wife today on 11/2/2022 we will obtain the MRI images from [facility], and arrange CT-guided core needle biopsy.  Because of his thrombocytopenia, I will arrange 1 unit platelets transfusion right before the biopsy.  I did discuss with him about the risk for bleeding post the biopsy.  This is highly suspicious for primary hepatocellular carcinoma.   Normal AFP 2.37 ng/mL on 11/2/2022.     Patient had CT-guided core needle biopsy on 11/16/2022. Pathology evaluation reported moderately differentiated hepatocellular carcinoma.  I discussed with the interventional radiologist Dr. Goodson about interventional procedure with radiofrequency ablation treatment for the solitary lesion in the liver, since this patient is not a candidate for surgical intervention due to multiple comorbidities. The procedure has been scheduled on 12/06/2022.  Due to his  thrombocytopenia, we decided to transfuse him with 1 unit of platelets on 12/05/2022 and also also 1 unit platelets on 12/06/2022 after his procedure.  The patient had CT-guided ablation procedure on 12/6/2022. The patient reports good tolerance, no pains, and no fevers after procedure. Interventional radiology, Dr. Goodson, recommended to obtain abdomen MRI with and without contrast with liver protocol 2 to 3 months after the procedure for reassessment.  The patient had MRI of the abdomen on 02/20/2023, contrast was not given due to worsening renal function. Nevertheless, the study reported post treatment changes. No evidence for new lesions. As I discussed with the patient and his wife today on 03/10/2023, I recommended to obtain MRI in 3 months for reassessment. We will request IV contrast if his renal function is improving.  MRI examination was obtained on 06/12/2023, reported post treatment changes of the liver lesion, overall stable size.  The patient had an ultrasound examination of the abdomen obtained on 9/20/2023, as requested by his nephrologist, and the study reported a right hepatic lobe lesion measuring 2.6 x 1 x 1.2 cm. There is also splenomegaly measuring 16.5 cm. There was hepatic steatosis and cirrhosis. There were also gallbladder polyps.  The patient had MRI examination of the abdomen without contrast obtained on 12/08/2023, which reported stable segment 8 post ablation lesion 3.7 x 2.7 cm. This was compared to the previous MRI from 06/12/2023.      9.  Diabetes.   On 11/21/2022 patient had significant elevated glucose 397.  Patient reports that he is diabetic, however his metformin was discontinued during his most recent hospitalization, and he was not put on any other medication for that.  He reports this morning's blood glucose level was about 180.  Discussed with patient, I will give him 10 units insulin today in the clinic, and also will start him on glipizide 5 mg twice a day.  Patient is  agreeable and will follow up with his primary care physician for management of diabetes.  On 2/20/2023 glucose 152.  Lab study on 05/19/2023 reported glucose 104.   On 08/25/2023, glucose was 192. The patient will continue to follow up with a primary care physician for management.  On 12/15/2023, the patient reports that he was started on insulin treatment with glargine 24 units and it started last night. His glucose level this morning was 160.  2/23/2024 glucose 160.      PLAN:  Proceed ahead with CONRAD/Procrit today and repeat every 2 weeks. Dose adjustment per protocol.  Continue oral iron once a day.  Arrange liver MRI examination without IV contrast in 3 months due to his history of hepatocellular carcinoma.  I will see the patient in 3 months for reevaluation.     I discussed with the patient about laboratory results and further management plan.  Patient voiced understanding and agreeable.          Milagro Salinas MD PhD       CC:  MD Everardo Cuello M.D. Ramsey Nassar. MD          Transcribed from ambient dictation for Milagro Salinas MD PhD by Jodie Mayers.  04/05/24   11:50 EDT    Patient or patient representative verbalized consent to the visit recording.  I have personally performed the services described in this document as transcribed by the above individual, and it is both accurate and complete.      Milagro Salinas MD PhD

## 2024-04-19 ENCOUNTER — LAB (OUTPATIENT)
Dept: LAB | Facility: HOSPITAL | Age: 74
End: 2024-04-19
Payer: MEDICARE

## 2024-04-19 ENCOUNTER — INFUSION (OUTPATIENT)
Dept: ONCOLOGY | Facility: HOSPITAL | Age: 74
End: 2024-04-19
Payer: MEDICARE

## 2024-04-19 DIAGNOSIS — N18.9 ANEMIA DUE TO CHRONIC RENAL FAILURE TREATED WITH ERYTHROPOIETIN, UNSPECIFIED STAGE: Primary | ICD-10-CM

## 2024-04-19 DIAGNOSIS — N18.9 ANEMIA DUE TO CHRONIC RENAL FAILURE TREATED WITH ERYTHROPOIETIN, UNSPECIFIED STAGE: ICD-10-CM

## 2024-04-19 DIAGNOSIS — C22.0 HEPATOCELLULAR CARCINOMA: ICD-10-CM

## 2024-04-19 DIAGNOSIS — D63.1 ANEMIA DUE TO CHRONIC RENAL FAILURE TREATED WITH ERYTHROPOIETIN, UNSPECIFIED STAGE: Primary | ICD-10-CM

## 2024-04-19 DIAGNOSIS — D63.1 ANEMIA DUE TO CHRONIC RENAL FAILURE TREATED WITH ERYTHROPOIETIN, UNSPECIFIED STAGE: ICD-10-CM

## 2024-04-19 DIAGNOSIS — N18.30 STAGE 3 CHRONIC KIDNEY DISEASE, UNSPECIFIED WHETHER STAGE 3A OR 3B CKD: ICD-10-CM

## 2024-04-19 LAB
BASOPHILS # BLD AUTO: 0.02 10*3/MM3 (ref 0–0.2)
BASOPHILS NFR BLD AUTO: 0.6 % (ref 0–1.5)
DEPRECATED RDW RBC AUTO: 50.6 FL (ref 37–54)
EOSINOPHIL # BLD AUTO: 0.22 10*3/MM3 (ref 0–0.4)
EOSINOPHIL NFR BLD AUTO: 7 % (ref 0.3–6.2)
ERYTHROCYTE [DISTWIDTH] IN BLOOD BY AUTOMATED COUNT: 14.6 % (ref 12.3–15.4)
HCT VFR BLD AUTO: 27.6 % (ref 37.5–51)
HGB BLD-MCNC: 8.9 G/DL (ref 13–17.7)
IMM GRANULOCYTES # BLD AUTO: 0.13 10*3/MM3 (ref 0–0.05)
IMM GRANULOCYTES NFR BLD AUTO: 4.1 % (ref 0–0.5)
LYMPHOCYTES # BLD AUTO: 0.59 10*3/MM3 (ref 0.7–3.1)
LYMPHOCYTES NFR BLD AUTO: 18.8 % (ref 19.6–45.3)
MCH RBC QN AUTO: 30.6 PG (ref 26.6–33)
MCHC RBC AUTO-ENTMCNC: 32.2 G/DL (ref 31.5–35.7)
MCV RBC AUTO: 94.8 FL (ref 79–97)
MONOCYTES # BLD AUTO: 0.29 10*3/MM3 (ref 0.1–0.9)
MONOCYTES NFR BLD AUTO: 9.2 % (ref 5–12)
NEUTROPHILS NFR BLD AUTO: 1.89 10*3/MM3 (ref 1.7–7)
NEUTROPHILS NFR BLD AUTO: 60.3 % (ref 42.7–76)
NRBC BLD AUTO-RTO: 0 /100 WBC (ref 0–0.2)
PLATELET # BLD AUTO: 36 10*3/MM3 (ref 140–450)
PMV BLD AUTO: 14.1 FL (ref 6–12)
RBC # BLD AUTO: 2.91 10*6/MM3 (ref 4.14–5.8)
WBC NRBC COR # BLD AUTO: 3.14 10*3/MM3 (ref 3.4–10.8)

## 2024-04-19 PROCEDURE — 25010000002 EPOETIN ALFA PER 1000 UNITS: Performed by: NURSE PRACTITIONER

## 2024-04-19 PROCEDURE — 36415 COLL VENOUS BLD VENIPUNCTURE: CPT

## 2024-04-19 PROCEDURE — 96372 THER/PROPH/DIAG INJ SC/IM: CPT

## 2024-04-19 PROCEDURE — 85025 COMPLETE CBC W/AUTO DIFF WBC: CPT

## 2024-04-19 RX ADMIN — ERYTHROPOIETIN 37000 UNITS: 20000 INJECTION, SOLUTION INTRAVENOUS; SUBCUTANEOUS at 11:40

## 2024-04-20 PROBLEM — C80.1 CANCER: Status: RESOLVED | Noted: 2022-12-29 | Resolved: 2024-04-20

## 2024-05-03 ENCOUNTER — INFUSION (OUTPATIENT)
Dept: ONCOLOGY | Facility: HOSPITAL | Age: 74
End: 2024-05-03
Payer: MEDICARE

## 2024-05-03 ENCOUNTER — LAB (OUTPATIENT)
Dept: LAB | Facility: HOSPITAL | Age: 74
End: 2024-05-03
Payer: MEDICARE

## 2024-05-03 DIAGNOSIS — N18.9 ANEMIA DUE TO CHRONIC RENAL FAILURE TREATED WITH ERYTHROPOIETIN, UNSPECIFIED STAGE: ICD-10-CM

## 2024-05-03 DIAGNOSIS — D63.1 ANEMIA DUE TO CHRONIC RENAL FAILURE TREATED WITH ERYTHROPOIETIN, UNSPECIFIED STAGE: ICD-10-CM

## 2024-05-03 DIAGNOSIS — N18.9 ANEMIA DUE TO CHRONIC RENAL FAILURE TREATED WITH ERYTHROPOIETIN, UNSPECIFIED STAGE: Primary | ICD-10-CM

## 2024-05-03 DIAGNOSIS — N18.30 STAGE 3 CHRONIC KIDNEY DISEASE, UNSPECIFIED WHETHER STAGE 3A OR 3B CKD: ICD-10-CM

## 2024-05-03 DIAGNOSIS — D63.1 ANEMIA DUE TO CHRONIC RENAL FAILURE TREATED WITH ERYTHROPOIETIN, UNSPECIFIED STAGE: Primary | ICD-10-CM

## 2024-05-03 DIAGNOSIS — C22.0 HEPATOCELLULAR CARCINOMA: ICD-10-CM

## 2024-05-03 LAB
BASOPHILS # BLD AUTO: 0.03 10*3/MM3 (ref 0–0.2)
BASOPHILS NFR BLD AUTO: 1 % (ref 0–1.5)
DEPRECATED RDW RBC AUTO: 53.3 FL (ref 37–54)
EOSINOPHIL # BLD AUTO: 0.23 10*3/MM3 (ref 0–0.4)
EOSINOPHIL NFR BLD AUTO: 7.5 % (ref 0.3–6.2)
ERYTHROCYTE [DISTWIDTH] IN BLOOD BY AUTOMATED COUNT: 14.9 % (ref 12.3–15.4)
HCT VFR BLD AUTO: 28.1 % (ref 37.5–51)
HGB BLD-MCNC: 8.9 G/DL (ref 13–17.7)
IMM GRANULOCYTES # BLD AUTO: 0.06 10*3/MM3 (ref 0–0.05)
IMM GRANULOCYTES NFR BLD AUTO: 2 % (ref 0–0.5)
LYMPHOCYTES # BLD AUTO: 0.7 10*3/MM3 (ref 0.7–3.1)
LYMPHOCYTES NFR BLD AUTO: 22.8 % (ref 19.6–45.3)
MCH RBC QN AUTO: 30.7 PG (ref 26.6–33)
MCHC RBC AUTO-ENTMCNC: 31.7 G/DL (ref 31.5–35.7)
MCV RBC AUTO: 96.9 FL (ref 79–97)
MONOCYTES # BLD AUTO: 0.26 10*3/MM3 (ref 0.1–0.9)
MONOCYTES NFR BLD AUTO: 8.5 % (ref 5–12)
NEUTROPHILS NFR BLD AUTO: 1.79 10*3/MM3 (ref 1.7–7)
NEUTROPHILS NFR BLD AUTO: 58.2 % (ref 42.7–76)
NRBC BLD AUTO-RTO: 0 /100 WBC (ref 0–0.2)
PLATELET # BLD AUTO: 39 10*3/MM3 (ref 140–450)
PMV BLD AUTO: 14 FL (ref 6–12)
RBC # BLD AUTO: 2.9 10*6/MM3 (ref 4.14–5.8)
WBC NRBC COR # BLD AUTO: 3.07 10*3/MM3 (ref 3.4–10.8)

## 2024-05-03 PROCEDURE — 25010000002 EPOETIN ALFA PER 1000 UNITS: Performed by: INTERNAL MEDICINE

## 2024-05-03 PROCEDURE — 96372 THER/PROPH/DIAG INJ SC/IM: CPT

## 2024-05-03 PROCEDURE — 36415 COLL VENOUS BLD VENIPUNCTURE: CPT

## 2024-05-03 PROCEDURE — 85025 COMPLETE CBC W/AUTO DIFF WBC: CPT

## 2024-05-03 RX ADMIN — ERYTHROPOIETIN 37000 UNITS: 20000 INJECTION, SOLUTION INTRAVENOUS; SUBCUTANEOUS at 11:17

## 2024-05-17 ENCOUNTER — INFUSION (OUTPATIENT)
Dept: ONCOLOGY | Facility: HOSPITAL | Age: 74
End: 2024-05-17
Payer: MEDICARE

## 2024-05-17 ENCOUNTER — LAB (OUTPATIENT)
Dept: LAB | Facility: HOSPITAL | Age: 74
End: 2024-05-17
Payer: MEDICARE

## 2024-05-17 DIAGNOSIS — D63.1 ANEMIA DUE TO CHRONIC RENAL FAILURE TREATED WITH ERYTHROPOIETIN, UNSPECIFIED STAGE: ICD-10-CM

## 2024-05-17 DIAGNOSIS — N18.9 ANEMIA DUE TO CHRONIC RENAL FAILURE TREATED WITH ERYTHROPOIETIN, UNSPECIFIED STAGE: ICD-10-CM

## 2024-05-17 DIAGNOSIS — N18.9 ANEMIA DUE TO CHRONIC RENAL FAILURE TREATED WITH ERYTHROPOIETIN, UNSPECIFIED STAGE: Primary | ICD-10-CM

## 2024-05-17 DIAGNOSIS — D63.1 ANEMIA DUE TO CHRONIC RENAL FAILURE TREATED WITH ERYTHROPOIETIN, UNSPECIFIED STAGE: Primary | ICD-10-CM

## 2024-05-17 DIAGNOSIS — C22.0 HEPATOCELLULAR CARCINOMA: ICD-10-CM

## 2024-05-17 DIAGNOSIS — N18.30 STAGE 3 CHRONIC KIDNEY DISEASE, UNSPECIFIED WHETHER STAGE 3A OR 3B CKD: ICD-10-CM

## 2024-05-17 LAB
BASOPHILS # BLD AUTO: 0.02 10*3/MM3 (ref 0–0.2)
BASOPHILS NFR BLD AUTO: 0.7 % (ref 0–1.5)
DEPRECATED RDW RBC AUTO: 52.9 FL (ref 37–54)
EOSINOPHIL # BLD AUTO: 0.2 10*3/MM3 (ref 0–0.4)
EOSINOPHIL NFR BLD AUTO: 7.2 % (ref 0.3–6.2)
ERYTHROCYTE [DISTWIDTH] IN BLOOD BY AUTOMATED COUNT: 14.6 % (ref 12.3–15.4)
HCT VFR BLD AUTO: 27.5 % (ref 37.5–51)
HGB BLD-MCNC: 8.5 G/DL (ref 13–17.7)
IMM GRANULOCYTES # BLD AUTO: 0.02 10*3/MM3 (ref 0–0.05)
IMM GRANULOCYTES NFR BLD AUTO: 0.7 % (ref 0–0.5)
LYMPHOCYTES # BLD AUTO: 0.58 10*3/MM3 (ref 0.7–3.1)
LYMPHOCYTES NFR BLD AUTO: 20.9 % (ref 19.6–45.3)
MCH RBC QN AUTO: 30.4 PG (ref 26.6–33)
MCHC RBC AUTO-ENTMCNC: 30.9 G/DL (ref 31.5–35.7)
MCV RBC AUTO: 98.2 FL (ref 79–97)
MONOCYTES # BLD AUTO: 0.26 10*3/MM3 (ref 0.1–0.9)
MONOCYTES NFR BLD AUTO: 9.4 % (ref 5–12)
NEUTROPHILS NFR BLD AUTO: 1.69 10*3/MM3 (ref 1.7–7)
NEUTROPHILS NFR BLD AUTO: 61.1 % (ref 42.7–76)
NRBC BLD AUTO-RTO: 0 /100 WBC (ref 0–0.2)
PLATELET # BLD AUTO: 36 10*3/MM3 (ref 140–450)
PMV BLD AUTO: 14 FL (ref 6–12)
RBC # BLD AUTO: 2.8 10*6/MM3 (ref 4.14–5.8)
WBC NRBC COR # BLD AUTO: 2.77 10*3/MM3 (ref 3.4–10.8)

## 2024-05-17 PROCEDURE — 85025 COMPLETE CBC W/AUTO DIFF WBC: CPT

## 2024-05-17 PROCEDURE — 25010000002 EPOETIN ALFA PER 1000 UNITS: Performed by: NURSE PRACTITIONER

## 2024-05-17 PROCEDURE — 36415 COLL VENOUS BLD VENIPUNCTURE: CPT

## 2024-05-17 PROCEDURE — 96372 THER/PROPH/DIAG INJ SC/IM: CPT

## 2024-05-17 RX ADMIN — ERYTHROPOIETIN 37000 UNITS: 20000 INJECTION, SOLUTION INTRAVENOUS; SUBCUTANEOUS at 11:41

## 2024-05-31 ENCOUNTER — INFUSION (OUTPATIENT)
Dept: ONCOLOGY | Facility: HOSPITAL | Age: 74
End: 2024-05-31
Payer: MEDICARE

## 2024-05-31 ENCOUNTER — LAB (OUTPATIENT)
Dept: LAB | Facility: HOSPITAL | Age: 74
End: 2024-05-31
Payer: MEDICARE

## 2024-05-31 DIAGNOSIS — N18.9 ANEMIA DUE TO CHRONIC RENAL FAILURE TREATED WITH ERYTHROPOIETIN, UNSPECIFIED STAGE: ICD-10-CM

## 2024-05-31 DIAGNOSIS — D63.1 ANEMIA DUE TO CHRONIC RENAL FAILURE TREATED WITH ERYTHROPOIETIN, UNSPECIFIED STAGE: Primary | ICD-10-CM

## 2024-05-31 DIAGNOSIS — N18.9 ANEMIA DUE TO CHRONIC RENAL FAILURE TREATED WITH ERYTHROPOIETIN, UNSPECIFIED STAGE: Primary | ICD-10-CM

## 2024-05-31 DIAGNOSIS — N18.30 STAGE 3 CHRONIC KIDNEY DISEASE, UNSPECIFIED WHETHER STAGE 3A OR 3B CKD: ICD-10-CM

## 2024-05-31 DIAGNOSIS — C22.0 HEPATOCELLULAR CARCINOMA: ICD-10-CM

## 2024-05-31 DIAGNOSIS — D63.1 ANEMIA DUE TO CHRONIC RENAL FAILURE TREATED WITH ERYTHROPOIETIN, UNSPECIFIED STAGE: ICD-10-CM

## 2024-05-31 LAB
ALBUMIN SERPL-MCNC: 3.3 G/DL (ref 3.5–5.2)
ANION GAP SERPL CALCULATED.3IONS-SCNC: 11.5 MMOL/L (ref 5–15)
BASOPHILS # BLD AUTO: 0.01 10*3/MM3 (ref 0–0.2)
BASOPHILS NFR BLD AUTO: 0.5 % (ref 0–1.5)
BILIRUB UR QL STRIP: NEGATIVE
BUN SERPL-MCNC: 51 MG/DL (ref 8–23)
BUN/CREAT SERPL: 17.8 (ref 7–25)
CALCIUM SPEC-SCNC: 8.3 MG/DL (ref 8.6–10.5)
CHLORIDE SERPL-SCNC: 106 MMOL/L (ref 98–107)
CLARITY UR: CLEAR
CO2 SERPL-SCNC: 18.5 MMOL/L (ref 22–29)
COLOR UR: YELLOW
CREAT SERPL-MCNC: 2.87 MG/DL (ref 0.76–1.27)
DEPRECATED RDW RBC AUTO: 52.6 FL (ref 37–54)
EGFRCR SERPLBLD CKD-EPI 2021: 22.4 ML/MIN/1.73
EOSINOPHIL # BLD AUTO: 0.16 10*3/MM3 (ref 0–0.4)
EOSINOPHIL NFR BLD AUTO: 8.1 % (ref 0.3–6.2)
ERYTHROCYTE [DISTWIDTH] IN BLOOD BY AUTOMATED COUNT: 14.5 % (ref 12.3–15.4)
GLUCOSE SERPL-MCNC: 422 MG/DL (ref 65–99)
GLUCOSE UR STRIP-MCNC: ABNORMAL MG/DL
HCT VFR BLD AUTO: 27.6 % (ref 37.5–51)
HGB BLD-MCNC: 8.5 G/DL (ref 13–17.7)
HGB UR QL STRIP.AUTO: NEGATIVE
IMM GRANULOCYTES # BLD AUTO: 0 10*3/MM3 (ref 0–0.05)
IMM GRANULOCYTES NFR BLD AUTO: 0 % (ref 0–0.5)
KETONES UR QL STRIP: NEGATIVE
LEUKOCYTE ESTERASE UR QL STRIP.AUTO: NEGATIVE
LYMPHOCYTES # BLD AUTO: 0.43 10*3/MM3 (ref 0.7–3.1)
LYMPHOCYTES NFR BLD AUTO: 21.8 % (ref 19.6–45.3)
MCH RBC QN AUTO: 30.4 PG (ref 26.6–33)
MCHC RBC AUTO-ENTMCNC: 30.8 G/DL (ref 31.5–35.7)
MCV RBC AUTO: 98.6 FL (ref 79–97)
MONOCYTES # BLD AUTO: 0.13 10*3/MM3 (ref 0.1–0.9)
MONOCYTES NFR BLD AUTO: 6.6 % (ref 5–12)
NEUTROPHILS NFR BLD AUTO: 1.24 10*3/MM3 (ref 1.7–7)
NEUTROPHILS NFR BLD AUTO: 63 % (ref 42.7–76)
NITRITE UR QL STRIP: NEGATIVE
NRBC BLD AUTO-RTO: 0 /100 WBC (ref 0–0.2)
PH UR STRIP.AUTO: <=5 [PH] (ref 4.5–8)
PHOSPHATE SERPL-MCNC: 4.2 MG/DL (ref 2.5–4.5)
PLATELET # BLD AUTO: 37 10*3/MM3 (ref 140–450)
PMV BLD AUTO: 13 FL (ref 6–12)
POTASSIUM SERPL-SCNC: 5.5 MMOL/L (ref 3.5–5.2)
PROT UR QL STRIP: NEGATIVE
PTH-INTACT SERPL-MCNC: 102 PG/ML (ref 15–65)
RBC # BLD AUTO: 2.8 10*6/MM3 (ref 4.14–5.8)
SODIUM SERPL-SCNC: 136 MMOL/L (ref 136–145)
SP GR UR STRIP: 1.01 (ref 1–1.03)
UROBILINOGEN UR QL STRIP: ABNORMAL
WBC NRBC COR # BLD AUTO: 1.97 10*3/MM3 (ref 3.4–10.8)

## 2024-05-31 PROCEDURE — 25010000002 EPOETIN ALFA PER 1000 UNITS: Performed by: INTERNAL MEDICINE

## 2024-05-31 PROCEDURE — 80069 RENAL FUNCTION PANEL: CPT

## 2024-05-31 PROCEDURE — 82570 ASSAY OF URINE CREATININE: CPT | Performed by: INTERNAL MEDICINE

## 2024-05-31 PROCEDURE — 83970 ASSAY OF PARATHORMONE: CPT | Performed by: INTERNAL MEDICINE

## 2024-05-31 PROCEDURE — 84156 ASSAY OF PROTEIN URINE: CPT | Performed by: INTERNAL MEDICINE

## 2024-05-31 PROCEDURE — 81003 URINALYSIS AUTO W/O SCOPE: CPT

## 2024-05-31 PROCEDURE — 36415 COLL VENOUS BLD VENIPUNCTURE: CPT

## 2024-05-31 PROCEDURE — 96372 THER/PROPH/DIAG INJ SC/IM: CPT

## 2024-05-31 PROCEDURE — 85025 COMPLETE CBC W/AUTO DIFF WBC: CPT

## 2024-05-31 RX ADMIN — EPOETIN ALFA 37000 UNITS: 20000 SOLUTION INTRAVENOUS; SUBCUTANEOUS at 11:39

## 2024-06-03 LAB
CREAT UR-MCNC: 28.1 MG/DL
PROT ?TM UR-MCNC: <4 MG/DL
PROT/CREAT UR: NORMAL MG/G{CREAT}

## 2024-06-13 ENCOUNTER — TRANSCRIBE ORDERS (OUTPATIENT)
Dept: GENERAL RADIOLOGY | Facility: HOSPITAL | Age: 74
End: 2024-06-13
Payer: OTHER GOVERNMENT

## 2024-06-13 DIAGNOSIS — R18.8 CIRRHOSIS OF LIVER WITH ASCITES, UNSPECIFIED HEPATIC CIRRHOSIS TYPE: Primary | ICD-10-CM

## 2024-06-13 DIAGNOSIS — K74.60 CIRRHOSIS OF LIVER WITH ASCITES, UNSPECIFIED HEPATIC CIRRHOSIS TYPE: Primary | ICD-10-CM

## 2024-06-14 ENCOUNTER — INFUSION (OUTPATIENT)
Dept: ONCOLOGY | Facility: HOSPITAL | Age: 74
End: 2024-06-14
Payer: MEDICARE

## 2024-06-14 ENCOUNTER — LAB (OUTPATIENT)
Dept: LAB | Facility: HOSPITAL | Age: 74
End: 2024-06-14
Payer: MEDICARE

## 2024-06-14 DIAGNOSIS — N18.30 STAGE 3 CHRONIC KIDNEY DISEASE, UNSPECIFIED WHETHER STAGE 3A OR 3B CKD: ICD-10-CM

## 2024-06-14 DIAGNOSIS — D63.1 ANEMIA DUE TO CHRONIC RENAL FAILURE TREATED WITH ERYTHROPOIETIN, UNSPECIFIED STAGE: ICD-10-CM

## 2024-06-14 DIAGNOSIS — N18.4 CHRONIC RENAL DISEASE, STAGE IV: Primary | ICD-10-CM

## 2024-06-14 DIAGNOSIS — N18.9 ANEMIA DUE TO CHRONIC RENAL FAILURE TREATED WITH ERYTHROPOIETIN, UNSPECIFIED STAGE: ICD-10-CM

## 2024-06-14 DIAGNOSIS — C22.0 HEPATOCELLULAR CARCINOMA: ICD-10-CM

## 2024-06-14 LAB
ALBUMIN SERPL-MCNC: 3.6 G/DL (ref 3.5–5.2)
ALBUMIN/GLOB SERPL: 1.2 G/DL
ALP SERPL-CCNC: 128 U/L (ref 39–117)
ALT SERPL W P-5'-P-CCNC: 20 U/L (ref 1–41)
ANION GAP SERPL CALCULATED.3IONS-SCNC: 11.9 MMOL/L (ref 5–15)
AST SERPL-CCNC: 48 U/L (ref 1–40)
BASOPHILS # BLD AUTO: 0.01 10*3/MM3 (ref 0–0.2)
BASOPHILS NFR BLD AUTO: 0.4 % (ref 0–1.5)
BILIRUB SERPL-MCNC: 0.6 MG/DL (ref 0–1.2)
BUN SERPL-MCNC: 39 MG/DL (ref 8–23)
BUN/CREAT SERPL: 17.8 (ref 7–25)
CALCIUM SPEC-SCNC: 9.1 MG/DL (ref 8.6–10.5)
CHLORIDE SERPL-SCNC: 106 MMOL/L (ref 98–107)
CO2 SERPL-SCNC: 23.1 MMOL/L (ref 22–29)
CREAT SERPL-MCNC: 2.19 MG/DL (ref 0.76–1.27)
DEPRECATED RDW RBC AUTO: 50.1 FL (ref 37–54)
EGFRCR SERPLBLD CKD-EPI 2021: 31 ML/MIN/1.73
EOSINOPHIL # BLD AUTO: 0.2 10*3/MM3 (ref 0–0.4)
EOSINOPHIL NFR BLD AUTO: 8.5 % (ref 0.3–6.2)
ERYTHROCYTE [DISTWIDTH] IN BLOOD BY AUTOMATED COUNT: 14.1 % (ref 12.3–15.4)
FERRITIN SERPL-MCNC: 71.2 NG/ML (ref 30–400)
GLOBULIN UR ELPH-MCNC: 2.9 GM/DL
GLUCOSE SERPL-MCNC: 153 MG/DL (ref 65–99)
HCT VFR BLD AUTO: 30.8 % (ref 37.5–51)
HGB BLD-MCNC: 10.1 G/DL (ref 13–17.7)
IMM GRANULOCYTES # BLD AUTO: 0.02 10*3/MM3 (ref 0–0.05)
IMM GRANULOCYTES NFR BLD AUTO: 0.9 % (ref 0–0.5)
IRON 24H UR-MRATE: 133 MCG/DL (ref 59–158)
IRON SATN MFR SERPL: 39 % (ref 20–50)
LYMPHOCYTES # BLD AUTO: 0.54 10*3/MM3 (ref 0.7–3.1)
LYMPHOCYTES NFR BLD AUTO: 23.1 % (ref 19.6–45.3)
MCH RBC QN AUTO: 31.7 PG (ref 26.6–33)
MCHC RBC AUTO-ENTMCNC: 32.8 G/DL (ref 31.5–35.7)
MCV RBC AUTO: 96.6 FL (ref 79–97)
MONOCYTES # BLD AUTO: 0.2 10*3/MM3 (ref 0.1–0.9)
MONOCYTES NFR BLD AUTO: 8.5 % (ref 5–12)
NEUTROPHILS NFR BLD AUTO: 1.37 10*3/MM3 (ref 1.7–7)
NEUTROPHILS NFR BLD AUTO: 58.6 % (ref 42.7–76)
NRBC BLD AUTO-RTO: 0 /100 WBC (ref 0–0.2)
PHOSPHATE SERPL-MCNC: 3.8 MG/DL (ref 2.5–4.5)
PLATELET # BLD AUTO: 43 10*3/MM3 (ref 140–450)
PMV BLD AUTO: 13.3 FL (ref 6–12)
POTASSIUM SERPL-SCNC: 4.4 MMOL/L (ref 3.5–5.2)
PROT SERPL-MCNC: 6.5 G/DL (ref 6–8.5)
RBC # BLD AUTO: 3.19 10*6/MM3 (ref 4.14–5.8)
SODIUM SERPL-SCNC: 141 MMOL/L (ref 136–145)
TIBC SERPL-MCNC: 341 MCG/DL (ref 298–536)
TRANSFERRIN SERPL-MCNC: 229 MG/DL (ref 200–360)
WBC NRBC COR # BLD AUTO: 2.34 10*3/MM3 (ref 3.4–10.8)

## 2024-06-14 PROCEDURE — 84100 ASSAY OF PHOSPHORUS: CPT

## 2024-06-14 PROCEDURE — 84466 ASSAY OF TRANSFERRIN: CPT

## 2024-06-14 PROCEDURE — 82728 ASSAY OF FERRITIN: CPT

## 2024-06-14 PROCEDURE — 83540 ASSAY OF IRON: CPT

## 2024-06-14 PROCEDURE — 36415 COLL VENOUS BLD VENIPUNCTURE: CPT

## 2024-06-14 PROCEDURE — 85025 COMPLETE CBC W/AUTO DIFF WBC: CPT

## 2024-06-14 PROCEDURE — 80053 COMPREHEN METABOLIC PANEL: CPT

## 2024-06-19 ENCOUNTER — HOSPITAL ENCOUNTER (OUTPATIENT)
Facility: HOSPITAL | Age: 74
Discharge: HOME | End: 2024-06-19
Admitting: INTERNAL MEDICINE
Payer: MEDICARE

## 2024-06-19 DIAGNOSIS — C22.0 HEPATOCELLULAR CARCINOMA: ICD-10-CM

## 2024-06-19 PROCEDURE — 74181 MRI ABDOMEN W/O CONTRAST: CPT

## 2024-06-28 ENCOUNTER — LAB (OUTPATIENT)
Dept: LAB | Facility: HOSPITAL | Age: 74
End: 2024-06-28
Payer: MEDICARE

## 2024-06-28 ENCOUNTER — INFUSION (OUTPATIENT)
Dept: ONCOLOGY | Facility: HOSPITAL | Age: 74
End: 2024-06-28
Payer: MEDICARE

## 2024-06-28 ENCOUNTER — OFFICE VISIT (OUTPATIENT)
Dept: ONCOLOGY | Facility: CLINIC | Age: 74
End: 2024-06-28
Payer: OTHER GOVERNMENT

## 2024-06-28 ENCOUNTER — TELEPHONE (OUTPATIENT)
Dept: ONCOLOGY | Facility: CLINIC | Age: 74
End: 2024-06-28

## 2024-06-28 VITALS
BODY MASS INDEX: 30.04 KG/M2 | OXYGEN SATURATION: 97 % | SYSTOLIC BLOOD PRESSURE: 151 MMHG | RESPIRATION RATE: 20 BRPM | HEART RATE: 60 BPM | WEIGHT: 186 LBS | DIASTOLIC BLOOD PRESSURE: 61 MMHG

## 2024-06-28 DIAGNOSIS — R18.8 CIRRHOSIS OF LIVER WITH ASCITES, UNSPECIFIED HEPATIC CIRRHOSIS TYPE: Primary | ICD-10-CM

## 2024-06-28 DIAGNOSIS — D63.1 ANEMIA DUE TO CHRONIC RENAL FAILURE TREATED WITH ERYTHROPOIETIN, UNSPECIFIED STAGE: Primary | ICD-10-CM

## 2024-06-28 DIAGNOSIS — N18.30 STAGE 3 CHRONIC KIDNEY DISEASE, UNSPECIFIED WHETHER STAGE 3A OR 3B CKD: ICD-10-CM

## 2024-06-28 DIAGNOSIS — D63.1 ANEMIA DUE TO CHRONIC RENAL FAILURE TREATED WITH ERYTHROPOIETIN, UNSPECIFIED STAGE: ICD-10-CM

## 2024-06-28 DIAGNOSIS — N18.9 ANEMIA DUE TO CHRONIC RENAL FAILURE TREATED WITH ERYTHROPOIETIN, UNSPECIFIED STAGE: ICD-10-CM

## 2024-06-28 DIAGNOSIS — N18.9 ANEMIA DUE TO CHRONIC RENAL FAILURE TREATED WITH ERYTHROPOIETIN, UNSPECIFIED STAGE: Primary | ICD-10-CM

## 2024-06-28 DIAGNOSIS — C22.0 HEPATOCELLULAR CARCINOMA: ICD-10-CM

## 2024-06-28 DIAGNOSIS — K74.60 CIRRHOSIS OF LIVER WITH ASCITES, UNSPECIFIED HEPATIC CIRRHOSIS TYPE: Primary | ICD-10-CM

## 2024-06-28 LAB
BASOPHILS # BLD AUTO: 0.02 10*3/MM3 (ref 0–0.2)
BASOPHILS NFR BLD AUTO: 0.7 % (ref 0–1.5)
DEPRECATED RDW RBC AUTO: 48.1 FL (ref 37–54)
EOSINOPHIL # BLD AUTO: 0.2 10*3/MM3 (ref 0–0.4)
EOSINOPHIL NFR BLD AUTO: 7.4 % (ref 0.3–6.2)
ERYTHROCYTE [DISTWIDTH] IN BLOOD BY AUTOMATED COUNT: 13.8 % (ref 12.3–15.4)
FOLATE SERPL-MCNC: >20 NG/ML (ref 4.78–24.2)
HCT VFR BLD AUTO: 27.8 % (ref 37.5–51)
HGB BLD-MCNC: 8.7 G/DL (ref 13–17.7)
IMM GRANULOCYTES # BLD AUTO: 0 10*3/MM3 (ref 0–0.05)
IMM GRANULOCYTES NFR BLD AUTO: 0 % (ref 0–0.5)
LYMPHOCYTES # BLD AUTO: 0.62 10*3/MM3 (ref 0.7–3.1)
LYMPHOCYTES NFR BLD AUTO: 22.9 % (ref 19.6–45.3)
MCH RBC QN AUTO: 29.7 PG (ref 26.6–33)
MCHC RBC AUTO-ENTMCNC: 31.3 G/DL (ref 31.5–35.7)
MCV RBC AUTO: 94.9 FL (ref 79–97)
MONOCYTES # BLD AUTO: 0.26 10*3/MM3 (ref 0.1–0.9)
MONOCYTES NFR BLD AUTO: 9.6 % (ref 5–12)
NEUTROPHILS NFR BLD AUTO: 1.61 10*3/MM3 (ref 1.7–7)
NEUTROPHILS NFR BLD AUTO: 59.4 % (ref 42.7–76)
NRBC BLD AUTO-RTO: 0 /100 WBC (ref 0–0.2)
PLATELET # BLD AUTO: 43 10*3/MM3 (ref 140–450)
PMV BLD AUTO: 12.2 FL (ref 6–12)
RBC # BLD AUTO: 2.93 10*6/MM3 (ref 4.14–5.8)
VIT B12 BLD-MCNC: 898 PG/ML (ref 211–946)
WBC NRBC COR # BLD AUTO: 2.71 10*3/MM3 (ref 3.4–10.8)

## 2024-06-28 PROCEDURE — 96372 THER/PROPH/DIAG INJ SC/IM: CPT

## 2024-06-28 PROCEDURE — 85025 COMPLETE CBC W/AUTO DIFF WBC: CPT

## 2024-06-28 PROCEDURE — 25010000002 EPOETIN ALFA PER 1000 UNITS: Performed by: INTERNAL MEDICINE

## 2024-06-28 PROCEDURE — 82746 ASSAY OF FOLIC ACID SERUM: CPT | Performed by: INTERNAL MEDICINE

## 2024-06-28 PROCEDURE — 82607 VITAMIN B-12: CPT | Performed by: INTERNAL MEDICINE

## 2024-06-28 RX ADMIN — EPOETIN ALFA 37000 UNITS: 20000 SOLUTION INTRAVENOUS; SUBCUTANEOUS at 10:05

## 2024-06-28 NOTE — PROGRESS NOTES
.     REASON FOR FOLLOWUP:     *. Pancytopenia secondary to liver cirrhosis   Thrombocytopenia secondary to liver cirrhosis.      Mild leukocytopenia and low normal neutrophils  *. Iron deficiency anemia discovered on 1/13/2021.  Patient was started on oral iron 3 times a day.   Because of poor iron absorption, received 2 dose of Injectafer 750 mg x 2 in June 2022.   *. Newly discovered liver lesion.  Primary hepatocellular carcinoma, biopsy confirmed on 11/16/2022. Status post CT guided ablation of the liver lesion on 12/6/2022.       HISTORY OF PRESENT ILLNESS:  The patient is a 73 y.o. year old male with hypertension diabetes, pancytopenia secondary to liver cirrhosis caused by RODRIGUEZ, and hypogonadism, hepatocellular carcinoma status post ablation, who presents for follow-up evaluation.    History of Present Illness  The patient presented today on 6/28/2024 for evaluation after recent MRI for the abdomen examination obtained on 06/19/2024. The study reported cirrhotic liver morphology and with segment eight ablation site approximately 1 cm. The radiologist recommended further evaluation with multiphase abnormal MRI with IV contrast. There is sequela of portal hypertension including change in the splenomegaly and likely recent splenic infarct. 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 Apparently, this patient has chronic renal insufficiency with a creatinine above 2, which is usually prohibits patient to have IV contrast for MRI or CT scan.     I double checked it with MRI division in the radiology department, and was told that they had been using new IV contrast, and the kidney function is less problem now, and I was also told to even not to check renal function before the MRI examination with IV contrast. So in this case, we certainly would obtain an MRI with IV contrast as recommended by radiologist. Laboratory study today 06/28/2024 reported worsening  hemoglobin 8.7, stable platelets 43,000 and WBC 2700 including neutrophils 1610 and the lymphocytes 620. Most recently on 06/14/2024, his hemoglobin was 10.1, we had to hold his Procrit. He was getting Procrit every 2 weeks at 37,000 units for the previous 4 times. I discussed with the patient, I discussed with the nursing staff, I recommended not to decrease the Procrit dose today and continue at 37,000. Most recently laboratory study on 06/14/2024 reported ferritin 71.2 ng/min, and iron saturation 39 percent with a free air 133. This patient has worsening ferritin level compared to that from 03/08/2024 when he had a ferritin 131.    The patient reports no current new health issues. His pruritus has significantly decreased. He has discontinued simvastatin due to associated pruritus. His nephrologist, Dr. Lagos, discontinued spironolactone due to persistently elevated potassium levels. He was subsequently prescribed sodium bicarbonate supplements and laxatives. His potassium levels have since decreased. He was advised to continue sodium bicarbonate and to discontinue spironolactone. His pharmacist suggested Farxiga or Jardiance due to potential interaction with Procrit. He continues to take iron supplements thrice daily without any reported side effects.    Results  Laboratory Studies today on 6/28/2024  Hemoglobin 8.7, stable platelets 43,000, WBC 2700, neutrophils 1610, lymphocytes 620. Ferritin 71.2 ng/min, iron saturation 39%, free air 133.    Imaging  MRI for the abdomen reported Serratia liver morphology with segment eight ablation site approximately 1 cm. Multiphase abnormal MRI with IV contrast showed sequela of portal hypertension including change in the splenomegaly and likely recent splenic infarct. 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      Past Medical History:   Diagnosis Date    Cancer     liver cancer    H/O Dental disorder     History of  thrombocytopenia     Hypertension     RODRIGUEZ (nonalcoholic steatohepatitis)     Psoriasis     Type 2 diabetes mellitus      Past Surgical History:   Procedure Laterality Date    CATARACT EXTRACTION, BILATERAL      COLONOSCOPY  11/2015    ROTATOR CUFF REPAIR Left 2005     HEMATOLOGY HISTORY:  The patient is a 70 y.o. year old male with history of hypertension diabetes, RODRIGUEZ, and hypogonadism who presented today on 1/13/2021 for initial evaluation because of thrombocytopenia, referred by his primary care physician Dr. Joseph.      Patient reports he has no limitation of activity.  He does have chronic joint pains, but of note joint swelling.  His skin pruritus.  No skin rashes.  He does have easy bruising, however denies evidence of bleeding such as epistaxis, gum bleeding, rectal bleeding or hematuria.  Denies weight loss.  No low fever.  Appetite is reasonable.    I reviewed his outside laboratory studies.  Most recent also lab on 10/22/2020 reported hemoglobin 10.5, MCV 89.6, WBC 3200, including neutrophils 1900, lymphocytes 800 monocytes 300, and platelets 62,000.    Liver study on 9/2/2020 reported WBC 3200, including ANC 1900 lymphocytes 1000 monocytes 300, hemoglobin 10.5, MCV 80.0, and platelets 65,000.    His earliest CBC results available for review was from 5/8/2019 which reported WBC 4200, including ANC 2600, lymphocytes 1200 and monocytes 400, hemoglobin 13.4 and platelets 95,000.  At that time chemistry lab reported elevated liver panel including  , alk phosphatase 87 and a total bilirubin 1.1.  Total serum protein 7.0 albumin 4.1 and the globulin 2.9.  His creatinine was 1.31, with glucose of 219 been in normal electrolytes.  Hemoglobin A1c was 8.3%.    Laboratory studies on 1/13/2021 reported mild anemia with Hb 10.9, thrombocytopenia platelets 48,000, and IPF 7.0%, leukocytopenia WBC 3250 including ANC 1830.  Other labs reported no evidence of hemolysis, had supratherapeutic B12 level 1413  pg/mL and folate > 20 ng/mL.  He does have evidence of iron deficiency with low iron sats 10% and ferritin only 41.5 ng/mL in the setting of anemia with Hb 10.9.  His erythropoietin is not elevated accordingly, which indicates lack of response from his kidney to produce erythropoietin.     Currently is not a candidate for CONRAD/Procrit treatment.  However he is adamant he is a candidate for oral iron treatment.    We will start him on ferrous sulfate 325 mg 3 times a day.  Will send prescription to his pharmacy.     Laboratory study 6/11/2021 showed a slightly improved hemoglobin 11.9, with stable thrombocytopenia platelets 50,000 IPF 8.3%, and WBC 3720 including ANC 2100.  Iron study reported ferritin 115.3 ng/mL and iron saturation 14% with free iron 55 TIBC 384.    Laboratory studies on 12/17/2021 reported stable pancytopenia with anemia Hb 10.8, with platelets 60,000 and IPF 4.1%, and WBC 3780 including neutrophils 2060 lymphocytes 1040.  Iron study reported slightly trending down ferritin 86.1 ng/mL in the much improved iron saturation 34% with free iron 126 and TIBC 368.      On 6/10/2022 reports patient has abdomen distention for about 3 months.  He also developed bilateral leg edema in December 2021.  Patient also reports worsening dyspnea and weight gain for the past several months..  Per our records, he gained 25 pounds since December 2021.    Patient reports having taken oral iron 3 times a day.  He has mild nausea but not significant and no vomiting.  He has brown-colored stool.  He also reports having loose stool some of days.  No diarrhea.    Patient received a boost dose of COVID-19 vaccine on 11/6/2021.  Patient reports abdomen distention about 3 months, leg edema 6 months, after the boost dose of COVID (December) boost on 11/6/21    Patient complains of easy bruising on his arms from scratching, he has skin pruritus, however no skin rashes.  He denies spontaneous bleeding such as epistaxis, gum  bleeding, or hematochezia.    Lab study on 6/10/2022 reported deteriorating anemia with Hb 9.6, persistent thrombocytopenia with stable platelets 69,000, and low normal WBC 4290 including neutrophils 2980.  Iron studies showed a deteriorating saturation 17%, free iron 61 TIBC 357 with a ferritin 86.8%.    Patient indeed had paracentesis on 6/17/2022 with 11.2 L ascites fluid removed.  Cytology study was negative for malignancy.    Patient reports today that he has worsening abdominal distention, also has dyspnea however no cough hemoptysis.  No chest pain.  He denies fever sweating chills.  Patient also reports worsening bilateral leg edema.    He also recently finished her 2 dose of Injectafer in late June 2022 because of iron deficiency anemia, not responding to oral iron treatment 3 times a day.    Patient continues to have easy bruising however no spontaneous bleeding such as epistaxis or gum bleeding.    Laboratory study on 7/6/2022 reported stable anemia Hb 9.7 and stable thrombocytopenia platelets 65,000, low normal WBC 4260.  Patient has worsening renal function with creatinine 1.36, and supratherapeutic ferritin 644 ng/mL.    Chest x-ray examination on 7/14/2022 reported a right-sided moderate to large pleural effusion.  We will arrange patient to have ultrasound-guided right thoracentesis.  Suspect the patient has pleural effusion due to his liver cirrhosis.  He will have x-ray examination after the thoracentesis per protocol.  We will reassess whether he needs a CT scan for the chest.    This patient also had paracentesis again on 7/14/2022 with 6.2 L ascites fluid removed.      This patient had right thoracentesis with 2 L pleural effusion removed.  Cytology studies negative for malignant cells.  He also had multiple paracentesis, in June, July and early August, cytology study was also negative for malignant cells.    Laboratory study on 9/28/2022 reported worsening anemia Hb 8.5, platelets 61,000, WBC  "3380 including ANC 1780.  Laboratory study showed significant hyperkalemia potassium 6.4, and worsening renal function creatinine 2.36.      I saw patient recently on 9/20/2022 and he was found having worsening renal function, with hyperkalemia potassium 6.4.  Patient was sent to ER and eventually admitted for several days.  His hemoglobin was 7.5 on 9/30/2022.  Patient was started on Procrit 20,000 units during hospitalization.  After discharge, he has been given Procrit every 2 weeks.     During his hospitalization in end of September 2022, patient had ultrasound for the liver examination on 9/29/2022 which reported a hepatic lesion 27 x 24 x 23 mm, heterogeneous in the anterior liver.  There was evidence of liver cirrhosis.      Patient also had attempted ultrasound-guided paracentesis, however there was not enough ascites to be removed.     Patient also had a venous Doppler study and there was no evidence for portal vein thrombosis.    Patient reports he was seen by his GI specialist Dr. Everardo Foote old records that abdomen MRI examination with and without contrast which was done at the [facility] on 10/26/2022.  This study confirmed liver cirrhosis however it also reported a solitary liver lesion.    This patient had CT-guided core needle biopsy of the liver lesion on 11/16/2022 after 1 unit platelets transfusion.  He did well with the procedure.  Patient reports he feels \"fine.\"  He denies any pain in the area. Denied any kind of bleeding. Denies any fevers after procedure as well.     Patient reports no fever sweating chills.  He is abdomen is slightly distended but no pains.  No nausea vomiting diarrhea.  He has no abdomen pain.  Performance status ECOG 1-2.     Patient reports he noticed improved energy level since starting on Procrit injection.  Overall he still has chronic fatigue.  He denies evidence of bleeding.    Laboratory study on 11/21/2022 reported baseline platelets 44,000, hemoglobin " 8.9, and WBC 2980 including ANC 1670.    Reevaluation after his recent CT-guided ablation of the liver lesion on 12/6/2022 by interventional radiologist Dr. Declan Goodson.    The patient reports that taking oral iron has been really bothering his stomach and he is now only taking once a day. He denies any melena or hematochezia. He also denies any fever or pain. The patient reports quite a bit significant constipation from the oral iron. The patient adds that he has gained some weight, weighing today at 177 pounds and has gained 8 pounds within the last 4 weeks. The patient reports that he has an appointment with Dr. Everardo Foote on 12/29/2022 and Dr. Phill Mooney on 1/27/2022 to discuss his shunt. He also has an EGD tomorrow, 12/14/2022, with Dr. Josh Khan.    Laboratory study on 12/13/2022 reported hemoglobin 9.5 g/dL, MCV 93.6 fL, platelets 47,000/mm3, WBC 3,530/mm3 including neutrophils 2,020/mm3, and lymphocytes 830/mm3. Iron study reported ferritin 105.6 ng/mL, however, iron saturation 13% with free iron 43 mcg/dL and TIBC 332 mcg/dL.     The MRI study obtained on 02/20/2023 reported post ablation defect in the hepatic segment 8 measuring 3.4 x 2.8 cm. The study was done without iv contrast due to chronic renal disease. There was splenomegaly stable at 17.1 cm. No enlarged lymph nodes. No suspicious osseous lesion. There was liver cirrhosis. There was a small to moderate size right sided pleural effusion.    Laboratory studies today on 6/30/2023 reported stable severe thrombocytopenia platelets 40,000, hemoglobin 9.3, and WBC 3200 including neutrophils 1930, lymphocytes 760, and monocytes 270.    Most recent iron study was on 05/05/2023 which reported ferritin 200.7 ng/mL, free iron 55, TIBC 309, and iron saturation 18%.    Recent MRI examination without IV contrast obtained on 06/12/2023 reported limited examination in the absence of intravenous contrast (creatinine 3.3), but it demonstrated a stable  right hepatic lobe ablation defect. There was hepatic cirrhosis and splenomegaly.      This patient had MRI of the abdomen without contrast obtained on 12/08/2023. This study reported severely limited exam but a grossly stable hepatic segment 8 ablation defect measuring 3.7 x 2.7 cm. The study was without a contrast due to his chronic adrenal insufficiency.    Laboratory study 12/15/2023  shows hemoglobin 9.3 g/dL, MCV 93.0, MCHC 33.5, platelets 40,000, WBC 3260, including neutrophils 1900, and lymphocytes 850.    His recent laboratory study on 12/01/2023 reported ferritin 360, free iron 76, TIBC 259, and iron saturation 29%. His creatinine was 3.29 and glucose 257.         MEDICATIONS    Current Outpatient Medications:     allopurinol (ZYLOPRIM) 100 MG tablet, Take 1 tablet by mouth Daily., Disp: , Rfl:     carvedilol (COREG) 12.5 MG tablet, , Disp: , Rfl:     docusate sodium 100 MG capsule, Take 1 capsule by mouth., Disp: , Rfl:     ferrous sulfate 325 (65 FE) MG tablet, Take 1 tablet by mouth 3 (Three) Times a Day With Meals. (Patient taking differently: Take 1 tablet by mouth Daily With Breakfast.), Disp: 90 tablet, Rfl: 2    FIBER PO, Take  by mouth., Disp: , Rfl:     FREESTYLE LITE test strip, , Disp: , Rfl:     furosemide (LASIX) 40 MG tablet, Take 1 tablet by mouth Daily., Disp: , Rfl:     glipizide (Glucotrol) 5 MG tablet, Take 1 tablet by mouth 2 (Two) Times a Day Before Meals., Disp: 60 tablet, Rfl: 2    hydrOXYzine (ATARAX) 10 MG tablet, Take 1 tablet by mouth., Disp: , Rfl:     insulin glargine (LANTUS, SEMGLEE) 100 UNIT/ML injection, Inject 25 Units under the skin into the appropriate area as directed Daily., Disp: , Rfl:     multivitamin (THERAGRAN) tablet tablet, Take  by mouth Daily., Disp: , Rfl:     nystatin (MYCOSTATIN) 651226 UNIT/GM cream, Apply 1 Application topically to the appropriate area as directed As Needed., Disp: , Rfl:     pantoprazole (PROTONIX) 40 MG EC tablet, , Disp: , Rfl:      pentoxifylline (TRENtal) 400 MG CR tablet, , Disp: , Rfl:     polycarbophil 625 MG tablet tablet, Take 1 tablet by mouth., Disp: , Rfl:     Polyethylene Glycol 3350 (MIRALAX PO), Take  by mouth., Disp: , Rfl:     simvastatin (ZOCOR) 20 MG tablet, Take 1 tablet by mouth Daily., Disp: , Rfl:     spironolactone (ALDACTONE) 25 MG tablet, Take 1 tablet by mouth Daily for 30 days., Disp: 30 tablet, Rfl: 0    ALLERGIES:     Allergies   Allergen Reactions    Doxycycline Itching    Rosuvastatin Other (See Comments)      Liver enzymes abnormal       SOCIAL HISTORY:         Social History     Socioeconomic History    Marital status:      Spouse name: Lisa   Tobacco Use    Smoking status: Former     Current packs/day: 0.00     Types: Cigarettes     Quit date:      Years since quittin.5   Vaping Use    Vaping status: Never Used   Substance and Sexual Activity    Alcohol use: Never   As of 2021, patient reports he does drink alcoholic beverage 10 drinks per week.  Denies illegal drug use.      FAMILY HISTORY:  No cancer in family, mother HTN,      REVIEW OF SYSTEM:  See HPI.       Vitals:    24 0937   BP: 151/61   Pulse: 60   Resp: 20   SpO2: 97%   Weight: 84.4 kg (186 lb)   PainSc: 0-No pain     ECOG 1    Physical Exam  Heart has a systolic murmur grade 3/10.  GENERAL:  Well-developed, well-nourished in no acute distress.    SKIN:  Warm, dry without rashes, purpura or petechiae.  HEENT:  Normocephalic.   LYMPHATICS:  No cervical, supraclavicular adenopathy.  CHEST: Normal respiratory effort.  Lungs clear to auscultation. Good airflow.  CARDIAC:  Regular rate and rhythm.  Patient has systolic murmur.  Normal S1,S2.  ABDOMEN:  Soft, no tender.  Bowel sounds normal.  EXTREMITIES:  No lower extremity edema.      RECENT LABS:    Component      Latest Ref Rng 2024   WBC      3.40 - 10.80 10*3/mm3 2.71 (L)    RBC      4.14 - 5.80 10*6/mm3 2.93 (L)    Hemoglobin      13.0 - 17.7 g/dL 8.7 (L)     Hematocrit      37.5 - 51.0 % 27.8 (L)    MCV      79.0 - 97.0 fL 94.9    MCH      26.6 - 33.0 pg 29.7    MCHC      31.5 - 35.7 g/dL 31.3 (L)    RDW      12.3 - 15.4 % 13.8    RDW-SD      37.0 - 54.0 fl 48.1    MPV      6.0 - 12.0 fL 12.2 (H)    Platelets      140 - 450 10*3/mm3 43 (L)    Neutrophil Rel %      42.7 - 76.0 % 59.4    Lymphocyte Rel %      19.6 - 45.3 % 22.9    Monocyte Rel %      5.0 - 12.0 % 9.6    Eosinophil Rel %      0.3 - 6.2 % 7.4 (H)    Basophil Rel %      0.0 - 1.5 % 0.7    Immature Granulocyte Rel %      0.0 - 0.5 % 0.0    Neutrophils Absolute      1.70 - 7.00 10*3/mm3 1.61 (L)    Lymphocytes Absolute      0.70 - 3.10 10*3/mm3 0.62 (L)    Monocytes Absolute      0.10 - 0.90 10*3/mm3 0.26    Eosinophils Absolute      0.00 - 0.40 10*3/mm3 0.20    Basophils Absolute      0.00 - 0.20 10*3/mm3 0.02    Immature Grans, Absolute      0.00 - 0.05 10*3/mm3 0.00    nRBC      0.0 - 0.2 /100 WBC 0.0        Lab Results   Component Value Date    WBC 2.34 (L) 06/14/2024    HGB 10.1 (L) 06/14/2024    HCT 30.8 (L) 06/14/2024    MCV 96.6 06/14/2024    PLT 43 (L) 06/14/2024     Lab Results   Component Value Date    NEUTROABS 1.37 (L) 06/14/2024     Lab Results   Component Value Date    GLUCOSE 153 (H) 06/14/2024    BUN 39 (H) 06/14/2024    CREATININE 2.19 (C) 06/14/2024    EGFRIFNONA 72 12/17/2021    BCR 17.8 06/14/2024    K 4.4 06/14/2024    CO2 23.1 06/14/2024    CALCIUM 9.1 06/14/2024    ALBUMIN 3.6 06/14/2024    AST 48 (H) 06/14/2024    ALT 20 06/14/2024     Lab Results   Component Value Date    IRON 133 06/14/2024    TIBC 341 06/14/2024    FERRITIN 71.20 06/14/2024   Iron saturation 39% on 6/14/2024,        Lab Results   Component Value Date    FOLATE >20.00 01/13/2021     Lab Results   Component Value Date    DRXOLPGT20 1,155 (H) 09/28/2022         IMAGING:   MRI Abdomen Without Contrast  Narrative: MRI ABDOMEN WO CONTRAST-     HISTORY: Pathologically proven hepatocellular carcinoma status  post  CT-guided ablation December 2022.     TECHNIQUE: Multiplanar multisequence MRI of the abdomen was performed  without the administration of IV contrast.      COMPARISON: MR abdomen 12/8/2023 and 10/26/2022.     FINDINGS: Study is significantly limited without intravenous contrast.     Cirrhotic liver morphology. Segment VIII ablation site. Approximately 1  cm focus of diffusion restriction with corresponding ADC hypointensity  at the central aspect of the ablation site (series 6/image 67 and series  7/image 11). Prior postablation exams did not contain diffusion-weighted  imaging to allow for comparison. Few small hepatic cysts. Evaluation for  additional hepatic lesions is extremely limited without intravenous  contrast. Sequela of portal hypertension including unchanged  splenomegaly (21 cm), upper abdominal portosystemic collaterals and  trace perihepatic ascites. New peripheral wedge-shaped area of decreased  diffusion restriction and slightly hypointense T2 signal within the  superior peripheral spleen (series 6/image 66). Preserved splenic volume  in this area.     Nondistended gallbladder and nondilated biliary tree.     Preserved pancreas parenchymal volume. Nondilated main pancreatic duct.  Two subcentimeter cystic lesions in the pancreatic head, one of which  communicates with the main pancreatic duct (series 9/images 26 and 24).  No appreciable soft tissue nodularity. Limited evaluation without  intravenous contrast.     Few bilateral renal cysts. Normal noncontrast MR appearance of the  adrenal glands and visualized bowel. No abdominal adenopathy.        Impression: 1. Cirrhotic liver morphology with segment VIII ablation site.  Approximately 1 cm focus of diffusion restriction at the central aspect  of the ablation site warrants further evaluation with multiphase  abdominal MRI with IV contrast. Prior postablation exams did not contain  diffusion-weighted imaging to allow for comparison. Recommend  subsequent  surveillance examinations with intravenous contrast as well. Evaluation  is significantly limited without intravenous contrast.  2. Sequela of portal hypertension including unchanged splenomegaly with  likely recent splenic infarct.  3. Two subcentimeter cystic lesions in the pancreatic head likely  representing sidebranch IPMNs. Attention on follow-up.        This report was finalized on 6/25/2024 9:05 AM by Dr. Bang Dill M.D on Workstation: [id number]         Assessment & Plan     Assessment & Plan      1.  Thrombocytopenia secondary to liver cirrhosis and splenomegaly.  Etiology of the thrombocytopenia is not clear.  However I do think it is likely related to his fatty liver which was documented from abdomen ultrasound on 7/20/2012.    Laboratory studies on 1/13/2021 reported excellent vitamin B12 level.  He had marginally elevated IPF 7.0%.  no apparent compensation for platelets production.   On 6/11/2021, platelets improved at 63,000 with normal IPF 5.3%.  Patient is asymptomatic.    On 12/17/2021, stable platelets 60,000 and normal IPF 4.1%. Patient is asymptomatic.   On 6/10/2022 platelets 69,000.  Patient reports no spontaneous bleeding.    On 7/6/2022 platelets 65,000.  No spontaneous bleeding.    On 9/28/2022 platelets 61,000.  Patient has easy bruising on extremities, however no spontaneous bleeding such as melena hematochezia or gum bleeding.    On 11/2/2022 platelets 48,000.  No change of clinical condition.  Continue to monitor.  Patient was given 1 unit platelets transfusion on 11/15/2022, platelets improved at 70,000 11/16/2022 when he had successful CT-guided core needle biopsy of liver lesion.   On 11/21/2022 platelets decreased at baseline level of 44,000.  No spontaneous bleeding.  We will arrange patient to receive platelets transfusion prior and post point interventional procedure with radiofrequency ablation of the primary hepatocellular carcinoma scheduled on 12/6/2022.    On 12/5/2022, the patient had platelets 49,000/mm3. We gave him 1 unit of platelets transfusion before the ablation of the liver cancer and the posttransfusion platelets was only 56,000/mm3 on 12/6/2022.   On 12/13/2022, platelets is 47,000/mm3. The patient is not having any bleeding or bruising.  Patient has platelets 41,000 on 03/10/2023, and this is stable at the baseline condition. Patient has no excessive bleeding or bruising.  Lab study on 06/30/2023 reported stable thrombocytopenia with platelets 40,000. Patient has easy bruising, however, no spontaneous bleeding. Continue to monitor.   A laboratory study today, on 9/22/2023, reported stable severe thrombocytopenia with platelets of 36,000. This is his baseline level.  on 12/15/2023, the patient has a stable but severe thrombocytopenia with platelets 40,000. The patient asked about for stimulating platelet growth. I researched the UptoDate, and this seems only being used for patient prior to surgical procedure to improve the platelets, but no indication for long term use.   on 04/05/2024, the patient exhibits stable severe thrombocytopenia with platelet count of 41,000/mm3. He reports no instances of easy bleeding or bruising.   On 6/28/2024 platelets 43,000.      2.  Iron deficiency anemia in the setting of chronic renal insufficiency stage III, and liver cirrhosis.   Laboratory study on 1/13/2021 reported no evidence of hemolysis, had supratherapeutic B12 level and folate.    He does have evidence of iron deficiency with low iron sats 10% and ferritin only 41.5 ng/mL with Hb 10.9.    His erythropoietin 16.1, is not elevated accordingly, which indicates lack of response from his kidney to produce erythropoietin. Currently is not a candidate for CONRAD/Procrit treatment.    We started patient on oral ferrous sulfate 325 mg 3 times a day in January 2021.   On 3/19/2021, patient has improved hemoglobin 11.9.  Continue to monitor.  On 6/11/2021, stable anemia Hb  11.0, however much improved with ferritin 115.3 ng/mL, and iron saturation 14%.  Will advised to continue oral iron treatment.  On 12/17/2021 stable Hb 10.8, much improved iron saturation 34% and free iron 126, however with trending down ferritin level 86.1 ng/mL. Continue oral iron treatment.  6/10/2022, patient reports taking oral iron 3 times a day.  No apparent side effects except mild nausea.  Laboratory studies showed deteriorating hemoglobin 9.6 and also worsening iron saturation 17% with free iron 61 and the ferritin 86.8 ng/mL.  Discussed with the patient, recommended intravenous iron therapy, this patient has poor iron absorption.    Patient received 2 dose of Injectafer 750 mg x 2 in June 2022.  Lab study today on 7/6/2022 reported improved normalized iron saturation 21% with a supratherapeutic ferritin 644 ng/mL.  On 9/28/2022 ferritin 256 ng/mL iron saturation 22% free iron 66 TIBC 295.  Supratherapeutic B12 at 155 and normal RBC folate 2175 ng/mL.  Patient had a hemoglobin 7.2 on 9/30/2022.  Was started on Procrit injection 20,000 units during hospitalization.  No PRBC transfusion.  Procrit is subsequently continued as outpatient every 2 weeks.  On 10/21/2022 ferritin 198 and free iron 56 TIBC 294 iron saturation 19%.   Improved hemoglobin 9.2 on 11/2/2022.  We will continue Procrit 20,000 units every 2 weeks.  Hemoglobin 8.9 on 11/21/2022.  Continue Procrit injection.  The laboratory study today on 12/13/2022 reported deteriorating and recurrent iron deficiency with iron saturation only 13% with free iron 43 mcg/dL, TIBC 332 mcg/dL, and ferritin 105.6 ng/dL. The patient will be arranged for two doses of Injectafer prior to resumption of Procrit.  Post treatment iron study on 01/23/2023 reported significantly improved ferritin 559 and normalized iron saturation 35 percent with a free iron 89, TIBC 255. Patient was continued on Procrit every 2 weeks.   On 03/10/2023, the patient has hemoglobin 9.8, and  normal iron study with ferritin 270 ng/mL and iron saturation 28% and free iron 82, TIBC 293.  Lab study on 05/05/2023 reported ferritin 200.7 ng/mL, free iron 55, TIBC 309, iron saturation 18%.   Patient continues on CONRAD/Procrit every 2 weeks.  The patient had a recurrent iron deficiency anemia with a hemoglobin of 8.9, ferritin of 95.3, free iron of 53, and iron saturation of 19% on 7/28/2023.  09/22/2023, the patient has hemoglobin 9.4. The patient will be given CONRAD/Procrit 37,000 units and will continue every 2 weeks with CBC monitorin.    His recent laboratory study on 12/01/2023 reported ferritin 360, free iron 76, TIBC 259, and iron saturation 29%.  On 12/15/2023, the patient has hemoglobin 9.3. He will continue epoetin sole/Procrit every 2 weeks.  2. Iron deficiency anemia.    The patient's hemoglobin level is 9.7% as of 04/05/2024, and he reports no instances of bleeding. The current treatment plan includes CONRAD/Procrit every 2 weeks.   6/14/2024 hemoglobin 10.1 and Procrit was on hold.  6/28/2024 hemoglobin 8.7.  The continuation of Procrit at 37,000 units will be maintained. The patient is advised to persist with oral iron supplementation, administered thrice daily. An appointment for an ultrasound-guided paracentesis is planned. The patient will also continue follow-ups with his nephrologist, Dr. Lagos.  B12 and folate will be conducted today. The patient will return biweekly for CBC monitoring and a Procrit injection. Iron studies will be conducted as per the protocol.          3.  Pancytopenia with mild leukocytopenia.  This is also likely related to his fatty liver and liver cirrhosis.  Patient drinks alcohol beverage as reported in January 2021.  Patient reports no recurrent infection.  His neutrophils marginally normal at 1830 out of WBC 3250.  Patient had supratherapeutic B12 level 1/13/2021.  Marginally better WBC 3720 including ANC 2100 3/19/2021.    6/11/2021 WBC 3530, with ANC 2180.  No  recurrent infection.    On 12/17/2021 stable leukocytopenia WBC 3780 with low normal neutrophils 2016.  Patient is asymptomatic.  Continue to monitor.  On 6/10/2022 patient has slightly better WBC 4290 and neutrophils 2980.  On 7/6/2022 patient had WBC 4260 ANC 2620, lymphocytes 840 and monocytes 470.  On 9/28/2022 WBC 3380, including ANC 1780, hemoglobin 8.5, and platelets 61,000.  On 11/2/2022 total WBC 2970 including ANC 1730.  Continue to monitor.   On 11/21/2022 WBC 2980 including ANC 1670 and lymphocytes 820.  Patient is afebrile.  Continue to monitor.  On 12/13/2022, WBC 3,530/mm3 including neutrophils 2,020/mm3.  On 03/10/2023, WBC 2710 including neutrophils of 1570. The patient reports no fever, sweating, or chills. Most recent peak ANC was 2200 on 02/06/2023.  On 06/30/2023, patient is stable leukocytopenia with WBC 3200, including neutrophils 1930.  On 9/22/2023, the patient had WBC of 3440, including neutrophils of 1900, hemoglobin of 9.4, and platelets of 36,000.  on 12/15/2023, the patient has WBC 3260 including neutrophils 1900. The patient has no fever, sweating, or chills.    on 04/05/2024, the patient presents with leukocytopenia with WBC of 2740 and moderate neutropenia with ANC of 1630.  However, his neutrophil count was normal at 2300 and WBC of 4090 as of 03/22/2024.  6/20/2024 WBC 2710 neutrophils 1610.  Patient is afebrile continue to monitor.    4. COVID-19 vaccination.    6/11/2021, patient reports that he had 2 doses of COVID-19 vaccine, had very limited side effects with some soreness at the site of injection.   On 12/17/2021, patient reports he received a booster dose of COVID-19 vaccine on 11/6/2021.      5.  Ascites secondary to liver cirrhosis.  CT scan examination on 10/15/2021 confirmed liver cirrhosis.  On 6/10/2022 patient reports abdomen distention for the past 3 months, weight gains, 25 pounds per our records, and also has worsening dyspnea.  Physical examination shows very  distended abdomen.  I think this patient has large ascites secondary to his liver cirrhosis.  I recommended to have ultrasound-guided therapeutic paracentesis with samples for routine chemistry labs cell counts and also for cytology study.  Patient also has bilateral leg edema which is also secondary to liver cirrhosis.  Patient was seen her primary care physician next week.  I think most likely he will need to be started on diuretics.  He also needs to follow-up with his GI specialist Dr. Everardo Foote to discuss possibility of  shunt.   On 6/17/2022 patient had ultrasound-guided paracentesis, with 11.2 L clear yellow ascites removed.  Cytology study reported negative for malignant cells.  There was a reactive mesothelial cells histiocytes and mixed inflammatory cells.  Today on 7/6/2022 follow-up, patient reports worsening abdomen distention again.  I will arrange patient to have therapeutic paracentesis again as soon as possible, and also will arrange another paracentesis in 3 weeks.  At the meantime I also recommended to start the patient on low-dose Lasix 20 mg daily on 7/6/2022.   Paracentesis, with 6.4 L ascites removed 7/14/2022.  Paracentesis 2.4 L ascites removed 8/4/2022.   Failed attempt for paracentesis on 9/29/2022 during hospitalization, no ascites per ultrasound exam.  On 12/13/2022, the patient has more distended abdomen and also has been gaining weight for more than 10 pounds in the past 2 to 3 months. The patient had attempted paracentesis under ultrasound guidance on 12/29/2022, however, there were no ascites or examination, so the procedure was abandoned.   Abnormal MRI on 2/20/2023 reported no significant ascites.  On 03/10/2023, the patient presents for reevaluation. His wife reports the patient is gaining about 10 to 12 pounds since the beginning of 01/2023. By physical examination, I do not feel patient has ascites by percussion of the abdomen. He has trace edema in both legs. Patient is on  Lasix 40 mg daily and spironolactone.   Ultrasound for the abdomen on 9/20/2023 reported liver cirrhosis and splenomegaly, no apparent significant ascites.  On 04/05/2024, the patient reports no distention of the abdomen.   Today on 6/28/2024 no specific complaints.      6.  Right pleural effusion.    Patient also complains of dyspnea, x-ray examination reported right pleural effusion 7/14/2022..  Patient had ultrasound-guided right thoracentesis with 2 L pleural effusion removed 7/27/2022.  Cytology studies negative for malignancy.  On 8/4/2022 patient had another 2 L pleural effusion removed on the right side.  The patient had a reaccumulation of the right pleural effusion as evidenced by his CT scan from 11/16/2022. We will arrange ultrasound-guided right thoracentesis.   The patient had ultrasound-guided right paracentesis on 12/29/2022 with a 500 mL of pleural effusion removed.   Abnormal MRI examination on 02/20/2023 reports small to moderate pleural effusion. I reviewed the images with the patient today on 03/10/2023, this is significant less than previous images. He is asymptomatic. We decided to observe for now.  MRI examination 06/12/2023 reported a right-sided pleural effusion. Physical examination today 06/30/2023, patient does have decreased but present breathing sounds at the left lung base.  Stable condition on 9/22/2023.  On 12/15/2023, the patient has no dyspnea.    On 04/05/2024, the patient has no complaints of dyspnea. His examination reveals clear breathing sounds bilaterally.    7.  Worsening renal function.  Laboratory study on 7/6/2022 reported creatinine 1.37.  He had creatinine 1.02 on 12/17/2021.  Discussed with patient, I recommended referring patient to nephrology service for evaluation of possible hepatorenal syndrome due to liver cirrhosis.  Patient is agreeable.  On 9/28/2022 patient reports that he still has no appointment for nephrology service.  On 11/2/2022 creatinine 2.34 with  BUN 47.  On 11/21/2022 creatinine 2.33 with BUN 41.  On 12/6/22, cr was 2.07.  On 02/20/2023, the patient had a creatinine 2.42, so MRI was done without IV contrast as we had requested. Patient will continue to follow up with the nephrologist for evaluation and management.  On 05/19/2023, creatinine 2.60.  On 8/25/2023, creatinine was 2.61.  On 12/01/2023, creatinine 3.29. The patient reports he recently had a follow-up with his nephrologist last week.   On 03/23/2024, the patient's creatinine level was 2.56 mg/dL and BUN of 55 mg/dL. The patient will continue to follow up with his nephrologist.   6/14/2024 creatinine 2.19 BUN 39.    8.  Hepatocellular carcinoma.    Newly discovered hepatic lesion by ultrasound examination on 9/29/2022.  Patient is subsequently seen by Dr. Everardo Foote, had an MRI of the abdomen with and without contrast at the [facility] on 10/26/2022 which reported 3.2 cm liver lesion, likely hepatocellular carcinoma.  I recommended to have AFP study, also discussed with patient and his wife today on 11/2/2022 we will obtain the MRI images from [facility], and arrange CT-guided core needle biopsy.  Because of his thrombocytopenia, I will arrange 1 unit platelets transfusion right before the biopsy.  I did discuss with him about the risk for bleeding post the biopsy.  This is highly suspicious for primary hepatocellular carcinoma.   Normal AFP 2.37 ng/mL on 11/2/2022.     Patient had CT-guided core needle biopsy on 11/16/2022. Pathology evaluation reported moderately differentiated hepatocellular carcinoma.  I discussed with the interventional radiologist Dr. Goodson about interventional procedure with radiofrequency ablation treatment for the solitary lesion in the liver, since this patient is not a candidate for surgical intervention due to multiple comorbidities. The procedure has been scheduled on 12/06/2022.  Due to his thrombocytopenia, we decided to transfuse him with 1  unit of platelets on 12/05/2022 and also also 1 unit platelets on 12/06/2022 after his procedure.  The patient had CT-guided ablation procedure on 12/6/2022. The patient reports good tolerance, no pains, and no fevers after procedure. Interventional radiology, Dr. Goodson, recommended to obtain abdomen MRI with and without contrast with liver protocol 2 to 3 months after the procedure for reassessment.  The patient had MRI of the abdomen on 02/20/2023, contrast was not given due to worsening renal function. Nevertheless, the study reported post treatment changes. No evidence for new lesions. As I discussed with the patient and his wife today on 03/10/2023, I recommended to obtain MRI in 3 months for reassessment. We will request IV contrast if his renal function is improving.  MRI examination was obtained on 06/12/2023, reported post treatment changes of the liver lesion, overall stable size.  The patient had an ultrasound examination of the abdomen obtained on 9/20/2023, as requested by his nephrologist, and the study reported a right hepatic lobe lesion measuring 2.6 x 1 x 1.2 cm. There is also splenomegaly measuring 16.5 cm. There was hepatic steatosis and cirrhosis. There were also gallbladder polyps.  The patient had MRI examination of the abdomen without contrast obtained on 12/08/2023, which reported stable segment 8 post ablation lesion 3.7 x 2.7 cm. This was compared to the previous MRI from 06/12/2023.        9.  Diabetes.   On 11/21/2022 patient had significant elevated glucose 397.  Patient reports that he is diabetic, however his metformin was discontinued during his most recent hospitalization, and he was not put on any other medication for that.  He reports this morning's blood glucose level was about 180.  Discussed with patient, I will give him 10 units insulin today in the clinic, and also will start him on glipizide 5 mg twice a day.  Patient is agreeable and will follow up with his primary care  physician for management of diabetes.  On 2/20/2023 glucose 152.  Lab study on 05/19/2023 reported glucose 104.   On 08/25/2023, glucose was 192. The patient will continue to follow up with a primary care physician for management.  On 12/15/2023, the patient reports that he was started on insulin treatment with glargine 24 units and it started last night. His glucose level this morning was 160.  2/23/2024 glucose 160.  6/14/2024 glucose 153.      PLAN:  Proceed ahead with CONRAD/Procrit today at 37,000 units, no needed to decrease dosage and repeat every 2 weeks.   Arrange abdomen MRI with IV contrast as recommended by radiologist.  Continue oral iron once a day.  Continue management of diabetes.  Continue follow-up with nephrologist.  I will see the patient in 3 months for reevaluation.       I discussed with the patient about laboratory results and further management plan.  Patient voiced understanding and agreeable.    This patient is on high risk medication and needs close monitoring.         Milagro Salinas MD PhD       CC:  MD Everardo Cuello M.D. Ramsey Nassar. MD

## 2024-06-28 NOTE — TELEPHONE ENCOUNTER
Caller: Dennys Lieberman    Relationship: Self    Best call back number: [phone]    What is the best time to reach you: ANYTIME    Who are you requesting to speak with (clinical staff, provider,  specific staff member): CLINICAL    What was the call regarding: PATIENT'S 8/1 MRI IS ENTERED INCORRECTLY - SHOULD BE MRI ABDOMEN, NOT MRI BRAIN.    PLEASE CALL TO GET THIS UPDATED.

## 2024-07-05 NOTE — PROGRESS NOTES
07/18/24 0001   Pre-Procedure Phone Call   Procedure Time Verified Yes   Arrival Time 1130   Procedure Location Verified Yes   Medical History Reviewed No   NPO Status Reinforced Yes   Ride and Caregiver Arranged N/A   Patient Knows to Bring Current Medications No   Bring Outside Films Requested No

## 2024-07-12 ENCOUNTER — LAB (OUTPATIENT)
Dept: LAB | Facility: HOSPITAL | Age: 74
End: 2024-07-12
Payer: MEDICARE

## 2024-07-12 ENCOUNTER — INFUSION (OUTPATIENT)
Dept: ONCOLOGY | Facility: HOSPITAL | Age: 74
End: 2024-07-12
Payer: MEDICARE

## 2024-07-12 DIAGNOSIS — N18.9 ANEMIA DUE TO CHRONIC RENAL FAILURE TREATED WITH ERYTHROPOIETIN, UNSPECIFIED STAGE: Primary | ICD-10-CM

## 2024-07-12 DIAGNOSIS — D63.1 ANEMIA DUE TO CHRONIC RENAL FAILURE TREATED WITH ERYTHROPOIETIN, UNSPECIFIED STAGE: ICD-10-CM

## 2024-07-12 DIAGNOSIS — N18.9 ANEMIA DUE TO CHRONIC RENAL FAILURE TREATED WITH ERYTHROPOIETIN, UNSPECIFIED STAGE: ICD-10-CM

## 2024-07-12 DIAGNOSIS — D63.1 ANEMIA DUE TO CHRONIC RENAL FAILURE TREATED WITH ERYTHROPOIETIN, UNSPECIFIED STAGE: Primary | ICD-10-CM

## 2024-07-12 DIAGNOSIS — C22.0 HEPATOCELLULAR CARCINOMA: ICD-10-CM

## 2024-07-12 DIAGNOSIS — N18.30 STAGE 3 CHRONIC KIDNEY DISEASE, UNSPECIFIED WHETHER STAGE 3A OR 3B CKD: ICD-10-CM

## 2024-07-12 LAB
BASOPHILS # BLD AUTO: 0.02 10*3/MM3 (ref 0–0.2)
BASOPHILS NFR BLD AUTO: 0.9 % (ref 0–1.5)
DEPRECATED RDW RBC AUTO: 50.6 FL (ref 37–54)
EOSINOPHIL # BLD AUTO: 0.15 10*3/MM3 (ref 0–0.4)
EOSINOPHIL NFR BLD AUTO: 6.4 % (ref 0.3–6.2)
ERYTHROCYTE [DISTWIDTH] IN BLOOD BY AUTOMATED COUNT: 14.5 % (ref 12.3–15.4)
HCT VFR BLD AUTO: 29.6 % (ref 37.5–51)
HGB BLD-MCNC: 9.3 G/DL (ref 13–17.7)
IMM GRANULOCYTES # BLD AUTO: 0.01 10*3/MM3 (ref 0–0.05)
IMM GRANULOCYTES NFR BLD AUTO: 0.4 % (ref 0–0.5)
LYMPHOCYTES # BLD AUTO: 0.59 10*3/MM3 (ref 0.7–3.1)
LYMPHOCYTES NFR BLD AUTO: 25.2 % (ref 19.6–45.3)
MCH RBC QN AUTO: 30.1 PG (ref 26.6–33)
MCHC RBC AUTO-ENTMCNC: 31.4 G/DL (ref 31.5–35.7)
MCV RBC AUTO: 95.8 FL (ref 79–97)
MONOCYTES # BLD AUTO: 0.18 10*3/MM3 (ref 0.1–0.9)
MONOCYTES NFR BLD AUTO: 7.7 % (ref 5–12)
NEUTROPHILS NFR BLD AUTO: 1.39 10*3/MM3 (ref 1.7–7)
NEUTROPHILS NFR BLD AUTO: 59.4 % (ref 42.7–76)
NRBC BLD AUTO-RTO: 0 /100 WBC (ref 0–0.2)
PLATELET # BLD AUTO: 37 10*3/MM3 (ref 140–450)
PMV BLD AUTO: 13.2 FL (ref 6–12)
RBC # BLD AUTO: 3.09 10*6/MM3 (ref 4.14–5.8)
WBC NRBC COR # BLD AUTO: 2.34 10*3/MM3 (ref 3.4–10.8)

## 2024-07-12 PROCEDURE — 36415 COLL VENOUS BLD VENIPUNCTURE: CPT

## 2024-07-12 PROCEDURE — 85025 COMPLETE CBC W/AUTO DIFF WBC: CPT

## 2024-07-12 PROCEDURE — 25010000002 EPOETIN ALFA PER 1000 UNITS: Performed by: INTERNAL MEDICINE

## 2024-07-12 PROCEDURE — 96372 THER/PROPH/DIAG INJ SC/IM: CPT

## 2024-07-12 RX ADMIN — EPOETIN ALFA 37000 UNITS: 20000 SOLUTION INTRAVENOUS; SUBCUTANEOUS at 11:51

## 2024-07-18 ENCOUNTER — HOSPITAL ENCOUNTER (OUTPATIENT)
Dept: ULTRASOUND IMAGING | Facility: HOSPITAL | Age: 74
Discharge: HOME | End: 2024-07-18
Payer: MEDICARE

## 2024-07-18 VITALS
RESPIRATION RATE: 16 BRPM | SYSTOLIC BLOOD PRESSURE: 167 MMHG | HEART RATE: 58 BPM | OXYGEN SATURATION: 99 % | TEMPERATURE: 97.3 F | WEIGHT: 197 LBS | HEIGHT: 65 IN | DIASTOLIC BLOOD PRESSURE: 61 MMHG | BODY MASS INDEX: 32.82 KG/M2

## 2024-07-18 DIAGNOSIS — R18.8 CIRRHOSIS OF LIVER WITH ASCITES, UNSPECIFIED HEPATIC CIRRHOSIS TYPE: ICD-10-CM

## 2024-07-18 DIAGNOSIS — K74.60 CIRRHOSIS OF LIVER WITH ASCITES, UNSPECIFIED HEPATIC CIRRHOSIS TYPE: ICD-10-CM

## 2024-07-18 LAB
INR PPP: 1.2 (ref 0.8–1.2)
PROTHROMBIN TIME: 12.4 SECONDS (ref 12.8–15.2)

## 2024-07-18 PROCEDURE — 76942 ECHO GUIDE FOR BIOPSY: CPT

## 2024-07-18 PROCEDURE — 85610 PROTHROMBIN TIME: CPT

## 2024-07-18 RX ORDER — TRAZODONE HYDROCHLORIDE 50 MG/1
TABLET ORAL
COMMUNITY
Start: 2024-07-03

## 2024-07-18 RX ORDER — SODIUM CHLORIDE 0.9 % (FLUSH) 0.9 %
10 SYRINGE (ML) INJECTION AS NEEDED
Status: DISCONTINUED | OUTPATIENT
Start: 2024-07-18 | End: 2024-07-19 | Stop reason: HOSPADM

## 2024-07-18 NOTE — NURSING NOTE
No fluid to remove per MD and Tech; pt and wife directed to main lobby for exit; no s/s of distress noted

## 2024-07-18 NOTE — DISCHARGE INSTRUCTIONS
EDUCATION /DISCHARGE INSTRUCTIONS  Paracentesis:  A needle is inserted into the space between your abdominal organs and the membrane that surrounds them (peritoneal space).  It is done for the diagnosis and treatment of fluid that is resistant to other therapies.  It helps determine the cause of the fluid and at the relieves pressure created by the fluid.  A sample is obtained and sent to the laboratory for study.  During the procedure:  You will lie on a bed on your back with your legs drawn up.  Your abdomen will be exposed from the chest to the pelvis.  You will otherwise be covered to maintain comfort.  A physician will clean your abdomen with antiseptic soap, place a sterile towel around the site and administer a local anesthetic to numb the area.  The physician will insert a needle into your abdominal wall.  There may be a popping sound which signifies the needle has pierced the abdominal wall. Next, the physician will attach tubing to transfer a sample into a collection bottle.  After the fluid is obtained the needle will be removed.  A pressure dressing is applied to the site.  Risks of the procedure include but are not limited to:   *  Bleeding    *  Wound infection   *  Low blood pressure   *  Decreased urination   *  Low sodium if a large amount of fluid is removed   *  Puncture of abdominal organs by the needle    Benefits of the procedure:  Benefits include the removal of fluid from the abdomen, relief of abdominal pressure and facilitation of a diagnosis.  Alternatives to the procedure:  Possible alternatives are diuretic drug therapy or surgery to place a shunt to drain fluid.  Risks of diuretic drug therapy include possible dehydration and renal failure.  The benefit of drug therapy is that it can be done at home under physician supervision.  Risks of shunt placement include exposure to anesthesia, infection, excessive bleeding and injury to abdominal organs.  The benefit of a shunt is that it can be  used to drain fluid over a longer period of time.  THIS EDUCATION INFORMATION WAS REVIEWED PRIOR TO THE PROCEDURE AND CONSENT. Patient initials__RH________________Time___1146______________    Post procedure: (Follow all the instructions below carefully)   *  Weigh yourself daily.   *  Follow your doctors dietary instructions.   *  Rest today (no pushing pulling, straining or heavy lifting).   *  Slowly increase activity tomorow.   *  If you received sedation do not drive for 24 hours.              * Skin affix  applied to puncture site. Do not try to remove, scratch or apply lotion   * Skin affix will fall off on it's own   *  You may shower tomorrow  Call your doctor if experiencing:   *  Signs of infection such as redness, swelling, excessive pain and / or foul       smelling drainage from the puncture site.   *  Chills or fever over 101 degrees (by mouth).   *  Fainting or any noted Rapid weight gain/loss   *  Unrelieved pain or any new or severe symptoms  Following the procedure:      Follow-up with the ordering physician as directed.   Continue to take other medications as directed by your physician unless    otherwise instructed.   If applicable, resume taking your blood thinners or Aspirin in 24 hours.              If you have any concerns please call the Radiology Nurses Desk at [phone]

## 2024-07-26 ENCOUNTER — LAB (OUTPATIENT)
Dept: LAB | Facility: HOSPITAL | Age: 74
End: 2024-07-26
Payer: MEDICARE

## 2024-07-26 ENCOUNTER — INFUSION (OUTPATIENT)
Dept: ONCOLOGY | Facility: HOSPITAL | Age: 74
End: 2024-07-26
Payer: MEDICARE

## 2024-07-26 DIAGNOSIS — N18.30 STAGE 3 CHRONIC KIDNEY DISEASE, UNSPECIFIED WHETHER STAGE 3A OR 3B CKD: ICD-10-CM

## 2024-07-26 DIAGNOSIS — N18.9 ANEMIA DUE TO CHRONIC RENAL FAILURE TREATED WITH ERYTHROPOIETIN, UNSPECIFIED STAGE: ICD-10-CM

## 2024-07-26 DIAGNOSIS — C22.0 HEPATOCELLULAR CARCINOMA: ICD-10-CM

## 2024-07-26 DIAGNOSIS — D63.1 ANEMIA DUE TO CHRONIC RENAL FAILURE TREATED WITH ERYTHROPOIETIN, UNSPECIFIED STAGE: ICD-10-CM

## 2024-07-26 DIAGNOSIS — N18.9 ANEMIA DUE TO CHRONIC RENAL FAILURE TREATED WITH ERYTHROPOIETIN, UNSPECIFIED STAGE: Primary | ICD-10-CM

## 2024-07-26 DIAGNOSIS — D63.1 ANEMIA DUE TO CHRONIC RENAL FAILURE TREATED WITH ERYTHROPOIETIN, UNSPECIFIED STAGE: Primary | ICD-10-CM

## 2024-07-26 LAB
BASOPHILS # BLD AUTO: 0.02 10*3/MM3 (ref 0–0.2)
BASOPHILS NFR BLD AUTO: 0.7 % (ref 0–1.5)
DEPRECATED RDW RBC AUTO: 49.6 FL (ref 37–54)
EOSINOPHIL # BLD AUTO: 0.15 10*3/MM3 (ref 0–0.4)
EOSINOPHIL NFR BLD AUTO: 5.1 % (ref 0.3–6.2)
ERYTHROCYTE [DISTWIDTH] IN BLOOD BY AUTOMATED COUNT: 14.2 % (ref 12.3–15.4)
HCT VFR BLD AUTO: 30.7 % (ref 37.5–51)
HGB BLD-MCNC: 9.8 G/DL (ref 13–17.7)
IMM GRANULOCYTES # BLD AUTO: 0.01 10*3/MM3 (ref 0–0.05)
IMM GRANULOCYTES NFR BLD AUTO: 0.3 % (ref 0–0.5)
LYMPHOCYTES # BLD AUTO: 0.61 10*3/MM3 (ref 0.7–3.1)
LYMPHOCYTES NFR BLD AUTO: 20.8 % (ref 19.6–45.3)
MCH RBC QN AUTO: 30.4 PG (ref 26.6–33)
MCHC RBC AUTO-ENTMCNC: 31.9 G/DL (ref 31.5–35.7)
MCV RBC AUTO: 95.3 FL (ref 79–97)
MONOCYTES # BLD AUTO: 0.22 10*3/MM3 (ref 0.1–0.9)
MONOCYTES NFR BLD AUTO: 7.5 % (ref 5–12)
NEUTROPHILS NFR BLD AUTO: 1.92 10*3/MM3 (ref 1.7–7)
NEUTROPHILS NFR BLD AUTO: 65.6 % (ref 42.7–76)
NRBC BLD AUTO-RTO: 0 /100 WBC (ref 0–0.2)
PLATELET # BLD AUTO: 48 10*3/MM3 (ref 140–450)
PMV BLD AUTO: 14.4 FL (ref 6–12)
RBC # BLD AUTO: 3.22 10*6/MM3 (ref 4.14–5.8)
WBC NRBC COR # BLD AUTO: 2.93 10*3/MM3 (ref 3.4–10.8)

## 2024-07-26 PROCEDURE — 36415 COLL VENOUS BLD VENIPUNCTURE: CPT

## 2024-07-26 PROCEDURE — 25010000002 EPOETIN ALFA PER 1000 UNITS: Performed by: INTERNAL MEDICINE

## 2024-07-26 PROCEDURE — 96372 THER/PROPH/DIAG INJ SC/IM: CPT

## 2024-07-26 PROCEDURE — 85025 COMPLETE CBC W/AUTO DIFF WBC: CPT

## 2024-07-26 RX ADMIN — EPOETIN ALFA 37000 UNITS: 20000 SOLUTION INTRAVENOUS; SUBCUTANEOUS at 11:27

## 2024-08-01 ENCOUNTER — HOSPITAL ENCOUNTER (OUTPATIENT)
Dept: MRI IMAGING | Facility: HOSPITAL | Age: 74
Discharge: HOME | End: 2024-08-01
Admitting: INTERNAL MEDICINE
Payer: MEDICARE

## 2024-08-01 DIAGNOSIS — R18.8 CIRRHOSIS OF LIVER WITH ASCITES, UNSPECIFIED HEPATIC CIRRHOSIS TYPE: ICD-10-CM

## 2024-08-01 DIAGNOSIS — K74.60 CIRRHOSIS OF LIVER WITH ASCITES, UNSPECIFIED HEPATIC CIRRHOSIS TYPE: ICD-10-CM

## 2024-08-01 PROCEDURE — 74181 MRI ABDOMEN W/O CONTRAST: CPT

## 2024-08-01 PROCEDURE — 82565 ASSAY OF CREATININE: CPT

## 2024-08-02 LAB — CREAT BLDA-MCNC: 2.7 MG/DL (ref 0.6–1.3)

## 2024-08-09 ENCOUNTER — LAB (OUTPATIENT)
Dept: LAB | Facility: HOSPITAL | Age: 74
End: 2024-08-09
Payer: MEDICARE

## 2024-08-09 ENCOUNTER — INFUSION (OUTPATIENT)
Dept: ONCOLOGY | Facility: HOSPITAL | Age: 74
End: 2024-08-09
Payer: MEDICARE

## 2024-08-09 DIAGNOSIS — N18.30 STAGE 3 CHRONIC KIDNEY DISEASE, UNSPECIFIED WHETHER STAGE 3A OR 3B CKD: ICD-10-CM

## 2024-08-09 DIAGNOSIS — D63.1 ANEMIA DUE TO CHRONIC RENAL FAILURE TREATED WITH ERYTHROPOIETIN, UNSPECIFIED STAGE: ICD-10-CM

## 2024-08-09 DIAGNOSIS — C22.0 HEPATOCELLULAR CARCINOMA: ICD-10-CM

## 2024-08-09 DIAGNOSIS — N18.9 ANEMIA DUE TO CHRONIC RENAL FAILURE TREATED WITH ERYTHROPOIETIN, UNSPECIFIED STAGE: ICD-10-CM

## 2024-08-09 LAB
BASOPHILS # BLD AUTO: 0.02 10*3/MM3 (ref 0–0.2)
BASOPHILS NFR BLD AUTO: 0.7 % (ref 0–1.5)
DEPRECATED RDW RBC AUTO: 46.5 FL (ref 37–54)
EOSINOPHIL # BLD AUTO: 0.18 10*3/MM3 (ref 0–0.4)
EOSINOPHIL NFR BLD AUTO: 5.9 % (ref 0.3–6.2)
ERYTHROCYTE [DISTWIDTH] IN BLOOD BY AUTOMATED COUNT: 13.6 % (ref 12.3–15.4)
HCT VFR BLD AUTO: 32.8 % (ref 37.5–51)
HGB BLD-MCNC: 10.8 G/DL (ref 13–17.7)
IMM GRANULOCYTES # BLD AUTO: 0.03 10*3/MM3 (ref 0–0.05)
IMM GRANULOCYTES NFR BLD AUTO: 1 % (ref 0–0.5)
LYMPHOCYTES # BLD AUTO: 0.76 10*3/MM3 (ref 0.7–3.1)
LYMPHOCYTES NFR BLD AUTO: 24.8 % (ref 19.6–45.3)
MCH RBC QN AUTO: 30.8 PG (ref 26.6–33)
MCHC RBC AUTO-ENTMCNC: 32.9 G/DL (ref 31.5–35.7)
MCV RBC AUTO: 93.4 FL (ref 79–97)
MONOCYTES # BLD AUTO: 0.27 10*3/MM3 (ref 0.1–0.9)
MONOCYTES NFR BLD AUTO: 8.8 % (ref 5–12)
NEUTROPHILS NFR BLD AUTO: 1.8 10*3/MM3 (ref 1.7–7)
NEUTROPHILS NFR BLD AUTO: 58.8 % (ref 42.7–76)
NRBC BLD AUTO-RTO: 0 /100 WBC (ref 0–0.2)
PLATELET # BLD AUTO: 36 10*3/MM3 (ref 140–450)
RBC # BLD AUTO: 3.51 10*6/MM3 (ref 4.14–5.8)
WBC NRBC COR # BLD AUTO: 3.06 10*3/MM3 (ref 3.4–10.8)

## 2024-08-09 PROCEDURE — 85025 COMPLETE CBC W/AUTO DIFF WBC: CPT

## 2024-08-09 PROCEDURE — G0463 HOSPITAL OUTPT CLINIC VISIT: HCPCS

## 2024-08-09 PROCEDURE — 36415 COLL VENOUS BLD VENIPUNCTURE: CPT

## 2024-08-23 ENCOUNTER — INFUSION (OUTPATIENT)
Dept: ONCOLOGY | Facility: HOSPITAL | Age: 74
End: 2024-08-23
Payer: MEDICARE

## 2024-08-23 ENCOUNTER — LAB (OUTPATIENT)
Dept: LAB | Facility: HOSPITAL | Age: 74
End: 2024-08-23
Payer: MEDICARE

## 2024-08-23 DIAGNOSIS — N18.9 ANEMIA DUE TO CHRONIC RENAL FAILURE TREATED WITH ERYTHROPOIETIN, UNSPECIFIED STAGE: ICD-10-CM

## 2024-08-23 DIAGNOSIS — N18.30 STAGE 3 CHRONIC KIDNEY DISEASE, UNSPECIFIED WHETHER STAGE 3A OR 3B CKD: ICD-10-CM

## 2024-08-23 DIAGNOSIS — D63.1 ANEMIA DUE TO CHRONIC RENAL FAILURE TREATED WITH ERYTHROPOIETIN, UNSPECIFIED STAGE: ICD-10-CM

## 2024-08-23 DIAGNOSIS — C22.0 HEPATOCELLULAR CARCINOMA: ICD-10-CM

## 2024-08-23 LAB
BASOPHILS # BLD AUTO: 0.03 10*3/MM3 (ref 0–0.2)
BASOPHILS NFR BLD AUTO: 0.9 % (ref 0–1.5)
DEPRECATED RDW RBC AUTO: 45.3 FL (ref 37–54)
EOSINOPHIL # BLD AUTO: 0.21 10*3/MM3 (ref 0–0.4)
EOSINOPHIL NFR BLD AUTO: 6.3 % (ref 0.3–6.2)
ERYTHROCYTE [DISTWIDTH] IN BLOOD BY AUTOMATED COUNT: 13.4 % (ref 12.3–15.4)
HCT VFR BLD AUTO: 33.3 % (ref 37.5–51)
HGB BLD-MCNC: 11.2 G/DL (ref 13–17.7)
IMM GRANULOCYTES # BLD AUTO: 0.03 10*3/MM3 (ref 0–0.05)
IMM GRANULOCYTES NFR BLD AUTO: 0.9 % (ref 0–0.5)
LYMPHOCYTES # BLD AUTO: 0.77 10*3/MM3 (ref 0.7–3.1)
LYMPHOCYTES NFR BLD AUTO: 23 % (ref 19.6–45.3)
MCH RBC QN AUTO: 31 PG (ref 26.6–33)
MCHC RBC AUTO-ENTMCNC: 33.6 G/DL (ref 31.5–35.7)
MCV RBC AUTO: 92.2 FL (ref 79–97)
MONOCYTES # BLD AUTO: 0.28 10*3/MM3 (ref 0.1–0.9)
MONOCYTES NFR BLD AUTO: 8.4 % (ref 5–12)
NEUTROPHILS NFR BLD AUTO: 2.03 10*3/MM3 (ref 1.7–7)
NEUTROPHILS NFR BLD AUTO: 60.5 % (ref 42.7–76)
NRBC BLD AUTO-RTO: 0 /100 WBC (ref 0–0.2)
PLATELET # BLD AUTO: 44 10*3/MM3 (ref 140–450)
PMV BLD AUTO: 14.7 FL (ref 6–12)
RBC # BLD AUTO: 3.61 10*6/MM3 (ref 4.14–5.8)
WBC NRBC COR # BLD AUTO: 3.35 10*3/MM3 (ref 3.4–10.8)

## 2024-08-23 PROCEDURE — 36415 COLL VENOUS BLD VENIPUNCTURE: CPT

## 2024-08-23 PROCEDURE — 85025 COMPLETE CBC W/AUTO DIFF WBC: CPT

## 2024-08-23 NOTE — PROGRESS NOTES
Hgb today is 11.2.  No procrit today per guidelines.  Patient without questions or concerns at this time.   Given copy of labs.

## 2024-09-06 ENCOUNTER — INFUSION (OUTPATIENT)
Dept: ONCOLOGY | Facility: HOSPITAL | Age: 74
End: 2024-09-06
Payer: MEDICARE

## 2024-09-06 ENCOUNTER — LAB (OUTPATIENT)
Dept: LAB | Facility: HOSPITAL | Age: 74
End: 2024-09-06
Payer: MEDICARE

## 2024-09-06 DIAGNOSIS — C22.0 HEPATOCELLULAR CARCINOMA: ICD-10-CM

## 2024-09-06 DIAGNOSIS — N18.4 CKD (CHRONIC KIDNEY DISEASE) STAGE 4, GFR 15-29 ML/MIN: Primary | ICD-10-CM

## 2024-09-06 DIAGNOSIS — N18.30 STAGE 3 CHRONIC KIDNEY DISEASE, UNSPECIFIED WHETHER STAGE 3A OR 3B CKD: ICD-10-CM

## 2024-09-06 DIAGNOSIS — N18.9 ANEMIA DUE TO CHRONIC RENAL FAILURE TREATED WITH ERYTHROPOIETIN, UNSPECIFIED STAGE: ICD-10-CM

## 2024-09-06 DIAGNOSIS — D63.1 ANEMIA DUE TO CHRONIC RENAL FAILURE TREATED WITH ERYTHROPOIETIN, UNSPECIFIED STAGE: ICD-10-CM

## 2024-09-06 LAB
ALBUMIN SERPL-MCNC: 3.6 G/DL (ref 3.5–5.2)
ANION GAP SERPL CALCULATED.3IONS-SCNC: 12.5 MMOL/L (ref 5–15)
BACTERIA UR QL AUTO: NEGATIVE /HPF
BASOPHILS # BLD AUTO: 0.02 10*3/MM3 (ref 0–0.2)
BASOPHILS NFR BLD AUTO: 0.7 % (ref 0–1.5)
BILIRUB UR QL STRIP: NEGATIVE
BUN SERPL-MCNC: 44 MG/DL (ref 8–23)
BUN/CREAT SERPL: 18.7 (ref 7–25)
CALCIUM SPEC-SCNC: 9 MG/DL (ref 8.6–10.5)
CHLORIDE SERPL-SCNC: 103 MMOL/L (ref 98–107)
CLARITY UR: CLEAR
CO2 SERPL-SCNC: 22.5 MMOL/L (ref 22–29)
COLOR UR: YELLOW
CREAT SERPL-MCNC: 2.35 MG/DL (ref 0.76–1.27)
CREAT UR-MCNC: 26.3 MG/DL
DEPRECATED RDW RBC AUTO: 46.2 FL (ref 37–54)
EGFRCR SERPLBLD CKD-EPI 2021: 28.5 ML/MIN/1.73
EOSINOPHIL # BLD AUTO: 0.19 10*3/MM3 (ref 0–0.4)
EOSINOPHIL NFR BLD AUTO: 7 % (ref 0.3–6.2)
ERYTHROCYTE [DISTWIDTH] IN BLOOD BY AUTOMATED COUNT: 13.6 % (ref 12.3–15.4)
GLUCOSE SERPL-MCNC: 194 MG/DL (ref 65–99)
GLUCOSE UR STRIP-MCNC: ABNORMAL MG/DL
HCT VFR BLD AUTO: 31.8 % (ref 37.5–51)
HGB BLD-MCNC: 10.4 G/DL (ref 13–17.7)
HGB UR QL STRIP.AUTO: NEGATIVE
IMM GRANULOCYTES # BLD AUTO: 0.01 10*3/MM3 (ref 0–0.05)
IMM GRANULOCYTES NFR BLD AUTO: 0.4 % (ref 0–0.5)
KETONES UR QL STRIP: NEGATIVE
LEUKOCYTE ESTERASE UR QL STRIP.AUTO: NEGATIVE
LYMPHOCYTES # BLD AUTO: 0.6 10*3/MM3 (ref 0.7–3.1)
LYMPHOCYTES NFR BLD AUTO: 22 % (ref 19.6–45.3)
MCH RBC QN AUTO: 30.8 PG (ref 26.6–33)
MCHC RBC AUTO-ENTMCNC: 32.7 G/DL (ref 31.5–35.7)
MCV RBC AUTO: 94.1 FL (ref 79–97)
MONOCYTES # BLD AUTO: 0.2 10*3/MM3 (ref 0.1–0.9)
MONOCYTES NFR BLD AUTO: 7.3 % (ref 5–12)
NEUTROPHILS NFR BLD AUTO: 1.71 10*3/MM3 (ref 1.7–7)
NEUTROPHILS NFR BLD AUTO: 62.6 % (ref 42.7–76)
NITRITE UR QL STRIP: NEGATIVE
NRBC BLD AUTO-RTO: 0 /100 WBC (ref 0–0.2)
PH UR STRIP.AUTO: 5.5 [PH] (ref 4.5–8)
PHOSPHATE SERPL-MCNC: 4.1 MG/DL (ref 2.5–4.5)
PLATELET # BLD AUTO: 35 10*3/MM3 (ref 140–450)
PMV BLD AUTO: 12.7 FL (ref 6–12)
POTASSIUM SERPL-SCNC: 4.1 MMOL/L (ref 3.5–5.2)
PROT ?TM UR-MCNC: <4 MG/DL
PROT UR QL STRIP: NEGATIVE
PROT/CREAT UR: NORMAL MG/G{CREAT}
PTH-INTACT SERPL-MCNC: 102 PG/ML (ref 15–65)
RBC # BLD AUTO: 3.38 10*6/MM3 (ref 4.14–5.8)
RBC # UR STRIP: NORMAL /HPF
REF LAB TEST METHOD: NORMAL
SODIUM SERPL-SCNC: 138 MMOL/L (ref 136–145)
SP GR UR STRIP: 1.01 (ref 1–1.03)
SQUAMOUS #/AREA URNS HPF: NORMAL /HPF
UROBILINOGEN UR QL STRIP: ABNORMAL
WBC # UR STRIP: NORMAL /HPF
WBC NRBC COR # BLD AUTO: 2.73 10*3/MM3 (ref 3.4–10.8)

## 2024-09-06 PROCEDURE — 82570 ASSAY OF URINE CREATININE: CPT | Performed by: INTERNAL MEDICINE

## 2024-09-06 PROCEDURE — 85025 COMPLETE CBC W/AUTO DIFF WBC: CPT

## 2024-09-06 PROCEDURE — 84156 ASSAY OF PROTEIN URINE: CPT | Performed by: INTERNAL MEDICINE

## 2024-09-06 PROCEDURE — 36415 COLL VENOUS BLD VENIPUNCTURE: CPT

## 2024-09-06 PROCEDURE — 83970 ASSAY OF PARATHORMONE: CPT | Performed by: INTERNAL MEDICINE

## 2024-09-06 PROCEDURE — 81001 URINALYSIS AUTO W/SCOPE: CPT

## 2024-09-06 PROCEDURE — 80069 RENAL FUNCTION PANEL: CPT | Performed by: INTERNAL MEDICINE

## 2024-09-20 ENCOUNTER — LAB (OUTPATIENT)
Dept: LAB | Facility: HOSPITAL | Age: 74
End: 2024-09-20
Payer: MEDICARE

## 2024-09-20 ENCOUNTER — INFUSION (OUTPATIENT)
Dept: ONCOLOGY | Facility: HOSPITAL | Age: 74
End: 2024-09-20
Payer: MEDICARE

## 2024-09-20 ENCOUNTER — OFFICE VISIT (OUTPATIENT)
Dept: ONCOLOGY | Facility: CLINIC | Age: 74
End: 2024-09-20
Payer: MEDICARE

## 2024-09-20 VITALS
DIASTOLIC BLOOD PRESSURE: 56 MMHG | TEMPERATURE: 98.1 F | RESPIRATION RATE: 16 BRPM | SYSTOLIC BLOOD PRESSURE: 156 MMHG | OXYGEN SATURATION: 95 % | WEIGHT: 197.5 LBS | HEART RATE: 51 BPM | BODY MASS INDEX: 32.9 KG/M2 | HEIGHT: 65 IN

## 2024-09-20 DIAGNOSIS — N18.30 STAGE 3 CHRONIC KIDNEY DISEASE, UNSPECIFIED WHETHER STAGE 3A OR 3B CKD: ICD-10-CM

## 2024-09-20 DIAGNOSIS — D63.1 ANEMIA DUE TO CHRONIC RENAL FAILURE TREATED WITH ERYTHROPOIETIN, UNSPECIFIED STAGE: ICD-10-CM

## 2024-09-20 DIAGNOSIS — D63.1 ANEMIA DUE TO CHRONIC RENAL FAILURE TREATED WITH ERYTHROPOIETIN, UNSPECIFIED STAGE: Primary | ICD-10-CM

## 2024-09-20 DIAGNOSIS — N18.9 ANEMIA DUE TO CHRONIC RENAL FAILURE TREATED WITH ERYTHROPOIETIN, UNSPECIFIED STAGE: Primary | ICD-10-CM

## 2024-09-20 DIAGNOSIS — C22.0 HEPATOCELLULAR CARCINOMA: ICD-10-CM

## 2024-09-20 DIAGNOSIS — K74.60 CIRRHOSIS OF LIVER WITH ASCITES, UNSPECIFIED HEPATIC CIRRHOSIS TYPE: ICD-10-CM

## 2024-09-20 DIAGNOSIS — R18.8 CIRRHOSIS OF LIVER WITH ASCITES, UNSPECIFIED HEPATIC CIRRHOSIS TYPE: ICD-10-CM

## 2024-09-20 DIAGNOSIS — N18.9 ANEMIA DUE TO CHRONIC RENAL FAILURE TREATED WITH ERYTHROPOIETIN, UNSPECIFIED STAGE: ICD-10-CM

## 2024-09-20 LAB
BASOPHILS # BLD AUTO: 0.03 10*3/MM3 (ref 0–0.2)
BASOPHILS NFR BLD AUTO: 0.9 % (ref 0–1.5)
DEPRECATED RDW RBC AUTO: 47.5 FL (ref 37–54)
EOSINOPHIL # BLD AUTO: 0.2 10*3/MM3 (ref 0–0.4)
EOSINOPHIL NFR BLD AUTO: 6.3 % (ref 0.3–6.2)
ERYTHROCYTE [DISTWIDTH] IN BLOOD BY AUTOMATED COUNT: 14.1 % (ref 12.3–15.4)
FERRITIN SERPL-MCNC: 115 NG/ML (ref 30–400)
HCT VFR BLD AUTO: 33.3 % (ref 37.5–51)
HGB BLD-MCNC: 11.1 G/DL (ref 13–17.7)
IMM GRANULOCYTES # BLD AUTO: 0.01 10*3/MM3 (ref 0–0.05)
IMM GRANULOCYTES NFR BLD AUTO: 0.3 % (ref 0–0.5)
IRON 24H UR-MRATE: 103 MCG/DL (ref 59–158)
IRON SATN MFR SERPL: 31 % (ref 20–50)
LYMPHOCYTES # BLD AUTO: 0.88 10*3/MM3 (ref 0.7–3.1)
LYMPHOCYTES NFR BLD AUTO: 27.7 % (ref 19.6–45.3)
MCH RBC QN AUTO: 30.9 PG (ref 26.6–33)
MCHC RBC AUTO-ENTMCNC: 33.3 G/DL (ref 31.5–35.7)
MCV RBC AUTO: 92.8 FL (ref 79–97)
MONOCYTES # BLD AUTO: 0.25 10*3/MM3 (ref 0.1–0.9)
MONOCYTES NFR BLD AUTO: 7.9 % (ref 5–12)
NEUTROPHILS NFR BLD AUTO: 1.81 10*3/MM3 (ref 1.7–7)
NEUTROPHILS NFR BLD AUTO: 56.9 % (ref 42.7–76)
NRBC BLD AUTO-RTO: 0 /100 WBC (ref 0–0.2)
PLATELET # BLD AUTO: 48 10*3/MM3 (ref 140–450)
PMV BLD AUTO: 13.5 FL (ref 6–12)
RBC # BLD AUTO: 3.59 10*6/MM3 (ref 4.14–5.8)
TIBC SERPL-MCNC: 334 MCG/DL (ref 298–536)
TRANSFERRIN SERPL-MCNC: 224 MG/DL (ref 200–360)
WBC NRBC COR # BLD AUTO: 3.18 10*3/MM3 (ref 3.4–10.8)

## 2024-09-20 PROCEDURE — 84466 ASSAY OF TRANSFERRIN: CPT

## 2024-09-20 PROCEDURE — 85025 COMPLETE CBC W/AUTO DIFF WBC: CPT

## 2024-09-20 PROCEDURE — 83540 ASSAY OF IRON: CPT

## 2024-09-20 PROCEDURE — 82728 ASSAY OF FERRITIN: CPT

## 2024-09-20 PROCEDURE — 36415 COLL VENOUS BLD VENIPUNCTURE: CPT

## 2024-09-20 PROCEDURE — G0463 HOSPITAL OUTPT CLINIC VISIT: HCPCS

## 2024-09-20 RX ORDER — PSEUDOEPHEDRINE HCL 30 MG
100 TABLET ORAL
COMMUNITY
Start: 2024-06-10 | End: 2025-06-10

## 2024-09-20 RX ORDER — PEN NEEDLE, DIABETIC 31 GX5/16"
NEEDLE, DISPOSABLE MISCELLANEOUS
COMMUNITY
Start: 2024-07-22

## 2024-09-20 NOTE — PROGRESS NOTES
.     REASON FOR FOLLOWUP:     *. Pancytopenia secondary to liver cirrhosis   Thrombocytopenia secondary to liver cirrhosis.      Mild leukocytopenia and low normal neutrophils  *. Iron deficiency anemia discovered on 1/13/2021.  Patient was started on oral iron 3 times a day.   Because of poor iron absorption, received 2 dose of Injectafer 750 mg x 2 in June 2022.   *. Newly discovered liver lesion.  Primary hepatocellular carcinoma, biopsy confirmed on 11/16/2022. Status post CT guided ablation of the liver lesion on 12/6/2022.       HISTORY OF PRESENT ILLNESS:  The patient is a 74 y.o. year old male with hypertension diabetes, pancytopenia secondary to liver cirrhosis caused by RODRIGUEZ, and hypogonadism, hepatocellular carcinoma status post ablation, who presents for follow-up evaluation.    History of Present Illness  The patient is here today 9/20/2024 for an evaluation.    He reports feeling fatigued, with no recent changes in his condition.  He has noticed bruising and the development of boils on his leg, which he suspects may be a side effect of his medication. He has been applying bacitracin and an antibiotic to the boils, which seems to be effective, but he is unsure of the cause. He has read about potential side effects of propranolol, including leg ulcers and a raccoon mask-like symptom associated with lupus.    His hemoglobin levels have remained stable since August 2024, as confirmed by his last four visits.    He also mentions that during his last visit, they were unable to use contrast dye for his MRI.    Results    Imaging  MRI for the abdomen on 8/1/2024 reported cirrhotic liver morphology with segment eight ablation site measures 2.8 x 2.4 x 2 cm which is improvement compared to data from December 8, 2023 but is stable compared to that from 6/19/2024.  There is a  approximately 1 cm hyperintense lesion in the right hepatic dome which is stable.       Laboratory Studies  Hemoglobin is 11.1.  Platelets are 48,000. White cells are 3180. Neutrophils are 1810. Ferritin is 115. Iron saturation is 31%. Free iron is 103.      Past Medical History:   Diagnosis Date    Cancer     liver cancer    H/O Dental disorder     History of thrombocytopenia     Hypertension     RODRIGUEZ (nonalcoholic steatohepatitis)     Psoriasis     Type 2 diabetes mellitus      Past Surgical History:   Procedure Laterality Date    CATARACT EXTRACTION, BILATERAL      COLONOSCOPY  11/2015    ROTATOR CUFF REPAIR Left 2005     HEMATOLOGY HISTORY:  The patient is a 70 y.o. year old male with history of hypertension diabetes, RODRIGUEZ, and hypogonadism who presented today on 1/13/2021 for initial evaluation because of thrombocytopenia, referred by his primary care physician Dr. Joseph.      Patient reports he has no limitation of activity.  He does have chronic joint pains, but of note joint swelling.  His skin pruritus.  No skin rashes.  He does have easy bruising, however denies evidence of bleeding such as epistaxis, gum bleeding, rectal bleeding or hematuria.  Denies weight loss.  No low fever.  Appetite is reasonable.    I reviewed his outside laboratory studies.  Most recent also lab on 10/22/2020 reported hemoglobin 10.5, MCV 89.6, WBC 3200, including neutrophils 1900, lymphocytes 800 monocytes 300, and platelets 62,000.    Liver study on 9/2/2020 reported WBC 3200, including ANC 1900 lymphocytes 1000 monocytes 300, hemoglobin 10.5, MCV 80.0, and platelets 65,000.    His earliest CBC results available for review was from 5/8/2019 which reported WBC 4200, including ANC 2600, lymphocytes 1200 and monocytes 400, hemoglobin 13.4 and platelets 95,000.  At that time chemistry lab reported elevated liver panel including  , alk phosphatase 87 and a total bilirubin 1.1.  Total serum protein 7.0 albumin 4.1 and the globulin 2.9.  His creatinine was 1.31, with glucose of 219 been in normal electrolytes.  Hemoglobin A1c was  8.3%.    Laboratory studies on 1/13/2021 reported mild anemia with Hb 10.9, thrombocytopenia platelets 48,000, and IPF 7.0%, leukocytopenia WBC 3250 including ANC 1830.  Other labs reported no evidence of hemolysis, had supratherapeutic B12 level 1413 pg/mL and folate > 20 ng/mL.  He does have evidence of iron deficiency with low iron sats 10% and ferritin only 41.5 ng/mL in the setting of anemia with Hb 10.9.  His erythropoietin is not elevated accordingly, which indicates lack of response from his kidney to produce erythropoietin.     Currently is not a candidate for CONRAD/Procrit treatment.  However he is adamant he is a candidate for oral iron treatment.    We will start him on ferrous sulfate 325 mg 3 times a day.  Will send prescription to his pharmacy.     Laboratory study 6/11/2021 showed a slightly improved hemoglobin 11.9, with stable thrombocytopenia platelets 50,000 IPF 8.3%, and WBC 3720 including ANC 2100.  Iron study reported ferritin 115.3 ng/mL and iron saturation 14% with free iron 55 TIBC 384.    Laboratory studies on 12/17/2021 reported stable pancytopenia with anemia Hb 10.8, with platelets 60,000 and IPF 4.1%, and WBC 3780 including neutrophils 2060 lymphocytes 1040.  Iron study reported slightly trending down ferritin 86.1 ng/mL in the much improved iron saturation 34% with free iron 126 and TIBC 368.      On 6/10/2022 reports patient has abdomen distention for about 3 months.  He also developed bilateral leg edema in December 2021.  Patient also reports worsening dyspnea and weight gain for the past several months..  Per our records, he gained 25 pounds since December 2021.    Patient reports having taken oral iron 3 times a day.  He has mild nausea but not significant and no vomiting.  He has brown-colored stool.  He also reports having loose stool some of days.  No diarrhea.    Patient received a boost dose of COVID-19 vaccine on 11/6/2021.  Patient reports abdomen distention about 3 months,  leg edema 6 months, after the boost dose of COVID (December) boost on 11/6/21    Patient complains of easy bruising on his arms from scratching, he has skin pruritus, however no skin rashes.  He denies spontaneous bleeding such as epistaxis, gum bleeding, or hematochezia.    Lab study on 6/10/2022 reported deteriorating anemia with Hb 9.6, persistent thrombocytopenia with stable platelets 69,000, and low normal WBC 4290 including neutrophils 2980.  Iron studies showed a deteriorating saturation 17%, free iron 61 TIBC 357 with a ferritin 86.8%.    Patient indeed had paracentesis on 6/17/2022 with 11.2 L ascites fluid removed.  Cytology study was negative for malignancy.    Patient reports today that he has worsening abdominal distention, also has dyspnea however no cough hemoptysis.  No chest pain.  He denies fever sweating chills.  Patient also reports worsening bilateral leg edema.    He also recently finished her 2 dose of Injectafer in late June 2022 because of iron deficiency anemia, not responding to oral iron treatment 3 times a day.    Patient continues to have easy bruising however no spontaneous bleeding such as epistaxis or gum bleeding.    Laboratory study on 7/6/2022 reported stable anemia Hb 9.7 and stable thrombocytopenia platelets 65,000, low normal WBC 4260.  Patient has worsening renal function with creatinine 1.36, and supratherapeutic ferritin 644 ng/mL.    Chest x-ray examination on 7/14/2022 reported a right-sided moderate to large pleural effusion.  We will arrange patient to have ultrasound-guided right thoracentesis.  Suspect the patient has pleural effusion due to his liver cirrhosis.  He will have x-ray examination after the thoracentesis per protocol.  We will reassess whether he needs a CT scan for the chest.    This patient also had paracentesis again on 7/14/2022 with 6.2 L ascites fluid removed.      This patient had right thoracentesis with 2 L pleural effusion removed.  Cytology  "studies negative for malignant cells.  He also had multiple paracentesis, in June, July and early August, cytology study was also negative for malignant cells.    Laboratory study on 9/28/2022 reported worsening anemia Hb 8.5, platelets 61,000, WBC 3380 including ANC 1780.  Laboratory study showed significant hyperkalemia potassium 6.4, and worsening renal function creatinine 2.36.      I saw patient recently on 9/20/2022 and he was found having worsening renal function, with hyperkalemia potassium 6.4.  Patient was sent to ER and eventually admitted for several days.  His hemoglobin was 7.5 on 9/30/2022.  Patient was started on Procrit 20,000 units during hospitalization.  After discharge, he has been given Procrit every 2 weeks.     During his hospitalization in end of September 2022, patient had ultrasound for the liver examination on 9/29/2022 which reported a hepatic lesion 27 x 24 x 23 mm, heterogeneous in the anterior liver.  There was evidence of liver cirrhosis.      Patient also had attempted ultrasound-guided paracentesis, however there was not enough ascites to be removed.     Patient also had a venous Doppler study and there was no evidence for portal vein thrombosis.    Patient reports he was seen by his GI specialist Dr. Everardo Foote old records that abdomen MRI examination with and without contrast which was done at the [facility] on 10/26/2022.  This study confirmed liver cirrhosis however it also reported a solitary liver lesion.    This patient had CT-guided core needle biopsy of the liver lesion on 11/16/2022 after 1 unit platelets transfusion.  He did well with the procedure.  Patient reports he feels \"fine.\"  He denies any pain in the area. Denied any kind of bleeding. Denies any fevers after procedure as well.     Patient reports no fever sweating chills.  He is abdomen is slightly distended but no pains.  No nausea vomiting diarrhea.  He has no abdomen pain.  Performance status ECOG " 1-2.     Patient reports he noticed improved energy level since starting on Procrit injection.  Overall he still has chronic fatigue.  He denies evidence of bleeding.    Laboratory study on 11/21/2022 reported baseline platelets 44,000, hemoglobin 8.9, and WBC 2980 including ANC 1670.    Reevaluation after his recent CT-guided ablation of the liver lesion on 12/6/2022 by interventional radiologist Dr. Declan Goodson.    The patient reports that taking oral iron has been really bothering his stomach and he is now only taking once a day. He denies any melena or hematochezia. He also denies any fever or pain. The patient reports quite a bit significant constipation from the oral iron. The patient adds that he has gained some weight, weighing today at 177 pounds and has gained 8 pounds within the last 4 weeks. The patient reports that he has an appointment with Dr. Everardo Foote on 12/29/2022 and Dr. Phill Mooney on 1/27/2022 to discuss his shunt. He also has an EGD tomorrow, 12/14/2022, with Dr. Josh Khan.    Laboratory study on 12/13/2022 reported hemoglobin 9.5 g/dL, MCV 93.6 fL, platelets 47,000/mm3, WBC 3,530/mm3 including neutrophils 2,020/mm3, and lymphocytes 830/mm3. Iron study reported ferritin 105.6 ng/mL, however, iron saturation 13% with free iron 43 mcg/dL and TIBC 332 mcg/dL.     The MRI study obtained on 02/20/2023 reported post ablation defect in the hepatic segment 8 measuring 3.4 x 2.8 cm. The study was done without iv contrast due to chronic renal disease. There was splenomegaly stable at 17.1 cm. No enlarged lymph nodes. No suspicious osseous lesion. There was liver cirrhosis. There was a small to moderate size right sided pleural effusion.    Laboratory studies today on 6/30/2023 reported stable severe thrombocytopenia platelets 40,000, hemoglobin 9.3, and WBC 3200 including neutrophils 1930, lymphocytes 760, and monocytes 270.    Most recent iron study was on 05/05/2023 which reported ferritin  200.7 ng/mL, free iron 55, TIBC 309, and iron saturation 18%.    Recent MRI examination without IV contrast obtained on 06/12/2023 reported limited examination in the absence of intravenous contrast (creatinine 3.3), but it demonstrated a stable right hepatic lobe ablation defect. There was hepatic cirrhosis and splenomegaly.      This patient had MRI of the abdomen without contrast obtained on 12/08/2023. This study reported severely limited exam but a grossly stable hepatic segment 8 ablation defect measuring 3.7 x 2.7 cm. The study was without a contrast due to his chronic adrenal insufficiency.    Laboratory study 12/15/2023  shows hemoglobin 9.3 g/dL, MCV 93.0, MCHC 33.5, platelets 40,000, WBC 3260, including neutrophils 1900, and lymphocytes 850.    His recent laboratory study on 12/01/2023 reported ferritin 360, free iron 76, TIBC 259, and iron saturation 29%. His creatinine was 3.29 and glucose 257.       The patient presented today on 6/28/2024 for evaluation after recent MRI for the abdomen examination obtained on 06/19/2024. The study reported cirrhotic liver morphology and with segment eight ablation site approximately 1 cm. The radiologist recommended further evaluation with multiphase abnormal MRI with IV contrast. There is sequela of portal hypertension including change in the splenomegaly and likely recent splenic infarct. 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 Apparently, this patient has chronic renal insufficiency with a creatinine above 2, which is usually prohibits patient to have IV contrast for MRI or CT scan.     I double checked it with MRI division in the radiology department, and was told that they had been using new IV contrast, and the kidney function is less problem now, and I was also told to even not to check renal function before the MRI examination with IV contrast. So in this case, we certainly would obtain an MRI with IV  contrast as recommended by radiologist. Laboratory study today 06/28/2024 reported worsening hemoglobin 8.7, stable platelets 43,000 and WBC 2700 including neutrophils 1610 and the lymphocytes 620. Most recently on 06/14/2024, his hemoglobin was 10.1, we had to hold his Procrit. He was getting Procrit every 2 weeks at 37,000 units for the previous 4 times. I discussed with the patient, I discussed with the nursing staff, I recommended not to decrease the Procrit dose today and continue at 37,000. Most recently laboratory study on 06/14/2024 reported ferritin 71.2 ng/min, and iron saturation 39 percent with a free air 133. This patient has worsening ferritin level compared to that from 03/08/2024 when he had a ferritin 131.    The patient reports no current new health issues. His pruritus has significantly decreased. He has discontinued simvastatin due to associated pruritus. His nephrologist, Dr. Lagos, discontinued spironolactone due to persistently elevated potassium levels. He was subsequently prescribed sodium bicarbonate supplements and laxatives. His potassium levels have since decreased. He was advised to continue sodium bicarbonate and to discontinue spironolactone. His pharmacist suggested Farxiga or Jardiance due to potential interaction with Procrit. He continues to take iron supplements thrice daily without any reported side effects.              MEDICATIONS    Current Outpatient Medications:     allopurinol (ZYLOPRIM) 100 MG tablet, Take 1 tablet by mouth Daily., Disp: , Rfl:     B-D ULTRAFINE III SHORT PEN 31G X 8 MM misc, , Disp: , Rfl:     carvedilol (COREG) 12.5 MG tablet, , Disp: , Rfl:     docusate sodium 100 MG capsule, Take 1 capsule by mouth., Disp: , Rfl:     docusate sodium 100 MG capsule, Take 1 capsule by mouth., Disp: , Rfl:     empagliflozin (JARDIANCE) 10 MG tablet tablet, Take 1 tablet by mouth Daily., Disp: , Rfl:     ferrous sulfate 325 (65 FE) MG tablet, Take 1 tablet by mouth 3  (Three) Times a Day With Meals. (Patient taking differently: Take 1 tablet by mouth Daily With Breakfast.), Disp: 90 tablet, Rfl: 2    FIBER PO, Take  by mouth., Disp: , Rfl:     furosemide (LASIX) 40 MG tablet, Take 1 tablet by mouth Daily., Disp: , Rfl:     glipizide (Glucotrol) 5 MG tablet, Take 1 tablet by mouth 2 (Two) Times a Day Before Meals., Disp: 60 tablet, Rfl: 2    Insulin Glargine (LANTUS SOLOSTAR) 100 UNIT/ML injection pen, Inject 25 Units under the skin into the appropriate area as directed Every Night., Disp: , Rfl:     insulin glargine (LANTUS, SEMGLEE) 100 UNIT/ML injection, Inject 25 Units under the skin into the appropriate area as directed Daily., Disp: , Rfl:     multivitamin (THERAGRAN) tablet tablet, Take  by mouth Daily., Disp: , Rfl:     pantoprazole (PROTONIX) 40 MG EC tablet, , Disp: , Rfl:     pentoxifylline (TRENtal) 400 MG CR tablet, , Disp: , Rfl:     polycarbophil 625 MG tablet tablet, Take 1 tablet by mouth., Disp: , Rfl:     Polyethylene Glycol 3350 (MIRALAX PO), Take  by mouth., Disp: , Rfl:     traZODone (DESYREL) 50 MG tablet, TAKE 1 TABLET AT NIGHT IF NEEDED FOR SLEEP, Disp: , Rfl:     FREESTYLE LITE test strip, , Disp: , Rfl:     hydrOXYzine (ATARAX) 10 MG tablet, Take 1 tablet by mouth. (Patient not taking: Reported on 9/20/2024), Disp: , Rfl:     nystatin (MYCOSTATIN) 045194 UNIT/GM cream, Apply 1 Application topically to the appropriate area as directed As Needed. (Patient not taking: Reported on 9/20/2024), Disp: , Rfl:     simvastatin (ZOCOR) 20 MG tablet, Take 1 tablet by mouth Daily. (Patient not taking: Reported on 9/20/2024), Disp: , Rfl:     spironolactone (ALDACTONE) 25 MG tablet, Take 1 tablet by mouth Daily for 30 days., Disp: 30 tablet, Rfl: 0    ALLERGIES:     Allergies   Allergen Reactions    Doxycycline Itching    Rosuvastatin Other (See Comments)      Liver enzymes abnormal       SOCIAL HISTORY:         Social History     Socioeconomic History    Marital  "status:      Spouse name: Lisa   Tobacco Use    Smoking status: Former     Current packs/day: 0.00     Types: Cigarettes     Quit date:      Years since quittin.7   Vaping Use    Vaping status: Never Used   Substance and Sexual Activity    Alcohol use: Never   As of 2021, patient reports he does drink alcoholic beverage 10 drinks per week.  Denies illegal drug use.      FAMILY HISTORY:  No cancer in family, mother HTN,      REVIEW OF SYSTEM:  See HPI.       Vitals:    24 1039   BP: 156/56   Pulse: 51   Resp: 16   Temp: 98.1 °F (36.7 °C)   TempSrc: Oral   SpO2: 95%   Weight: 89.6 kg (197 lb 8 oz)   Height: 165.1 cm (65\")   PainSc: 0-No pain     ECOG 1    Physical Exam  Heart has a systolic murmur grade 3/10.  GENERAL:  Well-developed, well-nourished in no acute distress.    SKIN:  Warm, dry without rashes, purpura or petechiae.  HEENT:  Normocephalic.   LYMPHATICS:  No cervical, supraclavicular adenopathy.  CHEST: Normal respiratory effort.  Lungs clear to auscultation. Good airflow.  CARDIAC:  Regular rate and rhythm.  Patient has systolic murmur.  Normal S1,S2.  ABDOMEN:  Soft, no tender.  Bowel sounds normal.  EXTREMITIES:  No lower extremity edema.      RECENT LABS:    Lab Results   Component Value Date    WBC 3.18 (L) 2024    HGB 11.1 (L) 2024    HCT 33.3 (L) 2024    MCV 92.8 2024    PLT 48 (L) 2024     Lab Results   Component Value Date    NEUTROABS 1.81 2024     Lab Results   Component Value Date    GLUCOSE 194 (H) 2024    BUN 44 (H) 2024    CREATININE 2.35 (H) 2024    EGFRIFNONA 72 2021    BCR 18.7 2024    K 4.1 2024    CO2 22.5 2024    CALCIUM 9.0 2024    ALBUMIN 3.6 2024    AST 48 (H) 2024    ALT 20 2024     Lab Results   Component Value Date    IRON 133 2024    TIBC 341 2024    FERRITIN 71.20 2024   Iron saturation 39% on 2024,        Lab Results   Component " Value Date    IRON 103 09/20/2024    LABIRON 31 09/20/2024    TRANSFERRIN 224 09/20/2024    TIBC 334 09/20/2024    FERRITIN 115.00 09/20/2024         Lab Results   Component Value Date    FOLATE >20.00 06/28/2024     Lab Results   Component Value Date    YNTXSQHX13 898 06/28/2024         IMAGING:   MRI Abdomen Without Contrast  MRI ABDOMEN WITHOUT INTRAVENOUS CONTRAST     HISTORY: Hepatocellular carcinoma status post ablation in December 2022     TECHNIQUE: Multiplanar multisequence MRI of the abdomen was performed  without intravenous contrast.     COMPARISON: Multiple abdominal MRIs and back back to 10/26/2022     FINDINGS: Please note evaluation is significantly suboptimal without  intravenous contrast.     There is splenomegaly and small perigastric varices. Spleen measures up  to approximately 17.5 cm in length, as before.     The liver is cirrhotic. The gallbladder surgically absent. Common bile  duct is not distended. Postsurgical changes from prior ablation of a  right hepatic lobe lesion are present, as before. Findings are  heterogenously T1 hyperintense and T2 hypointense and measure up to  approximately 2.8 x 2.4 x 2 cm (previously 3.5 x 2.8 x 2.8 cm on  2/20/2023 and 3.1 x 2.3 x 2.1 cm on 12/8/2023). Findings appear grossly  unchanged in size since 6/19/2024. The previously seen focus of  restricted diffusion has overall decreased in prominence; however mildly  persists.     A 1 cm T2 hyperintense lesion within the right hepatic dome is present,  as before. Subcentimeter hepatic lesions are too small to characterize.  Subcentimeter T2 hyperintense pancreatic lesions, as before, measuring  up to 0.6 cm. Main pancreatic duct is not distended.     IMPRESSION  1.  Please note evaluation is significantly suboptimal by intravenous  contrast. Previously seen restricted diffusion within the post ablation  site from known hepatocellular carcinoma has overall decreased in  prominence; however, mildly persists.  The overall size of the  postablation site appears grossly unchanged to minimally decreased since  12/8/2023. Overall findings remain indeterminate and continued close  attention on follow-up with multiphase MRI with and without contrast in  3 months is recommended to ensure stability and exclude recurrent  malignancy.  2.  Cirrhosis with findings of portosystemic shunting including  splenomegaly.  3.  1 cm T2 hyperintense lesion within the right hepatic dome and  subcentimeter T2 hyperintense lesion within the pancreatic head, as seen  on multiple prior MRIs. Continued attention on follow-up is recommended.  4.  Other findings as above        This report was finalized on 8/5/2024 8:15 AM by Dr. Johann Yoon M.D  on Workstation: [email or web address]         Assessment & Plan     Assessment & Plan      1.  Thrombocytopenia secondary to liver cirrhosis and splenomegaly.  Etiology of the thrombocytopenia is not clear.  However I do think it is likely related to his fatty liver which was documented from abdomen ultrasound on 7/20/2012.    Laboratory studies on 1/13/2021 reported excellent vitamin B12 level.  He had marginally elevated IPF 7.0%.  no apparent compensation for platelets production.   On 6/11/2021, platelets improved at 63,000 with normal IPF 5.3%.  Patient is asymptomatic.    On 12/17/2021, stable platelets 60,000 and normal IPF 4.1%. Patient is asymptomatic.   On 6/10/2022 platelets 69,000.  Patient reports no spontaneous bleeding.    On 7/6/2022 platelets 65,000.  No spontaneous bleeding.    On 9/28/2022 platelets 61,000.  Patient has easy bruising on extremities, however no spontaneous bleeding such as melena hematochezia or gum bleeding.    On 11/2/2022 platelets 48,000.  No change of clinical condition.  Continue to monitor.  Patient was given 1 unit platelets transfusion on 11/15/2022, platelets improved at 70,000 11/16/2022 when he had successful CT-guided core needle biopsy of liver lesion.   On 11/21/2022  platelets decreased at baseline level of 44,000.  No spontaneous bleeding.  We will arrange patient to receive platelets transfusion prior and post point interventional procedure with radiofrequency ablation of the primary hepatocellular carcinoma scheduled on 12/6/2022.   On 12/5/2022, the patient had platelets 49,000/mm3. We gave him 1 unit of platelets transfusion before the ablation of the liver cancer and the posttransfusion platelets was only 56,000/mm3 on 12/6/2022.   On 12/13/2022, platelets is 47,000/mm3. The patient is not having any bleeding or bruising.  Patient has platelets 41,000 on 03/10/2023, and this is stable at the baseline condition. Patient has no excessive bleeding or bruising.  Lab study on 06/30/2023 reported stable thrombocytopenia with platelets 40,000. Patient has easy bruising, however, no spontaneous bleeding. Continue to monitor.   A laboratory study today, on 9/22/2023, reported stable severe thrombocytopenia with platelets of 36,000. This is his baseline level.  on 12/15/2023, the patient has a stable but severe thrombocytopenia with platelets 40,000. The patient asked about for stimulating platelet growth. I researched the UptoDate, and this seems only being used for patient prior to surgical procedure to improve the platelets, but no indication for long term use.   on 04/05/2024, the patient exhibits stable severe thrombocytopenia with platelet count of 41,000/mm3. He reports no instances of easy bleeding or bruising.   On 6/28/2024 platelets 43,000.  Today 9/20/2024 His hemoglobin level is currently at 11.1, with platelets at 48,000.         2.  Iron deficiency anemia in the setting of chronic renal insufficiency stage III, and liver cirrhosis.   Laboratory study on 1/13/2021 reported no evidence of hemolysis, had supratherapeutic B12 level and folate.    He does have evidence of iron deficiency with low iron sats 10% and ferritin only 41.5 ng/mL with Hb 10.9.    His  erythropoietin 16.1, is not elevated accordingly, which indicates lack of response from his kidney to produce erythropoietin. Currently is not a candidate for CONRAD/Procrit treatment.    We started patient on oral ferrous sulfate 325 mg 3 times a day in January 2021.   On 3/19/2021, patient has improved hemoglobin 11.9.  Continue to monitor.  On 6/11/2021, stable anemia Hb 11.0, however much improved with ferritin 115.3 ng/mL, and iron saturation 14%.  Will advised to continue oral iron treatment.  On 12/17/2021 stable Hb 10.8, much improved iron saturation 34% and free iron 126, however with trending down ferritin level 86.1 ng/mL. Continue oral iron treatment.  6/10/2022, patient reports taking oral iron 3 times a day.  No apparent side effects except mild nausea.  Laboratory studies showed deteriorating hemoglobin 9.6 and also worsening iron saturation 17% with free iron 61 and the ferritin 86.8 ng/mL.  Discussed with the patient, recommended intravenous iron therapy, this patient has poor iron absorption.    Patient received 2 dose of Injectafer 750 mg x 2 in June 2022.  Lab study today on 7/6/2022 reported improved normalized iron saturation 21% with a supratherapeutic ferritin 644 ng/mL.  On 9/28/2022 ferritin 256 ng/mL iron saturation 22% free iron 66 TIBC 295.  Supratherapeutic B12 at 155 and normal RBC folate 2175 ng/mL.  Patient had a hemoglobin 7.2 on 9/30/2022.  Was started on Procrit injection 20,000 units during hospitalization.  No PRBC transfusion.  Procrit is subsequently continued as outpatient every 2 weeks.  On 10/21/2022 ferritin 198 and free iron 56 TIBC 294 iron saturation 19%.   Improved hemoglobin 9.2 on 11/2/2022.  We will continue Procrit 20,000 units every 2 weeks.  Hemoglobin 8.9 on 11/21/2022.  Continue Procrit injection.  The laboratory study today on 12/13/2022 reported deteriorating and recurrent iron deficiency with iron saturation only 13% with free iron 43 mcg/dL, TIBC 332 mcg/dL,  and ferritin 105.6 ng/dL. The patient will be arranged for two doses of Injectafer prior to resumption of Procrit.  Post treatment iron study on 01/23/2023 reported significantly improved ferritin 559 and normalized iron saturation 35 percent with a free iron 89, TIBC 255. Patient was continued on Procrit every 2 weeks.   On 03/10/2023, the patient has hemoglobin 9.8, and normal iron study with ferritin 270 ng/mL and iron saturation 28% and free iron 82, TIBC 293.  Lab study on 05/05/2023 reported ferritin 200.7 ng/mL, free iron 55, TIBC 309, iron saturation 18%.   Patient continues on CONRAD/Procrit every 2 weeks.  The patient had a recurrent iron deficiency anemia with a hemoglobin of 8.9, ferritin of 95.3, free iron of 53, and iron saturation of 19% on 7/28/2023.  09/22/2023, the patient has hemoglobin 9.4. The patient will be given CONRAD/Procrit 37,000 units and will continue every 2 weeks with CBC monitorin.    His recent laboratory study on 12/01/2023 reported ferritin 360, free iron 76, TIBC 259, and iron saturation 29%.  On 12/15/2023, the patient has hemoglobin 9.3. He will continue epoetin sole/Procrit every 2 weeks.  2. Iron deficiency anemia.    The patient's hemoglobin level is 9.7% as of 04/05/2024, and he reports no instances of bleeding. The current treatment plan includes CONRAD/Procrit every 2 weeks.   6/14/2024 hemoglobin 10.1 and Procrit was on hold.  6/28/2024 hemoglobin 8.7.  The continuation of Procrit at 37,000 units will be maintained. The patient is advised to persist with oral iron supplementation, administered thrice daily. An appointment for an ultrasound-guided paracentesis is planned. The patient will also continue follow-ups with his nephrologist, Dr. Lagos.  B12 and folate will be conducted today. The patient will return biweekly for CBC monitoring and a Procrit injection. Iron studies will be conducted as per the protocol.  9/20/2024 the hemoglobin 11.1. His iron levels are satisfactory,  with ferritin at 115, iron saturation at 31%, and free iron at 103. In June 2024, his ferritin was 71, iron saturation was 39%, and free iron was 130.           3.  Pancytopenia with mild leukocytopenia.  This is also likely related to his fatty liver and liver cirrhosis.  Patient drinks alcohol beverage as reported in January 2021.  Patient reports no recurrent infection.  His neutrophils marginally normal at 1830 out of WBC 3250.  Patient had supratherapeutic B12 level 1/13/2021.  Marginally better WBC 3720 including ANC 2100 3/19/2021.    6/11/2021 WBC 3530, with ANC 2180.  No recurrent infection.    On 12/17/2021 stable leukocytopenia WBC 3780 with low normal neutrophils 2016.  Patient is asymptomatic.  Continue to monitor.  On 6/10/2022 patient has slightly better WBC 4290 and neutrophils 2980.  On 7/6/2022 patient had WBC 4260 ANC 2620, lymphocytes 840 and monocytes 470.  On 9/28/2022 WBC 3380, including ANC 1780, hemoglobin 8.5, and platelets 61,000.  On 11/2/2022 total WBC 2970 including ANC 1730.  Continue to monitor.   On 11/21/2022 WBC 2980 including ANC 1670 and lymphocytes 820.  Patient is afebrile.  Continue to monitor.  On 12/13/2022, WBC 3,530/mm3 including neutrophils 2,020/mm3.  On 03/10/2023, WBC 2710 including neutrophils of 1570. The patient reports no fever, sweating, or chills. Most recent peak ANC was 2200 on 02/06/2023.  On 06/30/2023, patient is stable leukocytopenia with WBC 3200, including neutrophils 1930.  On 9/22/2023, the patient had WBC of 3440, including neutrophils of 1900, hemoglobin of 9.4, and platelets of 36,000.  on 12/15/2023, the patient has WBC 3260 including neutrophils 1900. The patient has no fever, sweating, or chills.    on 04/05/2024, the patient presents with leukocytopenia with WBC of 2740 and moderate neutropenia with ANC of 1630.  However, his neutrophil count was normal at 2300 and WBC of 4090 as of 03/22/2024.  6/20/2024 WBC 2710 neutrophils 1610.  Patient is  afebrile continue to monitor.  9/20/2024 white blood cell count is 3180 and neutrophils are at 1800, which is within the low normal range    4. COVID-19 vaccination.    6/11/2021, patient reports that he had 2 doses of COVID-19 vaccine, had very limited side effects with some soreness at the site of injection.   On 12/17/2021, patient reports he received a booster dose of COVID-19 vaccine on 11/6/2021.      5.  Ascites secondary to liver cirrhosis.  CT scan examination on 10/15/2021 confirmed liver cirrhosis.  On 6/10/2022 patient reports abdomen distention for the past 3 months, weight gains, 25 pounds per our records, and also has worsening dyspnea.  Physical examination shows very distended abdomen.  I think this patient has large ascites secondary to his liver cirrhosis.  I recommended to have ultrasound-guided therapeutic paracentesis with samples for routine chemistry labs cell counts and also for cytology study.  Patient also has bilateral leg edema which is also secondary to liver cirrhosis.  Patient was seen her primary care physician next week.  I think most likely he will need to be started on diuretics.  He also needs to follow-up with his GI specialist Dr. Everardo Foote to discuss possibility of  shunt.   On 6/17/2022 patient had ultrasound-guided paracentesis, with 11.2 L clear yellow ascites removed.  Cytology study reported negative for malignant cells.  There was a reactive mesothelial cells histiocytes and mixed inflammatory cells.  Today on 7/6/2022 follow-up, patient reports worsening abdomen distention again.  I will arrange patient to have therapeutic paracentesis again as soon as possible, and also will arrange another paracentesis in 3 weeks.  At the meantime I also recommended to start the patient on low-dose Lasix 20 mg daily on 7/6/2022.   Paracentesis, with 6.4 L ascites removed 7/14/2022.  Paracentesis 2.4 L ascites removed 8/4/2022.   Failed attempt for paracentesis on 9/29/2022 during  hospitalization, no ascites per ultrasound exam.  On 12/13/2022, the patient has more distended abdomen and also has been gaining weight for more than 10 pounds in the past 2 to 3 months. The patient had attempted paracentesis under ultrasound guidance on 12/29/2022, however, there were no ascites or examination, so the procedure was abandoned.   Abnormal MRI on 2/20/2023 reported no significant ascites.  On 03/10/2023, the patient presents for reevaluation. His wife reports the patient is gaining about 10 to 12 pounds since the beginning of 01/2023. By physical examination, I do not feel patient has ascites by percussion of the abdomen. He has trace edema in both legs. Patient is on Lasix 40 mg daily and spironolactone.   Ultrasound for the abdomen on 9/20/2023 reported liver cirrhosis and splenomegaly, no apparent significant ascites.  On 04/05/2024, the patient reports no distention of the abdomen.   on 6/28/2024 no specific complaints.  Attempted ultrasound paracentesis on 7/18/2024 was abandoned due to no significant ascites by ultrasound.      6.  Right pleural effusion.    Patient also complains of dyspnea, x-ray examination reported right pleural effusion 7/14/2022..  Patient had ultrasound-guided right thoracentesis with 2 L pleural effusion removed 7/27/2022.  Cytology studies negative for malignancy.  On 8/4/2022 patient had another 2 L pleural effusion removed on the right side.  The patient had a reaccumulation of the right pleural effusion as evidenced by his CT scan from 11/16/2022. We will arrange ultrasound-guided right thoracentesis.   The patient had ultrasound-guided right paracentesis on 12/29/2022 with a 500 mL of pleural effusion removed.   Abnormal MRI examination on 02/20/2023 reports small to moderate pleural effusion. I reviewed the images with the patient today on 03/10/2023, this is significant less than previous images. He is asymptomatic. We decided to observe for now.  MRI examination  06/12/2023 reported a right-sided pleural effusion. Physical examination today 06/30/2023, patient does have decreased but present breathing sounds at the left lung base.  Stable condition on 9/22/2023.  On 12/15/2023, the patient has no dyspnea.    On 04/05/2024, the patient has no complaints of dyspnea. His examination reveals clear breathing sounds bilaterally.  8/1/2024 MRI for the abdomen showed no evidence for lung base pleural effusion.    7.  Worsening renal function.  Laboratory study on 7/6/2022 reported creatinine 1.37.  He had creatinine 1.02 on 12/17/2021.  Discussed with patient, I recommended referring patient to nephrology service for evaluation of possible hepatorenal syndrome due to liver cirrhosis.  Patient is agreeable.  On 9/28/2022 patient reports that he still has no appointment for nephrology service.  On 11/2/2022 creatinine 2.34 with BUN 47.  On 11/21/2022 creatinine 2.33 with BUN 41.  On 12/6/22, cr was 2.07.  On 02/20/2023, the patient had a creatinine 2.42, so MRI was done without IV contrast as we had requested. Patient will continue to follow up with the nephrologist for evaluation and management.  On 05/19/2023, creatinine 2.60.  On 8/25/2023, creatinine was 2.61.  On 12/01/2023, creatinine 3.29. The patient reports he recently had a follow-up with his nephrologist last week.   On 03/23/2024, the patient's creatinine level was 2.56 mg/dL and BUN of 55 mg/dL. The patient will continue to follow up with his nephrologist.   6/14/2024 creatinine 2.19 BUN 39.      8.  Hepatocellular carcinoma.    Newly discovered hepatic lesion by ultrasound examination on 9/29/2022.  Patient is subsequently seen by Dr. Everardo Foote, had an MRI of the abdomen with and without contrast at the [facility] on 10/26/2022 which reported 3.2 cm liver lesion, likely hepatocellular carcinoma.  I recommended to have AFP study, also discussed with patient and his wife today on 11/2/2022 we will obtain the  MRI images from [facility], and arrange CT-guided core needle biopsy.  Because of his thrombocytopenia, I will arrange 1 unit platelets transfusion right before the biopsy.  I did discuss with him about the risk for bleeding post the biopsy.  This is highly suspicious for primary hepatocellular carcinoma.   Normal AFP 2.37 ng/mL on 11/2/2022.     Patient had CT-guided core needle biopsy on 11/16/2022. Pathology evaluation reported moderately differentiated hepatocellular carcinoma.  I discussed with the interventional radiologist Dr. Goodson about interventional procedure with radiofrequency ablation treatment for the solitary lesion in the liver, since this patient is not a candidate for surgical intervention due to multiple comorbidities. The procedure has been scheduled on 12/06/2022.  Due to his thrombocytopenia, we decided to transfuse him with 1 unit of platelets on 12/05/2022 and also also 1 unit platelets on 12/06/2022 after his procedure.  The patient had CT-guided ablation procedure on 12/6/2022. The patient reports good tolerance, no pains, and no fevers after procedure. Interventional radiology, Dr. Goodson, recommended to obtain abdomen MRI with and without contrast with liver protocol 2 to 3 months after the procedure for reassessment.  The patient had MRI of the abdomen on 02/20/2023, contrast was not given due to worsening renal function. Nevertheless, the study reported post treatment changes. No evidence for new lesions. As I discussed with the patient and his wife today on 03/10/2023, I recommended to obtain MRI in 3 months for reassessment. We will request IV contrast if his renal function is improving.  MRI examination was obtained on 06/12/2023, reported post treatment changes of the liver lesion, overall stable size.  The patient had an ultrasound examination of the abdomen obtained on 9/20/2023, as requested by his nephrologist, and the study reported a right hepatic lobe lesion  measuring 2.6 x 1 x 1.2 cm. There is also splenomegaly measuring 16.5 cm. There was hepatic steatosis and cirrhosis. There were also gallbladder polyps.  The patient had MRI examination of the abdomen without contrast obtained on 12/08/2023, which reported stable segment 8 post ablation lesion 3.7 x 2.7 cm. This was compared to the previous MRI from 06/12/2023.  MRI without contrast was done on 8/1/2024 despite we requested using IV contrast.      9.  Diabetes.   On 11/21/2022 patient had significant elevated glucose 397.  Patient reports that he is diabetic, however his metformin was discontinued during his most recent hospitalization, and he was not put on any other medication for that.  He reports this morning's blood glucose level was about 180.  Discussed with patient, I will give him 10 units insulin today in the clinic, and also will start him on glipizide 5 mg twice a day.  Patient is agreeable and will follow up with his primary care physician for management of diabetes.  On 2/20/2023 glucose 152.  Lab study on 05/19/2023 reported glucose 104.   On 08/25/2023, glucose was 192. The patient will continue to follow up with a primary care physician for management.  On 12/15/2023, the patient reports that he was started on insulin treatment with glargine 24 units and it started last night. His glucose level this morning was 160.  2/23/2024 glucose 160.  6/14/2024 glucose 153.  9/6/2024 glucose 194.        10. Bruising and Boils.  He reports experiencing bruising and the development of boils on his legs. He has been managing these with bacitracin and antibiotics, which seem to help. He suspects that propranolol might be contributing to these symptoms, as he read about potential side effects including leg ulcers and a raccoon mask for lupus. Further evaluation of his medication regimen may be necessary.        PLAN:  No Procrit injection today.  New oral iron 325 mg 3 times daily.  Monitor laboratory studies every  6 weeks with ferritin iron profile and possible Procrit if Hb less than 10.  Will see patient in 6 months for reevaluation.  Will also check a vitamin B12 level in 6 months.  Continue oral iron once a day.  Continue management of diabetes.  Continue follow-up with nephrologist.         I discussed with the patient about laboratory results and further management plan.  Patient voiced understanding and agreeable.    This patient is on high risk medication and needs close monitoring.         Milagro Salinas MD PhD       CC:  MD Everardo Cuello M.D. Ramsey Nassar.  MD

## 2024-11-01 ENCOUNTER — INFUSION (OUTPATIENT)
Dept: ONCOLOGY | Facility: HOSPITAL | Age: 74
End: 2024-11-01
Payer: MEDICARE

## 2024-11-01 ENCOUNTER — LAB (OUTPATIENT)
Dept: LAB | Facility: HOSPITAL | Age: 74
End: 2024-11-01
Payer: MEDICARE

## 2024-11-01 DIAGNOSIS — D63.1 ANEMIA DUE TO CHRONIC RENAL FAILURE TREATED WITH ERYTHROPOIETIN, UNSPECIFIED STAGE: ICD-10-CM

## 2024-11-01 DIAGNOSIS — N18.30 STAGE 3 CHRONIC KIDNEY DISEASE, UNSPECIFIED WHETHER STAGE 3A OR 3B CKD: ICD-10-CM

## 2024-11-01 DIAGNOSIS — N18.9 ANEMIA DUE TO CHRONIC RENAL FAILURE TREATED WITH ERYTHROPOIETIN, UNSPECIFIED STAGE: ICD-10-CM

## 2024-11-01 LAB
BASOPHILS # BLD AUTO: 0.02 10*3/MM3 (ref 0–0.2)
BASOPHILS NFR BLD AUTO: 0.7 % (ref 0–1.5)
DEPRECATED RDW RBC AUTO: 49.1 FL (ref 37–54)
EOSINOPHIL # BLD AUTO: 0.2 10*3/MM3 (ref 0–0.4)
EOSINOPHIL NFR BLD AUTO: 7 % (ref 0.3–6.2)
ERYTHROCYTE [DISTWIDTH] IN BLOOD BY AUTOMATED COUNT: 14.1 % (ref 12.3–15.4)
HCT VFR BLD AUTO: 32.1 % (ref 37.5–51)
HGB BLD-MCNC: 10.6 G/DL (ref 13–17.7)
IMM GRANULOCYTES # BLD AUTO: 0.04 10*3/MM3 (ref 0–0.05)
IMM GRANULOCYTES NFR BLD AUTO: 1.4 % (ref 0–0.5)
LYMPHOCYTES # BLD AUTO: 0.79 10*3/MM3 (ref 0.7–3.1)
LYMPHOCYTES NFR BLD AUTO: 27.8 % (ref 19.6–45.3)
MCH RBC QN AUTO: 31.2 PG (ref 26.6–33)
MCHC RBC AUTO-ENTMCNC: 33 G/DL (ref 31.5–35.7)
MCV RBC AUTO: 94.4 FL (ref 79–97)
MONOCYTES # BLD AUTO: 0.23 10*3/MM3 (ref 0.1–0.9)
MONOCYTES NFR BLD AUTO: 8.1 % (ref 5–12)
NEUTROPHILS NFR BLD AUTO: 1.56 10*3/MM3 (ref 1.7–7)
NEUTROPHILS NFR BLD AUTO: 55 % (ref 42.7–76)
NRBC BLD AUTO-RTO: 0 /100 WBC (ref 0–0.2)
PLATELET # BLD AUTO: 41 10*3/MM3 (ref 140–450)
PMV BLD AUTO: 13.2 FL (ref 6–12)
RBC # BLD AUTO: 3.4 10*6/MM3 (ref 4.14–5.8)
WBC NRBC COR # BLD AUTO: 2.84 10*3/MM3 (ref 3.4–10.8)

## 2024-11-01 PROCEDURE — 85025 COMPLETE CBC W/AUTO DIFF WBC: CPT

## 2024-11-01 PROCEDURE — 36415 COLL VENOUS BLD VENIPUNCTURE: CPT

## 2024-11-01 NOTE — PROGRESS NOTES
Hgb today is 10.6.  No procrit today per guidelines.  Patient without questions or concernsaat this time.   Given copy of labs.

## 2024-12-13 ENCOUNTER — LAB (OUTPATIENT)
Dept: LAB | Facility: HOSPITAL | Age: 74
End: 2024-12-13
Payer: MEDICARE

## 2024-12-13 ENCOUNTER — INFUSION (OUTPATIENT)
Dept: ONCOLOGY | Facility: HOSPITAL | Age: 74
End: 2024-12-13
Payer: MEDICARE

## 2024-12-13 DIAGNOSIS — N18.30 STAGE 3 CHRONIC KIDNEY DISEASE, UNSPECIFIED WHETHER STAGE 3A OR 3B CKD: ICD-10-CM

## 2024-12-13 DIAGNOSIS — D63.1 ANEMIA DUE TO CHRONIC RENAL FAILURE TREATED WITH ERYTHROPOIETIN, UNSPECIFIED STAGE: ICD-10-CM

## 2024-12-13 DIAGNOSIS — N18.9 ANEMIA DUE TO CHRONIC RENAL FAILURE TREATED WITH ERYTHROPOIETIN, UNSPECIFIED STAGE: ICD-10-CM

## 2024-12-13 LAB
BASOPHILS # BLD AUTO: 0.02 10*3/MM3 (ref 0–0.2)
BASOPHILS NFR BLD AUTO: 0.6 % (ref 0–1.5)
DEPRECATED RDW RBC AUTO: 48.5 FL (ref 37–54)
EOSINOPHIL # BLD AUTO: 0.23 10*3/MM3 (ref 0–0.4)
EOSINOPHIL NFR BLD AUTO: 6.5 % (ref 0.3–6.2)
ERYTHROCYTE [DISTWIDTH] IN BLOOD BY AUTOMATED COUNT: 13.5 % (ref 12.3–15.4)
FERRITIN SERPL-MCNC: 87.8 NG/ML (ref 30–400)
HCT VFR BLD AUTO: 33.7 % (ref 37.5–51)
HGB BLD-MCNC: 11 G/DL (ref 13–17.7)
IMM GRANULOCYTES # BLD AUTO: 0.01 10*3/MM3 (ref 0–0.05)
IMM GRANULOCYTES NFR BLD AUTO: 0.3 % (ref 0–0.5)
IRON 24H UR-MRATE: 49 MCG/DL (ref 59–158)
IRON SATN MFR SERPL: 13 % (ref 20–50)
LYMPHOCYTES # BLD AUTO: 0.81 10*3/MM3 (ref 0.7–3.1)
LYMPHOCYTES NFR BLD AUTO: 23 % (ref 19.6–45.3)
MCH RBC QN AUTO: 31.9 PG (ref 26.6–33)
MCHC RBC AUTO-ENTMCNC: 32.6 G/DL (ref 31.5–35.7)
MCV RBC AUTO: 97.7 FL (ref 79–97)
MONOCYTES # BLD AUTO: 0.29 10*3/MM3 (ref 0.1–0.9)
MONOCYTES NFR BLD AUTO: 8.2 % (ref 5–12)
NEUTROPHILS NFR BLD AUTO: 2.16 10*3/MM3 (ref 1.7–7)
NEUTROPHILS NFR BLD AUTO: 61.4 % (ref 42.7–76)
NRBC BLD AUTO-RTO: 0 /100 WBC (ref 0–0.2)
PLATELET # BLD AUTO: 46 10*3/MM3 (ref 140–450)
PMV BLD AUTO: 12.3 FL (ref 6–12)
RBC # BLD AUTO: 3.45 10*6/MM3 (ref 4.14–5.8)
TIBC SERPL-MCNC: 380 MCG/DL (ref 298–536)
TRANSFERRIN SERPL-MCNC: 255 MG/DL (ref 200–360)
WBC NRBC COR # BLD AUTO: 3.52 10*3/MM3 (ref 3.4–10.8)

## 2024-12-13 PROCEDURE — 36415 COLL VENOUS BLD VENIPUNCTURE: CPT

## 2024-12-13 PROCEDURE — G0463 HOSPITAL OUTPT CLINIC VISIT: HCPCS

## 2024-12-13 PROCEDURE — 83540 ASSAY OF IRON: CPT

## 2024-12-13 PROCEDURE — 84466 ASSAY OF TRANSFERRIN: CPT

## 2024-12-13 PROCEDURE — 82728 ASSAY OF FERRITIN: CPT

## 2024-12-13 PROCEDURE — 85025 COMPLETE CBC W/AUTO DIFF WBC: CPT

## 2024-12-13 NOTE — PROGRESS NOTES
Hgb today is 11.0.  No procrit today per guidelines.   Plt count remains stable at 46.  Patient without questions or concerns at this time.   Given copy of labs.

## 2025-01-24 ENCOUNTER — LAB (OUTPATIENT)
Dept: LAB | Facility: HOSPITAL | Age: 75
End: 2025-01-24
Payer: MEDICARE

## 2025-01-24 ENCOUNTER — INFUSION (OUTPATIENT)
Dept: ONCOLOGY | Facility: HOSPITAL | Age: 75
End: 2025-01-24
Payer: MEDICARE

## 2025-01-24 DIAGNOSIS — R31.29 MICROSCOPIC HEMATURIA: ICD-10-CM

## 2025-01-24 DIAGNOSIS — E11.22 TYPE 2 DIABETES MELLITUS WITH ESRD (END-STAGE RENAL DISEASE): ICD-10-CM

## 2025-01-24 DIAGNOSIS — N18.30 STAGE 3 CHRONIC KIDNEY DISEASE, UNSPECIFIED WHETHER STAGE 3A OR 3B CKD: ICD-10-CM

## 2025-01-24 DIAGNOSIS — D63.1 ANEMIA OF CHRONIC RENAL FAILURE, UNSPECIFIED CKD STAGE: ICD-10-CM

## 2025-01-24 DIAGNOSIS — D63.1 ANEMIA DUE TO CHRONIC RENAL FAILURE TREATED WITH ERYTHROPOIETIN, UNSPECIFIED STAGE: ICD-10-CM

## 2025-01-24 DIAGNOSIS — N18.9 ANEMIA DUE TO CHRONIC RENAL FAILURE TREATED WITH ERYTHROPOIETIN, UNSPECIFIED STAGE: ICD-10-CM

## 2025-01-24 DIAGNOSIS — N18.4 CHRONIC KIDNEY DISEASE, STAGE IV (SEVERE): Primary | ICD-10-CM

## 2025-01-24 DIAGNOSIS — N18.6 TYPE 2 DIABETES MELLITUS WITH ESRD (END-STAGE RENAL DISEASE): ICD-10-CM

## 2025-01-24 DIAGNOSIS — N18.9 ANEMIA OF CHRONIC RENAL FAILURE, UNSPECIFIED CKD STAGE: ICD-10-CM

## 2025-01-24 LAB
ALBUMIN SERPL-MCNC: 3.6 G/DL (ref 3.5–5.2)
ANION GAP SERPL CALCULATED.3IONS-SCNC: 12.8 MMOL/L (ref 5–15)
BASOPHILS # BLD AUTO: 0.01 10*3/MM3 (ref 0–0.2)
BASOPHILS NFR BLD AUTO: 0.3 % (ref 0–1.5)
BILIRUB UR QL STRIP: NEGATIVE
BUN SERPL-MCNC: 45 MG/DL (ref 8–23)
BUN/CREAT SERPL: 17.1 (ref 7–25)
CALCIUM SPEC-SCNC: 8.9 MG/DL (ref 8.6–10.5)
CHLORIDE SERPL-SCNC: 102 MMOL/L (ref 98–107)
CLARITY UR: CLEAR
CO2 SERPL-SCNC: 23.2 MMOL/L (ref 22–29)
COLOR UR: YELLOW
CREAT SERPL-MCNC: 2.63 MG/DL (ref 0.76–1.27)
CREAT UR-MCNC: 23.9 MG/DL
DEPRECATED RDW RBC AUTO: 46.5 FL (ref 37–54)
EGFRCR SERPLBLD CKD-EPI 2021: 24.7 ML/MIN/1.73
EOSINOPHIL # BLD AUTO: 0.14 10*3/MM3 (ref 0–0.4)
EOSINOPHIL NFR BLD AUTO: 4.5 % (ref 0.3–6.2)
ERYTHROCYTE [DISTWIDTH] IN BLOOD BY AUTOMATED COUNT: 13.2 % (ref 12.3–15.4)
FERRITIN SERPL-MCNC: 82.9 NG/ML (ref 30–400)
GLUCOSE SERPL-MCNC: 276 MG/DL (ref 65–99)
GLUCOSE UR STRIP-MCNC: ABNORMAL MG/DL
HCT VFR BLD AUTO: 33.7 % (ref 37.5–51)
HGB BLD-MCNC: 11.2 G/DL (ref 13–17.7)
HGB UR QL STRIP.AUTO: NEGATIVE
IMM GRANULOCYTES # BLD AUTO: 0 10*3/MM3 (ref 0–0.05)
IMM GRANULOCYTES NFR BLD AUTO: 0 % (ref 0–0.5)
IRON 24H UR-MRATE: 64 MCG/DL (ref 59–158)
IRON SATN MFR SERPL: 19 % (ref 20–50)
KETONES UR QL STRIP: NEGATIVE
LEUKOCYTE ESTERASE UR QL STRIP.AUTO: NEGATIVE
LYMPHOCYTES # BLD AUTO: 0.68 10*3/MM3 (ref 0.7–3.1)
LYMPHOCYTES NFR BLD AUTO: 21.8 % (ref 19.6–45.3)
MCH RBC QN AUTO: 32.1 PG (ref 26.6–33)
MCHC RBC AUTO-ENTMCNC: 33.2 G/DL (ref 31.5–35.7)
MCV RBC AUTO: 96.6 FL (ref 79–97)
MONOCYTES # BLD AUTO: 0.23 10*3/MM3 (ref 0.1–0.9)
MONOCYTES NFR BLD AUTO: 7.4 % (ref 5–12)
NEUTROPHILS NFR BLD AUTO: 2.06 10*3/MM3 (ref 1.7–7)
NEUTROPHILS NFR BLD AUTO: 66 % (ref 42.7–76)
NITRITE UR QL STRIP: NEGATIVE
NRBC BLD AUTO-RTO: 0 /100 WBC (ref 0–0.2)
PH UR STRIP.AUTO: 5.5 [PH] (ref 4.5–8)
PHOSPHATE SERPL-MCNC: 3.9 MG/DL (ref 2.5–4.5)
PLATELET # BLD AUTO: 42 10*3/MM3 (ref 140–450)
PMV BLD AUTO: 13.2 FL (ref 6–12)
POTASSIUM SERPL-SCNC: 4.3 MMOL/L (ref 3.5–5.2)
PROT ?TM UR-MCNC: 4.1 MG/DL
PROT UR QL STRIP: NEGATIVE
PROT/CREAT UR: 171.5 MG/G CREA (ref 0–200)
PTH-INTACT SERPL-MCNC: 107 PG/ML (ref 15–65)
RBC # BLD AUTO: 3.49 10*6/MM3 (ref 4.14–5.8)
SODIUM SERPL-SCNC: 138 MMOL/L (ref 136–145)
SP GR UR STRIP: 1.01 (ref 1–1.03)
TIBC SERPL-MCNC: 344 MCG/DL (ref 298–536)
TRANSFERRIN SERPL-MCNC: 231 MG/DL (ref 200–360)
UROBILINOGEN UR QL STRIP: ABNORMAL
WBC NRBC COR # BLD AUTO: 3.12 10*3/MM3 (ref 3.4–10.8)

## 2025-01-24 PROCEDURE — 84466 ASSAY OF TRANSFERRIN: CPT

## 2025-01-24 PROCEDURE — 36415 COLL VENOUS BLD VENIPUNCTURE: CPT

## 2025-01-24 PROCEDURE — 80069 RENAL FUNCTION PANEL: CPT

## 2025-01-24 PROCEDURE — 85025 COMPLETE CBC W/AUTO DIFF WBC: CPT

## 2025-01-24 PROCEDURE — 83540 ASSAY OF IRON: CPT

## 2025-01-24 PROCEDURE — 82570 ASSAY OF URINE CREATININE: CPT | Performed by: INTERNAL MEDICINE

## 2025-01-24 PROCEDURE — 83970 ASSAY OF PARATHORMONE: CPT | Performed by: INTERNAL MEDICINE

## 2025-01-24 PROCEDURE — 82728 ASSAY OF FERRITIN: CPT

## 2025-01-24 PROCEDURE — 81003 URINALYSIS AUTO W/O SCOPE: CPT

## 2025-01-24 PROCEDURE — 84156 ASSAY OF PROTEIN URINE: CPT | Performed by: INTERNAL MEDICINE

## 2025-01-24 NOTE — PROGRESS NOTES
Hgb today is 11.2.  Other counts are stable at this time.   Patient without questions or concerns at this time.   Given copy of labs.     Lab Results   Component Value Date    WBC 3.12 (L) 01/24/2025    HGB 11.2 (L) 01/24/2025    HCT 33.7 (L) 01/24/2025    MCV 96.6 01/24/2025    PLT 42 (L) 01/24/2025

## 2025-03-07 ENCOUNTER — OFFICE VISIT (OUTPATIENT)
Dept: ONCOLOGY | Facility: CLINIC | Age: 75
End: 2025-03-07
Payer: OTHER GOVERNMENT

## 2025-03-07 ENCOUNTER — LAB (OUTPATIENT)
Dept: LAB | Facility: HOSPITAL | Age: 75
End: 2025-03-07
Payer: MEDICARE

## 2025-03-07 ENCOUNTER — INFUSION (OUTPATIENT)
Dept: ONCOLOGY | Facility: HOSPITAL | Age: 75
End: 2025-03-07
Payer: MEDICARE

## 2025-03-07 VITALS
TEMPERATURE: 97.6 F | WEIGHT: 200.5 LBS | HEART RATE: 60 BPM | BODY MASS INDEX: 33.41 KG/M2 | DIASTOLIC BLOOD PRESSURE: 74 MMHG | SYSTOLIC BLOOD PRESSURE: 157 MMHG | HEIGHT: 65 IN | OXYGEN SATURATION: 99 % | RESPIRATION RATE: 16 BRPM

## 2025-03-07 DIAGNOSIS — N18.9 ANEMIA DUE TO CHRONIC RENAL FAILURE TREATED WITH ERYTHROPOIETIN, UNSPECIFIED STAGE: ICD-10-CM

## 2025-03-07 DIAGNOSIS — D70.9 NEUTROPENIA, UNSPECIFIED TYPE: ICD-10-CM

## 2025-03-07 DIAGNOSIS — C22.0 HEPATOCELLULAR CARCINOMA: Primary | ICD-10-CM

## 2025-03-07 DIAGNOSIS — R18.8 CIRRHOSIS OF LIVER WITH ASCITES, UNSPECIFIED HEPATIC CIRRHOSIS TYPE: ICD-10-CM

## 2025-03-07 DIAGNOSIS — N18.30 STAGE 3 CHRONIC KIDNEY DISEASE, UNSPECIFIED WHETHER STAGE 3A OR 3B CKD: ICD-10-CM

## 2025-03-07 DIAGNOSIS — D63.1 ANEMIA DUE TO CHRONIC RENAL FAILURE TREATED WITH ERYTHROPOIETIN, UNSPECIFIED STAGE: ICD-10-CM

## 2025-03-07 DIAGNOSIS — D50.9 IRON DEFICIENCY ANEMIA, UNSPECIFIED IRON DEFICIENCY ANEMIA TYPE: ICD-10-CM

## 2025-03-07 DIAGNOSIS — D69.6 THROMBOCYTOPENIA: ICD-10-CM

## 2025-03-07 DIAGNOSIS — D61.818 PANCYTOPENIA: ICD-10-CM

## 2025-03-07 DIAGNOSIS — K74.60 CIRRHOSIS OF LIVER WITH ASCITES, UNSPECIFIED HEPATIC CIRRHOSIS TYPE: ICD-10-CM

## 2025-03-07 LAB
ALBUMIN SERPL-MCNC: 3.7 G/DL (ref 3.5–5.2)
ALBUMIN/GLOB SERPL: 1.2 G/DL
ALP SERPL-CCNC: 130 U/L (ref 39–117)
ALT SERPL W P-5'-P-CCNC: 47 U/L (ref 1–41)
ANION GAP SERPL CALCULATED.3IONS-SCNC: 11.1 MMOL/L (ref 5–15)
AST SERPL-CCNC: 49 U/L (ref 1–40)
BASOPHILS # BLD AUTO: 0.02 10*3/MM3 (ref 0–0.2)
BASOPHILS NFR BLD AUTO: 0.8 % (ref 0–1.5)
BILIRUB SERPL-MCNC: 0.5 MG/DL (ref 0–1.2)
BUN SERPL-MCNC: 29 MG/DL (ref 8–23)
BUN/CREAT SERPL: 12.8 (ref 7–25)
CALCIUM SPEC-SCNC: 9 MG/DL (ref 8.6–10.5)
CHLORIDE SERPL-SCNC: 108 MMOL/L (ref 98–107)
CO2 SERPL-SCNC: 23.9 MMOL/L (ref 22–29)
CREAT SERPL-MCNC: 2.27 MG/DL (ref 0.76–1.27)
DEPRECATED RDW RBC AUTO: 49.1 FL (ref 37–54)
EGFRCR SERPLBLD CKD-EPI 2021: 29.5 ML/MIN/1.73
EOSINOPHIL # BLD AUTO: 0.16 10*3/MM3 (ref 0–0.4)
EOSINOPHIL NFR BLD AUTO: 6.7 % (ref 0.3–6.2)
ERYTHROCYTE [DISTWIDTH] IN BLOOD BY AUTOMATED COUNT: 14 % (ref 12.3–15.4)
FERRITIN SERPL-MCNC: 85.9 NG/ML (ref 30–400)
FOLATE SERPL-MCNC: >20 NG/ML (ref 4.78–24.2)
GLOBULIN UR ELPH-MCNC: 3.1 GM/DL
GLUCOSE SERPL-MCNC: 141 MG/DL (ref 65–99)
HCT VFR BLD AUTO: 33.4 % (ref 37.5–51)
HGB BLD-MCNC: 10.8 G/DL (ref 13–17.7)
IMM GRANULOCYTES # BLD AUTO: 0.01 10*3/MM3 (ref 0–0.05)
IMM GRANULOCYTES NFR BLD AUTO: 0.4 % (ref 0–0.5)
IRON 24H UR-MRATE: 61 MCG/DL (ref 59–158)
IRON SATN MFR SERPL: 18 % (ref 20–50)
LYMPHOCYTES # BLD AUTO: 0.55 10*3/MM3 (ref 0.7–3.1)
LYMPHOCYTES NFR BLD AUTO: 23.1 % (ref 19.6–45.3)
MCH RBC QN AUTO: 30.9 PG (ref 26.6–33)
MCHC RBC AUTO-ENTMCNC: 32.3 G/DL (ref 31.5–35.7)
MCV RBC AUTO: 95.7 FL (ref 79–97)
MONOCYTES # BLD AUTO: 0.17 10*3/MM3 (ref 0.1–0.9)
MONOCYTES NFR BLD AUTO: 7.1 % (ref 5–12)
NEUTROPHILS NFR BLD AUTO: 1.47 10*3/MM3 (ref 1.7–7)
NEUTROPHILS NFR BLD AUTO: 61.9 % (ref 42.7–76)
NRBC BLD AUTO-RTO: 0 /100 WBC (ref 0–0.2)
PLATELET # BLD AUTO: 51 10*3/MM3 (ref 140–450)
PMV BLD AUTO: 12.2 FL (ref 6–12)
POTASSIUM SERPL-SCNC: 3.7 MMOL/L (ref 3.5–5.2)
PROT SERPL-MCNC: 6.8 G/DL (ref 6–8.5)
RBC # BLD AUTO: 3.49 10*6/MM3 (ref 4.14–5.8)
SODIUM SERPL-SCNC: 143 MMOL/L (ref 136–145)
TIBC SERPL-MCNC: 335 MCG/DL (ref 298–536)
TRANSFERRIN SERPL-MCNC: 225 MG/DL (ref 200–360)
VIT B12 BLD-MCNC: 1050 PG/ML (ref 211–946)
WBC NRBC COR # BLD AUTO: 2.38 10*3/MM3 (ref 3.4–10.8)

## 2025-03-07 PROCEDURE — 82746 ASSAY OF FOLIC ACID SERUM: CPT | Performed by: INTERNAL MEDICINE

## 2025-03-07 PROCEDURE — 83540 ASSAY OF IRON: CPT

## 2025-03-07 PROCEDURE — 84466 ASSAY OF TRANSFERRIN: CPT

## 2025-03-07 PROCEDURE — 82728 ASSAY OF FERRITIN: CPT

## 2025-03-07 PROCEDURE — 80053 COMPREHEN METABOLIC PANEL: CPT

## 2025-03-07 PROCEDURE — 85025 COMPLETE CBC W/AUTO DIFF WBC: CPT

## 2025-03-07 PROCEDURE — 82607 VITAMIN B-12: CPT | Performed by: INTERNAL MEDICINE

## 2025-03-07 PROCEDURE — 36415 COLL VENOUS BLD VENIPUNCTURE: CPT

## 2025-03-07 RX ORDER — AMLODIPINE BESYLATE 5 MG/1
1 TABLET ORAL DAILY
COMMUNITY
Start: 2025-01-29 | End: 2026-01-29

## 2025-03-07 RX ORDER — DULAGLUTIDE 0.75 MG/.5ML
0.75 INJECTION, SOLUTION SUBCUTANEOUS WEEKLY
COMMUNITY
Start: 2025-01-22 | End: 2025-07-21

## 2025-03-07 NOTE — PROGRESS NOTES
.     REASON FOR FOLLOWUP:     *. Pancytopenia secondary to liver cirrhosis   Thrombocytopenia secondary to liver cirrhosis.      Mild leukocytopenia and low normal neutrophils  *. Iron deficiency anemia discovered on 1/13/2021.  Patient was started on oral iron 3 times a day.   Because of poor iron absorption, received 2 dose of Injectafer 750 mg x 2 in June 2022.   *. Newly discovered liver lesion.  Primary hepatocellular carcinoma, biopsy confirmed on 11/16/2022. Status post CT guided ablation of the liver lesion on 12/6/2022.       HISTORY OF PRESENT ILLNESS:  The patient is a 74 y.o. year old male with hypertension diabetes, pancytopenia secondary to liver cirrhosis caused by RODRIGUEZ, and hypogonadism, hepatocellular carcinoma status post ablation, who presents for follow-up evaluation.    History of Present Illness    The patient presented today on 03/07/2025 for a 6-month follow-up evaluation for multiple medical problems, including iron deficiency anemia, anemia in the setting of stage 3 renal insufficiency, and a history of hepatocellular carcinoma in the setting of liver cirrhosis, as well as pancytopenia.    He reports a significant improvement in his energy levels over the past 2 months, negating the need for a cane and resuming driving activities. He has been consuming mushroom soup with additional canned mushrooms, which he believes may have contributed to his improved energy levels and reduced need for Procrit injections. He expresses a desire to continue monitoring his blood counts every 6 weeks. He reports no pain or swelling in his legs. He is currently on a regimen of iron supplements, taking 3 tablets daily, which has resulted in mild constipation managed with Dulcolax. The last time he received the Procrit injection was in July 2024, and since that time, his hemoglobin has been maintained above 10 g.    He initiated Trulicity therapy approximately 1 month ago, resulting in fasting morning blood  glucose levels consistently in the 90s.    Results  Laboratory Studies  On 03/07/2025, hemoglobin 10.8, platelets 51,000, WBC 2380 including neutrophils 1470, lymphocytes 550 and monocytes 170.  Iron study ferritin 85.9, free iron 61 iron saturation 18%, B12 at 1050 pg/mL, folate > 20 ng/mL.  Chemistry lab creatinine 2.27 BUN 29, elevated AST 49 ALT 47 alk phosphatase 130 normal total bilirubin 0.5, glucose 141, normal electrolytes.         Past Medical History:   Diagnosis Date    Cancer     liver cancer    H/O Dental disorder     History of thrombocytopenia     Hypertension     RODRIGUEZ (nonalcoholic steatohepatitis)     Psoriasis     Type 2 diabetes mellitus      Past Surgical History:   Procedure Laterality Date    CATARACT EXTRACTION, BILATERAL      COLONOSCOPY  11/2015    ROTATOR CUFF REPAIR Left 2005     HEMATOLOGY HISTORY:  The patient is a 70 y.o. year old male with history of hypertension diabetes, RODRIGUEZ, and hypogonadism who presented today on 1/13/2021 for initial evaluation because of thrombocytopenia, referred by his primary care physician Dr. Joseph.      Patient reports he has no limitation of activity.  He does have chronic joint pains, but of note joint swelling.  His skin pruritus.  No skin rashes.  He does have easy bruising, however denies evidence of bleeding such as epistaxis, gum bleeding, rectal bleeding or hematuria.  Denies weight loss.  No low fever.  Appetite is reasonable.    I reviewed his outside laboratory studies.  Most recent also lab on 10/22/2020 reported hemoglobin 10.5, MCV 89.6, WBC 3200, including neutrophils 1900, lymphocytes 800 monocytes 300, and platelets 62,000.    Liver study on 9/2/2020 reported WBC 3200, including ANC 1900 lymphocytes 1000 monocytes 300, hemoglobin 10.5, MCV 80.0, and platelets 65,000.    His earliest CBC results available for review was from 5/8/2019 which reported WBC 4200, including ANC 2600, lymphocytes 1200 and monocytes 400, hemoglobin 13.4 and  platelets 95,000.  At that time chemistry lab reported elevated liver panel including  , alk phosphatase 87 and a total bilirubin 1.1.  Total serum protein 7.0 albumin 4.1 and the globulin 2.9.  His creatinine was 1.31, with glucose of 219 been in normal electrolytes.  Hemoglobin A1c was 8.3%.    Laboratory studies on 1/13/2021 reported mild anemia with Hb 10.9, thrombocytopenia platelets 48,000, and IPF 7.0%, leukocytopenia WBC 3250 including ANC 1830.  Other labs reported no evidence of hemolysis, had supratherapeutic B12 level 1413 pg/mL and folate > 20 ng/mL.  He does have evidence of iron deficiency with low iron sats 10% and ferritin only 41.5 ng/mL in the setting of anemia with Hb 10.9.  His erythropoietin is not elevated accordingly, which indicates lack of response from his kidney to produce erythropoietin.     Currently is not a candidate for CONRAD/Procrit treatment.  However he is adamant he is a candidate for oral iron treatment.    We will start him on ferrous sulfate 325 mg 3 times a day.  Will send prescription to his pharmacy.     Laboratory study 6/11/2021 showed a slightly improved hemoglobin 11.9, with stable thrombocytopenia platelets 50,000 IPF 8.3%, and WBC 3720 including ANC 2100.  Iron study reported ferritin 115.3 ng/mL and iron saturation 14% with free iron 55 TIBC 384.    Laboratory studies on 12/17/2021 reported stable pancytopenia with anemia Hb 10.8, with platelets 60,000 and IPF 4.1%, and WBC 3780 including neutrophils 2060 lymphocytes 1040.  Iron study reported slightly trending down ferritin 86.1 ng/mL in the much improved iron saturation 34% with free iron 126 and TIBC 368.      On 6/10/2022 reports patient has abdomen distention for about 3 months.  He also developed bilateral leg edema in December 2021.  Patient also reports worsening dyspnea and weight gain for the past several months..  Per our records, he gained 25 pounds since December 2021.    Patient reports  having taken oral iron 3 times a day.  He has mild nausea but not significant and no vomiting.  He has brown-colored stool.  He also reports having loose stool some of days.  No diarrhea.    Patient received a boost dose of COVID-19 vaccine on 11/6/2021.  Patient reports abdomen distention about 3 months, leg edema 6 months, after the boost dose of COVID (December) boost on 11/6/21    Patient complains of easy bruising on his arms from scratching, he has skin pruritus, however no skin rashes.  He denies spontaneous bleeding such as epistaxis, gum bleeding, or hematochezia.    Lab study on 6/10/2022 reported deteriorating anemia with Hb 9.6, persistent thrombocytopenia with stable platelets 69,000, and low normal WBC 4290 including neutrophils 2980.  Iron studies showed a deteriorating saturation 17%, free iron 61 TIBC 357 with a ferritin 86.8%.    Patient indeed had paracentesis on 6/17/2022 with 11.2 L ascites fluid removed.  Cytology study was negative for malignancy.    Patient reports today that he has worsening abdominal distention, also has dyspnea however no cough hemoptysis.  No chest pain.  He denies fever sweating chills.  Patient also reports worsening bilateral leg edema.    He also recently finished her 2 dose of Injectafer in late June 2022 because of iron deficiency anemia, not responding to oral iron treatment 3 times a day.    Patient continues to have easy bruising however no spontaneous bleeding such as epistaxis or gum bleeding.    Laboratory study on 7/6/2022 reported stable anemia Hb 9.7 and stable thrombocytopenia platelets 65,000, low normal WBC 4260.  Patient has worsening renal function with creatinine 1.36, and supratherapeutic ferritin 644 ng/mL.    Chest x-ray examination on 7/14/2022 reported a right-sided moderate to large pleural effusion.  We will arrange patient to have ultrasound-guided right thoracentesis.  Suspect the patient has pleural effusion due to his liver cirrhosis.  He  will have x-ray examination after the thoracentesis per protocol.  We will reassess whether he needs a CT scan for the chest.    This patient also had paracentesis again on 7/14/2022 with 6.2 L ascites fluid removed.      This patient had right thoracentesis with 2 L pleural effusion removed.  Cytology studies negative for malignant cells.  He also had multiple paracentesis, in June, July and early August, cytology study was also negative for malignant cells.    Laboratory study on 9/28/2022 reported worsening anemia Hb 8.5, platelets 61,000, WBC 3380 including ANC 1780.  Laboratory study showed significant hyperkalemia potassium 6.4, and worsening renal function creatinine 2.36.      I saw patient recently on 9/20/2022 and he was found having worsening renal function, with hyperkalemia potassium 6.4.  Patient was sent to ER and eventually admitted for several days.  His hemoglobin was 7.5 on 9/30/2022.  Patient was started on Procrit 20,000 units during hospitalization.  After discharge, he has been given Procrit every 2 weeks.     During his hospitalization in end of September 2022, patient had ultrasound for the liver examination on 9/29/2022 which reported a hepatic lesion 27 x 24 x 23 mm, heterogeneous in the anterior liver.  There was evidence of liver cirrhosis.      Patient also had attempted ultrasound-guided paracentesis, however there was not enough ascites to be removed.     Patient also had a venous Doppler study and there was no evidence for portal vein thrombosis.    Patient reports he was seen by his GI specialist Dr. Everardo Foote old records that abdomen MRI examination with and without contrast which was done at the [facility] on 10/26/2022.  This study confirmed liver cirrhosis however it also reported a solitary liver lesion.    This patient had CT-guided core needle biopsy of the liver lesion on 11/16/2022 after 1 unit platelets transfusion.  He did well with the procedure.  Patient  "reports he feels \"fine.\"  He denies any pain in the area. Denied any kind of bleeding. Denies any fevers after procedure as well.     Patient reports no fever sweating chills.  He is abdomen is slightly distended but no pains.  No nausea vomiting diarrhea.  He has no abdomen pain.  Performance status ECOG 1-2.     Patient reports he noticed improved energy level since starting on Procrit injection.  Overall he still has chronic fatigue.  He denies evidence of bleeding.    Laboratory study on 11/21/2022 reported baseline platelets 44,000, hemoglobin 8.9, and WBC 2980 including ANC 1670.    Reevaluation after his recent CT-guided ablation of the liver lesion on 12/6/2022 by interventional radiologist Dr. Declan Goodson.    The patient reports that taking oral iron has been really bothering his stomach and he is now only taking once a day. He denies any melena or hematochezia. He also denies any fever or pain. The patient reports quite a bit significant constipation from the oral iron. The patient adds that he has gained some weight, weighing today at 177 pounds and has gained 8 pounds within the last 4 weeks. The patient reports that he has an appointment with Dr. Everardo Fotoe on 12/29/2022 and Dr. Phill Mooney on 1/27/2022 to discuss his shunt. He also has an EGD tomorrow, 12/14/2022, with Dr. Josh Khan.    Laboratory study on 12/13/2022 reported hemoglobin 9.5 g/dL, MCV 93.6 fL, platelets 47,000/mm3, WBC 3,530/mm3 including neutrophils 2,020/mm3, and lymphocytes 830/mm3. Iron study reported ferritin 105.6 ng/mL, however, iron saturation 13% with free iron 43 mcg/dL and TIBC 332 mcg/dL.     The MRI study obtained on 02/20/2023 reported post ablation defect in the hepatic segment 8 measuring 3.4 x 2.8 cm. The study was done without iv contrast due to chronic renal disease. There was splenomegaly stable at 17.1 cm. No enlarged lymph nodes. No suspicious osseous lesion. There was liver cirrhosis. There was a small to " moderate size right sided pleural effusion.    Laboratory studies today on 6/30/2023 reported stable severe thrombocytopenia platelets 40,000, hemoglobin 9.3, and WBC 3200 including neutrophils 1930, lymphocytes 760, and monocytes 270.    Most recent iron study was on 05/05/2023 which reported ferritin 200.7 ng/mL, free iron 55, TIBC 309, and iron saturation 18%.    Recent MRI examination without IV contrast obtained on 06/12/2023 reported limited examination in the absence of intravenous contrast (creatinine 3.3), but it demonstrated a stable right hepatic lobe ablation defect. There was hepatic cirrhosis and splenomegaly.      This patient had MRI of the abdomen without contrast obtained on 12/08/2023. This study reported severely limited exam but a grossly stable hepatic segment 8 ablation defect measuring 3.7 x 2.7 cm. The study was without a contrast due to his chronic adrenal insufficiency.    Laboratory study 12/15/2023  shows hemoglobin 9.3 g/dL, MCV 93.0, MCHC 33.5, platelets 40,000, WBC 3260, including neutrophils 1900, and lymphocytes 850.    His recent laboratory study on 12/01/2023 reported ferritin 360, free iron 76, TIBC 259, and iron saturation 29%. His creatinine was 3.29 and glucose 257.       The patient presented today on 6/28/2024 for evaluation after recent MRI for the abdomen examination obtained on 06/19/2024. The study reported cirrhotic liver morphology and with segment eight ablation site approximately 1 cm. The radiologist recommended further evaluation with multiphase abnormal MRI with IV contrast. There is sequela of portal hypertension including change in the splenomegaly and likely recent splenic infarct. 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 Apparently, this patient has chronic renal insufficiency with a creatinine above 2, which is usually prohibits patient to have IV contrast for MRI or CT scan.     I double checked it  with MRI division in the radiology department, and was told that they had been using new IV contrast, and the kidney function is less problem now, and I was also told to even not to check renal function before the MRI examination with IV contrast. So in this case, we certainly would obtain an MRI with IV contrast as recommended by radiologist. Laboratory study today 06/28/2024 reported worsening hemoglobin 8.7, stable platelets 43,000 and WBC 2700 including neutrophils 1610 and the lymphocytes 620. Most recently on 06/14/2024, his hemoglobin was 10.1, we had to hold his Procrit. He was getting Procrit every 2 weeks at 37,000 units for the previous 4 times. I discussed with the patient, I discussed with the nursing staff, I recommended not to decrease the Procrit dose today and continue at 37,000. Most recently laboratory study on 06/14/2024 reported ferritin 71.2 ng/min, and iron saturation 39 percent with a free air 133. This patient has worsening ferritin level compared to that from 03/08/2024 when he had a ferritin 131.    The patient reports no current new health issues. His pruritus has significantly decreased. He has discontinued simvastatin due to associated pruritus. His nephrologist, Dr. Lagos, discontinued spironolactone due to persistently elevated potassium levels. He was subsequently prescribed sodium bicarbonate supplements and laxatives. His potassium levels have since decreased. He was advised to continue sodium bicarbonate and to discontinue spironolactone. His pharmacist suggested Farxiga or Jardiance due to potential interaction with Procrit. He continues to take iron supplements thrice daily without any reported side effects.    MRI for the abdomen on 8/1/2024 reported cirrhotic liver morphology with segment eight ablation site measures 2.8 x 2.4 x 2 cm which is improvement compared to data from December 8, 2023 but is stable compared to that from 6/19/2024.  There is a  approximately 1 cm  hyperintense lesion in the right hepatic dome which is stable.               MEDICATIONS    Current Outpatient Medications:     Trulicity 0.75 MG/0.5ML solution auto-injector, Inject 0.75 mg under the skin into the appropriate area as directed 1 (One) Time Per Week., Disp: , Rfl:     allopurinol (ZYLOPRIM) 100 MG tablet, Take 1 tablet by mouth Daily., Disp: , Rfl:     B-D ULTRAFINE III SHORT PEN 31G X 8 MM misc, , Disp: , Rfl:     carvedilol (COREG) 12.5 MG tablet, , Disp: , Rfl:     docusate sodium 100 MG capsule, Take 1 capsule by mouth., Disp: , Rfl:     docusate sodium 100 MG capsule, Take 1 capsule by mouth., Disp: , Rfl:     ferrous sulfate 325 (65 FE) MG tablet, Take 1 tablet by mouth 3 (Three) Times a Day With Meals. (Patient taking differently: Take 1 tablet by mouth Daily With Breakfast.), Disp: 90 tablet, Rfl: 2    FIBER PO, Take  by mouth., Disp: , Rfl:     FREESTYLE LITE test strip, , Disp: , Rfl:     furosemide (LASIX) 40 MG tablet, Take 1 tablet by mouth Daily., Disp: , Rfl:     glipizide (Glucotrol) 5 MG tablet, Take 1 tablet by mouth 2 (Two) Times a Day Before Meals., Disp: 60 tablet, Rfl: 2    hydrOXYzine (ATARAX) 10 MG tablet, Take 1 tablet by mouth. (Patient not taking: Reported on 3/7/2025), Disp: , Rfl:     Insulin Glargine (LANTUS SOLOSTAR) 100 UNIT/ML injection pen, Inject 25 Units under the skin into the appropriate area as directed Every Night., Disp: , Rfl:     insulin glargine (LANTUS, SEMGLEE) 100 UNIT/ML injection, Inject 25 Units under the skin into the appropriate area as directed Daily., Disp: , Rfl:     multivitamin (THERAGRAN) tablet tablet, Take  by mouth Daily., Disp: , Rfl:     nystatin (MYCOSTATIN) 307368 UNIT/GM cream, Apply 1 Application topically to the appropriate area as directed As Needed. (Patient not taking: Reported on 3/7/2025), Disp: , Rfl:     pantoprazole (PROTONIX) 40 MG EC tablet, , Disp: , Rfl:     pentoxifylline (TRENtal) 400 MG CR tablet, , Disp: , Rfl:      "polycarbophil 625 MG tablet tablet, Take 1 tablet by mouth., Disp: , Rfl:     Polyethylene Glycol 3350 (MIRALAX PO), Take  by mouth., Disp: , Rfl:     simvastatin (ZOCOR) 20 MG tablet, Take 1 tablet by mouth Daily. (Patient not taking: Reported on 3/7/2025), Disp: , Rfl:     spironolactone (ALDACTONE) 25 MG tablet, Take 1 tablet by mouth Daily for 30 days., Disp: 30 tablet, Rfl: 0    traZODone (DESYREL) 50 MG tablet, TAKE 1 TABLET AT NIGHT IF NEEDED FOR SLEEP, Disp: , Rfl:     ALLERGIES:     Allergies   Allergen Reactions    Doxycycline Itching    Rosuvastatin Other (See Comments)      Liver enzymes abnormal       SOCIAL HISTORY:         Social History     Socioeconomic History    Marital status:      Spouse name: Lisa   Tobacco Use    Smoking status: Former     Current packs/day: 0.00     Types: Cigarettes     Quit date:      Years since quittin.2   Vaping Use    Vaping status: Never Used   Substance and Sexual Activity    Alcohol use: Never   As of 2021, patient reports he does drink alcoholic beverage 10 drinks per week.  Denies illegal drug use.      FAMILY HISTORY:  No cancer in family, mother HTN,      REVIEW OF SYSTEM:  See HPI.       Vitals:    25 0948   Height: 165.1 cm (65\")   PainSc: 0-No pain     ECOG 1  Physical Exam    GENERAL:  Well-developed, well-nourished in no acute distress.    CHEST: Normal respiratory effort.  Lungs clear to auscultation. Good airflow.  CARDIAC:  Regular rate and rhythm.  Patient has systolic murmur.  Normal S1,S2.  ABDOMEN:  Soft, no tender.  Bowel sounds normal.  EXTREMITIES:  No lower extremity edema.      RECENT LABS:    Lab Results   Component Value Date    WBC 2.38 (L) 2025    HGB 10.8 (L) 2025    HCT 33.4 (L) 2025    MCV 95.7 2025    PLT 51 (L) 2025     Lab Results   Component Value Date    NEUTROABS 1.47 (L) 2025     Lab Results   Component Value Date    GLUCOSE 141 (H) 2025    BUN 29 (H) 2025    " CREATININE 2.27 (C) 03/07/2025     03/07/2025    K 3.7 03/07/2025     (H) 03/07/2025    CALCIUM 9.0 03/07/2025    PROTEINTOT 6.8 03/07/2025    ALBUMIN 3.7 03/07/2025    ALT 47 (H) 03/07/2025    AST 49 (H) 03/07/2025    ALKPHOS 130 (H) 03/07/2025    BILITOT 0.5 03/07/2025    GLOB 3.1 03/07/2025    AGRATIO 1.2 03/07/2025    BCR 12.8 03/07/2025    ANIONGAP 11.1 03/07/2025    EGFR 29.5 (L) 03/07/2025     Lab Results   Component Value Date    IRON 61 03/07/2025    LABIRON 18 (L) 03/07/2025    TRANSFERRIN 225 03/07/2025    TIBC 335 03/07/2025    FERRITIN 85.90 03/07/2025     Lab Results   Component Value Date    ZEMNPQIU97 1,050 (H) 03/07/2025     Lab Results   Component Value Date    FOLATE >20.00 03/07/2025       IMAGING:   MRI Abdomen Without Contrast (08/01/2024 07:35)       Assessment & Plan     Assessment & Plan      1.  Thrombocytopenia secondary to liver cirrhosis and splenomegaly.  Etiology of the thrombocytopenia is not clear.  However I do think it is likely related to his fatty liver which was documented from abdomen ultrasound on 7/20/2012.    Laboratory studies on 1/13/2021 reported excellent vitamin B12 level.  He had marginally elevated IPF 7.0%.  no apparent compensation for platelets production.   On 6/11/2021, platelets improved at 63,000 with normal IPF 5.3%.  Patient is asymptomatic.    On 12/17/2021, stable platelets 60,000 and normal IPF 4.1%. Patient is asymptomatic.   On 6/10/2022 platelets 69,000.  Patient reports no spontaneous bleeding.    On 7/6/2022 platelets 65,000.  No spontaneous bleeding.    On 9/28/2022 platelets 61,000.  Patient has easy bruising on extremities, however no spontaneous bleeding such as melena hematochezia or gum bleeding.    On 11/2/2022 platelets 48,000.  No change of clinical condition.  Continue to monitor.  Patient was given 1 unit platelets transfusion on 11/15/2022, platelets improved at 70,000 11/16/2022 when he had successful CT-guided core needle  biopsy of liver lesion.   On 11/21/2022 platelets decreased at baseline level of 44,000.  No spontaneous bleeding.  We will arrange patient to receive platelets transfusion prior and post point interventional procedure with radiofrequency ablation of the primary hepatocellular carcinoma scheduled on 12/6/2022.   On 12/5/2022, the patient had platelets 49,000/mm3. We gave him 1 unit of platelets transfusion before the ablation of the liver cancer and the posttransfusion platelets was only 56,000/mm3 on 12/6/2022.   On 12/13/2022, platelets is 47,000/mm3. The patient is not having any bleeding or bruising.  Patient has platelets 41,000 on 03/10/2023, and this is stable at the baseline condition. Patient has no excessive bleeding or bruising.  Lab study on 06/30/2023 reported stable thrombocytopenia with platelets 40,000. Patient has easy bruising, however, no spontaneous bleeding. Continue to monitor.   A laboratory study today, on 9/22/2023, reported stable severe thrombocytopenia with platelets of 36,000. This is his baseline level.  on 12/15/2023, the patient has a stable but severe thrombocytopenia with platelets 40,000. The patient asked about for stimulating platelet growth. I researched the UptoDate, and this seems only being used for patient prior to surgical procedure to improve the platelets, but no indication for long term use.   on 04/05/2024, the patient exhibits stable severe thrombocytopenia with platelet count of 41,000/mm3. He reports no instances of easy bleeding or bruising.   On 6/28/2024 platelets 43,000.  Today 9/20/2024 His hemoglobin level is currently at 11.1, with platelets at 48,000.   3/7/2025 His platelet count is low at 51,000, and his white blood cell count is also low at 2380, with neutrophils at 1470. These counts will continue to be monitored every 6 weeks.       2.  Iron deficiency anemia in the setting of chronic renal insufficiency stage III, and liver cirrhosis.   Laboratory study  on 1/13/2021 reported no evidence of hemolysis, had supratherapeutic B12 level and folate.    He does have evidence of iron deficiency with low iron sats 10% and ferritin only 41.5 ng/mL with Hb 10.9.    His erythropoietin 16.1, is not elevated accordingly, which indicates lack of response from his kidney to produce erythropoietin. Currently is not a candidate for CONRAD/Procrit treatment.    We started patient on oral ferrous sulfate 325 mg 3 times a day in January 2021.   On 3/19/2021, patient has improved hemoglobin 11.9.  Continue to monitor.  On 6/11/2021, stable anemia Hb 11.0, however much improved with ferritin 115.3 ng/mL, and iron saturation 14%.  Will advised to continue oral iron treatment.  On 12/17/2021 stable Hb 10.8, much improved iron saturation 34% and free iron 126, however with trending down ferritin level 86.1 ng/mL. Continue oral iron treatment.  6/10/2022, patient reports taking oral iron 3 times a day.  No apparent side effects except mild nausea.  Laboratory studies showed deteriorating hemoglobin 9.6 and also worsening iron saturation 17% with free iron 61 and the ferritin 86.8 ng/mL.  Discussed with the patient, recommended intravenous iron therapy, this patient has poor iron absorption.    Patient received 2 dose of Injectafer 750 mg x 2 in June 2022.  Lab study today on 7/6/2022 reported improved normalized iron saturation 21% with a supratherapeutic ferritin 644 ng/mL.  On 9/28/2022 ferritin 256 ng/mL iron saturation 22% free iron 66 TIBC 295.  Supratherapeutic B12 at 155 and normal RBC folate 2175 ng/mL.  Patient had a hemoglobin 7.2 on 9/30/2022.  Was started on Procrit injection 20,000 units during hospitalization.  No PRBC transfusion.  Procrit is subsequently continued as outpatient every 2 weeks.  On 10/21/2022 ferritin 198 and free iron 56 TIBC 294 iron saturation 19%.   Improved hemoglobin 9.2 on 11/2/2022.  We will continue Procrit 20,000 units every 2 weeks.  Hemoglobin 8.9 on  11/21/2022.  Continue Procrit injection.  The laboratory study today on 12/13/2022 reported deteriorating and recurrent iron deficiency with iron saturation only 13% with free iron 43 mcg/dL, TIBC 332 mcg/dL, and ferritin 105.6 ng/dL. The patient will be arranged for two doses of Injectafer prior to resumption of Procrit.  Post treatment iron study on 01/23/2023 reported significantly improved ferritin 559 and normalized iron saturation 35 percent with a free iron 89, TIBC 255. Patient was continued on Procrit every 2 weeks.   On 03/10/2023, the patient has hemoglobin 9.8, and normal iron study with ferritin 270 ng/mL and iron saturation 28% and free iron 82, TIBC 293.  Lab study on 05/05/2023 reported ferritin 200.7 ng/mL, free iron 55, TIBC 309, iron saturation 18%.   Patient continues on CONRAD/Procrit every 2 weeks.  The patient had a recurrent iron deficiency anemia with a hemoglobin of 8.9, ferritin of 95.3, free iron of 53, and iron saturation of 19% on 7/28/2023.  09/22/2023, the patient has hemoglobin 9.4. The patient will be given CONRAD/Procrit 37,000 units and will continue every 2 weeks with CBC monitorin.    His recent laboratory study on 12/01/2023 reported ferritin 360, free iron 76, TIBC 259, and iron saturation 29%.  On 12/15/2023, the patient has hemoglobin 9.3. He will continue epoetin sole/Procrit every 2 weeks.  2. Iron deficiency anemia.    The patient's hemoglobin level is 9.7% as of 04/05/2024, and he reports no instances of bleeding. The current treatment plan includes CONRAD/Procrit every 2 weeks.   6/14/2024 hemoglobin 10.1 and Procrit was on hold.  6/28/2024 hemoglobin 8.7.  The continuation of Procrit at 37,000 units will be maintained. The patient is advised to persist with oral iron supplementation, administered thrice daily. An appointment for an ultrasound-guided paracentesis is planned. The patient will also continue follow-ups with his nephrologist, Dr. Lagos.  B12 and folate will be  conducted today. The patient will return biweekly for CBC monitoring and a Procrit injection. Iron studies will be conducted as per the protocol.  9/20/2024 the hemoglobin 11.1. His iron levels are satisfactory, with ferritin at 115, iron saturation at 31%, and free iron at 103. In June 2024, his ferritin was 71, iron saturation was 39%, and free iron was 130.   3/7/2025 his hemoglobin level is currently at 10.8, which is satisfactory.  Creatinine 2.27.  The last Procrit injection was administered on July 27, 2024, and his hemoglobin has been maintained above 10 g since then. He reports feeling energetic and has not required a cane or additional support. He is currently taking iron supplements three times a day, which has caused some constipation managed with Dulcolax. His ferritin level is 85, similar to previous readings in January 2025 and December 2024, but lower than the level recorded in September. Iron saturation is at 18%, and free iron is 61. He will continue with the current iron supplementation regimen.       3.  Pancytopenia with mild leukocytopenia.  This is also likely related to his fatty liver and liver cirrhosis.  Patient drinks alcohol beverage as reported in January 2021.  Patient reports no recurrent infection.  His neutrophils marginally normal at 1830 out of WBC 3250.  Patient had supratherapeutic B12 level 1/13/2021.  Marginally better WBC 3720 including ANC 2100 3/19/2021.    6/11/2021 WBC 3530, with ANC 2180.  No recurrent infection.    On 12/17/2021 stable leukocytopenia WBC 3780 with low normal neutrophils 2016.  Patient is asymptomatic.  Continue to monitor.  On 6/10/2022 patient has slightly better WBC 4290 and neutrophils 2980.  On 7/6/2022 patient had WBC 4260 ANC 2620, lymphocytes 840 and monocytes 470.  On 9/28/2022 WBC 3380, including ANC 1780, hemoglobin 8.5, and platelets 61,000.  On 11/2/2022 total WBC 2970 including ANC 1730.  Continue to monitor.   On 11/21/2022 WBC 2980  including ANC 1670 and lymphocytes 820.  Patient is afebrile.  Continue to monitor.  On 12/13/2022, WBC 3,530/mm3 including neutrophils 2,020/mm3.  On 03/10/2023, WBC 2710 including neutrophils of 1570. The patient reports no fever, sweating, or chills. Most recent peak ANC was 2200 on 02/06/2023.  On 06/30/2023, patient is stable leukocytopenia with WBC 3200, including neutrophils 1930.  On 9/22/2023, the patient had WBC of 3440, including neutrophils of 1900, hemoglobin of 9.4, and platelets of 36,000.  on 12/15/2023, the patient has WBC 3260 including neutrophils 1900. The patient has no fever, sweating, or chills.    on 04/05/2024, the patient presents with leukocytopenia with WBC of 2740 and moderate neutropenia with ANC of 1630.  However, his neutrophil count was normal at 2300 and WBC of 4090 as of 03/22/2024.  6/20/2024 WBC 2710 neutrophils 1610.  Patient is afebrile continue to monitor.  9/20/2024 white blood cell count is 3180 and neutrophils are at 1800, which is within the low normal range.   3/7/2025 his platelet count is low at 51,000, and his white blood cell count is also low at 2380, with neutrophils at 1470.     4. COVID-19 vaccination.    6/11/2021, patient reports that he had 2 doses of COVID-19 vaccine, had very limited side effects with some soreness at the site of injection.   On 12/17/2021, patient reports he received a booster dose of COVID-19 vaccine on 11/6/2021.      5.  Ascites secondary to liver cirrhosis.  CT scan examination on 10/15/2021 confirmed liver cirrhosis.  On 6/10/2022 patient reports abdomen distention for the past 3 months, weight gains, 25 pounds per our records, and also has worsening dyspnea.  Physical examination shows very distended abdomen.  I think this patient has large ascites secondary to his liver cirrhosis.  I recommended to have ultrasound-guided therapeutic paracentesis with samples for routine chemistry labs cell counts and also for cytology study.  Patient  also has bilateral leg edema which is also secondary to liver cirrhosis.  Patient was seen her primary care physician next week.  I think most likely he will need to be started on diuretics.  He also needs to follow-up with his GI specialist Dr. Everardo Foote to discuss possibility of  shunt.   On 6/17/2022 patient had ultrasound-guided paracentesis, with 11.2 L clear yellow ascites removed.  Cytology study reported negative for malignant cells.  There was a reactive mesothelial cells histiocytes and mixed inflammatory cells.  Today on 7/6/2022 follow-up, patient reports worsening abdomen distention again.  I will arrange patient to have therapeutic paracentesis again as soon as possible, and also will arrange another paracentesis in 3 weeks.  At the meantime I also recommended to start the patient on low-dose Lasix 20 mg daily on 7/6/2022.   Paracentesis, with 6.4 L ascites removed 7/14/2022.  Paracentesis 2.4 L ascites removed 8/4/2022.   Failed attempt for paracentesis on 9/29/2022 during hospitalization, no ascites per ultrasound exam.  On 12/13/2022, the patient has more distended abdomen and also has been gaining weight for more than 10 pounds in the past 2 to 3 months. The patient had attempted paracentesis under ultrasound guidance on 12/29/2022, however, there were no ascites or examination, so the procedure was abandoned.   Abnormal MRI on 2/20/2023 reported no significant ascites.  On 03/10/2023, the patient presents for reevaluation. His wife reports the patient is gaining about 10 to 12 pounds since the beginning of 01/2023. By physical examination, I do not feel patient has ascites by percussion of the abdomen. He has trace edema in both legs. Patient is on Lasix 40 mg daily and spironolactone.   Ultrasound for the abdomen on 9/20/2023 reported liver cirrhosis and splenomegaly, no apparent significant ascites.  On 04/05/2024, the patient reports no distention of the abdomen.   on 6/28/2024 no specific  complaints.  Attempted ultrasound paracentesis on 7/18/2024 was abandoned due to no significant ascites by ultrasound.  3/7/2025 examination has no apparent ascites.       6.  Right pleural effusion.    Patient also complains of dyspnea, x-ray examination reported right pleural effusion 7/14/2022..  Patient had ultrasound-guided right thoracentesis with 2 L pleural effusion removed 7/27/2022.  Cytology studies negative for malignancy.  On 8/4/2022 patient had another 2 L pleural effusion removed on the right side.  The patient had a reaccumulation of the right pleural effusion as evidenced by his CT scan from 11/16/2022. We will arrange ultrasound-guided right thoracentesis.   The patient had ultrasound-guided right paracentesis on 12/29/2022 with a 500 mL of pleural effusion removed.   Abnormal MRI examination on 02/20/2023 reports small to moderate pleural effusion. I reviewed the images with the patient today on 03/10/2023, this is significant less than previous images. He is asymptomatic. We decided to observe for now.  MRI examination 06/12/2023 reported a right-sided pleural effusion. Physical examination today 06/30/2023, patient does have decreased but present breathing sounds at the left lung base.  Stable condition on 9/22/2023.  On 12/15/2023, the patient has no dyspnea.    On 04/05/2024, the patient has no complaints of dyspnea. His examination reveals clear breathing sounds bilaterally.  8/1/2024 MRI for the abdomen showed no evidence for lung base pleural effusion.  3/7/2025 patient has no dyspnea.    7.  Worsening renal function.  Laboratory study on 7/6/2022 reported creatinine 1.37.  He had creatinine 1.02 on 12/17/2021.  Discussed with patient, I recommended referring patient to nephrology service for evaluation of possible hepatorenal syndrome due to liver cirrhosis.  Patient is agreeable.  On 9/28/2022 patient reports that he still has no appointment for nephrology service.  On 11/2/2022  creatinine 2.34 with BUN 47.  On 11/21/2022 creatinine 2.33 with BUN 41.  On 12/6/22, cr was 2.07.  On 02/20/2023, the patient had a creatinine 2.42, so MRI was done without IV contrast as we had requested. Patient will continue to follow up with the nephrologist for evaluation and management.  On 05/19/2023, creatinine 2.60.  On 8/25/2023, creatinine was 2.61.  On 12/01/2023, creatinine 3.29. The patient reports he recently had a follow-up with his nephrologist last week.   On 03/23/2024, the patient's creatinine level was 2.56 mg/dL and BUN of 55 mg/dL. The patient will continue to follow up with his nephrologist.   6/14/2024 creatinine 2.19 BUN 39.  His kidney function was noted to be suboptimal six weeks ago 1/24/2025 with creatinine 2.63 and BUN 45.  Lab studies today showed improved creatinine 2.27 and BUN 29.  He is advised to continue monitoring his kidney function.      8.  Hepatocellular carcinoma.    Newly discovered hepatic lesion by ultrasound examination on 9/29/2022.  Patient is subsequently seen by Dr. Everardo Foote, had an MRI of the abdomen with and without contrast at the [facility] on 10/26/2022 which reported 3.2 cm liver lesion, likely hepatocellular carcinoma.  I recommended to have AFP study, also discussed with patient and his wife today on 11/2/2022 we will obtain the MRI images from [facility], and arrange CT-guided core needle biopsy.  Because of his thrombocytopenia, I will arrange 1 unit platelets transfusion right before the biopsy.  I did discuss with him about the risk for bleeding post the biopsy.  This is highly suspicious for primary hepatocellular carcinoma.   Normal AFP 2.37 ng/mL on 11/2/2022.     Patient had CT-guided core needle biopsy on 11/16/2022. Pathology evaluation reported moderately differentiated hepatocellular carcinoma.  I discussed with the interventional radiologist Dr. Goodson about interventional procedure with radiofrequency ablation  treatment for the solitary lesion in the liver, since this patient is not a candidate for surgical intervention due to multiple comorbidities. The procedure has been scheduled on 12/06/2022.  Due to his thrombocytopenia, we decided to transfuse him with 1 unit of platelets on 12/05/2022 and also also 1 unit platelets on 12/06/2022 after his procedure.  The patient had CT-guided ablation procedure on 12/6/2022. The patient reports good tolerance, no pains, and no fevers after procedure. Interventional radiology, Dr. Goodson, recommended to obtain abdomen MRI with and without contrast with liver protocol 2 to 3 months after the procedure for reassessment.  The patient had MRI of the abdomen on 02/20/2023, contrast was not given due to worsening renal function. Nevertheless, the study reported post treatment changes. No evidence for new lesions. As I discussed with the patient and his wife today on 03/10/2023, I recommended to obtain MRI in 3 months for reassessment. We will request IV contrast if his renal function is improving.  MRI examination was obtained on 06/12/2023, reported post treatment changes of the liver lesion, overall stable size.  The patient had an ultrasound examination of the abdomen obtained on 9/20/2023, as requested by his nephrologist, and the study reported a right hepatic lobe lesion measuring 2.6 x 1 x 1.2 cm. There is also splenomegaly measuring 16.5 cm. There was hepatic steatosis and cirrhosis. There were also gallbladder polyps.  The patient had MRI examination of the abdomen without contrast obtained on 12/08/2023, which reported stable segment 8 post ablation lesion 3.7 x 2.7 cm. This was compared to the previous MRI from 06/12/2023.  MRI without contrast was done on 8/1/2024 despite we requested using IV contrast.    An MRI without contrast will be scheduled for the end of July or early August 2025, and a follow-up appointment will be arranged approximately one week post-MRI.        9.   Diabetes.   On 11/21/2022 patient had significant elevated glucose 397.  Patient reports that he is diabetic, however his metformin was discontinued during his most recent hospitalization, and he was not put on any other medication for that.  He reports this morning's blood glucose level was about 180.  Discussed with patient, I will give him 10 units insulin today in the clinic, and also will start him on glipizide 5 mg twice a day.  Patient is agreeable and will follow up with his primary care physician for management of diabetes.  On 2/20/2023 glucose 152.  Lab study on 05/19/2023 reported glucose 104.   On 08/25/2023, glucose was 192. The patient will continue to follow up with a primary care physician for management.  On 12/15/2023, the patient reports that he was started on insulin treatment with glargine 24 units and it started last night. His glucose level this morning was 160.  2/23/2024 glucose 160.  6/14/2024 glucose 153.  9/6/2024 glucose 194.    Patient reports on 3/7/2025 that he was started on Trulicity about a month ago, and his fasting blood sugars are now in the 90s.  Glucose level today is 141.  He is advised to continue with Trulicity and monitor his blood sugar levels. A1c levels will be checked to assess long-term glucose control.          PLAN:  No Procrit injection today.  Continue oral iron 325 mg 3 times daily.  Monitor laboratory studies every 6 weeks with ferritin iron profile and possible Procrit if Hb less than 10.  An abdominal MRI without contrast will be scheduled for the end of July or early August 2025.   A follow-up appointment will be arranged approximately one week post-MRI.    Continue management of diabetes.  Continue follow-up with nephrologist.  Continue follow-up with PCP for routine health maintenance care.     I discussed with the patient about laboratory results and further management plan.  Patient voiced understanding and agreeable.    I spent 43 minutes caring for  Dennys on this date of service. This time includes time spent by me in the following activities: preparing for the visit, reviewing tests, obtaining and/or reviewing a separately obtained history, performing a medically appropriate examination and/or evaluation, counseling and educating the patient/family/caregiver, ordering medications, tests, or procedures, referring and communicating with other health care professionals, documenting information in the medical record, independently interpreting results and communicating that information with the patient/family/caregiver and care coordination             Milagro Salinas MD PhD       CC:  MD Everardo Cuello M.D.     Adolfo Mathias MD

## 2025-03-20 PROBLEM — D70.9 NEUTROPENIA: Status: ACTIVE | Noted: 2021-03-21

## 2025-04-18 ENCOUNTER — INFUSION (OUTPATIENT)
Dept: ONCOLOGY | Facility: HOSPITAL | Age: 75
End: 2025-04-18
Payer: MEDICARE

## 2025-04-18 ENCOUNTER — LAB (OUTPATIENT)
Dept: LAB | Facility: HOSPITAL | Age: 75
End: 2025-04-18
Payer: MEDICARE

## 2025-04-18 DIAGNOSIS — N18.9 ANEMIA DUE TO CHRONIC RENAL FAILURE TREATED WITH ERYTHROPOIETIN, UNSPECIFIED STAGE: ICD-10-CM

## 2025-04-18 DIAGNOSIS — D63.1 ANEMIA DUE TO CHRONIC RENAL FAILURE TREATED WITH ERYTHROPOIETIN, UNSPECIFIED STAGE: ICD-10-CM

## 2025-04-18 DIAGNOSIS — N18.30 STAGE 3 CHRONIC KIDNEY DISEASE, UNSPECIFIED WHETHER STAGE 3A OR 3B CKD: ICD-10-CM

## 2025-04-18 LAB
BASOPHILS # BLD AUTO: 0.02 10*3/MM3 (ref 0–0.2)
BASOPHILS NFR BLD AUTO: 0.7 % (ref 0–1.5)
DEPRECATED RDW RBC AUTO: 47.9 FL (ref 37–54)
EOSINOPHIL # BLD AUTO: 0.17 10*3/MM3 (ref 0–0.4)
EOSINOPHIL NFR BLD AUTO: 6.2 % (ref 0.3–6.2)
ERYTHROCYTE [DISTWIDTH] IN BLOOD BY AUTOMATED COUNT: 14 % (ref 12.3–15.4)
HCT VFR BLD AUTO: 30.9 % (ref 37.5–51)
HGB BLD-MCNC: 10.3 G/DL (ref 13–17.7)
IMM GRANULOCYTES # BLD AUTO: 0.02 10*3/MM3 (ref 0–0.05)
IMM GRANULOCYTES NFR BLD AUTO: 0.7 % (ref 0–0.5)
LYMPHOCYTES # BLD AUTO: 0.65 10*3/MM3 (ref 0.7–3.1)
LYMPHOCYTES NFR BLD AUTO: 23.8 % (ref 19.6–45.3)
MCH RBC QN AUTO: 31.4 PG (ref 26.6–33)
MCHC RBC AUTO-ENTMCNC: 33.3 G/DL (ref 31.5–35.7)
MCV RBC AUTO: 94.2 FL (ref 79–97)
MONOCYTES # BLD AUTO: 0.22 10*3/MM3 (ref 0.1–0.9)
MONOCYTES NFR BLD AUTO: 8.1 % (ref 5–12)
NEUTROPHILS NFR BLD AUTO: 1.65 10*3/MM3 (ref 1.7–7)
NEUTROPHILS NFR BLD AUTO: 60.5 % (ref 42.7–76)
NRBC BLD AUTO-RTO: 0 /100 WBC (ref 0–0.2)
PLATELET # BLD AUTO: 44 10*3/MM3 (ref 140–450)
PMV BLD AUTO: 13.9 FL (ref 6–12)
RBC # BLD AUTO: 3.28 10*6/MM3 (ref 4.14–5.8)
WBC NRBC COR # BLD AUTO: 2.73 10*3/MM3 (ref 3.4–10.8)

## 2025-04-18 PROCEDURE — G0463 HOSPITAL OUTPT CLINIC VISIT: HCPCS

## 2025-04-18 PROCEDURE — 36415 COLL VENOUS BLD VENIPUNCTURE: CPT

## 2025-04-18 PROCEDURE — 85025 COMPLETE CBC W/AUTO DIFF WBC: CPT

## 2025-04-18 NOTE — NURSING NOTE
No procrit today, hgb 10.3. Pt given copy of labs and d/c.     Lab Results   Component Value Date    WBC 2.73 (L) 04/18/2025    HGB 10.3 (L) 04/18/2025    HCT 30.9 (L) 04/18/2025    MCV 94.2 04/18/2025    PLT 44 (L) 04/18/2025

## 2025-05-30 ENCOUNTER — INFUSION (OUTPATIENT)
Dept: ONCOLOGY | Facility: HOSPITAL | Age: 75
End: 2025-05-30
Payer: MEDICARE

## 2025-05-30 ENCOUNTER — LAB (OUTPATIENT)
Dept: LAB | Facility: HOSPITAL | Age: 75
End: 2025-05-30
Payer: MEDICARE

## 2025-05-30 DIAGNOSIS — C22.0 HEPATOCELLULAR CARCINOMA: ICD-10-CM

## 2025-05-30 DIAGNOSIS — N18.30 STAGE 3 CHRONIC KIDNEY DISEASE, UNSPECIFIED WHETHER STAGE 3A OR 3B CKD: ICD-10-CM

## 2025-05-30 DIAGNOSIS — N18.9 ANEMIA DUE TO CHRONIC RENAL FAILURE TREATED WITH ERYTHROPOIETIN, UNSPECIFIED STAGE: ICD-10-CM

## 2025-05-30 DIAGNOSIS — D63.1 ANEMIA DUE TO CHRONIC RENAL FAILURE TREATED WITH ERYTHROPOIETIN, UNSPECIFIED STAGE: ICD-10-CM

## 2025-05-30 DIAGNOSIS — N18.4 CHRONIC KIDNEY DISEASE, STAGE IV (SEVERE): Primary | ICD-10-CM

## 2025-05-30 DIAGNOSIS — D50.9 IRON DEFICIENCY ANEMIA, UNSPECIFIED IRON DEFICIENCY ANEMIA TYPE: ICD-10-CM

## 2025-05-30 LAB
ALBUMIN SERPL-MCNC: 4.1 G/DL (ref 3.5–5.2)
ALBUMIN UR-MCNC: <1.2 MG/DL
ANION GAP SERPL CALCULATED.3IONS-SCNC: 12.6 MMOL/L (ref 5–15)
BASOPHILS # BLD AUTO: 0.02 10*3/MM3 (ref 0–0.2)
BASOPHILS NFR BLD AUTO: 0.7 % (ref 0–1.5)
BILIRUB UR QL STRIP: NEGATIVE
BUN SERPL-MCNC: 39.4 MG/DL (ref 8–23)
BUN/CREAT SERPL: 17.8 (ref 7–25)
CALCIUM SPEC-SCNC: 9.3 MG/DL (ref 8.6–10.5)
CHLORIDE SERPL-SCNC: 106 MMOL/L (ref 98–107)
CLARITY UR: CLEAR
CO2 SERPL-SCNC: 20.4 MMOL/L (ref 22–29)
COLOR UR: YELLOW
CREAT SERPL-MCNC: 2.21 MG/DL (ref 0.76–1.27)
CREAT UR-MCNC: 33.7 MG/DL
DEPRECATED RDW RBC AUTO: 49.3 FL (ref 37–54)
EGFRCR SERPLBLD CKD-EPI 2021: 30.5 ML/MIN/1.73
EOSINOPHIL # BLD AUTO: 0.1 10*3/MM3 (ref 0–0.4)
EOSINOPHIL NFR BLD AUTO: 3.4 % (ref 0.3–6.2)
ERYTHROCYTE [DISTWIDTH] IN BLOOD BY AUTOMATED COUNT: 13.9 % (ref 12.3–15.4)
FERRITIN SERPL-MCNC: 75.6 NG/ML (ref 30–400)
GLUCOSE SERPL-MCNC: 147 MG/DL (ref 65–99)
GLUCOSE UR STRIP-MCNC: ABNORMAL MG/DL
HCT VFR BLD AUTO: 33.2 % (ref 37.5–51)
HGB BLD-MCNC: 10.8 G/DL (ref 13–17.7)
HGB UR QL STRIP.AUTO: NEGATIVE
IMM GRANULOCYTES # BLD AUTO: 0.01 10*3/MM3 (ref 0–0.05)
IMM GRANULOCYTES NFR BLD AUTO: 0.3 % (ref 0–0.5)
IRON 24H UR-MRATE: 34 MCG/DL (ref 59–158)
IRON SATN MFR SERPL: 10 % (ref 20–50)
KETONES UR QL STRIP: NEGATIVE
LEUKOCYTE ESTERASE UR QL STRIP.AUTO: NEGATIVE
LYMPHOCYTES # BLD AUTO: 0.62 10*3/MM3 (ref 0.7–3.1)
LYMPHOCYTES NFR BLD AUTO: 21.3 % (ref 19.6–45.3)
MCH RBC QN AUTO: 31.2 PG (ref 26.6–33)
MCHC RBC AUTO-ENTMCNC: 32.5 G/DL (ref 31.5–35.7)
MCV RBC AUTO: 96 FL (ref 79–97)
MICROALBUMIN/CREAT UR: NORMAL MG/G{CREAT}
MONOCYTES # BLD AUTO: 0.18 10*3/MM3 (ref 0.1–0.9)
MONOCYTES NFR BLD AUTO: 6.2 % (ref 5–12)
NEUTROPHILS NFR BLD AUTO: 1.98 10*3/MM3 (ref 1.7–7)
NEUTROPHILS NFR BLD AUTO: 68.1 % (ref 42.7–76)
NITRITE UR QL STRIP: NEGATIVE
NRBC BLD AUTO-RTO: 0 /100 WBC (ref 0–0.2)
PH UR STRIP.AUTO: 5.5 [PH] (ref 4.5–8)
PHOSPHATE SERPL-MCNC: 3.3 MG/DL (ref 2.5–4.5)
PLATELET # BLD AUTO: 49 10*3/MM3 (ref 140–450)
PMV BLD AUTO: 11.8 FL (ref 6–12)
POTASSIUM SERPL-SCNC: 4 MMOL/L (ref 3.5–5.2)
PROT UR QL STRIP: NEGATIVE
PTH-INTACT SERPL-MCNC: 91.4 PG/ML (ref 15–65)
RBC # BLD AUTO: 3.46 10*6/MM3 (ref 4.14–5.8)
SODIUM SERPL-SCNC: 139 MMOL/L (ref 136–145)
SP GR UR STRIP: 1.01 (ref 1–1.03)
TIBC SERPL-MCNC: 353 MCG/DL (ref 298–536)
TRANSFERRIN SERPL-MCNC: 237 MG/DL (ref 200–360)
UROBILINOGEN UR QL STRIP: ABNORMAL
WBC NRBC COR # BLD AUTO: 2.91 10*3/MM3 (ref 3.4–10.8)

## 2025-05-30 PROCEDURE — 83970 ASSAY OF PARATHORMONE: CPT | Performed by: INTERNAL MEDICINE

## 2025-05-30 PROCEDURE — 82570 ASSAY OF URINE CREATININE: CPT | Performed by: INTERNAL MEDICINE

## 2025-05-30 PROCEDURE — 80069 RENAL FUNCTION PANEL: CPT

## 2025-05-30 PROCEDURE — 82043 UR ALBUMIN QUANTITATIVE: CPT | Performed by: INTERNAL MEDICINE

## 2025-05-30 PROCEDURE — 84466 ASSAY OF TRANSFERRIN: CPT

## 2025-05-30 PROCEDURE — 36415 COLL VENOUS BLD VENIPUNCTURE: CPT

## 2025-05-30 PROCEDURE — 81003 URINALYSIS AUTO W/O SCOPE: CPT

## 2025-05-30 PROCEDURE — 82728 ASSAY OF FERRITIN: CPT

## 2025-05-30 PROCEDURE — 83540 ASSAY OF IRON: CPT

## 2025-05-30 PROCEDURE — 85025 COMPLETE CBC W/AUTO DIFF WBC: CPT

## 2025-07-11 ENCOUNTER — INFUSION (OUTPATIENT)
Dept: ONCOLOGY | Facility: HOSPITAL | Age: 75
End: 2025-07-11
Payer: MEDICARE

## 2025-07-11 ENCOUNTER — LAB (OUTPATIENT)
Dept: LAB | Facility: HOSPITAL | Age: 75
End: 2025-07-11
Payer: MEDICARE

## 2025-07-11 DIAGNOSIS — D63.1 ANEMIA DUE TO CHRONIC RENAL FAILURE TREATED WITH ERYTHROPOIETIN, UNSPECIFIED STAGE: ICD-10-CM

## 2025-07-11 DIAGNOSIS — N18.9 ANEMIA DUE TO CHRONIC RENAL FAILURE TREATED WITH ERYTHROPOIETIN, UNSPECIFIED STAGE: ICD-10-CM

## 2025-07-11 DIAGNOSIS — N18.30 STAGE 3 CHRONIC KIDNEY DISEASE, UNSPECIFIED WHETHER STAGE 3A OR 3B CKD: ICD-10-CM

## 2025-07-11 LAB
BASOPHILS # BLD AUTO: 0.03 10*3/MM3 (ref 0–0.2)
BASOPHILS NFR BLD AUTO: 0.8 % (ref 0–1.5)
DEPRECATED RDW RBC AUTO: 45.6 FL (ref 37–54)
EOSINOPHIL # BLD AUTO: 0.12 10*3/MM3 (ref 0–0.4)
EOSINOPHIL NFR BLD AUTO: 3.4 % (ref 0.3–6.2)
ERYTHROCYTE [DISTWIDTH] IN BLOOD BY AUTOMATED COUNT: 13.7 % (ref 12.3–15.4)
HCT VFR BLD AUTO: 33.6 % (ref 37.5–51)
HGB BLD-MCNC: 11.4 G/DL (ref 13–17.7)
IMM GRANULOCYTES # BLD AUTO: 0.06 10*3/MM3 (ref 0–0.05)
IMM GRANULOCYTES NFR BLD AUTO: 1.7 % (ref 0–0.5)
LYMPHOCYTES # BLD AUTO: 0.8 10*3/MM3 (ref 0.7–3.1)
LYMPHOCYTES NFR BLD AUTO: 22.5 % (ref 19.6–45.3)
MCH RBC QN AUTO: 31.1 PG (ref 26.6–33)
MCHC RBC AUTO-ENTMCNC: 33.9 G/DL (ref 31.5–35.7)
MCV RBC AUTO: 91.8 FL (ref 79–97)
MONOCYTES # BLD AUTO: 0.26 10*3/MM3 (ref 0.1–0.9)
MONOCYTES NFR BLD AUTO: 7.3 % (ref 5–12)
NEUTROPHILS NFR BLD AUTO: 2.28 10*3/MM3 (ref 1.7–7)
NEUTROPHILS NFR BLD AUTO: 64.3 % (ref 42.7–76)
NRBC BLD AUTO-RTO: 0 /100 WBC (ref 0–0.2)
PLATELET # BLD AUTO: 59 10*3/MM3 (ref 140–450)
PMV BLD AUTO: 12.8 FL (ref 6–12)
RBC # BLD AUTO: 3.66 10*6/MM3 (ref 4.14–5.8)
WBC NRBC COR # BLD AUTO: 3.55 10*3/MM3 (ref 3.4–10.8)

## 2025-07-11 PROCEDURE — 36415 COLL VENOUS BLD VENIPUNCTURE: CPT

## 2025-07-11 PROCEDURE — 85025 COMPLETE CBC W/AUTO DIFF WBC: CPT

## 2025-07-11 NOTE — PROGRESS NOTES
Hgb today is 11.4.  No procrit today per guidelines.   Patient does have c/o being tired.   Iron labs to be drawn next visit.   Given copy of labs.

## 2025-08-11 DIAGNOSIS — C22.0 HEPATOCELLULAR CARCINOMA: Primary | ICD-10-CM

## 2025-08-11 DIAGNOSIS — N18.30 STAGE 3 CHRONIC KIDNEY DISEASE, UNSPECIFIED WHETHER STAGE 3A OR 3B CKD: ICD-10-CM

## 2025-08-11 DIAGNOSIS — D50.0 IRON DEFICIENCY ANEMIA DUE TO CHRONIC BLOOD LOSS: ICD-10-CM

## 2025-08-11 DIAGNOSIS — D63.1 ANEMIA DUE TO CHRONIC RENAL FAILURE TREATED WITH ERYTHROPOIETIN, UNSPECIFIED STAGE: ICD-10-CM

## 2025-08-11 DIAGNOSIS — N18.9 ANEMIA DUE TO CHRONIC RENAL FAILURE TREATED WITH ERYTHROPOIETIN, UNSPECIFIED STAGE: ICD-10-CM

## 2025-08-15 ENCOUNTER — HOSPITAL ENCOUNTER (OUTPATIENT)
Dept: MRI IMAGING | Facility: HOSPITAL | Age: 75
Discharge: HOME | End: 2025-08-15
Payer: MEDICARE

## 2025-08-15 DIAGNOSIS — N18.30 STAGE 3 CHRONIC KIDNEY DISEASE, UNSPECIFIED WHETHER STAGE 3A OR 3B CKD: ICD-10-CM

## 2025-08-15 DIAGNOSIS — C22.0 HEPATOCELLULAR CARCINOMA: ICD-10-CM

## 2025-08-15 PROCEDURE — 74181 MRI ABDOMEN W/O CONTRAST: CPT

## 2025-08-22 ENCOUNTER — INFUSION (OUTPATIENT)
Dept: ONCOLOGY | Facility: HOSPITAL | Age: 75
End: 2025-08-22
Payer: MEDICARE

## 2025-08-22 ENCOUNTER — LAB (OUTPATIENT)
Dept: LAB | Facility: HOSPITAL | Age: 75
End: 2025-08-22
Payer: MEDICARE